# Patient Record
Sex: MALE | Race: WHITE | Employment: OTHER | URBAN - METROPOLITAN AREA
[De-identification: names, ages, dates, MRNs, and addresses within clinical notes are randomized per-mention and may not be internally consistent; named-entity substitution may affect disease eponyms.]

---

## 2017-03-15 LAB
AVERAGE GLUCOSE: NORMAL
CHOLESTEROL, TOTAL: 125 MG/DL
CHOLESTEROL/HDL RATIO: NORMAL
HBA1C MFR BLD: 6.1 %
HDLC SERPL-MCNC: 39 MG/DL (ref 35–70)
LDL CHOLESTEROL CALCULATED: 61 MG/DL (ref 0–160)
TRIGL SERPL-MCNC: 127 MG/DL
VLDLC SERPL CALC-MCNC: NORMAL MG/DL

## 2017-09-12 ENCOUNTER — TELEPHONE (OUTPATIENT)
Dept: PHARMACY | Facility: CLINIC | Age: 77
End: 2017-09-12

## 2018-03-27 ENCOUNTER — OFFICE VISIT (OUTPATIENT)
Dept: PRIMARY CARE CLINIC | Age: 78
End: 2018-03-27
Payer: MEDICARE

## 2018-03-27 VITALS
OXYGEN SATURATION: 97 % | WEIGHT: 228 LBS | TEMPERATURE: 97.7 F | RESPIRATION RATE: 18 BRPM | BODY MASS INDEX: 30.22 KG/M2 | SYSTOLIC BLOOD PRESSURE: 108 MMHG | HEIGHT: 73 IN | HEART RATE: 61 BPM | DIASTOLIC BLOOD PRESSURE: 62 MMHG

## 2018-03-27 DIAGNOSIS — R53.82 CHRONIC FATIGUE: ICD-10-CM

## 2018-03-27 DIAGNOSIS — N32.0 BLADDER OUTLET OBSTRUCTION: ICD-10-CM

## 2018-03-27 DIAGNOSIS — E78.2 MIXED HYPERLIPIDEMIA: ICD-10-CM

## 2018-03-27 DIAGNOSIS — I25.5 ISCHEMIC CARDIOMYOPATHY: Primary | ICD-10-CM

## 2018-03-27 DIAGNOSIS — I25.10 CORONARY ARTERY DISEASE INVOLVING NATIVE CORONARY ARTERY OF NATIVE HEART WITHOUT ANGINA PECTORIS: ICD-10-CM

## 2018-03-27 DIAGNOSIS — R97.20 ELEVATED PSA: ICD-10-CM

## 2018-03-27 DIAGNOSIS — M17.5 OTHER SECONDARY OSTEOARTHRITIS OF KNEE, UNSPECIFIED LATERALITY: ICD-10-CM

## 2018-03-27 DIAGNOSIS — L98.9 SKIN LESIONS: ICD-10-CM

## 2018-03-27 DIAGNOSIS — G47.34 IDIOPATHIC SLEEP RELATED NONOBSTRUCTIVE ALVEOLAR HYPOVENTILATION: ICD-10-CM

## 2018-03-27 DIAGNOSIS — I15.9 SECONDARY HYPERTENSION: ICD-10-CM

## 2018-03-27 DIAGNOSIS — R73.09 ELEVATED HEMOGLOBIN A1C: ICD-10-CM

## 2018-03-27 DIAGNOSIS — N42.89 PROSTATE ASYMMETRY: ICD-10-CM

## 2018-03-27 PROCEDURE — 99214 OFFICE O/P EST MOD 30 MIN: CPT | Performed by: INTERNAL MEDICINE

## 2018-03-27 RX ORDER — LOSARTAN POTASSIUM 25 MG/1
12.5 TABLET ORAL DAILY
Qty: 90 TABLET | Refills: 3 | Status: CANCELLED | OUTPATIENT
Start: 2018-03-27

## 2018-03-27 RX ORDER — METOPROLOL SUCCINATE 25 MG/1
25 TABLET, EXTENDED RELEASE ORAL DAILY
Status: ON HOLD | COMMUNITY
End: 2018-04-12

## 2018-03-27 RX ORDER — LOSARTAN POTASSIUM 25 MG/1
25 TABLET ORAL DAILY
COMMUNITY
End: 2018-03-27 | Stop reason: SDUPTHER

## 2018-03-27 RX ORDER — LOSARTAN POTASSIUM 25 MG/1
25 TABLET ORAL DAILY
Qty: 90 TABLET | Refills: 3 | Status: ON HOLD | OUTPATIENT
Start: 2018-03-27 | End: 2018-04-12

## 2018-03-27 ASSESSMENT — ENCOUNTER SYMPTOMS
DOUBLE VISION: 0
BLURRED VISION: 0
DIARRHEA: 0
BLOOD IN STOOL: 0
NAUSEA: 0
STRIDOR: 0
HEMOPTYSIS: 0
EYE PAIN: 0
SHORTNESS OF BREATH: 0
EYE REDNESS: 0

## 2018-03-27 ASSESSMENT — PATIENT HEALTH QUESTIONNAIRE - PHQ9
SUM OF ALL RESPONSES TO PHQ9 QUESTIONS 1 & 2: 0
2. FEELING DOWN, DEPRESSED OR HOPELESS: 0
1. LITTLE INTEREST OR PLEASURE IN DOING THINGS: 0
SUM OF ALL RESPONSES TO PHQ QUESTIONS 1-9: 0

## 2018-03-27 NOTE — PROGRESS NOTES
Chief Complaint   Patient presents with    Hypertension     6mth check-up    Hyperlipidemia    Health Maintenance     due for flu,tdap,pneumo,       HPI:   Patient last seen 9/26/17    Chief complaint reason for visit patient is complaining of bladder outlet obstruction frequency of urination nocturia    As call located history of known coronary artery disease follows up with cardiologist at [facility]    Patient uses CPAP on a regular basis apparently derives benefit from this. Colonoscopy in 2013 no GI complaints. Patient has had stent placements in the past for coronary disease with ischemic heart disease. He is working out on a regular basis. Allergy and Medications are reviewed and updated. Past Medical History, Surgical History, and Family History has been reviewed and updated. Review of Systems:  Review of Systems   Constitutional: Negative for chills and fever. HENT: Negative for congestion, ear discharge and ear pain. Eyes: Negative for blurred vision, double vision, pain and redness. Respiratory: Negative for hemoptysis, shortness of breath and stridor. Cardiovascular: Negative for chest pain, claudication, leg swelling and PND. Gastrointestinal: Negative for blood in stool, diarrhea and nausea. Genitourinary: Negative for dysuria, hematuria and urgency. Musculoskeletal: Negative for falls and joint pain. Skin: Negative for rash. Neurological: Negative for dizziness, focal weakness, seizures and loss of consciousness. Psychiatric/Behavioral: Negative for depression, hallucinations, memory loss and suicidal ideas. Vitals:    03/27/18 1026   BP: 108/62   Site: Left Arm   Position: Sitting   Cuff Size: Large Adult   Pulse: 61   Resp: 18   Temp: 97.7 °F (36.5 °C)   TempSrc: Tympanic   SpO2: 97%   Weight: 228 lb (103.4 kg)   Height: 6' 1\" (1.854 m)       Physical Exam:  Physical Exam   Constitutional: He is oriented to person, place, and time.  He appears [address], [place] ([facility])      External Referral To Dermatology      CBC with Differential      Comprehensive Metabolic Panel      Hemoglobin A1C      Lipid Panel      TSH with Reflex      UA without Microscopic, Reflex C&S   4. Idiopathic sleep related nonobstructive alveolar hypoventilation G47.34 327.24 [facility], [address], [place] ([facility])      External Referral To Dermatology      CBC with Differential      Comprehensive Metabolic Panel      Hemoglobin A1C      Lipid Panel      TSH with Reflex      UA without Microscopic, Reflex C&S   5. Other secondary osteoarthritis of knee, unspecified laterality M17.5 715.26 [facility], [address], [place] ([facility])      External Referral To Dermatology      CBC with Differential      Comprehensive Metabolic Panel      Hemoglobin A1C      Lipid Panel      TSH with Reflex      UA without Microscopic, Reflex C&S   6. Mixed hyperlipidemia E78.2 272.2 [facility], [address], [place] ([facility])      External Referral To Dermatology      CBC with Differential      Comprehensive Metabolic Panel      Hemoglobin A1C      Lipid Panel      TSH with Reflex      UA without Microscopic, Reflex C&S   7. Elevated hemoglobin A1c R73.09 790.29 [facility], [address], [place] ([facility])      External Referral To Dermatology      CBC with Differential      Comprehensive Metabolic Panel      Hemoglobin A1C      Lipid Panel      TSH with Reflex      UA without Microscopic, Reflex C&S   8. Bladder outlet obstruction N32.0 596.0 [facility], [address], [place] ([facility])      External Referral To Dermatology      CBC with Differential      Comprehensive Metabolic Panel      Hemoglobin A1C      Lipid Panel      TSH with Reflex      UA without Microscopic, Reflex C&S   9.  Prostate asymmetry N42.89 602.8 [facility], [address], [place] ([facility])      External Referral To Dermatology      CBC with Differential      Comprehensive Metabolic Panel      Hemoglobin A1C      Lipid Panel      TSH with Reflex      UA without Microscopic, Reflex

## 2018-03-27 NOTE — PATIENT INSTRUCTIONS
1.  Continue to follow up on a regular basis with all usual consultants that she see  2. Be sure to continue following dietary instructions  3. Review your medications, note any changes that will be made by me or any other physician seeing.   Set up appointment with Dr. Bryan Geronimo, Urology, and Dr. Aldo Nguyen, dermatology  See me in 3 months, lab test befor

## 2018-04-06 ENCOUNTER — HOSPITAL ENCOUNTER (OUTPATIENT)
Age: 78
Discharge: HOME | End: 2018-04-08
Payer: MEDICARE

## 2018-04-06 LAB — PROSTATE SPECIFIC ANTIGEN: 2.14 NG/ML (ref 0–4)

## 2018-04-06 PROCEDURE — G0103 PSA SCREENING: HCPCS

## 2018-04-11 ENCOUNTER — APPOINTMENT (OUTPATIENT)
Dept: GENERAL RADIOLOGY | Age: 78
End: 2018-04-11
Payer: MEDICARE

## 2018-04-11 ENCOUNTER — APPOINTMENT (OUTPATIENT)
Dept: CT IMAGING | Age: 78
End: 2018-04-11
Payer: MEDICARE

## 2018-04-11 ENCOUNTER — HOSPITAL ENCOUNTER (OUTPATIENT)
Age: 78
Setting detail: OBSERVATION
Discharge: HOME | End: 2018-04-12
Attending: EMERGENCY MEDICINE | Admitting: INTERNAL MEDICINE
Payer: MEDICARE

## 2018-04-11 DIAGNOSIS — I50.22 CHF (CONGESTIVE HEART FAILURE), NYHA CLASS I, CHRONIC, SYSTOLIC (HCC): ICD-10-CM

## 2018-04-11 DIAGNOSIS — J32.0 CHRONIC MAXILLARY SINUSITIS: ICD-10-CM

## 2018-04-11 DIAGNOSIS — G45.9 TRANSIENT CEREBRAL ISCHEMIA, UNSPECIFIED TYPE: Primary | ICD-10-CM

## 2018-04-11 DIAGNOSIS — J32.8 CHRONIC FRONTOETHMOIDAL SINUSITIS: ICD-10-CM

## 2018-04-11 DIAGNOSIS — I25.10 CORONARY ARTERY DISEASE INVOLVING NATIVE CORONARY ARTERY OF NATIVE HEART WITHOUT ANGINA PECTORIS: ICD-10-CM

## 2018-04-11 DIAGNOSIS — I25.2 HISTORY OF NON-ST ELEVATION MYOCARDIAL INFARCTION (NSTEMI): ICD-10-CM

## 2018-04-11 PROBLEM — J44.9 COPD (CHRONIC OBSTRUCTIVE PULMONARY DISEASE) (HCC): Status: ACTIVE | Noted: 2018-04-01

## 2018-04-11 LAB
ANION GAP SERPL CALCULATED.3IONS-SCNC: 12 MMOL/L (ref 7–16)
APTT: 32.8 SEC (ref 24.5–35.1)
BILIRUBIN URINE: NEGATIVE
BLOOD, URINE: NEGATIVE
BUN BLDV-MCNC: 16 MG/DL (ref 8–23)
CALCIUM SERPL-MCNC: 8.8 MG/DL (ref 8.6–10.2)
CHLORIDE BLD-SCNC: 105 MMOL/L (ref 98–107)
CLARITY: CLEAR
CO2: 25 MMOL/L (ref 22–29)
COLOR: YELLOW
CREAT SERPL-MCNC: 0.8 MG/DL (ref 0.7–1.2)
EKG ATRIAL RATE: 66 BPM
EKG P AXIS: 39 DEGREES
EKG P-R INTERVAL: 158 MS
EKG Q-T INTERVAL: 414 MS
EKG QRS DURATION: 92 MS
EKG QTC CALCULATION (BAZETT): 434 MS
EKG R AXIS: -2 DEGREES
EKG T AXIS: 9 DEGREES
EKG VENTRICULAR RATE: 66 BPM
GFR AFRICAN AMERICAN: >60
GFR NON-AFRICAN AMERICAN: >60 ML/MIN/1.73
GLUCOSE BLD-MCNC: 110 MG/DL (ref 74–109)
GLUCOSE URINE: NEGATIVE MG/DL
HCT VFR BLD CALC: 41.7 % (ref 37–54)
HEMOGLOBIN: 13.5 G/DL (ref 12.5–16.5)
INR BLD: 1
KETONES, URINE: NEGATIVE MG/DL
LEUKOCYTE ESTERASE, URINE: NEGATIVE
MCH RBC QN AUTO: 28.4 PG (ref 26–35)
MCHC RBC AUTO-ENTMCNC: 32.4 % (ref 32–34.5)
MCV RBC AUTO: 87.6 FL (ref 80–99.9)
NITRITE, URINE: NEGATIVE
PDW BLD-RTO: 15.4 FL (ref 11.5–15)
PH UA: 6 (ref 5–9)
PLATELET # BLD: 211 E9/L (ref 130–450)
PMV BLD AUTO: 11.2 FL (ref 7–12)
POTASSIUM SERPL-SCNC: 3.5 MMOL/L (ref 3.5–5)
PROTEIN UA: NEGATIVE MG/DL
PROTHROMBIN TIME: 11.7 SEC (ref 9.3–12.4)
RBC # BLD: 4.76 E12/L (ref 3.8–5.8)
SODIUM BLD-SCNC: 142 MMOL/L (ref 132–146)
SPECIFIC GRAVITY UA: 1.02 (ref 1–1.03)
TROPONIN: <0.01 NG/ML (ref 0–0.03)
UROBILINOGEN, URINE: 0.2 E.U./DL
WBC # BLD: 9.4 E9/L (ref 4.5–11.5)

## 2018-04-11 PROCEDURE — G0378 HOSPITAL OBSERVATION PER HR: HCPCS

## 2018-04-11 PROCEDURE — 93005 ELECTROCARDIOGRAM TRACING: CPT | Performed by: PHYSICIAN ASSISTANT

## 2018-04-11 PROCEDURE — 80048 BASIC METABOLIC PNL TOTAL CA: CPT

## 2018-04-11 PROCEDURE — 81003 URINALYSIS AUTO W/O SCOPE: CPT

## 2018-04-11 PROCEDURE — 85730 THROMBOPLASTIN TIME PARTIAL: CPT

## 2018-04-11 PROCEDURE — 99285 EMERGENCY DEPT VISIT HI MDM: CPT

## 2018-04-11 PROCEDURE — 71045 X-RAY EXAM CHEST 1 VIEW: CPT

## 2018-04-11 PROCEDURE — 85610 PROTHROMBIN TIME: CPT

## 2018-04-11 PROCEDURE — 70450 CT HEAD/BRAIN W/O DYE: CPT

## 2018-04-11 PROCEDURE — 84484 ASSAY OF TROPONIN QUANT: CPT

## 2018-04-11 PROCEDURE — 85027 COMPLETE CBC AUTOMATED: CPT

## 2018-04-11 RX ORDER — LOSARTAN POTASSIUM 50 MG/1
50 TABLET ORAL DAILY
Status: DISCONTINUED | OUTPATIENT
Start: 2018-04-12 | End: 2018-04-12 | Stop reason: HOSPADM

## 2018-04-11 RX ORDER — ASPIRIN 81 MG/1
81 TABLET ORAL DAILY
Status: DISCONTINUED | OUTPATIENT
Start: 2018-04-12 | End: 2018-04-12 | Stop reason: HOSPADM

## 2018-04-11 RX ORDER — FORMOTEROL FUMARATE 20 UG/2ML
20 SOLUTION RESPIRATORY (INHALATION) 2 TIMES DAILY
Status: DISCONTINUED | OUTPATIENT
Start: 2018-04-12 | End: 2018-04-12

## 2018-04-11 RX ORDER — NITROGLYCERIN 0.4 MG/1
0.4 TABLET SUBLINGUAL EVERY 5 MIN PRN
Status: DISCONTINUED | OUTPATIENT
Start: 2018-04-11 | End: 2018-04-12 | Stop reason: HOSPADM

## 2018-04-11 RX ORDER — CLOPIDOGREL BISULFATE 75 MG/1
75 TABLET ORAL DAILY
Status: DISCONTINUED | OUTPATIENT
Start: 2018-04-12 | End: 2018-04-12 | Stop reason: HOSPADM

## 2018-04-11 RX ORDER — SODIUM CHLORIDE 9 MG/ML
INJECTION, SOLUTION INTRAVENOUS CONTINUOUS
Status: DISCONTINUED | OUTPATIENT
Start: 2018-04-12 | End: 2018-04-12 | Stop reason: HOSPADM

## 2018-04-11 RX ORDER — IPRATROPIUM BROMIDE AND ALBUTEROL SULFATE 2.5; .5 MG/3ML; MG/3ML
1 SOLUTION RESPIRATORY (INHALATION)
Status: DISCONTINUED | OUTPATIENT
Start: 2018-04-12 | End: 2018-04-12

## 2018-04-11 RX ORDER — M-VIT,TX,IRON,MINS/CALC/FOLIC 27MG-0.4MG
1 TABLET ORAL DAILY
Status: DISCONTINUED | OUTPATIENT
Start: 2018-04-12 | End: 2018-04-12 | Stop reason: HOSPADM

## 2018-04-11 RX ORDER — CHOLECALCIFEROL (VITAMIN D3) 50 MCG
2000 TABLET ORAL DAILY
Status: DISCONTINUED | OUTPATIENT
Start: 2018-04-12 | End: 2018-04-12 | Stop reason: HOSPADM

## 2018-04-11 RX ORDER — ATORVASTATIN CALCIUM 40 MG/1
40 TABLET, FILM COATED ORAL DAILY
Status: DISCONTINUED | OUTPATIENT
Start: 2018-04-12 | End: 2018-04-12

## 2018-04-11 RX ORDER — ACETAMINOPHEN 325 MG/1
650 TABLET ORAL EVERY 4 HOURS PRN
Status: DISCONTINUED | OUTPATIENT
Start: 2018-04-11 | End: 2018-04-12 | Stop reason: HOSPADM

## 2018-04-11 RX ORDER — PANTOPRAZOLE SODIUM 40 MG/1
40 TABLET, DELAYED RELEASE ORAL
Status: DISCONTINUED | OUTPATIENT
Start: 2018-04-12 | End: 2018-04-12 | Stop reason: HOSPADM

## 2018-04-11 RX ORDER — METOPROLOL SUCCINATE 50 MG/1
50 TABLET, EXTENDED RELEASE ORAL DAILY
Status: DISCONTINUED | OUTPATIENT
Start: 2018-04-12 | End: 2018-04-12

## 2018-04-11 RX ORDER — ONDANSETRON 2 MG/ML
4 INJECTION INTRAMUSCULAR; INTRAVENOUS EVERY 6 HOURS PRN
Status: DISCONTINUED | OUTPATIENT
Start: 2018-04-11 | End: 2018-04-12 | Stop reason: HOSPADM

## 2018-04-11 RX ORDER — METHYLPREDNISOLONE SODIUM SUCCINATE 40 MG/ML
40 INJECTION, POWDER, LYOPHILIZED, FOR SOLUTION INTRAMUSCULAR; INTRAVENOUS EVERY 8 HOURS
Status: DISCONTINUED | OUTPATIENT
Start: 2018-04-12 | End: 2018-04-12

## 2018-04-11 RX ORDER — BUDESONIDE 0.25 MG/2ML
250 INHALANT ORAL 2 TIMES DAILY
Status: DISCONTINUED | OUTPATIENT
Start: 2018-04-12 | End: 2018-04-12

## 2018-04-11 ASSESSMENT — ENCOUNTER SYMPTOMS
ABDOMINAL PAIN: 0
SHORTNESS OF BREATH: 0
EYE REDNESS: 0
EYE PAIN: 0
SORE THROAT: 0
DIARRHEA: 0
VOMITING: 0
EYE DISCHARGE: 0
BLOOD IN STOOL: 0
NAUSEA: 0
BACK PAIN: 0
SINUS PRESSURE: 0
WHEEZING: 0
COUGH: 0

## 2018-04-11 ASSESSMENT — PAIN SCALES - GENERAL: PAINLEVEL_OUTOF10: 0

## 2018-04-12 ENCOUNTER — APPOINTMENT (OUTPATIENT)
Dept: MRI IMAGING | Age: 78
End: 2018-04-12
Payer: MEDICARE

## 2018-04-12 ENCOUNTER — APPOINTMENT (OUTPATIENT)
Dept: ULTRASOUND IMAGING | Age: 78
End: 2018-04-12
Payer: MEDICARE

## 2018-04-12 VITALS
HEART RATE: 81 BPM | SYSTOLIC BLOOD PRESSURE: 134 MMHG | BODY MASS INDEX: 30.22 KG/M2 | HEIGHT: 73 IN | DIASTOLIC BLOOD PRESSURE: 64 MMHG | OXYGEN SATURATION: 95 % | WEIGHT: 228 LBS | TEMPERATURE: 97.9 F | RESPIRATION RATE: 18 BRPM

## 2018-04-12 LAB
ALBUMIN SERPL-MCNC: 3.4 G/DL (ref 3.5–5.2)
ALP BLD-CCNC: 51 U/L (ref 40–129)
ALT SERPL-CCNC: 11 U/L (ref 0–40)
ANION GAP SERPL CALCULATED.3IONS-SCNC: 11 MMOL/L (ref 7–16)
AST SERPL-CCNC: 12 U/L (ref 0–39)
BASOPHILS ABSOLUTE: 0.08 E9/L (ref 0–0.2)
BASOPHILS RELATIVE PERCENT: 1 % (ref 0–2)
BILIRUB SERPL-MCNC: 0.4 MG/DL (ref 0–1.2)
BUN BLDV-MCNC: 15 MG/DL (ref 8–23)
CALCIUM SERPL-MCNC: 8.7 MG/DL (ref 8.6–10.2)
CHLORIDE BLD-SCNC: 108 MMOL/L (ref 98–107)
CHOLESTEROL, TOTAL: 158 MG/DL (ref 0–199)
CO2: 22 MMOL/L (ref 22–29)
CREAT SERPL-MCNC: 0.7 MG/DL (ref 0.7–1.2)
EOSINOPHILS ABSOLUTE: 0.66 E9/L (ref 0.05–0.5)
EOSINOPHILS RELATIVE PERCENT: 8.4 % (ref 0–6)
FILM ARRAY ADENOVIRUS: NORMAL
FILM ARRAY BORDETELLA PERTUSSIS: NORMAL
FILM ARRAY CHLAMYDOPHILIA PNEUMONIAE: NORMAL
FILM ARRAY CORONAVIRUS 229E: NORMAL
FILM ARRAY CORONAVIRUS HKU1: NORMAL
FILM ARRAY CORONAVIRUS NL63: NORMAL
FILM ARRAY CORONAVIRUS OC43: NORMAL
FILM ARRAY INFLUENZA A VIRUS 09H1: NORMAL
FILM ARRAY INFLUENZA A VIRUS H1: NORMAL
FILM ARRAY INFLUENZA A VIRUS H3: NORMAL
FILM ARRAY INFLUENZA A VIRUS: NORMAL
FILM ARRAY INFLUENZA B: NORMAL
FILM ARRAY METAPNEUMOVIRUS: NORMAL
FILM ARRAY MYCOPLASMA PNEUMONIAE: NORMAL
FILM ARRAY PARAINFLUENZA VIRUS 1: NORMAL
FILM ARRAY PARAINFLUENZA VIRUS 2: NORMAL
FILM ARRAY PARAINFLUENZA VIRUS 3: NORMAL
FILM ARRAY PARAINFLUENZA VIRUS 4: NORMAL
FILM ARRAY RESPIRATORY SYNCITIAL VIRUS: NORMAL
FILM ARRAY RHINOVIRUS/ENTEROVIRUS: NORMAL
FOLATE: >20 NG/ML (ref 4.8–24.2)
GFR AFRICAN AMERICAN: >60
GFR NON-AFRICAN AMERICAN: >60 ML/MIN/1.73
GLUCOSE BLD-MCNC: 105 MG/DL (ref 74–109)
HBA1C MFR BLD: 5.8 % (ref 4.8–5.9)
HCT VFR BLD CALC: 39.5 % (ref 37–54)
HDLC SERPL-MCNC: 59 MG/DL
HEMOGLOBIN: 12.8 G/DL (ref 12.5–16.5)
IMMATURE GRANULOCYTES #: 0.03 E9/L
IMMATURE GRANULOCYTES %: 0.4 % (ref 0–5)
L. PNEUMOPHILA SEROGP 1 UR AG: NORMAL
LDL CHOLESTEROL CALCULATED: 81 MG/DL (ref 0–99)
LV EF: 55 %
LVEF MODALITY: NORMAL
LYMPHOCYTES ABSOLUTE: 3.18 E9/L (ref 1.5–4)
LYMPHOCYTES RELATIVE PERCENT: 40.5 % (ref 20–42)
MAGNESIUM: 2 MG/DL (ref 1.6–2.6)
MCH RBC QN AUTO: 28.3 PG (ref 26–35)
MCHC RBC AUTO-ENTMCNC: 32.4 % (ref 32–34.5)
MCV RBC AUTO: 87.4 FL (ref 80–99.9)
MONOCYTES ABSOLUTE: 0.74 E9/L (ref 0.1–0.95)
MONOCYTES RELATIVE PERCENT: 9.4 % (ref 2–12)
NEUTROPHILS ABSOLUTE: 3.16 E9/L (ref 1.8–7.3)
NEUTROPHILS RELATIVE PERCENT: 40.3 % (ref 43–80)
PDW BLD-RTO: 15.3 FL (ref 11.5–15)
PHOSPHORUS: 4.1 MG/DL (ref 2.5–4.5)
PLATELET # BLD: 192 E9/L (ref 130–450)
PMV BLD AUTO: 11.3 FL (ref 7–12)
POTASSIUM SERPL-SCNC: 3.8 MMOL/L (ref 3.5–5)
PRO-BNP: 439 PG/ML (ref 0–450)
RBC # BLD: 4.52 E12/L (ref 3.8–5.8)
SODIUM BLD-SCNC: 141 MMOL/L (ref 132–146)
STREP PNEUMONIAE ANTIGEN, URINE: NORMAL
TOTAL PROTEIN: 5.8 G/DL (ref 6.4–8.3)
TRIGL SERPL-MCNC: 91 MG/DL (ref 0–149)
TROPONIN: <0.01 NG/ML (ref 0–0.03)
TROPONIN: <0.01 NG/ML (ref 0–0.03)
TSH SERPL DL<=0.05 MIU/L-ACNC: 2.97 UIU/ML (ref 0.27–4.2)
VITAMIN B-12: 552 PG/ML (ref 211–946)
VLDLC SERPL CALC-MCNC: 18 MG/DL
WBC # BLD: 7.9 E9/L (ref 4.5–11.5)

## 2018-04-12 PROCEDURE — 96375 TX/PRO/DX INJ NEW DRUG ADDON: CPT

## 2018-04-12 PROCEDURE — 87501 INFLUENZA DNA AMP PROB 1+: CPT

## 2018-04-12 PROCEDURE — 87581 M.PNEUMON DNA AMP PROBE: CPT

## 2018-04-12 PROCEDURE — 80061 LIPID PANEL: CPT

## 2018-04-12 PROCEDURE — G8988 SELF CARE GOAL STATUS: HCPCS

## 2018-04-12 PROCEDURE — 87088 URINE BACTERIA CULTURE: CPT

## 2018-04-12 PROCEDURE — G8989 SELF CARE D/C STATUS: HCPCS

## 2018-04-12 PROCEDURE — 6370000000 HC RX 637 (ALT 250 FOR IP): Performed by: INTERNAL MEDICINE

## 2018-04-12 PROCEDURE — 83036 HEMOGLOBIN GLYCOSYLATED A1C: CPT

## 2018-04-12 PROCEDURE — 84439 ASSAY OF FREE THYROXINE: CPT

## 2018-04-12 PROCEDURE — 97165 OT EVAL LOW COMPLEX 30 MIN: CPT

## 2018-04-12 PROCEDURE — 87040 BLOOD CULTURE FOR BACTERIA: CPT

## 2018-04-12 PROCEDURE — 96376 TX/PRO/DX INJ SAME DRUG ADON: CPT

## 2018-04-12 PROCEDURE — 93880 EXTRACRANIAL BILAT STUDY: CPT

## 2018-04-12 PROCEDURE — 87798 DETECT AGENT NOS DNA AMP: CPT

## 2018-04-12 PROCEDURE — 2580000003 HC RX 258

## 2018-04-12 PROCEDURE — 87450 HC DIRECT STREP B ANTIGEN: CPT

## 2018-04-12 PROCEDURE — G8978 MOBILITY CURRENT STATUS: HCPCS

## 2018-04-12 PROCEDURE — 87503 INFLUENZA DNA AMP PROB ADDL: CPT

## 2018-04-12 PROCEDURE — 80053 COMPREHEN METABOLIC PANEL: CPT

## 2018-04-12 PROCEDURE — 6360000002 HC RX W HCPCS: Performed by: INTERNAL MEDICINE

## 2018-04-12 PROCEDURE — G0378 HOSPITAL OBSERVATION PER HR: HCPCS

## 2018-04-12 PROCEDURE — 82607 VITAMIN B-12: CPT

## 2018-04-12 PROCEDURE — 85025 COMPLETE CBC W/AUTO DIFF WBC: CPT

## 2018-04-12 PROCEDURE — G8987 SELF CARE CURRENT STATUS: HCPCS

## 2018-04-12 PROCEDURE — 87502 INFLUENZA DNA AMP PROBE: CPT

## 2018-04-12 PROCEDURE — 84100 ASSAY OF PHOSPHORUS: CPT

## 2018-04-12 PROCEDURE — 93306 TTE W/DOPPLER COMPLETE: CPT

## 2018-04-12 PROCEDURE — 96374 THER/PROPH/DIAG INJ IV PUSH: CPT

## 2018-04-12 PROCEDURE — 84443 ASSAY THYROID STIM HORMONE: CPT

## 2018-04-12 PROCEDURE — 36415 COLL VENOUS BLD VENIPUNCTURE: CPT

## 2018-04-12 PROCEDURE — 97161 PT EVAL LOW COMPLEX 20 MIN: CPT

## 2018-04-12 PROCEDURE — 94640 AIRWAY INHALATION TREATMENT: CPT

## 2018-04-12 PROCEDURE — 83735 ASSAY OF MAGNESIUM: CPT

## 2018-04-12 PROCEDURE — 83880 ASSAY OF NATRIURETIC PEPTIDE: CPT

## 2018-04-12 PROCEDURE — 84484 ASSAY OF TROPONIN QUANT: CPT

## 2018-04-12 PROCEDURE — 2580000003 HC RX 258: Performed by: INTERNAL MEDICINE

## 2018-04-12 PROCEDURE — 70551 MRI BRAIN STEM W/O DYE: CPT

## 2018-04-12 PROCEDURE — 87486 CHLMYD PNEUM DNA AMP PROBE: CPT

## 2018-04-12 PROCEDURE — 82746 ASSAY OF FOLIC ACID SERUM: CPT

## 2018-04-12 PROCEDURE — G8980 MOBILITY D/C STATUS: HCPCS

## 2018-04-12 PROCEDURE — 87205 SMEAR GRAM STAIN: CPT

## 2018-04-12 RX ORDER — METOPROLOL SUCCINATE 25 MG/1
50 TABLET, EXTENDED RELEASE ORAL DAILY
Qty: 30 TABLET | Refills: 3 | Status: SHIPPED | OUTPATIENT
Start: 2018-04-12 | End: 2018-07-11 | Stop reason: CLARIF

## 2018-04-12 RX ORDER — METOPROLOL SUCCINATE 50 MG/1
50 TABLET, EXTENDED RELEASE ORAL DAILY
Status: DISCONTINUED | OUTPATIENT
Start: 2018-04-12 | End: 2018-04-12 | Stop reason: HOSPADM

## 2018-04-12 RX ORDER — ATORVASTATIN CALCIUM 40 MG/1
40 TABLET, FILM COATED ORAL DAILY
Status: DISCONTINUED | OUTPATIENT
Start: 2018-04-12 | End: 2018-04-12 | Stop reason: HOSPADM

## 2018-04-12 RX ORDER — LOSARTAN POTASSIUM 25 MG/1
50 TABLET ORAL DAILY
Qty: 90 TABLET | Refills: 3 | Status: SHIPPED | OUTPATIENT
Start: 2018-04-12 | End: 2019-03-21 | Stop reason: SDUPTHER

## 2018-04-12 RX ORDER — ATORVASTATIN CALCIUM 80 MG/1
80 TABLET, FILM COATED ORAL DAILY
Qty: 30 TABLET | Refills: 3 | Status: SHIPPED | OUTPATIENT
Start: 2018-04-12 | End: 2018-06-18 | Stop reason: SDUPTHER

## 2018-04-12 RX ADMIN — SODIUM CHLORIDE: 9 INJECTION, SOLUTION INTRAVENOUS at 00:55

## 2018-04-12 RX ADMIN — ATORVASTATIN CALCIUM 40 MG: 40 TABLET, FILM COATED ORAL at 01:42

## 2018-04-12 RX ADMIN — ENOXAPARIN SODIUM 40 MG: 40 INJECTION, SOLUTION INTRAVENOUS; SUBCUTANEOUS at 08:58

## 2018-04-12 RX ADMIN — LOSARTAN POTASSIUM 50 MG: 50 TABLET, FILM COATED ORAL at 08:58

## 2018-04-12 RX ADMIN — BUDESONIDE 250 MCG: 0.25 SUSPENSION RESPIRATORY (INHALATION) at 09:46

## 2018-04-12 RX ADMIN — CHOLECALCIFEROL TAB 50 MCG (2000 UNIT) 2000 UNITS: 50 TAB at 09:09

## 2018-04-12 RX ADMIN — Medication 1 TABLET: at 08:57

## 2018-04-12 RX ADMIN — FORMOTEROL FUMARATE DIHYDRATE 20 MCG: 20 SOLUTION RESPIRATORY (INHALATION) at 09:45

## 2018-04-12 RX ADMIN — WATER 1 ML: 1 INJECTION INTRAMUSCULAR; INTRAVENOUS; SUBCUTANEOUS at 08:58

## 2018-04-12 RX ADMIN — METOPROLOL SUCCINATE 50 MG: 50 TABLET, EXTENDED RELEASE ORAL at 01:43

## 2018-04-12 RX ADMIN — PANTOPRAZOLE SODIUM 40 MG: 40 TABLET, DELAYED RELEASE ORAL at 09:09

## 2018-04-12 RX ADMIN — WATER 1 ML: 1 INJECTION INTRAMUSCULAR; INTRAVENOUS; SUBCUTANEOUS at 01:43

## 2018-04-12 RX ADMIN — SODIUM CHLORIDE: 9 INJECTION, SOLUTION INTRAVENOUS at 17:49

## 2018-04-12 RX ADMIN — METHYLPREDNISOLONE SODIUM SUCCINATE 40 MG: 40 INJECTION, POWDER, LYOPHILIZED, FOR SOLUTION INTRAMUSCULAR; INTRAVENOUS at 08:58

## 2018-04-12 RX ADMIN — METHYLPREDNISOLONE SODIUM SUCCINATE 40 MG: 40 INJECTION, POWDER, LYOPHILIZED, FOR SOLUTION INTRAMUSCULAR; INTRAVENOUS at 01:43

## 2018-04-12 RX ADMIN — ASPIRIN 81 MG: 81 TABLET, COATED ORAL at 08:57

## 2018-04-12 RX ADMIN — CLOPIDOGREL BISULFATE 75 MG: 75 TABLET ORAL at 08:57

## 2018-04-12 ASSESSMENT — PAIN SCALES - GENERAL
PAINLEVEL_OUTOF10: 0

## 2018-04-13 LAB
GRAM STAIN ORDERABLE: NORMAL
T4 FREE: 1.36 NG/DL (ref 0.93–1.7)

## 2018-04-14 LAB — URINE CULTURE, ROUTINE: NORMAL

## 2018-04-17 LAB
BLOOD CULTURE, ROUTINE: NORMAL
CULTURE, BLOOD 2: NORMAL

## 2018-05-29 ENCOUNTER — OFFICE VISIT (OUTPATIENT)
Dept: CARDIOLOGY CLINIC | Age: 78
End: 2018-05-29
Payer: MEDICARE

## 2018-05-29 VITALS
DIASTOLIC BLOOD PRESSURE: 72 MMHG | BODY MASS INDEX: 29.42 KG/M2 | RESPIRATION RATE: 16 BRPM | SYSTOLIC BLOOD PRESSURE: 112 MMHG | HEART RATE: 60 BPM | HEIGHT: 73 IN | WEIGHT: 222 LBS

## 2018-05-29 DIAGNOSIS — R06.09 DOE (DYSPNEA ON EXERTION): ICD-10-CM

## 2018-05-29 DIAGNOSIS — R07.9 CHEST PAIN, UNSPECIFIED TYPE: Primary | ICD-10-CM

## 2018-05-29 PROCEDURE — 93000 ELECTROCARDIOGRAM COMPLETE: CPT | Performed by: INTERNAL MEDICINE

## 2018-05-29 PROCEDURE — 99214 OFFICE O/P EST MOD 30 MIN: CPT | Performed by: INTERNAL MEDICINE

## 2018-06-18 RX ORDER — ATORVASTATIN CALCIUM 80 MG/1
80 TABLET, FILM COATED ORAL DAILY
Qty: 90 TABLET | Refills: 1 | Status: SHIPPED | OUTPATIENT
Start: 2018-06-18 | End: 2018-07-11 | Stop reason: SDUPTHER

## 2018-06-27 ENCOUNTER — HOSPITAL ENCOUNTER (OUTPATIENT)
Age: 78
Discharge: HOME | End: 2018-06-29
Payer: MEDICARE

## 2018-06-27 DIAGNOSIS — M17.5 OTHER SECONDARY OSTEOARTHRITIS OF KNEE, UNSPECIFIED LATERALITY: ICD-10-CM

## 2018-06-27 DIAGNOSIS — N42.89 PROSTATE ASYMMETRY: ICD-10-CM

## 2018-06-27 DIAGNOSIS — I25.5 ISCHEMIC CARDIOMYOPATHY: ICD-10-CM

## 2018-06-27 DIAGNOSIS — I25.10 CORONARY ARTERY DISEASE INVOLVING NATIVE CORONARY ARTERY OF NATIVE HEART WITHOUT ANGINA PECTORIS: ICD-10-CM

## 2018-06-27 DIAGNOSIS — L98.9 SKIN LESIONS: ICD-10-CM

## 2018-06-27 DIAGNOSIS — R97.20 ELEVATED PSA: ICD-10-CM

## 2018-06-27 DIAGNOSIS — G47.34 IDIOPATHIC SLEEP RELATED NONOBSTRUCTIVE ALVEOLAR HYPOVENTILATION: ICD-10-CM

## 2018-06-27 DIAGNOSIS — N32.0 BLADDER OUTLET OBSTRUCTION: ICD-10-CM

## 2018-06-27 DIAGNOSIS — R73.09 ELEVATED HEMOGLOBIN A1C: ICD-10-CM

## 2018-06-27 DIAGNOSIS — E78.2 MIXED HYPERLIPIDEMIA: ICD-10-CM

## 2018-06-27 DIAGNOSIS — R53.82 CHRONIC FATIGUE: ICD-10-CM

## 2018-06-27 DIAGNOSIS — I15.9 SECONDARY HYPERTENSION: ICD-10-CM

## 2018-06-27 LAB
ALBUMIN SERPL-MCNC: 4 G/DL (ref 3.5–5.2)
ALP BLD-CCNC: 70 U/L (ref 40–129)
ALT SERPL-CCNC: 11 U/L (ref 0–40)
AMYLASE: 74 U/L (ref 20–100)
ANION GAP SERPL CALCULATED.3IONS-SCNC: 12 MMOL/L (ref 7–16)
AST SERPL-CCNC: 13 U/L (ref 0–39)
BASOPHILS ABSOLUTE: 0.07 E9/L (ref 0–0.2)
BASOPHILS RELATIVE PERCENT: 0.8 % (ref 0–2)
BILIRUB SERPL-MCNC: 0.8 MG/DL (ref 0–1.2)
BUN BLDV-MCNC: 19 MG/DL (ref 8–23)
CALCIUM SERPL-MCNC: 8.9 MG/DL (ref 8.6–10.2)
CHLORIDE BLD-SCNC: 108 MMOL/L (ref 98–107)
CHOLESTEROL, TOTAL: 120 MG/DL (ref 0–199)
CO2: 24 MMOL/L (ref 22–29)
CREAT SERPL-MCNC: 0.9 MG/DL (ref 0.7–1.2)
EOSINOPHILS ABSOLUTE: 0.6 E9/L (ref 0.05–0.5)
EOSINOPHILS RELATIVE PERCENT: 7 % (ref 0–6)
GFR AFRICAN AMERICAN: >60
GFR NON-AFRICAN AMERICAN: >60 ML/MIN/1.73
GLUCOSE BLD-MCNC: 103 MG/DL (ref 74–109)
HBA1C MFR BLD: 6.1 % (ref 4–5.6)
HCT VFR BLD CALC: 45.3 % (ref 37–54)
HDLC SERPL-MCNC: 42 MG/DL
HEMOGLOBIN: 14 G/DL (ref 12.5–16.5)
IMMATURE GRANULOCYTES #: 0.02 E9/L
IMMATURE GRANULOCYTES %: 0.2 % (ref 0–5)
LDL CHOLESTEROL CALCULATED: 57 MG/DL (ref 0–99)
LIPASE: 48 U/L (ref 13–60)
LYMPHOCYTES ABSOLUTE: 3.19 E9/L (ref 1.5–4)
LYMPHOCYTES RELATIVE PERCENT: 37.2 % (ref 20–42)
MCH RBC QN AUTO: 27.3 PG (ref 26–35)
MCHC RBC AUTO-ENTMCNC: 30.9 % (ref 32–34.5)
MCV RBC AUTO: 88.3 FL (ref 80–99.9)
MONOCYTES ABSOLUTE: 0.81 E9/L (ref 0.1–0.95)
MONOCYTES RELATIVE PERCENT: 9.5 % (ref 2–12)
NEUTROPHILS ABSOLUTE: 3.88 E9/L (ref 1.8–7.3)
NEUTROPHILS RELATIVE PERCENT: 45.3 % (ref 43–80)
PDW BLD-RTO: 15.2 FL (ref 11.5–15)
PLATELET # BLD: 215 E9/L (ref 130–450)
PMV BLD AUTO: 11.6 FL (ref 7–12)
POTASSIUM SERPL-SCNC: 4.4 MMOL/L (ref 3.5–5)
RBC # BLD: 5.13 E12/L (ref 3.8–5.8)
SODIUM BLD-SCNC: 144 MMOL/L (ref 132–146)
TOTAL PROTEIN: 6.7 G/DL (ref 6.4–8.3)
TRIGL SERPL-MCNC: 107 MG/DL (ref 0–149)
TSH SERPL DL<=0.05 MIU/L-ACNC: 1.93 UIU/ML (ref 0.27–4.2)
VLDLC SERPL CALC-MCNC: 21 MG/DL
WBC # BLD: 8.6 E9/L (ref 4.5–11.5)

## 2018-06-27 PROCEDURE — 83036 HEMOGLOBIN GLYCOSYLATED A1C: CPT

## 2018-06-27 PROCEDURE — 80061 LIPID PANEL: CPT

## 2018-06-27 PROCEDURE — 83690 ASSAY OF LIPASE: CPT

## 2018-06-27 PROCEDURE — 85025 COMPLETE CBC W/AUTO DIFF WBC: CPT

## 2018-06-27 PROCEDURE — 80053 COMPREHEN METABOLIC PANEL: CPT

## 2018-06-27 PROCEDURE — 82150 ASSAY OF AMYLASE: CPT

## 2018-06-27 PROCEDURE — 84443 ASSAY THYROID STIM HORMONE: CPT

## 2018-06-28 ENCOUNTER — HOSPITAL ENCOUNTER (OUTPATIENT)
Dept: PULMONOLOGY | Age: 78
Discharge: HOME | End: 2018-06-28
Payer: MEDICARE

## 2018-06-28 DIAGNOSIS — R06.09 DOE (DYSPNEA ON EXERTION): ICD-10-CM

## 2018-06-28 PROCEDURE — 94726 PLETHYSMOGRAPHY LUNG VOLUMES: CPT

## 2018-06-28 PROCEDURE — 94060 EVALUATION OF WHEEZING: CPT

## 2018-06-28 PROCEDURE — 94729 DIFFUSING CAPACITY: CPT

## 2018-07-02 ENCOUNTER — TELEPHONE (OUTPATIENT)
Dept: CARDIOLOGY CLINIC | Age: 78
End: 2018-07-02

## 2018-07-02 NOTE — TELEPHONE ENCOUNTER
----- Message from Bebe Dawson DO sent at 7/2/2018  3:33 PM EDT -----  Let him know that he has abnormal pulmonary function tests indicating that he has lung disease. I'll defer this to his PCP.  Please forward this to his PCP and ask him to follow-up on this with his PCP.  ----- Message -----  From: Gianni Zavala Incoming Scans From [facility]  Sent: 7/2/2018   2:03 PM  To: Bebe Dawson DO

## 2018-07-02 NOTE — TELEPHONE ENCOUNTER
Patient notified of PFT results and Dr. Morgan Doing recommendation.  Note and PFT results faxed to Dr. Tonia Lemus [phone]

## 2018-07-03 ENCOUNTER — TELEPHONE (OUTPATIENT)
Dept: PRIMARY CARE CLINIC | Age: 78
End: 2018-07-03

## 2018-07-03 DIAGNOSIS — R94.2 ABNORMAL PFTS (PULMONARY FUNCTION TESTS): Primary | ICD-10-CM

## 2018-07-05 NOTE — TELEPHONE ENCOUNTER
Patient has been advised a referral has been sent to Dr. Scott Valerio.   His office will contact patient to schedule

## 2018-07-11 ENCOUNTER — OFFICE VISIT (OUTPATIENT)
Dept: PRIMARY CARE CLINIC | Age: 78
End: 2018-07-11
Payer: MEDICARE

## 2018-07-11 VITALS
WEIGHT: 224 LBS | HEIGHT: 73 IN | TEMPERATURE: 97.2 F | OXYGEN SATURATION: 96 % | BODY MASS INDEX: 29.69 KG/M2 | HEART RATE: 60 BPM | RESPIRATION RATE: 18 BRPM | DIASTOLIC BLOOD PRESSURE: 72 MMHG | SYSTOLIC BLOOD PRESSURE: 118 MMHG

## 2018-07-11 DIAGNOSIS — L81.9 PIGMENTED SKIN LESION: ICD-10-CM

## 2018-07-11 DIAGNOSIS — J32.0 CHRONIC MAXILLARY SINUSITIS: ICD-10-CM

## 2018-07-11 DIAGNOSIS — J44.9 CHRONIC OBSTRUCTIVE PULMONARY DISEASE, UNSPECIFIED COPD TYPE (HCC): ICD-10-CM

## 2018-07-11 DIAGNOSIS — G45.3 AMAUROSIS FUGAX: ICD-10-CM

## 2018-07-11 DIAGNOSIS — R53.83 FATIGUE, UNSPECIFIED TYPE: ICD-10-CM

## 2018-07-11 DIAGNOSIS — I15.9 SECONDARY HYPERTENSION: ICD-10-CM

## 2018-07-11 DIAGNOSIS — M89.9 DISORDER OF BONE: ICD-10-CM

## 2018-07-11 DIAGNOSIS — Z83.3 FHX: DIABETES MELLITUS: ICD-10-CM

## 2018-07-11 DIAGNOSIS — N40.0 PROSTATE ENLARGEMENT: ICD-10-CM

## 2018-07-11 DIAGNOSIS — G47.34 IDIOPATHIC SLEEP RELATED NONOBSTRUCTIVE ALVEOLAR HYPOVENTILATION: ICD-10-CM

## 2018-07-11 DIAGNOSIS — G45.0 VERTEBROBASILAR ARTERY SYNDROME: ICD-10-CM

## 2018-07-11 DIAGNOSIS — E78.2 MIXED HYPERLIPIDEMIA: ICD-10-CM

## 2018-07-11 DIAGNOSIS — I20.9 ANGINA PECTORIS (HCC): Primary | ICD-10-CM

## 2018-07-11 DIAGNOSIS — M17.5 OTHER SECONDARY OSTEOARTHRITIS OF KNEE, UNSPECIFIED LATERALITY: ICD-10-CM

## 2018-07-11 DIAGNOSIS — I25.10 CORONARY ARTERY DISEASE INVOLVING NATIVE CORONARY ARTERY OF NATIVE HEART WITHOUT ANGINA PECTORIS: ICD-10-CM

## 2018-07-11 PROCEDURE — 99214 OFFICE O/P EST MOD 30 MIN: CPT | Performed by: INTERNAL MEDICINE

## 2018-07-11 RX ORDER — CLOPIDOGREL BISULFATE 75 MG/1
75 TABLET ORAL DAILY
Qty: 90 TABLET | Refills: 2 | Status: SHIPPED | OUTPATIENT
Start: 2018-07-11 | End: 2019-03-21 | Stop reason: SDUPTHER

## 2018-07-11 RX ORDER — METOPROLOL SUCCINATE 50 MG/1
50 TABLET, EXTENDED RELEASE ORAL DAILY
COMMUNITY
End: 2018-07-11 | Stop reason: SDUPTHER

## 2018-07-11 RX ORDER — METOPROLOL SUCCINATE 50 MG/1
50 TABLET, EXTENDED RELEASE ORAL DAILY
Qty: 90 TABLET | Refills: 2 | Status: SHIPPED | OUTPATIENT
Start: 2018-07-11 | End: 2019-03-21 | Stop reason: SDUPTHER

## 2018-07-11 RX ORDER — ATORVASTATIN CALCIUM 80 MG/1
80 TABLET, FILM COATED ORAL DAILY
Qty: 90 TABLET | Refills: 2 | Status: SHIPPED | OUTPATIENT
Start: 2018-07-11 | End: 2019-03-21 | Stop reason: SDUPTHER

## 2018-07-11 ASSESSMENT — ENCOUNTER SYMPTOMS
BLURRED VISION: 0
DIARRHEA: 0
SHORTNESS OF BREATH: 1
EYE REDNESS: 0
EYE PAIN: 0
HEMOPTYSIS: 0
DOUBLE VISION: 0
BLOOD IN STOOL: 0
NAUSEA: 0
STRIDOR: 0

## 2018-07-11 NOTE — PROGRESS NOTES
1\" (1.854 m)   Wt 224 lb (101.6 kg)   SpO2 96%   BMI 29.55 kg/m²   Physical Exam   Constitutional: He is oriented to person, place, and time. He appears well-developed and well-nourished. HENT:   Head: Normocephalic and atraumatic. Eyes: Pupils are equal, round, and reactive to light. Neck: Normal range of motion. Cardiovascular: Normal rate and regular rhythm. Pulmonary/Chest: Breath sounds normal.   Abdominal: Soft. Bowel sounds are normal.   Musculoskeletal: Normal range of motion. Neurological: He is alert and oriented to person, place, and time. Skin: Skin is warm and dry. Capillary refill takes less than 2 seconds. Psychiatric: He has a normal mood and affect. Vitals reviewed. Assessment/Plan:  Dewayne Pedro was seen today for hypertension, hyperlipidemia, discuss labs and health maintenance. Coronary artery disease with complications of angina are biggest concern for this gentleman. He saw cardiology locally here over the past 6 or 8 weeks. Eventually had pulmonary function studies which were thought to be abnormal he's now been referred to a pulmonologist for mixed obstructive and restrictive lung changes. The patient follows up with his local cardiologist as directed    Has had colonoscopy in 2013    Laboratory studies reviewed recently done are all at goal.    Continues to use his CPAP device effectively    Had TIA earlier in the year was hospitalized briefly. Patient is on Plavix with good results tolerates the medication well. Immunizations up-to-date on the patient. Plan he'll see me in 6 months, he'll continue to see all the other consulting physicians that he sees. He has not seen urology in recent years as well. The PSA study will be done the next time I see him.     Diagnoses and all orders for this visit:    Angina pectoris (HCC);continue to see cardiology and continue meds    Chronic obstructive pulmonary disease, unspecified COPD type (HCC);we'll continue problems, her history, and the physicians that he engages with.

## 2018-07-16 ENCOUNTER — TELEPHONE (OUTPATIENT)
Dept: CARDIOLOGY CLINIC | Age: 78
End: 2018-07-16

## 2018-07-16 NOTE — TELEPHONE ENCOUNTER
----- Message from Bebe Dawson DO sent at 7/13/2018 10:15 PM EDT -----  Per pulm/pcp    ----- Message -----  From: Gianni Zavala Incoming Scans From [facility]  Sent: 7/2/2018   2:03 PM  To: Bebe Dawson DO

## 2019-02-26 ENCOUNTER — TELEPHONE (OUTPATIENT)
Dept: PRIMARY CARE CLINIC | Age: 79
End: 2019-02-26

## 2019-03-14 ENCOUNTER — HOSPITAL ENCOUNTER (INPATIENT)
Age: 79
LOS: 2 days | Discharge: HOME | DRG: 445 | End: 2019-03-17
Attending: EMERGENCY MEDICINE | Admitting: INTERNAL MEDICINE
Payer: MEDICARE

## 2019-03-14 ENCOUNTER — APPOINTMENT (OUTPATIENT)
Dept: CT IMAGING | Age: 79
DRG: 445 | End: 2019-03-14
Payer: MEDICARE

## 2019-03-14 ENCOUNTER — APPOINTMENT (OUTPATIENT)
Dept: ULTRASOUND IMAGING | Age: 79
DRG: 445 | End: 2019-03-14
Payer: MEDICARE

## 2019-03-14 DIAGNOSIS — K81.0 ACUTE CHOLECYSTITIS: Primary | ICD-10-CM

## 2019-03-14 LAB
ALBUMIN SERPL-MCNC: 4.1 G/DL (ref 3.5–5.2)
ALP BLD-CCNC: 206 U/L (ref 40–129)
ALT SERPL-CCNC: 207 U/L (ref 0–40)
ANION GAP SERPL CALCULATED.3IONS-SCNC: 13 MMOL/L (ref 7–16)
AST SERPL-CCNC: 281 U/L (ref 0–39)
BASOPHILS ABSOLUTE: 0.05 E9/L (ref 0–0.2)
BASOPHILS RELATIVE PERCENT: 0.4 % (ref 0–2)
BILIRUB SERPL-MCNC: 1.6 MG/DL (ref 0–1.2)
BUN BLDV-MCNC: 17 MG/DL (ref 8–23)
CALCIUM SERPL-MCNC: 8.9 MG/DL (ref 8.6–10.2)
CHLORIDE BLD-SCNC: 105 MMOL/L (ref 98–107)
CO2: 23 MMOL/L (ref 22–29)
CREAT SERPL-MCNC: 0.8 MG/DL (ref 0.7–1.2)
EOSINOPHILS ABSOLUTE: 0.09 E9/L (ref 0.05–0.5)
EOSINOPHILS RELATIVE PERCENT: 0.7 % (ref 0–6)
GFR AFRICAN AMERICAN: >60
GFR NON-AFRICAN AMERICAN: >60 ML/MIN/1.73
GLUCOSE BLD-MCNC: 133 MG/DL (ref 74–99)
HCT VFR BLD CALC: 42.9 % (ref 37–54)
HEMOGLOBIN: 14 G/DL (ref 12.5–16.5)
IMMATURE GRANULOCYTES #: 0.02 E9/L
IMMATURE GRANULOCYTES %: 0.2 % (ref 0–5)
LACTIC ACID: 1.8 MMOL/L (ref 0.5–2.2)
LIPASE: 33 U/L (ref 13–60)
LYMPHOCYTES ABSOLUTE: 1.09 E9/L (ref 1.5–4)
LYMPHOCYTES RELATIVE PERCENT: 9 % (ref 20–42)
MCH RBC QN AUTO: 28.6 PG (ref 26–35)
MCHC RBC AUTO-ENTMCNC: 32.6 % (ref 32–34.5)
MCV RBC AUTO: 87.6 FL (ref 80–99.9)
MONOCYTES ABSOLUTE: 0.81 E9/L (ref 0.1–0.95)
MONOCYTES RELATIVE PERCENT: 6.7 % (ref 2–12)
NEUTROPHILS ABSOLUTE: 10.11 E9/L (ref 1.8–7.3)
NEUTROPHILS RELATIVE PERCENT: 83 % (ref 43–80)
PDW BLD-RTO: 14.7 FL (ref 11.5–15)
PLATELET # BLD: 193 E9/L (ref 130–450)
PMV BLD AUTO: 10.5 FL (ref 7–12)
POTASSIUM REFLEX MAGNESIUM: 3.8 MMOL/L (ref 3.5–5)
RBC # BLD: 4.9 E12/L (ref 3.8–5.8)
SODIUM BLD-SCNC: 141 MMOL/L (ref 132–146)
TOTAL PROTEIN: 6.8 G/DL (ref 6.4–8.3)
WBC # BLD: 12.2 E9/L (ref 4.5–11.5)

## 2019-03-14 PROCEDURE — 74176 CT ABD & PELVIS W/O CONTRAST: CPT

## 2019-03-14 PROCEDURE — 99285 EMERGENCY DEPT VISIT HI MDM: CPT

## 2019-03-14 PROCEDURE — 85025 COMPLETE CBC W/AUTO DIFF WBC: CPT

## 2019-03-14 PROCEDURE — 36415 COLL VENOUS BLD VENIPUNCTURE: CPT

## 2019-03-14 PROCEDURE — 83690 ASSAY OF LIPASE: CPT

## 2019-03-14 PROCEDURE — 96375 TX/PRO/DX INJ NEW DRUG ADDON: CPT

## 2019-03-14 PROCEDURE — 2580000003 HC RX 258: Performed by: EMERGENCY MEDICINE

## 2019-03-14 PROCEDURE — 80053 COMPREHEN METABOLIC PANEL: CPT

## 2019-03-14 PROCEDURE — 6360000002 HC RX W HCPCS: Performed by: EMERGENCY MEDICINE

## 2019-03-14 PROCEDURE — 76705 ECHO EXAM OF ABDOMEN: CPT

## 2019-03-14 PROCEDURE — 83605 ASSAY OF LACTIC ACID: CPT

## 2019-03-14 PROCEDURE — 96365 THER/PROPH/DIAG IV INF INIT: CPT

## 2019-03-14 RX ORDER — KETOROLAC TROMETHAMINE 30 MG/ML
15 INJECTION, SOLUTION INTRAMUSCULAR; INTRAVENOUS ONCE
Status: COMPLETED | OUTPATIENT
Start: 2019-03-14 | End: 2019-03-14

## 2019-03-14 RX ORDER — SODIUM CHLORIDE 0.9 % (FLUSH) 0.9 %
10 SYRINGE (ML) INJECTION PRN
Status: DISCONTINUED | OUTPATIENT
Start: 2019-03-14 | End: 2019-03-15 | Stop reason: SDUPTHER

## 2019-03-14 RX ORDER — 0.9 % SODIUM CHLORIDE 0.9 %
1000 INTRAVENOUS SOLUTION INTRAVENOUS ONCE
Status: COMPLETED | OUTPATIENT
Start: 2019-03-14 | End: 2019-03-15

## 2019-03-14 RX ADMIN — KETOROLAC TROMETHAMINE 15 MG: 30 INJECTION, SOLUTION INTRAMUSCULAR; INTRAVENOUS at 23:53

## 2019-03-14 RX ADMIN — SODIUM CHLORIDE 1000 ML: 9 INJECTION, SOLUTION INTRAVENOUS at 23:53

## 2019-03-14 RX ADMIN — CEFTRIAXONE SODIUM 2 G: 2 INJECTION, POWDER, FOR SOLUTION INTRAMUSCULAR; INTRAVENOUS at 23:52

## 2019-03-14 ASSESSMENT — PAIN DESCRIPTION - LOCATION: LOCATION: ABDOMEN

## 2019-03-14 ASSESSMENT — PAIN DESCRIPTION - DESCRIPTORS: DESCRIPTORS: PRESSURE

## 2019-03-14 ASSESSMENT — PAIN - FUNCTIONAL ASSESSMENT: PAIN_FUNCTIONAL_ASSESSMENT: ACTIVITIES ARE NOT PREVENTED

## 2019-03-14 ASSESSMENT — PAIN DESCRIPTION - ORIENTATION: ORIENTATION: LOWER

## 2019-03-14 ASSESSMENT — PAIN DESCRIPTION - ONSET: ONSET: SUDDEN

## 2019-03-14 ASSESSMENT — PAIN SCALES - GENERAL: PAINLEVEL_OUTOF10: 9

## 2019-03-14 ASSESSMENT — PAIN DESCRIPTION - FREQUENCY: FREQUENCY: CONTINUOUS

## 2019-03-14 ASSESSMENT — PAIN DESCRIPTION - PAIN TYPE: TYPE: ACUTE PAIN

## 2019-03-15 ENCOUNTER — APPOINTMENT (OUTPATIENT)
Dept: NUCLEAR MEDICINE | Age: 79
DRG: 445 | End: 2019-03-15
Payer: MEDICARE

## 2019-03-15 PROBLEM — K81.0 CHOLECYSTITIS, ACUTE: Status: ACTIVE | Noted: 2019-03-15

## 2019-03-15 PROBLEM — K81.0 ACUTE CHOLECYSTITIS: Status: ACTIVE | Noted: 2019-03-15

## 2019-03-15 LAB
BILIRUBIN URINE: NEGATIVE
BLOOD, URINE: NEGATIVE
CLARITY: CLEAR
COLOR: YELLOW
EKG ATRIAL RATE: 85 BPM
EKG P AXIS: 32 DEGREES
EKG P-R INTERVAL: 152 MS
EKG Q-T INTERVAL: 380 MS
EKG QRS DURATION: 94 MS
EKG QTC CALCULATION (BAZETT): 452 MS
EKG R AXIS: 12 DEGREES
EKG T AXIS: -6 DEGREES
EKG VENTRICULAR RATE: 85 BPM
GLUCOSE URINE: 100 MG/DL
HBA1C MFR BLD: 5.9 % (ref 4–5.6)
KETONES, URINE: ABNORMAL MG/DL
LACTIC ACID, SEPSIS: 1.4 MMOL/L (ref 0.5–1.9)
LEUKOCYTE ESTERASE, URINE: NEGATIVE
METER GLUCOSE: 108 MG/DL (ref 74–99)
METER GLUCOSE: 120 MG/DL (ref 74–99)
NITRITE, URINE: NEGATIVE
PH UA: 6 (ref 5–9)
PRO-BNP: 754 PG/ML (ref 0–450)
PROCALCITONIN: 1.19 NG/ML (ref 0–0.08)
PROTEIN UA: NEGATIVE MG/DL
SPECIFIC GRAVITY UA: 1.01 (ref 1–1.03)
TROPONIN: <0.01 NG/ML (ref 0–0.03)
TROPONIN: <0.01 NG/ML (ref 0–0.03)
UROBILINOGEN, URINE: 1 E.U./DL

## 2019-03-15 PROCEDURE — 6370000000 HC RX 637 (ALT 250 FOR IP): Performed by: INTERNAL MEDICINE

## 2019-03-15 PROCEDURE — 96375 TX/PRO/DX INJ NEW DRUG ADDON: CPT

## 2019-03-15 PROCEDURE — 99222 1ST HOSP IP/OBS MODERATE 55: CPT | Performed by: INTERNAL MEDICINE

## 2019-03-15 PROCEDURE — 6360000002 HC RX W HCPCS: Performed by: EMERGENCY MEDICINE

## 2019-03-15 PROCEDURE — 83880 ASSAY OF NATRIURETIC PEPTIDE: CPT

## 2019-03-15 PROCEDURE — 2580000003 HC RX 258: Performed by: INTERNAL MEDICINE

## 2019-03-15 PROCEDURE — 6360000002 HC RX W HCPCS: Performed by: INTERNAL MEDICINE

## 2019-03-15 PROCEDURE — 96368 THER/DIAG CONCURRENT INF: CPT

## 2019-03-15 PROCEDURE — 1200000000 HC SEMI PRIVATE

## 2019-03-15 PROCEDURE — 83036 HEMOGLOBIN GLYCOSYLATED A1C: CPT

## 2019-03-15 PROCEDURE — C9113 INJ PANTOPRAZOLE SODIUM, VIA: HCPCS | Performed by: EMERGENCY MEDICINE

## 2019-03-15 PROCEDURE — 3430000000 HC RX DIAGNOSTIC RADIOPHARMACEUTICAL: Performed by: RADIOLOGY

## 2019-03-15 PROCEDURE — 96366 THER/PROPH/DIAG IV INF ADDON: CPT

## 2019-03-15 PROCEDURE — A9537 TC99M MEBROFENIN: HCPCS | Performed by: RADIOLOGY

## 2019-03-15 PROCEDURE — 84484 ASSAY OF TROPONIN QUANT: CPT

## 2019-03-15 PROCEDURE — 96372 THER/PROPH/DIAG INJ SC/IM: CPT

## 2019-03-15 PROCEDURE — 94664 DEMO&/EVAL PT USE INHALER: CPT

## 2019-03-15 PROCEDURE — 2500000003 HC RX 250 WO HCPCS: Performed by: EMERGENCY MEDICINE

## 2019-03-15 PROCEDURE — 2500000003 HC RX 250 WO HCPCS: Performed by: INTERNAL MEDICINE

## 2019-03-15 PROCEDURE — 78226 HEPATOBILIARY SYSTEM IMAGING: CPT

## 2019-03-15 PROCEDURE — 87040 BLOOD CULTURE FOR BACTERIA: CPT

## 2019-03-15 PROCEDURE — C9113 INJ PANTOPRAZOLE SODIUM, VIA: HCPCS | Performed by: INTERNAL MEDICINE

## 2019-03-15 PROCEDURE — 36415 COLL VENOUS BLD VENIPUNCTURE: CPT

## 2019-03-15 PROCEDURE — 97161 PT EVAL LOW COMPLEX 20 MIN: CPT

## 2019-03-15 PROCEDURE — 97165 OT EVAL LOW COMPLEX 30 MIN: CPT

## 2019-03-15 PROCEDURE — 93010 ELECTROCARDIOGRAM REPORT: CPT | Performed by: INTERNAL MEDICINE

## 2019-03-15 PROCEDURE — 96376 TX/PRO/DX INJ SAME DRUG ADON: CPT

## 2019-03-15 PROCEDURE — 97530 THERAPEUTIC ACTIVITIES: CPT

## 2019-03-15 PROCEDURE — 2060000000 HC ICU INTERMEDIATE R&B

## 2019-03-15 PROCEDURE — 81003 URINALYSIS AUTO W/O SCOPE: CPT

## 2019-03-15 PROCEDURE — 83605 ASSAY OF LACTIC ACID: CPT

## 2019-03-15 PROCEDURE — 84145 PROCALCITONIN (PCT): CPT

## 2019-03-15 PROCEDURE — APPSS45 APP SPLIT SHARED TIME 31-45 MINUTES: Performed by: NURSE PRACTITIONER

## 2019-03-15 PROCEDURE — 93005 ELECTROCARDIOGRAM TRACING: CPT | Performed by: NURSE PRACTITIONER

## 2019-03-15 PROCEDURE — 82962 GLUCOSE BLOOD TEST: CPT

## 2019-03-15 PROCEDURE — 94640 AIRWAY INHALATION TREATMENT: CPT

## 2019-03-15 RX ORDER — ACETAMINOPHEN 325 MG/1
650 TABLET ORAL EVERY 4 HOURS PRN
Status: DISCONTINUED | OUTPATIENT
Start: 2019-03-15 | End: 2019-03-17 | Stop reason: HOSPADM

## 2019-03-15 RX ORDER — SODIUM CHLORIDE 9 MG/ML
INJECTION, SOLUTION INTRAVENOUS CONTINUOUS
Status: DISCONTINUED | OUTPATIENT
Start: 2019-03-15 | End: 2019-03-15

## 2019-03-15 RX ORDER — ASPIRIN 81 MG/1
81 TABLET ORAL DAILY
Status: DISCONTINUED | OUTPATIENT
Start: 2019-03-15 | End: 2019-03-17 | Stop reason: HOSPADM

## 2019-03-15 RX ORDER — IPRATROPIUM BROMIDE AND ALBUTEROL SULFATE 2.5; .5 MG/3ML; MG/3ML
1 SOLUTION RESPIRATORY (INHALATION)
Status: DISCONTINUED | OUTPATIENT
Start: 2019-03-15 | End: 2019-03-17 | Stop reason: HOSPADM

## 2019-03-15 RX ORDER — METOPROLOL SUCCINATE 50 MG/1
50 TABLET, EXTENDED RELEASE ORAL DAILY
Status: DISCONTINUED | OUTPATIENT
Start: 2019-03-15 | End: 2019-03-17 | Stop reason: HOSPADM

## 2019-03-15 RX ORDER — NITROGLYCERIN 0.4 MG/1
0.4 TABLET SUBLINGUAL EVERY 5 MIN PRN
Status: DISCONTINUED | OUTPATIENT
Start: 2019-03-15 | End: 2019-03-17 | Stop reason: HOSPADM

## 2019-03-15 RX ORDER — ONDANSETRON 2 MG/ML
4 INJECTION INTRAMUSCULAR; INTRAVENOUS EVERY 8 HOURS PRN
Status: DISCONTINUED | OUTPATIENT
Start: 2019-03-15 | End: 2019-03-17 | Stop reason: HOSPADM

## 2019-03-15 RX ORDER — SODIUM CHLORIDE 9 MG/ML
INJECTION, SOLUTION INTRAVENOUS CONTINUOUS
Status: DISCONTINUED | OUTPATIENT
Start: 2019-03-15 | End: 2019-03-17

## 2019-03-15 RX ORDER — ACETAMINOPHEN 325 MG/1
650 TABLET ORAL EVERY 4 HOURS PRN
Status: DISCONTINUED | OUTPATIENT
Start: 2019-03-15 | End: 2019-03-15 | Stop reason: SDUPTHER

## 2019-03-15 RX ORDER — PANTOPRAZOLE SODIUM 40 MG/10ML
40 INJECTION, POWDER, LYOPHILIZED, FOR SOLUTION INTRAVENOUS DAILY
Status: DISCONTINUED | OUTPATIENT
Start: 2019-03-15 | End: 2019-03-17

## 2019-03-15 RX ORDER — DEXTROSE MONOHYDRATE 25 G/50ML
12.5 INJECTION, SOLUTION INTRAVENOUS PRN
Status: DISCONTINUED | OUTPATIENT
Start: 2019-03-15 | End: 2019-03-15

## 2019-03-15 RX ORDER — MORPHINE SULFATE 2 MG/ML
4 INJECTION, SOLUTION INTRAMUSCULAR; INTRAVENOUS ONCE
Status: COMPLETED | OUTPATIENT
Start: 2019-03-15 | End: 2019-03-15

## 2019-03-15 RX ORDER — SODIUM CHLORIDE 0.9 % (FLUSH) 0.9 %
10 SYRINGE (ML) INJECTION EVERY 12 HOURS SCHEDULED
Status: DISCONTINUED | OUTPATIENT
Start: 2019-03-15 | End: 2019-03-17 | Stop reason: HOSPADM

## 2019-03-15 RX ORDER — SODIUM CHLORIDE 0.9 % (FLUSH) 0.9 %
10 SYRINGE (ML) INJECTION PRN
Status: DISCONTINUED | OUTPATIENT
Start: 2019-03-15 | End: 2019-03-17 | Stop reason: HOSPADM

## 2019-03-15 RX ORDER — FORMOTEROL FUMARATE 20 UG/2ML
20 SOLUTION RESPIRATORY (INHALATION) 2 TIMES DAILY
Status: DISCONTINUED | OUTPATIENT
Start: 2019-03-15 | End: 2019-03-17 | Stop reason: HOSPADM

## 2019-03-15 RX ORDER — CLOPIDOGREL BISULFATE 75 MG/1
75 TABLET ORAL DAILY
Status: DISCONTINUED | OUTPATIENT
Start: 2019-03-15 | End: 2019-03-17 | Stop reason: HOSPADM

## 2019-03-15 RX ORDER — BUDESONIDE 0.25 MG/2ML
250 INHALANT ORAL 2 TIMES DAILY
Status: DISCONTINUED | OUTPATIENT
Start: 2019-03-15 | End: 2019-03-17 | Stop reason: HOSPADM

## 2019-03-15 RX ORDER — DEXTROSE MONOHYDRATE 50 MG/ML
100 INJECTION, SOLUTION INTRAVENOUS PRN
Status: DISCONTINUED | OUTPATIENT
Start: 2019-03-15 | End: 2019-03-15

## 2019-03-15 RX ORDER — MORPHINE SULFATE 2 MG/ML
2 INJECTION, SOLUTION INTRAMUSCULAR; INTRAVENOUS
Status: DISCONTINUED | OUTPATIENT
Start: 2019-03-15 | End: 2019-03-17 | Stop reason: HOSPADM

## 2019-03-15 RX ORDER — PANTOPRAZOLE SODIUM 40 MG/10ML
40 INJECTION, POWDER, LYOPHILIZED, FOR SOLUTION INTRAVENOUS ONCE
Status: COMPLETED | OUTPATIENT
Start: 2019-03-15 | End: 2019-03-15

## 2019-03-15 RX ORDER — 0.9 % SODIUM CHLORIDE 0.9 %
10 VIAL (ML) INJECTION DAILY
Status: DISCONTINUED | OUTPATIENT
Start: 2019-03-15 | End: 2019-03-17

## 2019-03-15 RX ORDER — NICOTINE POLACRILEX 4 MG
15 LOZENGE BUCCAL PRN
Status: DISCONTINUED | OUTPATIENT
Start: 2019-03-15 | End: 2019-03-15

## 2019-03-15 RX ADMIN — IPRATROPIUM BROMIDE AND ALBUTEROL SULFATE 1 AMPULE: .5; 3 SOLUTION RESPIRATORY (INHALATION) at 09:46

## 2019-03-15 RX ADMIN — IPRATROPIUM BROMIDE AND ALBUTEROL SULFATE 1 AMPULE: .5; 3 SOLUTION RESPIRATORY (INHALATION) at 21:49

## 2019-03-15 RX ADMIN — ENOXAPARIN SODIUM 40 MG: 40 INJECTION SUBCUTANEOUS at 10:06

## 2019-03-15 RX ADMIN — ASPIRIN 81 MG: 81 TABLET ORAL at 14:14

## 2019-03-15 RX ADMIN — Medication 10 ML: at 10:06

## 2019-03-15 RX ADMIN — BUDESONIDE 250 MCG: 0.25 SUSPENSION RESPIRATORY (INHALATION) at 09:48

## 2019-03-15 RX ADMIN — FORMOTEROL FUMARATE DIHYDRATE 20 MCG: 20 SOLUTION RESPIRATORY (INHALATION) at 21:48

## 2019-03-15 RX ADMIN — CLOPIDOGREL BISULFATE 75 MG: 75 TABLET ORAL at 14:14

## 2019-03-15 RX ADMIN — MORPHINE SULFATE 2 MG: 2 INJECTION, SOLUTION INTRAMUSCULAR; INTRAVENOUS at 22:23

## 2019-03-15 RX ADMIN — METRONIDAZOLE 500 MG: 500 INJECTION, SOLUTION INTRAVENOUS at 00:27

## 2019-03-15 RX ADMIN — Medication 8 MILLICURIE: at 12:13

## 2019-03-15 RX ADMIN — FORMOTEROL FUMARATE DIHYDRATE 20 MCG: 20 SOLUTION RESPIRATORY (INHALATION) at 09:46

## 2019-03-15 RX ADMIN — MORPHINE SULFATE 2 MG: 2 INJECTION, SOLUTION INTRAMUSCULAR; INTRAVENOUS at 05:19

## 2019-03-15 RX ADMIN — SODIUM CHLORIDE: 9 INJECTION, SOLUTION INTRAVENOUS at 18:51

## 2019-03-15 RX ADMIN — SODIUM CHLORIDE: 9 INJECTION, SOLUTION INTRAVENOUS at 06:59

## 2019-03-15 RX ADMIN — METRONIDAZOLE 500 MG: 500 INJECTION, SOLUTION INTRAVENOUS at 17:30

## 2019-03-15 RX ADMIN — IPRATROPIUM BROMIDE AND ALBUTEROL SULFATE 1 AMPULE: .5; 3 SOLUTION RESPIRATORY (INHALATION) at 17:25

## 2019-03-15 RX ADMIN — IPRATROPIUM BROMIDE AND ALBUTEROL SULFATE 1 AMPULE: .5; 3 SOLUTION RESPIRATORY (INHALATION) at 11:56

## 2019-03-15 RX ADMIN — PANTOPRAZOLE SODIUM 40 MG: 40 INJECTION, POWDER, FOR SOLUTION INTRAVENOUS at 01:38

## 2019-03-15 RX ADMIN — METRONIDAZOLE 500 MG: 500 INJECTION, SOLUTION INTRAVENOUS at 10:06

## 2019-03-15 RX ADMIN — METRONIDAZOLE 500 MG: 500 INJECTION, SOLUTION INTRAVENOUS at 23:25

## 2019-03-15 RX ADMIN — MORPHINE SULFATE 4 MG: 2 INJECTION, SOLUTION INTRAMUSCULAR; INTRAVENOUS at 02:23

## 2019-03-15 RX ADMIN — SODIUM CHLORIDE: 9 INJECTION, SOLUTION INTRAVENOUS at 03:40

## 2019-03-15 RX ADMIN — BUDESONIDE 250 MCG: 0.25 SUSPENSION RESPIRATORY (INHALATION) at 21:48

## 2019-03-15 RX ADMIN — METOPROLOL SUCCINATE 50 MG: 50 TABLET, EXTENDED RELEASE ORAL at 14:14

## 2019-03-15 RX ADMIN — PANTOPRAZOLE SODIUM 40 MG: 40 INJECTION, POWDER, FOR SOLUTION INTRAVENOUS at 10:06

## 2019-03-15 RX ADMIN — CEFTRIAXONE SODIUM 2 G: 2 INJECTION, POWDER, FOR SOLUTION INTRAMUSCULAR; INTRAVENOUS at 22:23

## 2019-03-15 ASSESSMENT — PAIN DESCRIPTION - LOCATION
LOCATION: ABDOMEN

## 2019-03-15 ASSESSMENT — ENCOUNTER SYMPTOMS
DIARRHEA: 0
HEMATOCHEZIA: 0
NAUSEA: 1
WHEEZING: 0
COUGH: 0
BACK PAIN: 0
VOMITING: 1
CONSTIPATION: 1
SHORTNESS OF BREATH: 0
ABDOMINAL PAIN: 1
HEMATEMESIS: 0
BLOOD IN STOOL: 0

## 2019-03-15 ASSESSMENT — PAIN SCALES - GENERAL
PAINLEVEL_OUTOF10: 4
PAINLEVEL_OUTOF10: 0
PAINLEVEL_OUTOF10: 5
PAINLEVEL_OUTOF10: 9
PAINLEVEL_OUTOF10: 0
PAINLEVEL_OUTOF10: 2
PAINLEVEL_OUTOF10: 0
PAINLEVEL_OUTOF10: 0

## 2019-03-15 ASSESSMENT — PAIN DESCRIPTION - PROGRESSION
CLINICAL_PROGRESSION: GRADUALLY WORSENING
CLINICAL_PROGRESSION: NOT CHANGED
CLINICAL_PROGRESSION: GRADUALLY WORSENING

## 2019-03-15 ASSESSMENT — PAIN DESCRIPTION - PAIN TYPE
TYPE: ACUTE PAIN

## 2019-03-15 ASSESSMENT — PAIN DESCRIPTION - FREQUENCY
FREQUENCY: INTERMITTENT
FREQUENCY: CONTINUOUS
FREQUENCY: INTERMITTENT

## 2019-03-15 ASSESSMENT — PAIN DESCRIPTION - ORIENTATION
ORIENTATION: LOWER

## 2019-03-15 ASSESSMENT — PAIN DESCRIPTION - DESCRIPTORS
DESCRIPTORS: DISCOMFORT
DESCRIPTORS: ACHING;DULL;DISCOMFORT
DESCRIPTORS: DULL;DISCOMFORT

## 2019-03-15 ASSESSMENT — PAIN - FUNCTIONAL ASSESSMENT
PAIN_FUNCTIONAL_ASSESSMENT: ACTIVITIES ARE NOT PREVENTED
PAIN_FUNCTIONAL_ASSESSMENT: PREVENTS OR INTERFERES SOME ACTIVE ACTIVITIES AND ADLS

## 2019-03-15 ASSESSMENT — PAIN DESCRIPTION - ONSET
ONSET: GRADUAL
ONSET: ON-GOING
ONSET: ON-GOING

## 2019-03-16 ENCOUNTER — APPOINTMENT (OUTPATIENT)
Dept: MRI IMAGING | Age: 79
DRG: 445 | End: 2019-03-16
Payer: MEDICARE

## 2019-03-16 LAB
ALBUMIN SERPL-MCNC: 3.2 G/DL (ref 3.5–5.2)
ALP BLD-CCNC: 167 U/L (ref 40–129)
ALT SERPL-CCNC: 216 U/L (ref 0–40)
ANION GAP SERPL CALCULATED.3IONS-SCNC: 11 MMOL/L (ref 7–16)
AST SERPL-CCNC: 141 U/L (ref 0–39)
BILIRUB SERPL-MCNC: 3.4 MG/DL (ref 0–1.2)
BILIRUBIN DIRECT: 2.6 MG/DL (ref 0–0.3)
BILIRUBIN, INDIRECT: 0.8 MG/DL (ref 0–1)
BUN BLDV-MCNC: 12 MG/DL (ref 8–23)
CALCIUM SERPL-MCNC: 8.1 MG/DL (ref 8.6–10.2)
CHLORIDE BLD-SCNC: 106 MMOL/L (ref 98–107)
CHOLESTEROL, TOTAL: 87 MG/DL (ref 0–199)
CO2: 21 MMOL/L (ref 22–29)
CREAT SERPL-MCNC: 0.8 MG/DL (ref 0.7–1.2)
GFR AFRICAN AMERICAN: >60
GFR NON-AFRICAN AMERICAN: >60 ML/MIN/1.73
GLUCOSE BLD-MCNC: 115 MG/DL (ref 74–99)
HCT VFR BLD CALC: 36.2 % (ref 37–54)
HDLC SERPL-MCNC: 45 MG/DL
HEMOGLOBIN: 11.4 G/DL (ref 12.5–16.5)
LDL CHOLESTEROL CALCULATED: 33 MG/DL (ref 0–99)
MAGNESIUM: 1.9 MG/DL (ref 1.6–2.6)
MCH RBC QN AUTO: 27.7 PG (ref 26–35)
MCHC RBC AUTO-ENTMCNC: 31.5 % (ref 32–34.5)
MCV RBC AUTO: 88.1 FL (ref 80–99.9)
PDW BLD-RTO: 15.5 FL (ref 11.5–15)
PHOSPHORUS: 2.5 MG/DL (ref 2.5–4.5)
PLATELET # BLD: 165 E9/L (ref 130–450)
PMV BLD AUTO: 11.2 FL (ref 7–12)
POTASSIUM SERPL-SCNC: 3.7 MMOL/L (ref 3.5–5)
RBC # BLD: 4.11 E12/L (ref 3.8–5.8)
REASON FOR REJECTION: NORMAL
REJECTED TEST: NORMAL
SODIUM BLD-SCNC: 138 MMOL/L (ref 132–146)
TOTAL PROTEIN: 5.7 G/DL (ref 6.4–8.3)
TRIGL SERPL-MCNC: 46 MG/DL (ref 0–149)
TSH SERPL DL<=0.05 MIU/L-ACNC: 1.1 UIU/ML (ref 0.27–4.2)
VLDLC SERPL CALC-MCNC: 9 MG/DL
WBC # BLD: 7.9 E9/L (ref 4.5–11.5)

## 2019-03-16 PROCEDURE — 6360000002 HC RX W HCPCS: Performed by: INTERNAL MEDICINE

## 2019-03-16 PROCEDURE — C9113 INJ PANTOPRAZOLE SODIUM, VIA: HCPCS | Performed by: INTERNAL MEDICINE

## 2019-03-16 PROCEDURE — 2500000003 HC RX 250 WO HCPCS: Performed by: INTERNAL MEDICINE

## 2019-03-16 PROCEDURE — 96376 TX/PRO/DX INJ SAME DRUG ADON: CPT

## 2019-03-16 PROCEDURE — 80076 HEPATIC FUNCTION PANEL: CPT

## 2019-03-16 PROCEDURE — 94640 AIRWAY INHALATION TREATMENT: CPT

## 2019-03-16 PROCEDURE — 36415 COLL VENOUS BLD VENIPUNCTURE: CPT

## 2019-03-16 PROCEDURE — 84100 ASSAY OF PHOSPHORUS: CPT

## 2019-03-16 PROCEDURE — 83735 ASSAY OF MAGNESIUM: CPT

## 2019-03-16 PROCEDURE — 74182 MRI ABDOMEN W/CONTRAST: CPT

## 2019-03-16 PROCEDURE — 96366 THER/PROPH/DIAG IV INF ADDON: CPT

## 2019-03-16 PROCEDURE — 80061 LIPID PANEL: CPT

## 2019-03-16 PROCEDURE — 80048 BASIC METABOLIC PNL TOTAL CA: CPT

## 2019-03-16 PROCEDURE — 2580000003 HC RX 258: Performed by: INTERNAL MEDICINE

## 2019-03-16 PROCEDURE — 85027 COMPLETE CBC AUTOMATED: CPT

## 2019-03-16 PROCEDURE — 6370000000 HC RX 637 (ALT 250 FOR IP): Performed by: INTERNAL MEDICINE

## 2019-03-16 PROCEDURE — 84443 ASSAY THYROID STIM HORMONE: CPT

## 2019-03-16 PROCEDURE — 6360000004 HC RX CONTRAST MEDICATION: Performed by: RADIOLOGY

## 2019-03-16 PROCEDURE — 1200000000 HC SEMI PRIVATE

## 2019-03-16 PROCEDURE — A9579 GAD-BASE MR CONTRAST NOS,1ML: HCPCS | Performed by: RADIOLOGY

## 2019-03-16 RX ADMIN — BUDESONIDE 250 MCG: 0.25 SUSPENSION RESPIRATORY (INHALATION) at 09:53

## 2019-03-16 RX ADMIN — FORMOTEROL FUMARATE DIHYDRATE 20 MCG: 20 SOLUTION RESPIRATORY (INHALATION) at 20:58

## 2019-03-16 RX ADMIN — Medication 10 ML: at 08:12

## 2019-03-16 RX ADMIN — FORMOTEROL FUMARATE DIHYDRATE 20 MCG: 20 SOLUTION RESPIRATORY (INHALATION) at 09:53

## 2019-03-16 RX ADMIN — METRONIDAZOLE 500 MG: 500 INJECTION, SOLUTION INTRAVENOUS at 16:06

## 2019-03-16 RX ADMIN — MORPHINE SULFATE 2 MG: 2 INJECTION, SOLUTION INTRAMUSCULAR; INTRAVENOUS at 08:10

## 2019-03-16 RX ADMIN — MORPHINE SULFATE 2 MG: 2 INJECTION, SOLUTION INTRAMUSCULAR; INTRAVENOUS at 22:55

## 2019-03-16 RX ADMIN — CEFTRIAXONE SODIUM 2 G: 2 INJECTION, POWDER, FOR SOLUTION INTRAMUSCULAR; INTRAVENOUS at 22:50

## 2019-03-16 RX ADMIN — SODIUM CHLORIDE: 9 INJECTION, SOLUTION INTRAVENOUS at 07:03

## 2019-03-16 RX ADMIN — METRONIDAZOLE 500 MG: 500 INJECTION, SOLUTION INTRAVENOUS at 10:00

## 2019-03-16 RX ADMIN — METOPROLOL SUCCINATE 50 MG: 50 TABLET, EXTENDED RELEASE ORAL at 09:59

## 2019-03-16 RX ADMIN — Medication 10 ML: at 10:01

## 2019-03-16 RX ADMIN — IPRATROPIUM BROMIDE AND ALBUTEROL SULFATE 1 AMPULE: .5; 3 SOLUTION RESPIRATORY (INHALATION) at 20:58

## 2019-03-16 RX ADMIN — IPRATROPIUM BROMIDE AND ALBUTEROL SULFATE 1 AMPULE: .5; 3 SOLUTION RESPIRATORY (INHALATION) at 11:58

## 2019-03-16 RX ADMIN — ASPIRIN 81 MG: 81 TABLET ORAL at 09:59

## 2019-03-16 RX ADMIN — CLOPIDOGREL BISULFATE 75 MG: 75 TABLET ORAL at 09:59

## 2019-03-16 RX ADMIN — PANTOPRAZOLE SODIUM 40 MG: 40 INJECTION, POWDER, FOR SOLUTION INTRAVENOUS at 10:00

## 2019-03-16 RX ADMIN — GADOTERIDOL 20 ML: 279.3 INJECTION, SOLUTION INTRAVENOUS at 09:29

## 2019-03-16 RX ADMIN — MORPHINE SULFATE 2 MG: 2 INJECTION, SOLUTION INTRAMUSCULAR; INTRAVENOUS at 03:41

## 2019-03-16 RX ADMIN — BUDESONIDE 250 MCG: 0.25 SUSPENSION RESPIRATORY (INHALATION) at 20:58

## 2019-03-16 RX ADMIN — SODIUM CHLORIDE: 9 INJECTION, SOLUTION INTRAVENOUS at 19:48

## 2019-03-16 ASSESSMENT — PAIN SCALES - GENERAL
PAINLEVEL_OUTOF10: 1
PAINLEVEL_OUTOF10: 0
PAINLEVEL_OUTOF10: 5
PAINLEVEL_OUTOF10: 2
PAINLEVEL_OUTOF10: 1
PAINLEVEL_OUTOF10: 4
PAINLEVEL_OUTOF10: 4

## 2019-03-16 ASSESSMENT — PAIN DESCRIPTION - ONSET: ONSET: GRADUAL

## 2019-03-16 ASSESSMENT — PAIN DESCRIPTION - PROGRESSION: CLINICAL_PROGRESSION: GRADUALLY WORSENING

## 2019-03-16 ASSESSMENT — PAIN DESCRIPTION - FREQUENCY: FREQUENCY: INTERMITTENT

## 2019-03-16 ASSESSMENT — PAIN DESCRIPTION - DESCRIPTORS: DESCRIPTORS: ACHING;DISCOMFORT

## 2019-03-16 ASSESSMENT — PAIN - FUNCTIONAL ASSESSMENT: PAIN_FUNCTIONAL_ASSESSMENT: PREVENTS OR INTERFERES SOME ACTIVE ACTIVITIES AND ADLS

## 2019-03-16 ASSESSMENT — PAIN DESCRIPTION - PAIN TYPE: TYPE: ACUTE PAIN

## 2019-03-16 ASSESSMENT — PAIN DESCRIPTION - LOCATION: LOCATION: ABDOMEN

## 2019-03-17 VITALS
TEMPERATURE: 97.6 F | DIASTOLIC BLOOD PRESSURE: 90 MMHG | WEIGHT: 219.8 LBS | HEIGHT: 73 IN | RESPIRATION RATE: 18 BRPM | BODY MASS INDEX: 29.13 KG/M2 | SYSTOLIC BLOOD PRESSURE: 155 MMHG | HEART RATE: 77 BPM | OXYGEN SATURATION: 96 %

## 2019-03-17 LAB
ALBUMIN SERPL-MCNC: 3.2 G/DL (ref 3.5–5.2)
ALP BLD-CCNC: 172 U/L (ref 40–129)
ALT SERPL-CCNC: 148 U/L (ref 0–40)
ANION GAP SERPL CALCULATED.3IONS-SCNC: 10 MMOL/L (ref 7–16)
AST SERPL-CCNC: 65 U/L (ref 0–39)
BILIRUB SERPL-MCNC: 1.7 MG/DL (ref 0–1.2)
BILIRUBIN DIRECT: 1.1 MG/DL (ref 0–0.3)
BILIRUBIN, INDIRECT: 0.6 MG/DL (ref 0–1)
BUN BLDV-MCNC: 8 MG/DL (ref 8–23)
CALCIUM SERPL-MCNC: 8.2 MG/DL (ref 8.6–10.2)
CHLORIDE BLD-SCNC: 105 MMOL/L (ref 98–107)
CO2: 22 MMOL/L (ref 22–29)
CREAT SERPL-MCNC: 0.7 MG/DL (ref 0.7–1.2)
GFR AFRICAN AMERICAN: >60
GFR NON-AFRICAN AMERICAN: >60 ML/MIN/1.73
GLUCOSE BLD-MCNC: 103 MG/DL (ref 74–99)
LIPASE: 22 U/L (ref 13–60)
MAGNESIUM: 1.8 MG/DL (ref 1.6–2.6)
POTASSIUM SERPL-SCNC: 3.4 MMOL/L (ref 3.5–5)
SODIUM BLD-SCNC: 137 MMOL/L (ref 132–146)
TOTAL PROTEIN: 5.6 G/DL (ref 6.4–8.3)

## 2019-03-17 PROCEDURE — 96376 TX/PRO/DX INJ SAME DRUG ADON: CPT

## 2019-03-17 PROCEDURE — 96366 THER/PROPH/DIAG IV INF ADDON: CPT

## 2019-03-17 PROCEDURE — 2500000003 HC RX 250 WO HCPCS: Performed by: INTERNAL MEDICINE

## 2019-03-17 PROCEDURE — 94640 AIRWAY INHALATION TREATMENT: CPT

## 2019-03-17 PROCEDURE — 2580000003 HC RX 258: Performed by: INTERNAL MEDICINE

## 2019-03-17 PROCEDURE — C9113 INJ PANTOPRAZOLE SODIUM, VIA: HCPCS | Performed by: INTERNAL MEDICINE

## 2019-03-17 PROCEDURE — 83735 ASSAY OF MAGNESIUM: CPT

## 2019-03-17 PROCEDURE — 6360000002 HC RX W HCPCS: Performed by: INTERNAL MEDICINE

## 2019-03-17 PROCEDURE — 6370000000 HC RX 637 (ALT 250 FOR IP): Performed by: INTERNAL MEDICINE

## 2019-03-17 PROCEDURE — 36415 COLL VENOUS BLD VENIPUNCTURE: CPT

## 2019-03-17 PROCEDURE — 80076 HEPATIC FUNCTION PANEL: CPT

## 2019-03-17 PROCEDURE — 83690 ASSAY OF LIPASE: CPT

## 2019-03-17 PROCEDURE — 80048 BASIC METABOLIC PNL TOTAL CA: CPT

## 2019-03-17 RX ORDER — PANTOPRAZOLE SODIUM 40 MG/1
40 TABLET, DELAYED RELEASE ORAL
Qty: 30 TABLET | Refills: 3 | Status: SHIPPED | OUTPATIENT
Start: 2019-03-18 | End: 2019-05-08

## 2019-03-17 RX ORDER — PANTOPRAZOLE SODIUM 40 MG/1
40 TABLET, DELAYED RELEASE ORAL
Status: DISCONTINUED | OUTPATIENT
Start: 2019-03-18 | End: 2019-03-17 | Stop reason: HOSPADM

## 2019-03-17 RX ORDER — METRONIDAZOLE 500 MG/1
500 TABLET ORAL EVERY 8 HOURS SCHEDULED
Qty: 15 TABLET | Refills: 0 | Status: SHIPPED | OUTPATIENT
Start: 2019-03-17 | End: 2019-03-22

## 2019-03-17 RX ORDER — CEFDINIR 300 MG/1
300 CAPSULE ORAL EVERY 12 HOURS SCHEDULED
Status: DISCONTINUED | OUTPATIENT
Start: 2019-03-17 | End: 2019-03-17 | Stop reason: HOSPADM

## 2019-03-17 RX ORDER — METRONIDAZOLE 500 MG/1
500 TABLET ORAL EVERY 8 HOURS SCHEDULED
Status: DISCONTINUED | OUTPATIENT
Start: 2019-03-17 | End: 2019-03-17 | Stop reason: HOSPADM

## 2019-03-17 RX ORDER — POTASSIUM CHLORIDE 20 MEQ/1
20 TABLET, EXTENDED RELEASE ORAL ONCE
Status: COMPLETED | OUTPATIENT
Start: 2019-03-17 | End: 2019-03-17

## 2019-03-17 RX ORDER — CEFDINIR 300 MG/1
300 CAPSULE ORAL EVERY 12 HOURS SCHEDULED
Qty: 10 CAPSULE | Refills: 0 | Status: SHIPPED | OUTPATIENT
Start: 2019-03-17 | End: 2019-03-22

## 2019-03-17 RX ADMIN — POTASSIUM CHLORIDE 20 MEQ: 20 TABLET, EXTENDED RELEASE ORAL at 13:56

## 2019-03-17 RX ADMIN — METOPROLOL SUCCINATE 50 MG: 50 TABLET, EXTENDED RELEASE ORAL at 09:51

## 2019-03-17 RX ADMIN — Medication 10 ML: at 09:50

## 2019-03-17 RX ADMIN — METRONIDAZOLE 500 MG: 500 INJECTION, SOLUTION INTRAVENOUS at 01:50

## 2019-03-17 RX ADMIN — CEFDINIR 300 MG: 300 CAPSULE ORAL at 12:03

## 2019-03-17 RX ADMIN — IPRATROPIUM BROMIDE AND ALBUTEROL SULFATE 1 AMPULE: .5; 3 SOLUTION RESPIRATORY (INHALATION) at 07:50

## 2019-03-17 RX ADMIN — METRONIDAZOLE 500 MG: 500 INJECTION, SOLUTION INTRAVENOUS at 09:49

## 2019-03-17 RX ADMIN — IPRATROPIUM BROMIDE AND ALBUTEROL SULFATE 1 AMPULE: .5; 3 SOLUTION RESPIRATORY (INHALATION) at 11:40

## 2019-03-17 RX ADMIN — METRONIDAZOLE 500 MG: 500 TABLET, FILM COATED ORAL at 13:57

## 2019-03-17 RX ADMIN — ASPIRIN 81 MG: 81 TABLET ORAL at 12:03

## 2019-03-17 RX ADMIN — PANTOPRAZOLE SODIUM 40 MG: 40 INJECTION, POWDER, FOR SOLUTION INTRAVENOUS at 09:50

## 2019-03-17 RX ADMIN — BUDESONIDE 250 MCG: 0.25 SUSPENSION RESPIRATORY (INHALATION) at 07:50

## 2019-03-17 RX ADMIN — Medication 10 ML: at 09:52

## 2019-03-17 RX ADMIN — FORMOTEROL FUMARATE DIHYDRATE 20 MCG: 20 SOLUTION RESPIRATORY (INHALATION) at 07:50

## 2019-03-17 RX ADMIN — CLOPIDOGREL BISULFATE 75 MG: 75 TABLET ORAL at 09:50

## 2019-03-17 ASSESSMENT — PAIN SCALES - GENERAL: PAINLEVEL_OUTOF10: 0

## 2019-03-20 LAB
BLOOD CULTURE, ROUTINE: NORMAL
CULTURE, BLOOD 2: NORMAL

## 2019-03-21 ENCOUNTER — OFFICE VISIT (OUTPATIENT)
Dept: PRIMARY CARE CLINIC | Age: 79
End: 2019-03-21
Payer: MEDICARE

## 2019-03-21 ENCOUNTER — TELEPHONE (OUTPATIENT)
Dept: CARDIOLOGY CLINIC | Age: 79
End: 2019-03-21

## 2019-03-21 VITALS
WEIGHT: 215 LBS | BODY MASS INDEX: 28.49 KG/M2 | SYSTOLIC BLOOD PRESSURE: 120 MMHG | RESPIRATION RATE: 18 BRPM | DIASTOLIC BLOOD PRESSURE: 72 MMHG | TEMPERATURE: 98.1 F | OXYGEN SATURATION: 96 % | HEART RATE: 72 BPM | HEIGHT: 73 IN

## 2019-03-21 DIAGNOSIS — I25.10 CORONARY ARTERY DISEASE INVOLVING NATIVE CORONARY ARTERY OF NATIVE HEART WITHOUT ANGINA PECTORIS: ICD-10-CM

## 2019-03-21 DIAGNOSIS — G47.33 OBSTRUCTIVE SLEEP APNEA SYNDROME: ICD-10-CM

## 2019-03-21 DIAGNOSIS — I25.5 ISCHEMIC CARDIOMYOPATHY: ICD-10-CM

## 2019-03-21 DIAGNOSIS — G45.9 TIA (TRANSIENT ISCHEMIC ATTACK): ICD-10-CM

## 2019-03-21 DIAGNOSIS — N40.0 PROSTATE ENLARGEMENT: ICD-10-CM

## 2019-03-21 DIAGNOSIS — J44.9 CHRONIC OBSTRUCTIVE PULMONARY DISEASE, UNSPECIFIED COPD TYPE (HCC): ICD-10-CM

## 2019-03-21 DIAGNOSIS — G45.0 VERTEBROBASILAR ARTERY SYNDROME: ICD-10-CM

## 2019-03-21 DIAGNOSIS — E55.9 VITAMIN D DEFICIENCY: ICD-10-CM

## 2019-03-21 DIAGNOSIS — I10 ESSENTIAL HYPERTENSION: ICD-10-CM

## 2019-03-21 DIAGNOSIS — K81.0 ACUTE CHOLECYSTITIS: Primary | ICD-10-CM

## 2019-03-21 DIAGNOSIS — E78.2 MIXED HYPERLIPIDEMIA: ICD-10-CM

## 2019-03-21 DIAGNOSIS — K59.09 OTHER CONSTIPATION: ICD-10-CM

## 2019-03-21 PROBLEM — G45.3 AMAUROSIS FUGAX: Status: RESOLVED | Noted: 2018-07-11 | Resolved: 2019-03-21

## 2019-03-21 PROBLEM — J32.0 CHRONIC MAXILLARY SINUSITIS: Status: RESOLVED | Noted: 2018-04-01 | Resolved: 2019-03-21

## 2019-03-21 PROBLEM — J32.8 CHRONIC FRONTOETHMOIDAL SINUSITIS: Status: RESOLVED | Noted: 2018-04-01 | Resolved: 2019-03-21

## 2019-03-21 PROCEDURE — 99214 OFFICE O/P EST MOD 30 MIN: CPT | Performed by: INTERNAL MEDICINE

## 2019-03-21 RX ORDER — CLOPIDOGREL BISULFATE 75 MG/1
75 TABLET ORAL DAILY
Qty: 90 TABLET | Refills: 2 | Status: SHIPPED | OUTPATIENT
Start: 2019-03-21 | End: 2019-12-30 | Stop reason: SDUPTHER

## 2019-03-21 RX ORDER — ATORVASTATIN CALCIUM 80 MG/1
80 TABLET, FILM COATED ORAL DAILY
Qty: 90 TABLET | Refills: 2 | Status: SHIPPED
Start: 2019-03-21 | End: 2020-04-02 | Stop reason: SDUPTHER

## 2019-03-21 RX ORDER — METOPROLOL SUCCINATE 50 MG/1
50 TABLET, EXTENDED RELEASE ORAL DAILY
Qty: 90 TABLET | Refills: 2 | Status: SHIPPED | OUTPATIENT
Start: 2019-03-21 | End: 2019-12-30 | Stop reason: SDUPTHER

## 2019-03-21 RX ORDER — LOSARTAN POTASSIUM 25 MG/1
25 TABLET ORAL DAILY
Qty: 90 TABLET | Refills: 2 | Status: SHIPPED | OUTPATIENT
Start: 2019-03-21 | End: 2019-12-30 | Stop reason: SDUPTHER

## 2019-03-21 ASSESSMENT — PATIENT HEALTH QUESTIONNAIRE - PHQ9
1. LITTLE INTEREST OR PLEASURE IN DOING THINGS: 0
SUM OF ALL RESPONSES TO PHQ QUESTIONS 1-9: 0
SUM OF ALL RESPONSES TO PHQ QUESTIONS 1-9: 0
2. FEELING DOWN, DEPRESSED OR HOPELESS: 0
SUM OF ALL RESPONSES TO PHQ9 QUESTIONS 1 & 2: 0

## 2019-03-21 ASSESSMENT — ENCOUNTER SYMPTOMS
CONSTIPATION: 0
CHEST TIGHTNESS: 0
NAUSEA: 0
RHINORRHEA: 0
ABDOMINAL PAIN: 0
SORE THROAT: 0
VOMITING: 0
SHORTNESS OF BREATH: 1
BLOOD IN STOOL: 0
EYE PAIN: 0
COUGH: 0
DIARRHEA: 0

## 2019-03-28 ENCOUNTER — TELEPHONE (OUTPATIENT)
Dept: PRIMARY CARE CLINIC | Age: 79
End: 2019-03-28

## 2019-04-30 ENCOUNTER — OFFICE VISIT (OUTPATIENT)
Dept: CARDIOLOGY CLINIC | Age: 79
End: 2019-04-30
Payer: MEDICARE

## 2019-04-30 VITALS
WEIGHT: 212.3 LBS | HEART RATE: 57 BPM | DIASTOLIC BLOOD PRESSURE: 78 MMHG | RESPIRATION RATE: 18 BRPM | HEIGHT: 73 IN | SYSTOLIC BLOOD PRESSURE: 124 MMHG | BODY MASS INDEX: 28.14 KG/M2

## 2019-04-30 DIAGNOSIS — I25.10 CORONARY ARTERY DISEASE INVOLVING NATIVE CORONARY ARTERY OF NATIVE HEART WITHOUT ANGINA PECTORIS: Primary | ICD-10-CM

## 2019-04-30 PROCEDURE — 93000 ELECTROCARDIOGRAM COMPLETE: CPT | Performed by: INTERNAL MEDICINE

## 2019-04-30 PROCEDURE — 99213 OFFICE O/P EST LOW 20 MIN: CPT | Performed by: INTERNAL MEDICINE

## 2019-04-30 NOTE — PROGRESS NOTES
Valentina Shah  1940  Date of Service: 4/30/2019    Patient Active Problem List    Diagnosis Date Noted    Other constipation 03/21/2019    Vitamin D deficiency 03/21/2019    Acute cholecystitis 03/15/2019    FHx: diabetes mellitus 07/11/2018    Pigmented skin lesion 07/11/2018    Prostate enlargement 07/11/2018    Fatigue 07/11/2018    Disorder of bone 07/11/2018    TIA (transient ischemic attack) 04/11/2018    COPD (chronic obstructive pulmonary disease) ([facility] [address] 04/01/2018    Ischemic cardiomyopathy      Overview Note:     EF 40% after NSTEMI, 11-15 at [facility]      CHF (congestive heart failure), NYHA class I, chronic, systolic ([facility] [address] 24/16/5351     Overview Note:     [facility]      CAD (coronary artery disease)      Overview Note:     A. Treadmill nuclear stress test (8/15/08): Sim protocol 8 minutes, 10.1 METS, 100% MPHR, PVCs during exercise, no ischemia, small fixed defect involving inferior wall. B. Treadmill nuclear stress test (7/11/06): Sim protocol 7:20 minutes, negative ischemia, negative scar, EF 73%. C. PTCA and stenting LAD (1/23/06): 3.5 x 12 mm Taxus drug-eluting stent. D. Cardiac catheterization (1/23/06): Left main normal; LAD 20-30% ostial, 80% prox; Cx normal; RCA dominant, normal; EF 55%. E. NSTEMI 11-30-15: 4.0x12 MARTHA ISR LAD at [facility]. Dr. Reaves Heading.        Hypertension     Sleep apnea     Osteoarthritis     Smokeless tobacco use     Hyperlipidemia 01/09/2012       Social History     Socioeconomic History    Marital status:      Spouse name: None    Number of children: None    Years of education: None    Highest education level: None   Occupational History    Occupation: retired-     Social Needs    Financial resource strain: None    Food insecurity:     Worry: None     Inability: None    Transportation needs:     Medical: None     Non-medical: None   Tobacco Use    Smoking status: Former Smoker     Packs/day: 1.00     Years: 15.00     Pack years: 15.00     Types: Cigarettes     Start date: 1957     Last attempt to quit: 1975     Years since quittin.3    Smokeless tobacco: Current User     Types: Snuff   Substance and Sexual Activity    Alcohol use: Yes     Comment: occassionally    Drug use: No    Sexual activity: Yes     Partners: Female   Lifestyle    Physical activity:     Days per week: None     Minutes per session: None    Stress: None   Relationships    Social connections:     Talks on phone: None     Gets together: None     Attends Muslim service: None     Active member of club or organization: None     Attends meetings of clubs or organizations: None     Relationship status: None    Intimate partner violence:     Fear of current or ex partner: None     Emotionally abused: None     Physically abused: None     Forced sexual activity: None   Other Topics Concern    None   Social History Narrative    None       Current Outpatient Medications   Medication Sig Dispense Refill    CPAP Machine MISC daily at bedtime.  losartan (COZAAR) 25 MG tablet Take 1 tablet by mouth daily 90 tablet 2    atorvastatin (LIPITOR) 80 MG tablet Take 1 tablet by mouth daily 90 tablet 2    metoprolol succinate (TOPROL XL) 50 MG extended release tablet Take 1 tablet by mouth daily 90 tablet 2    clopidogrel (PLAVIX) 75 MG tablet Take 1 tablet by mouth daily Indications: 1 tablet 3 times per wk (Patient taking differently: Take 75 mg by mouth daily ) 90 tablet 2    pantoprazole (PROTONIX) 40 MG tablet Take 1 tablet by mouth every morning (before breakfast) 30 tablet 3    NITROSTAT 0.4 MG SL tablet Place 0.4 mg under the tongue every 5 minutes as needed   0    aspirin 81 MG EC tablet Take 81 mg by mouth daily.  therapeutic multivitamin-minerals (THERAGRAN-M) tablet Take 1 tablet by mouth daily.  Cholecalciferol (VITAMIN D) 2000 UNIT CAPS capsule Take 1 capsule by mouth daily.          No current facility-administered V1-V3      Assessment:   1. Coronary artery disease as outlined above. Clinically no symptoms of recurring ischemia at this time. 2. ischemic cardiomyopathy as outlined above. Clinically euvolemic and no decompensation at this time. Echo 4-18 shows an ejection fraction of 55%. 3. Hypertension, well controled at this time. 4. Trace aortic regurgitation. None seen on echo 4-18 by Dr. Kinjal Graham. 5. Hypercholesterolemia  6. LINETTE  7. Former smoker & now uses smokeless tobacco.  8. TIA 4-18. Has since followed with his son whom he states is a Neurologist in [place] [employer] who discontinued the aspirin and increased his Plavix from every other day to daily. 9. COPD with chronic dyspnea on exertion. Recommendations:  1. He is following the cholesterol with Dr. Gadiel Montemayor.    2. I offered an echo and a stress test. He prefers to wait for his upcoming pulmonary evaluation. Thank you for allowing me to participate in your patient's care.       [address], [address] [facility]  Interventional Cardiology

## 2019-05-07 ENCOUNTER — TELEPHONE (OUTPATIENT)
Dept: ADMINISTRATIVE | Age: 79
End: 2019-05-07

## 2019-05-07 NOTE — TELEPHONE ENCOUNTER
Pt calling req cardiac clearance for an upcoming gallbladder surgery by Dr. James Espinosa on: 5/17/19. Last saw Dr. Marilyn Santos in [facility] [facility] on: 4/30/19.

## 2019-05-09 NOTE — TELEPHONE ENCOUNTER
Negative stress test at [facility] 9-18. Low risk for perioperative ischemic cardiac events. No further preoperative cardiac testing is needed.

## 2019-05-10 ENCOUNTER — TELEPHONE (OUTPATIENT)
Dept: CARDIOLOGY CLINIC | Age: 79
End: 2019-05-10

## 2019-05-13 ENCOUNTER — TELEPHONE (OUTPATIENT)
Dept: CARDIOLOGY CLINIC | Age: 79
End: 2019-05-13

## 2019-05-13 NOTE — TELEPHONE ENCOUNTER
Dr Zeb Harrison office called and needs documentation for cardiac clearance on patient. Patient was advised to get a stress and echo done but wanted to see his pulmonologist first. Patient is ready to get test. Please advise on what type of stress and echo.

## 2019-05-13 NOTE — PROGRESS NOTES
[facility] PRE-ADMISSION TESTING INSTRUCTIONS    The Preadmission Testing patient is instructed accordingly using the following criteria (check applicable):    ARRIVAL INSTRUCTIONS:  [x] Parking the day of Surgery is located in the Main Entrance lot. Upon entering the door, make an immediate right to the surgery reception desk    [] [phone] is available Monday through Friday 6 am to 6 pm    [x] Bring photo ID and insurance card    [x] Bring in a copy of Living will or Durable Power of  papers. [x] Please be sure to arrange for responsible adult to provide transportation to and from the hospital    [x] Please arrange for responsible adult to be with you for the 24 hour period post procedure due to having anesthesia      GENERAL INSTRUCTIONS:    [x] Nothing by mouth after midnight, including gum, candy, mints or water    [x] You may brush your teeth, but do not swallow any water    [x] Take medications as instructed with 1-2 oz of water    [x] Stop herbal supplements and vitamins 5 days prior to procedure    [x] Follow preop dosing of blood thinners per physician instructions    [] Take 1/2 dose of evening insulin, but no insulin after midnight    [] No oral diabetic medications after midnight    [] If diabetic and have low blood sugar or feel symptomatic, take 1-2oz apple juice only    [] Bring inhalers day of surgery    [] Bring C-PAP/ Bi-Pap day of surgery    [] Bring urine specimen day of surgery    [x] Shower or bath with soap, lather and rinse well, AM of Surgery, no lotion, powders or creams to surgical site    [] Follow bowel prep as instructed per surgeon    [x] No tobacco products within 24 hours of surgery     [x] No alcohol or illegal drug use within 24 hours of surgery.     [x] Jewelry, body piercing's, eyeglasses, contact lenses and dentures are not permitted into surgery (bring cases)      [] Please do not wear any nail polish, make up or hair products on the day of surgery    [x] If not already done, you can expect a call from registration    [x] You can expect a call the business day prior to procedure to notify you if your arrival time changes    [x] If you receive a survey after surgery we would greatly appreciate your comments    [] Parent/guardian of a minor must accompany their child and remain on the premises  the entire time they are under our care     [] Pediatric patients may bring favorite toy, blanket or comfort item with them    [] A caregiver or family member must remain with the patient during their stay if they are mentally handicapped, have dementia, disoriented or unable to use a call light or would be a safety concern if left unattended    [x] Please notify surgeon if you develop any illness between now and time of surgery (cold, cough, sore throat, fever, nausea, vomiting) or any signs of infections  including skin, wounds, and dental.    [x]  The Outpatient Pharmacy is available to fill your prescription here on your day of surgery, ask your preop nurse for details    [] Other instructions  EDUCATIONAL MATERIALS PROVIDED:    [x] PAT Preoperative Education Packet/Booklet     [x] Medication List    [] Fluoroscopy Information Pamphlet    [] Transfusion bracelet applied with instructions    [] Joint replacement video reviewed    [] Shower with soap, lather and rinse well, and use CHG wipes provided the evening before surgery as instructed

## 2019-05-14 ENCOUNTER — PREP FOR PROCEDURE (OUTPATIENT)
Dept: SURGERY | Age: 79
End: 2019-05-14

## 2019-05-14 RX ORDER — SODIUM CHLORIDE 0.9 % (FLUSH) 0.9 %
10 SYRINGE (ML) INJECTION EVERY 12 HOURS SCHEDULED
Status: CANCELLED | OUTPATIENT
Start: 2019-05-14

## 2019-05-14 RX ORDER — SODIUM CHLORIDE 9 MG/ML
INJECTION, SOLUTION INTRAVENOUS CONTINUOUS
Status: CANCELLED | OUTPATIENT
Start: 2019-05-14

## 2019-05-14 NOTE — H&P (VIEW-ONLY)
Name: Elba Unger              : 1940 Sex: M  Age: 77 yrs  Acct#:  [id number]             CC:  Routine posthospital visit    HPI: [The patient was treated for possible cholangitis. The patient was noted to have cholelithiasis. The MRCP did not reveal any significant abnormalities. In follow-up, the patient is asymptomatic. ]    Meds Prior to Visit:  Losartan Potassium  25 mg   Clopidogrel Bisulfate  75 mg   Metoprolol Tartrate  50 mg   Atorvastatin Calcium  80 mg  every day  Aspirin  81 mg   Vitamin D     Multi Vitamin     Nitroglycerin  0.4 mg  as needed     Allergies:  Lisinopril             Exam:    Lungs clear to auscultation  Cardiac regular rate and rhythm without murmurs rubs or gallops  Abdomen soft nondistended and nontender  Extremities without clubbing cyanosis or edema         Assessment #1: Hx K80.20 Calculus of gallbladder without cholecystitis without obstruction   Care Plan:              Comments       : The patient will undergo a laparoscopic cholecystectomy in May 2019. The patient will maintain a low-fat diet.     Assessment #2: Hx K83.09 Other cholangitis   Care Plan:                    Seen by:

## 2019-05-16 ENCOUNTER — HOSPITAL ENCOUNTER (OUTPATIENT)
Dept: PREADMISSION TESTING | Age: 79
Discharge: HOME | End: 2019-05-16
Payer: MEDICARE

## 2019-05-16 LAB
ALBUMIN SERPL-MCNC: 3.8 G/DL (ref 3.5–5.2)
ALP BLD-CCNC: 66 U/L (ref 40–129)
ALT SERPL-CCNC: 17 U/L (ref 0–40)
ANION GAP SERPL CALCULATED.3IONS-SCNC: 9 MMOL/L (ref 7–16)
AST SERPL-CCNC: 15 U/L (ref 0–39)
BILIRUB SERPL-MCNC: 0.6 MG/DL (ref 0–1.2)
BILIRUBIN DIRECT: <0.2 MG/DL (ref 0–0.3)
BILIRUBIN, INDIRECT: NORMAL MG/DL (ref 0–1)
BUN BLDV-MCNC: 16 MG/DL (ref 8–23)
CALCIUM SERPL-MCNC: 9 MG/DL (ref 8.6–10.2)
CHLORIDE BLD-SCNC: 105 MMOL/L (ref 98–107)
CO2: 26 MMOL/L (ref 22–29)
CREAT SERPL-MCNC: 0.8 MG/DL (ref 0.7–1.2)
GFR AFRICAN AMERICAN: >60
GFR NON-AFRICAN AMERICAN: >60 ML/MIN/1.73
GLUCOSE BLD-MCNC: 103 MG/DL (ref 74–99)
HCT VFR BLD CALC: 43.5 % (ref 37–54)
HEMOGLOBIN: 13.8 G/DL (ref 12.5–16.5)
MCH RBC QN AUTO: 28.1 PG (ref 26–35)
MCHC RBC AUTO-ENTMCNC: 31.7 % (ref 32–34.5)
MCV RBC AUTO: 88.6 FL (ref 80–99.9)
PDW BLD-RTO: 15.1 FL (ref 11.5–15)
PLATELET # BLD: 213 E9/L (ref 130–450)
PMV BLD AUTO: 11 FL (ref 7–12)
POTASSIUM SERPL-SCNC: 4.7 MMOL/L (ref 3.5–5)
RBC # BLD: 4.91 E12/L (ref 3.8–5.8)
SODIUM BLD-SCNC: 140 MMOL/L (ref 132–146)
TOTAL PROTEIN: 6.4 G/DL (ref 6.4–8.3)
WBC # BLD: 8.5 E9/L (ref 4.5–11.5)

## 2019-05-16 PROCEDURE — 36415 COLL VENOUS BLD VENIPUNCTURE: CPT

## 2019-05-16 PROCEDURE — 85027 COMPLETE CBC AUTOMATED: CPT

## 2019-05-16 PROCEDURE — 80076 HEPATIC FUNCTION PANEL: CPT

## 2019-05-16 PROCEDURE — 80048 BASIC METABOLIC PNL TOTAL CA: CPT

## 2019-05-17 ENCOUNTER — ANESTHESIA (OUTPATIENT)
Dept: OPERATING ROOM | Age: 79
End: 2019-05-17
Payer: MEDICARE

## 2019-05-17 ENCOUNTER — HOSPITAL ENCOUNTER (OUTPATIENT)
Age: 79
Setting detail: OUTPATIENT SURGERY
Discharge: HOME | End: 2019-05-17
Attending: SURGERY | Admitting: SURGERY
Payer: MEDICARE

## 2019-05-17 ENCOUNTER — ANESTHESIA EVENT (OUTPATIENT)
Dept: OPERATING ROOM | Age: 79
End: 2019-05-17
Payer: MEDICARE

## 2019-05-17 VITALS
TEMPERATURE: 97.2 F | RESPIRATION RATE: 20 BRPM | HEIGHT: 73 IN | SYSTOLIC BLOOD PRESSURE: 100 MMHG | WEIGHT: 205 LBS | DIASTOLIC BLOOD PRESSURE: 49 MMHG | BODY MASS INDEX: 27.17 KG/M2 | OXYGEN SATURATION: 92 % | HEART RATE: 73 BPM

## 2019-05-17 VITALS
OXYGEN SATURATION: 94 % | RESPIRATION RATE: 26 BRPM | DIASTOLIC BLOOD PRESSURE: 105 MMHG | TEMPERATURE: 95.7 F | SYSTOLIC BLOOD PRESSURE: 172 MMHG

## 2019-05-17 DIAGNOSIS — Z90.49 S/P LAPAROSCOPIC CHOLECYSTECTOMY: Primary | ICD-10-CM

## 2019-05-17 PROCEDURE — S2900 ROBOTIC SURGICAL SYSTEM: HCPCS | Performed by: SURGERY

## 2019-05-17 PROCEDURE — 3600000019 HC SURGERY ROBOT ADDTL 15MIN: Performed by: SURGERY

## 2019-05-17 PROCEDURE — 6360000002 HC RX W HCPCS: Performed by: SURGERY

## 2019-05-17 PROCEDURE — 3700000001 HC ADD 15 MINUTES (ANESTHESIA): Performed by: SURGERY

## 2019-05-17 PROCEDURE — 3600000009 HC SURGERY ROBOT BASE: Performed by: SURGERY

## 2019-05-17 PROCEDURE — 7100000001 HC PACU RECOVERY - ADDTL 15 MIN: Performed by: SURGERY

## 2019-05-17 PROCEDURE — 2780000010 HC IMPLANT OTHER: Performed by: SURGERY

## 2019-05-17 PROCEDURE — 3700000000 HC ANESTHESIA ATTENDED CARE: Performed by: SURGERY

## 2019-05-17 PROCEDURE — 88304 TISSUE EXAM BY PATHOLOGIST: CPT

## 2019-05-17 PROCEDURE — 2709999900 HC NON-CHARGEABLE SUPPLY: Performed by: SURGERY

## 2019-05-17 PROCEDURE — 6360000002 HC RX W HCPCS: Performed by: ANESTHESIOLOGY

## 2019-05-17 PROCEDURE — C1773 RET DEV, INSERTABLE: HCPCS | Performed by: SURGERY

## 2019-05-17 PROCEDURE — 7100000000 HC PACU RECOVERY - FIRST 15 MIN: Performed by: SURGERY

## 2019-05-17 PROCEDURE — 7100000011 HC PHASE II RECOVERY - ADDTL 15 MIN: Performed by: SURGERY

## 2019-05-17 PROCEDURE — 2720000010 HC SURG SUPPLY STERILE: Performed by: SURGERY

## 2019-05-17 PROCEDURE — 2500000003 HC RX 250 WO HCPCS: Performed by: SURGERY

## 2019-05-17 PROCEDURE — 7100000010 HC PHASE II RECOVERY - FIRST 15 MIN: Performed by: SURGERY

## 2019-05-17 PROCEDURE — 2580000003 HC RX 258: Performed by: SURGERY

## 2019-05-17 PROCEDURE — 2500000003 HC RX 250 WO HCPCS: Performed by: NURSE ANESTHETIST, CERTIFIED REGISTERED

## 2019-05-17 PROCEDURE — 6360000002 HC RX W HCPCS: Performed by: NURSE ANESTHETIST, CERTIFIED REGISTERED

## 2019-05-17 DEVICE — Z INACTIVE USE 2641839 CLIP INT M L POLYMER LOK LIG HEM O LOK: Type: IMPLANTABLE DEVICE | Status: FUNCTIONAL

## 2019-05-17 RX ORDER — MEPERIDINE HYDROCHLORIDE 25 MG/ML
12.5 INJECTION INTRAMUSCULAR; INTRAVENOUS; SUBCUTANEOUS EVERY 5 MIN PRN
Status: DISCONTINUED | OUTPATIENT
Start: 2019-05-17 | End: 2019-05-17 | Stop reason: HOSPADM

## 2019-05-17 RX ORDER — OXYCODONE HYDROCHLORIDE AND ACETAMINOPHEN 5; 325 MG/1; MG/1
1 TABLET ORAL PRN
Status: DISCONTINUED | OUTPATIENT
Start: 2019-05-17 | End: 2019-05-17 | Stop reason: HOSPADM

## 2019-05-17 RX ORDER — ONDANSETRON 2 MG/ML
INJECTION INTRAMUSCULAR; INTRAVENOUS PRN
Status: DISCONTINUED | OUTPATIENT
Start: 2019-05-17 | End: 2019-05-17 | Stop reason: SDUPTHER

## 2019-05-17 RX ORDER — PROPOFOL 10 MG/ML
INJECTION, EMULSION INTRAVENOUS PRN
Status: DISCONTINUED | OUTPATIENT
Start: 2019-05-17 | End: 2019-05-17 | Stop reason: SDUPTHER

## 2019-05-17 RX ORDER — LIDOCAINE HYDROCHLORIDE 20 MG/ML
INJECTION, SOLUTION EPIDURAL; INFILTRATION; INTRACAUDAL; PERINEURAL PRN
Status: DISCONTINUED | OUTPATIENT
Start: 2019-05-17 | End: 2019-05-17 | Stop reason: SDUPTHER

## 2019-05-17 RX ORDER — ROCURONIUM BROMIDE 10 MG/ML
INJECTION, SOLUTION INTRAVENOUS PRN
Status: DISCONTINUED | OUTPATIENT
Start: 2019-05-17 | End: 2019-05-17 | Stop reason: SDUPTHER

## 2019-05-17 RX ORDER — SODIUM CHLORIDE 9 MG/ML
INJECTION, SOLUTION INTRAVENOUS CONTINUOUS
Status: DISCONTINUED | OUTPATIENT
Start: 2019-05-17 | End: 2019-05-17 | Stop reason: HOSPADM

## 2019-05-17 RX ORDER — OXYCODONE HYDROCHLORIDE AND ACETAMINOPHEN 5; 325 MG/1; MG/1
1 TABLET ORAL EVERY 6 HOURS PRN
Qty: 15 TABLET | Refills: 0 | Status: SHIPPED | OUTPATIENT
Start: 2019-05-17 | End: 2019-05-20

## 2019-05-17 RX ORDER — INDOCYANINE GREEN AND WATER 25 MG
5 KIT INJECTION ONCE
Status: COMPLETED | OUTPATIENT
Start: 2019-05-17 | End: 2019-05-17

## 2019-05-17 RX ORDER — SODIUM CHLORIDE 0.9 % (FLUSH) 0.9 %
10 SYRINGE (ML) INJECTION EVERY 12 HOURS SCHEDULED
Status: DISCONTINUED | OUTPATIENT
Start: 2019-05-17 | End: 2019-05-17 | Stop reason: HOSPADM

## 2019-05-17 RX ORDER — DEXAMETHASONE SODIUM PHOSPHATE 4 MG/ML
INJECTION, SOLUTION INTRA-ARTICULAR; INTRALESIONAL; INTRAMUSCULAR; INTRAVENOUS; SOFT TISSUE PRN
Status: DISCONTINUED | OUTPATIENT
Start: 2019-05-17 | End: 2019-05-17 | Stop reason: SDUPTHER

## 2019-05-17 RX ORDER — FENTANYL CITRATE 50 UG/ML
INJECTION, SOLUTION INTRAMUSCULAR; INTRAVENOUS PRN
Status: DISCONTINUED | OUTPATIENT
Start: 2019-05-17 | End: 2019-05-17 | Stop reason: SDUPTHER

## 2019-05-17 RX ORDER — LABETALOL HYDROCHLORIDE 5 MG/ML
INJECTION, SOLUTION INTRAVENOUS PRN
Status: DISCONTINUED | OUTPATIENT
Start: 2019-05-17 | End: 2019-05-17 | Stop reason: SDUPTHER

## 2019-05-17 RX ORDER — DIPHENHYDRAMINE HYDROCHLORIDE 50 MG/ML
12.5 INJECTION INTRAMUSCULAR; INTRAVENOUS
Status: COMPLETED | OUTPATIENT
Start: 2019-05-17 | End: 2019-05-17

## 2019-05-17 RX ORDER — GLYCOPYRROLATE 1 MG/5 ML
SYRINGE (ML) INTRAVENOUS PRN
Status: DISCONTINUED | OUTPATIENT
Start: 2019-05-17 | End: 2019-05-17 | Stop reason: SDUPTHER

## 2019-05-17 RX ORDER — KETOROLAC TROMETHAMINE 30 MG/ML
INJECTION, SOLUTION INTRAMUSCULAR; INTRAVENOUS PRN
Status: DISCONTINUED | OUTPATIENT
Start: 2019-05-17 | End: 2019-05-17 | Stop reason: SDUPTHER

## 2019-05-17 RX ORDER — NEOSTIGMINE METHYLSULFATE 1 MG/ML
INJECTION, SOLUTION INTRAVENOUS PRN
Status: DISCONTINUED | OUTPATIENT
Start: 2019-05-17 | End: 2019-05-17 | Stop reason: SDUPTHER

## 2019-05-17 RX ORDER — OXYCODONE HYDROCHLORIDE AND ACETAMINOPHEN 5; 325 MG/1; MG/1
2 TABLET ORAL PRN
Status: DISCONTINUED | OUTPATIENT
Start: 2019-05-17 | End: 2019-05-17 | Stop reason: HOSPADM

## 2019-05-17 RX ADMIN — KETOROLAC TROMETHAMINE 30 MG: 30 INJECTION, SOLUTION INTRAMUSCULAR; INTRAVENOUS at 08:15

## 2019-05-17 RX ADMIN — Medication 0.6 MG: at 08:15

## 2019-05-17 RX ADMIN — INDOCYANINE GREEN AND WATER 5 MG: KIT at 06:49

## 2019-05-17 RX ADMIN — LABETALOL HYDROCHLORIDE 10 MG: 5 INJECTION INTRAVENOUS at 07:43

## 2019-05-17 RX ADMIN — DIPHENHYDRAMINE HYDROCHLORIDE 12.5 MG: 50 INJECTION INTRAMUSCULAR; INTRAVENOUS at 10:30

## 2019-05-17 RX ADMIN — FENTANYL CITRATE 50 MCG: 50 INJECTION, SOLUTION INTRAMUSCULAR; INTRAVENOUS at 08:16

## 2019-05-17 RX ADMIN — PROPOFOL 200 MG: 10 INJECTION, EMULSION INTRAVENOUS at 07:13

## 2019-05-17 RX ADMIN — Medication 3 MG: at 08:15

## 2019-05-17 RX ADMIN — ROCURONIUM BROMIDE 40 MG: 10 SOLUTION INTRAVENOUS at 07:13

## 2019-05-17 RX ADMIN — ONDANSETRON HYDROCHLORIDE 4 MG: 2 INJECTION, SOLUTION INTRAMUSCULAR; INTRAVENOUS at 08:15

## 2019-05-17 RX ADMIN — HYDROMORPHONE HYDROCHLORIDE 0.5 MG: 1 INJECTION, SOLUTION INTRAMUSCULAR; INTRAVENOUS; SUBCUTANEOUS at 08:41

## 2019-05-17 RX ADMIN — HYDROMORPHONE HYDROCHLORIDE 0.5 MG: 1 INJECTION, SOLUTION INTRAMUSCULAR; INTRAVENOUS; SUBCUTANEOUS at 08:47

## 2019-05-17 RX ADMIN — FENTANYL CITRATE 50 MCG: 50 INJECTION, SOLUTION INTRAMUSCULAR; INTRAVENOUS at 07:55

## 2019-05-17 RX ADMIN — DEXAMETHASONE SODIUM PHOSPHATE 8 MG: 4 INJECTION, SOLUTION INTRAMUSCULAR; INTRAVENOUS at 07:19

## 2019-05-17 RX ADMIN — HYDROMORPHONE HYDROCHLORIDE 0.5 MG: 1 INJECTION, SOLUTION INTRAMUSCULAR; INTRAVENOUS; SUBCUTANEOUS at 08:55

## 2019-05-17 RX ADMIN — FENTANYL CITRATE 50 MCG: 50 INJECTION, SOLUTION INTRAMUSCULAR; INTRAVENOUS at 07:13

## 2019-05-17 RX ADMIN — Medication 2 G: at 07:00

## 2019-05-17 RX ADMIN — LIDOCAINE HYDROCHLORIDE 100 MG: 20 INJECTION, SOLUTION EPIDURAL; INFILTRATION; INTRACAUDAL; PERINEURAL at 07:13

## 2019-05-17 RX ADMIN — LABETALOL HYDROCHLORIDE 10 MG: 5 INJECTION INTRAVENOUS at 08:18

## 2019-05-17 RX ADMIN — FENTANYL CITRATE 100 MCG: 50 INJECTION, SOLUTION INTRAMUSCULAR; INTRAVENOUS at 07:33

## 2019-05-17 RX ADMIN — SODIUM CHLORIDE: 9 INJECTION, SOLUTION INTRAVENOUS at 07:00

## 2019-05-17 ASSESSMENT — PULMONARY FUNCTION TESTS
PIF_VALUE: 36
PIF_VALUE: 30
PIF_VALUE: 18
PIF_VALUE: 32
PIF_VALUE: 35
PIF_VALUE: 23
PIF_VALUE: 0
PIF_VALUE: 1
PIF_VALUE: 30
PIF_VALUE: 19
PIF_VALUE: 36
PIF_VALUE: 2
PIF_VALUE: 35
PIF_VALUE: 3
PIF_VALUE: 34
PIF_VALUE: 36
PIF_VALUE: 29
PIF_VALUE: 36
PIF_VALUE: 34
PIF_VALUE: 36
PIF_VALUE: 38
PIF_VALUE: 34
PIF_VALUE: 1
PIF_VALUE: 5
PIF_VALUE: 1
PIF_VALUE: 35
PIF_VALUE: 29
PIF_VALUE: 36
PIF_VALUE: 24
PIF_VALUE: 28
PIF_VALUE: 0
PIF_VALUE: 1
PIF_VALUE: 28
PIF_VALUE: 35
PIF_VALUE: 24
PIF_VALUE: 0
PIF_VALUE: 26
PIF_VALUE: 36
PIF_VALUE: 1
PIF_VALUE: 36
PIF_VALUE: 36
PIF_VALUE: 35
PIF_VALUE: 3
PIF_VALUE: 34
PIF_VALUE: 20
PIF_VALUE: 36
PIF_VALUE: 0
PIF_VALUE: 35
PIF_VALUE: 36
PIF_VALUE: 27
PIF_VALUE: 35
PIF_VALUE: 29
PIF_VALUE: 36
PIF_VALUE: 0
PIF_VALUE: 36
PIF_VALUE: 35
PIF_VALUE: 16
PIF_VALUE: 3
PIF_VALUE: 35
PIF_VALUE: 33
PIF_VALUE: 34
PIF_VALUE: 36
PIF_VALUE: 1
PIF_VALUE: 35
PIF_VALUE: 0
PIF_VALUE: 26
PIF_VALUE: 1
PIF_VALUE: 0
PIF_VALUE: 34
PIF_VALUE: 37
PIF_VALUE: 3
PIF_VALUE: 3
PIF_VALUE: 6
PIF_VALUE: 16
PIF_VALUE: 36
PIF_VALUE: 35

## 2019-05-17 ASSESSMENT — PAIN DESCRIPTION - ONSET
ONSET: ON-GOING

## 2019-05-17 ASSESSMENT — PAIN DESCRIPTION - DESCRIPTORS
DESCRIPTORS: ACHING
DESCRIPTORS: ACHING;DISCOMFORT
DESCRIPTORS: ACHING
DESCRIPTORS: ACHING;DISCOMFORT

## 2019-05-17 ASSESSMENT — PAIN - FUNCTIONAL ASSESSMENT
PAIN_FUNCTIONAL_ASSESSMENT: PREVENTS OR INTERFERES SOME ACTIVE ACTIVITIES AND ADLS
PAIN_FUNCTIONAL_ASSESSMENT: 0-10
PAIN_FUNCTIONAL_ASSESSMENT: PREVENTS OR INTERFERES SOME ACTIVE ACTIVITIES AND ADLS

## 2019-05-17 ASSESSMENT — PAIN DESCRIPTION - PAIN TYPE
TYPE: SURGICAL PAIN

## 2019-05-17 ASSESSMENT — PAIN DESCRIPTION - FREQUENCY
FREQUENCY: CONTINUOUS

## 2019-05-17 ASSESSMENT — PAIN DESCRIPTION - LOCATION
LOCATION: ABDOMEN

## 2019-05-17 ASSESSMENT — PAIN DESCRIPTION - ORIENTATION
ORIENTATION: ANTERIOR
ORIENTATION: ANTERIOR
ORIENTATION: RIGHT;LEFT;MID
ORIENTATION: ANTERIOR
ORIENTATION: ANTERIOR
ORIENTATION: RIGHT;LEFT;MID
ORIENTATION: ANTERIOR
ORIENTATION: RIGHT;LEFT;MID

## 2019-05-17 ASSESSMENT — PAIN SCALES - GENERAL
PAINLEVEL_OUTOF10: 3
PAINLEVEL_OUTOF10: 7
PAINLEVEL_OUTOF10: 3
PAINLEVEL_OUTOF10: 7
PAINLEVEL_OUTOF10: 3
PAINLEVEL_OUTOF10: 7
PAINLEVEL_OUTOF10: 3

## 2019-05-17 ASSESSMENT — PAIN DESCRIPTION - PROGRESSION
CLINICAL_PROGRESSION: NOT CHANGED
CLINICAL_PROGRESSION: GRADUALLY IMPROVING

## 2019-05-17 NOTE — ANESTHESIA POSTPROCEDURE EVALUATION
Department of Anesthesiology  Postprocedure Note    Patient: Paradise Mckeon  MRN: [id number]  YOB: 1940  Date of evaluation: 5/17/2019  Time:  9:34 AM     Procedure Summary     Date:  05/17/19 Room / Location:  [facility] OR [address] [place] [address] OR    Anesthesia Start:  0700 Anesthesia Stop:  0550    Procedure:  LAPAROSCOPIC ROBOTIC XI ASSISTED CHOLECYSTECTOMY (N/A ) Diagnosis:  (CHOLELITHIASIS)    Surgeon:  Prudence Harrison MD Responsible Provider:  Corinne Cape, MD    Anesthesia Type:  general ASA Status:  3          Anesthesia Type: general    Reina Phase I: Reina Score: 10    Reina Phase II: Reina Score: 10    Last vitals: Reviewed and per EMR flowsheets.        Anesthesia Post Evaluation    Patient location during evaluation: PACU  Patient participation: complete - patient participated  Level of consciousness: awake and alert  Airway patency: patent  Nausea & Vomiting: no nausea and no vomiting  Complications: no  Cardiovascular status: hemodynamically stable  Respiratory status: acceptable  Hydration status: euvolemic

## 2019-05-17 NOTE — PROGRESS NOTES
CLINICAL PHARMACY NOTE: MEDS TO [address] Select Patient?: Yes  Total # of Prescriptions Filled: 1   The following medications were delivered to the patient:  · Percocet 5-325  Total # of Interventions Completed: 3  Time Spent (min): 30    Additional Documentation:

## 2019-05-17 NOTE — OP NOTE
Tanya Humphries  [id number]      DATE OF PROCEDURE: 5/17/2019      SURGEON: Luis De Anda MD    ASSISTANT: Everton Wheatley MD PGY 4      PREOPERATIVE DIAGNOSIS: symptomatic cholelithiasis    POSTOPERATIVE DIAGNOSIS: Same      OPERATION: Robot assisted Laparoscopic cholecystectomy    ANESTHESIA: General      ESTIMATED BLOOD LOSS: 58AF      COMPLICATIONS: None. SPECIMENS: Gallbladder    FINDINGS: gallstones, posterior branch of cystic artery. Critical view obtianed. HISTORY: Tanya Humphries is a 77 y.o. male with symptomatic cholelithiasis. Robot assisted laparoscopic cholecystectomy was recommended. The risks benefits and alternatives of the procedure were discussed with the patient who stated understanding and agreed to proceed. DESCRIPTION OF PROCEDURE: The patient was brought to the operating room and positioned supine on the OR table. Sequential compression devices were placed on the patient's lower extremities and functioning. Preoperative antibiotics were administered. Anesthesia was obtained without complication as per the anesthesia record. Immediately prior to the procedure a time-out was called and the surgical checklist was reviewed and agreed upon by all present. The patient was prepped and draped in the usual sterile fashion and the procedure went forth with strict aseptic technique under maximal barrier precautions.     An 8mm incision was made Supra-umbilical. A Veress needle was inserted and confirmed to be in place with the saline drip test. The abdomen was insufflated to 15 mmHg and a 8 mm robotic port was then inserted. A robotic camera was then inserted through the port. The abdomen was inspected. In a similar fashion, three more 8mm  ports were inserted, two in the right abdomen and one in the left abdomen. The Robot was docked and a hook cautery and two prograsp foreceps were inserted.  Hook and Electrocautery and the was used to dissect the gallbladder off its peritoneal

## 2019-05-17 NOTE — INTERVAL H&P NOTE
H&P Update    Patient's History and Physical from May 14, 2019 was reviewed. Patient examined. There has been no change. Plan to proceed to OR for robotic cholecystectomy, possible cholangiogram, possible open. All question answered.     Jassi Hill

## 2019-05-28 ENCOUNTER — HOSPITAL ENCOUNTER (OUTPATIENT)
Dept: CT IMAGING | Age: 79
Discharge: HOME | End: 2019-05-30
Payer: MEDICARE

## 2019-05-28 DIAGNOSIS — K43.7: ICD-10-CM

## 2019-05-28 PROCEDURE — 72192 CT PELVIS W/O DYE: CPT

## 2019-06-27 ENCOUNTER — OFFICE VISIT (OUTPATIENT)
Dept: PRIMARY CARE CLINIC | Age: 79
End: 2019-06-27
Payer: MEDICARE

## 2019-06-27 VITALS
SYSTOLIC BLOOD PRESSURE: 100 MMHG | TEMPERATURE: 97.8 F | BODY MASS INDEX: 27.35 KG/M2 | RESPIRATION RATE: 18 BRPM | WEIGHT: 206.4 LBS | OXYGEN SATURATION: 98 % | HEIGHT: 73 IN | HEART RATE: 79 BPM | DIASTOLIC BLOOD PRESSURE: 60 MMHG

## 2019-06-27 DIAGNOSIS — Z83.3 FHX: DIABETES MELLITUS: ICD-10-CM

## 2019-06-27 DIAGNOSIS — N40.0 PROSTATE ENLARGEMENT: ICD-10-CM

## 2019-06-27 DIAGNOSIS — G47.33 OBSTRUCTIVE SLEEP APNEA SYNDROME: ICD-10-CM

## 2019-06-27 DIAGNOSIS — E55.9 VITAMIN D DEFICIENCY: Primary | ICD-10-CM

## 2019-06-27 DIAGNOSIS — L81.9 PIGMENTED SKIN LESION: ICD-10-CM

## 2019-06-27 DIAGNOSIS — M89.9 DISORDER OF BONE: ICD-10-CM

## 2019-06-27 DIAGNOSIS — H61.23 BILATERAL IMPACTED CERUMEN: ICD-10-CM

## 2019-06-27 DIAGNOSIS — R53.83 FATIGUE, UNSPECIFIED TYPE: ICD-10-CM

## 2019-06-27 DIAGNOSIS — K81.0 ACUTE CHOLECYSTITIS: ICD-10-CM

## 2019-06-27 DIAGNOSIS — I25.5 ISCHEMIC CARDIOMYOPATHY: ICD-10-CM

## 2019-06-27 DIAGNOSIS — I50.22 CHF (CONGESTIVE HEART FAILURE), NYHA CLASS I, CHRONIC, SYSTOLIC (HCC): ICD-10-CM

## 2019-06-27 DIAGNOSIS — M17.5 OTHER SECONDARY OSTEOARTHRITIS OF KNEE, UNSPECIFIED LATERALITY: ICD-10-CM

## 2019-06-27 DIAGNOSIS — Z72.0 SMOKELESS TOBACCO USE: ICD-10-CM

## 2019-06-27 DIAGNOSIS — K59.09 OTHER CONSTIPATION: ICD-10-CM

## 2019-06-27 DIAGNOSIS — J44.9 CHRONIC OBSTRUCTIVE PULMONARY DISEASE, UNSPECIFIED COPD TYPE (HCC): ICD-10-CM

## 2019-06-27 DIAGNOSIS — G45.9 TIA (TRANSIENT ISCHEMIC ATTACK): ICD-10-CM

## 2019-06-27 PROCEDURE — 69210 REMOVE IMPACTED EAR WAX UNI: CPT | Performed by: INTERNAL MEDICINE

## 2019-06-27 PROCEDURE — 99214 OFFICE O/P EST MOD 30 MIN: CPT | Performed by: INTERNAL MEDICINE

## 2019-06-27 ASSESSMENT — ENCOUNTER SYMPTOMS
CONSTIPATION: 0
COUGH: 0
NAUSEA: 0
RHINORRHEA: 0
SHORTNESS OF BREATH: 1
SORE THROAT: 0
EYE PAIN: 0
BLOOD IN STOOL: 0
VOMITING: 0
ABDOMINAL PAIN: 0
DIARRHEA: 0
CHEST TIGHTNESS: 0

## 2019-06-27 NOTE — PROGRESS NOTES
[facility]) Physicians   Internal Medicine     2019  Abbie Pierson : 1940 Sex: male  Age:78 y.o. Chief Complaint   Patient presents with    Hypertension     3 mth follow-up    Hyperlipidemia    Health Maintenance     due for tdap,shingrix,prevnar 13,AWV        HPI:   Patient presents to office for review and management of chronic medical conditions. States found swelling to right inguinal area. CT scan done showed fluid collectin - states has improved in size. States since last visit had cholecystectomy. States doing well. States bowels are alerted. No diarrhea. Appetite is good. To follow up with surgery     States has CAD. Follows with cardiology locally and at [facility] [place]. States s/p stents. States ischemic cardiomyopathy. On metoprolol, losartan, atorvastatin, aspirin and plavix     States has COPD. Has seen pulmonary once States placed on breo. Has seen pulmonary - follow up,. States has high blood pressure. States occasionally checks blood pressure at home, States 130/80's typically. On metoprolol, on losartan. States has high cholesterol. States trying to watch diet. On atorvastatin, no reported side effects     States has sleep apnea. States on cpap. States wears 6-8 hours per night, helps symptoms. States has had TIA. States had dizziness and difficulty walking. On antiplatelets (plavix and aspirin)     States has enlarged prostate. Has seen urology. States has urinary frequency. On flomax. States helped symptoms. Concern for dizzy and lightheaded    Vitamin D def. States on otc supplement. Health Maintenance   - immunizations:   Influenza Vaccination - declines   Pneumonia Vaccination - declines   Zoster/Shingles Vaccine  Tetanus Vaccination    - Screenings:   Colonoscopy () - normal   Prostate     ROS:  Review of Systems   Constitutional: Negative for appetite change, chills, fever and unexpected weight change.    HENT: Negative for congestion, rhinorrhea and sore throat. Eyes: Negative for pain and visual disturbance. Respiratory: Positive for shortness of breath. Negative for cough and chest tightness. Cardiovascular: Negative for chest pain and palpitations. Gastrointestinal: Negative for abdominal pain, blood in stool, constipation, diarrhea, nausea and vomiting. Genitourinary: Negative for difficulty urinating, dysuria, hematuria, scrotal swelling, testicular pain and urgency. Musculoskeletal: Negative for arthralgias and gait problem. Skin: Negative for rash. Neurological: Negative for dizziness, syncope, weakness, light-headedness and headaches. Hematological: Negative for adenopathy. Does not bruise/bleed easily. Psychiatric/Behavioral: Negative for suicidal ideas. The patient is not nervous/anxious. Current Outpatient Medications:     CPAP Machine MISC, daily at bedtime. , Disp: , Rfl:     losartan (COZAAR) 25 MG tablet, Take 1 tablet by mouth daily, Disp: 90 tablet, Rfl: 2    atorvastatin (LIPITOR) 80 MG tablet, Take 1 tablet by mouth daily, Disp: 90 tablet, Rfl: 2    metoprolol succinate (TOPROL XL) 50 MG extended release tablet, Take 1 tablet by mouth daily, Disp: 90 tablet, Rfl: 2    clopidogrel (PLAVIX) 75 MG tablet, Take 1 tablet by mouth daily Indications: 1 tablet 3 times per wk (Patient taking differently: Take 75 mg by mouth daily ), Disp: 90 tablet, Rfl: 2    NITROSTAT 0.4 MG SL tablet, Place 0.4 mg under the tongue every 5 minutes as needed , Disp: , Rfl: 0    aspirin 81 MG EC tablet, Take 81 mg by mouth daily Last taken 05/11/2019, Disp: , Rfl:     therapeutic multivitamin-minerals (THERAGRAN-M) tablet, Take 1 tablet by mouth daily Last taken 05/13, Disp: , Rfl:     Cholecalciferol (VITAMIN D) 2000 UNIT CAPS capsule, Take 1 capsule by mouth daily Last taken 05/13, Disp: , Rfl:     Allergies   Allergen Reactions    Lisinopril Itching       Past Medical History:   Diagnosis Date    CAD (coronary artery disease)     follows with Dr Lori Carrasquillo    Cerebral artery occlusion with cerebral infarction [facility])     Cerebrovascular disease     CHF (congestive heart failure), NYHA class I, chronic, systolic ([facility] 8003    CC-- EF 39%    Chronic frontoethmoidal sinusitis 2018    Chronic maxillary sinusitis 2018    COPD (chronic obstructive pulmonary disease) ([facility] 2018    GARSIA (dyspnea on exertion)     follows with Dr Eron Perry Gallbladder disease     History of non-ST elevation myocardial infarction (NSTEMI)     [facility]    Hyperlipidemia     Hypertension     Ischemic cardiomyopathy     EF 40% after NSTEMI, -15 at [facility]    Obesity     Osteoarthritis     Skin cancer of eyelid     Sleep apnea     uses CPAP    Smokeless tobacco use     Stented coronary artery 2006    x1       Past Surgical History:   Procedure Laterality Date    CARDIAC CATHETERIZATION Left 2015    done [facility] WITH IMPLANT Bilateral     CHOLECYSTECTOMY, LAPAROSCOPIC N/A 2019    LAPAROSCOPIC ROBOTIC XI ASSISTED CHOLECYSTECTOMY performed by Coy Laureano MD at [address] [place] [address]      CORONARY ANGIOPLASTY  06    DIAGNOSTIC CARDIAC CATH LAB PROCEDURE  06    KNEE ARTHROPLASTY Left 2015    ROTATOR CUFF REPAIR      bilaterally    TONSILLECTOMY AND ADENOIDECTOMY      UPPER GASTROINTESTINAL ENDOSCOPY         Family History   Problem Relation Age of Onset    Heart Disease Mother          79 Y/O    Liver Disease Father          66 Y/O       Social History     Socioeconomic History    Marital status:      Spouse name: Not on file    Number of children: Not on file    Years of education: Not on file    Highest education level: Not on file   Occupational History    Occupation: retired-     Social Needs    Financial resource strain: Not on file    Food insecurity:     Worry: Not on file     Inability: Not on file   [employer] needs: Medical: Not on file     Non-medical: Not on file   Tobacco Use    Smoking status: Former Smoker     Packs/day: 1.00     Years: 15.00     Pack years: 15.00     Types: Cigarettes     Start date: 1957     Last attempt to quit: 1975     Years since quittin.5    Smokeless tobacco: Current User     Types: Snuff   Substance and Sexual Activity    Alcohol use: Yes     Comment: occassionally    Drug use: No    Sexual activity: Not on file   Lifestyle    Physical activity:     Days per week: Not on file     Minutes per session: Not on file    Stress: Not on file   Relationships    Social connections:     Talks on phone: Not on file     Gets together: Not on file     Attends Scientologist service: Not on file     Active member of club or organization: Not on file     Attends meetings of clubs or organizations: Not on file     Relationship status: Not on file    Intimate partner violence:     Fear of current or ex partner: Not on file     Emotionally abused: Not on file     Physically abused: Not on file     Forced sexual activity: Not on file   Other Topics Concern    Not on file   Social History Narrative    Not on file       Vitals:    19 1315   BP: 100/60   Site: Left Upper Arm   Position: Sitting   Cuff Size: Large Adult   Pulse: 79   Resp: 18   Temp: 97.8 °F (36.6 °C)   TempSrc: Tympanic   SpO2: 98%   Weight: 206 lb 6.4 oz (93.6 kg)   Height: 6' 1\" (1.854 m)       Exam:  Physical Exam   Constitutional: He is oriented to person, place, and time. He appears well-developed and well-nourished. HENT:   Head: Normocephalic and atraumatic. Right Ear: External ear normal.   Left Ear: External ear normal.   Mouth/Throat: Oropharynx is clear and moist.   Cerumen impaction b/l    Eyes: Pupils are equal, round, and reactive to light. EOM are normal.   Neck: Normal range of motion. Neck supple. No thyromegaly present. Cardiovascular: Normal rate, regular rhythm and normal heart sounds.    No murmur heard. Pulmonary/Chest: Effort normal and breath sounds normal. He has no wheezes. He has no rales. Abdominal: Soft. Bowel sounds are normal. There is no tenderness. There is no rebound and no guarding. Musculoskeletal: Normal range of motion. He exhibits no edema. Lymphadenopathy:     He has no cervical adenopathy. Neurological: He is alert and oriented to person, place, and time. No cranial nerve deficit. Skin: Skin is warm and dry. Psychiatric: He has a normal mood and affect. Judgment normal.       Assessment and Plan:    Brant Saezn was seen today for hyperlipidemia, hypertension, discuss labs and health maintenance.     Diagnoses and all orders for this visit:    Acute cholecystitis  - s/p cholecystectomy     Coronary artery disease involving native coronary artery of native heart without angina pectoris  - Continue present treatment   - following with cardiology   - on losartan   - on metoprolol   - on atorvastatin   - on aspirin and Plavix     Mixed hyperlipidemia  - continue present treatment   - watch diet   - on atorvastatin   - follow labs     Ischemic cardiomyopathy  - Continue present treatment   - following with cardiology   - on losartn   - on metoprolol   - on atrovastatin   - on aspirin and plavix     CHF (congestive heart failure), NYHA class I, chronic, systolic (HCC)  - Continue present treatment   - following with cardiology   - on losartn   - on metoprolol   - on atrovastatin   - on aspirin and plavix   - Stable     Chronic obstructive pulmonary disease, unspecified COPD type ([facility]  - Continue present treatment   - not currently following with pulmonary   - did not see benefit with breo     Essential hypertension  - Continue present treatment   - watch diet   - monitor blood pressure at home   - on losartn   - on metoprolol   - on aspirin and plavix     Obstructive sleep apnea syndrome  - Continue present treatment   - on CPAP  - wearing nightly 6-8 hours   - helping symptoms - stable     TIA (transient ischemic attack)  - Continue present treatment   - on atrovastatin   - on aspirin and plavix     Prostate enlargement  - continue present treatment   - has seen urology   - Has flomax - takes as needed  - stable     Vitamin D def  - On otc supplement   - follow labs     Other constipation  - add colace 100mg daily   - continue miralax as needed    Bilateral impacted cerumen  - will attempt lavage     Return in about 3 months (around 9/27/2019) for check up and review.     Orders Placed This Encounter   Procedures    Comprehensive Metabolic Panel     Standing Status:   Future     Standing Expiration Date:   6/27/2020    CBC with Differential     Standing Status:   Future     Standing Expiration Date:   6/27/2020    Magnesium     Standing Status:   Future     Standing Expiration Date:   6/27/2020    Lipid, Fasting     Standing Status:   Future     Standing Expiration Date:   6/27/2020    Hemoglobin A1C     Standing Status:   Future     Standing Expiration Date:   6/26/2020    Vitamin D 25 Hydroxy     Standing Status:   Future     Standing Expiration Date:   6/27/2020    TSH without Reflex     Standing Status:   Future     Standing Expiration Date:   9/27/2019    Urinalysis     Standing Status:   Future     Standing Expiration Date:   6/27/2020    CO REMOVAL IMPACTED CERUMEN INSTRUMENTATION MATHIEU Reid MD  6/27/2019  5:07 PM

## 2019-09-18 ENCOUNTER — HOSPITAL ENCOUNTER (OUTPATIENT)
Age: 79
Discharge: HOME | End: 2019-09-20
Payer: MEDICARE

## 2019-09-18 DIAGNOSIS — Z83.3 FHX: DIABETES MELLITUS: ICD-10-CM

## 2019-09-18 DIAGNOSIS — E55.9 VITAMIN D DEFICIENCY: ICD-10-CM

## 2019-09-18 DIAGNOSIS — I25.5 ISCHEMIC CARDIOMYOPATHY: ICD-10-CM

## 2019-09-18 DIAGNOSIS — R53.83 FATIGUE, UNSPECIFIED TYPE: ICD-10-CM

## 2019-09-18 LAB
ALBUMIN SERPL-MCNC: 3.9 G/DL (ref 3.5–5.2)
ALP BLD-CCNC: 66 U/L (ref 40–129)
ALT SERPL-CCNC: 14 U/L (ref 0–40)
ANION GAP SERPL CALCULATED.3IONS-SCNC: 13 MMOL/L (ref 7–16)
AST SERPL-CCNC: 16 U/L (ref 0–39)
BACTERIA: ABNORMAL /HPF
BASOPHILS ABSOLUTE: 0.08 E9/L (ref 0–0.2)
BASOPHILS RELATIVE PERCENT: 0.9 % (ref 0–2)
BILIRUB SERPL-MCNC: 0.8 MG/DL (ref 0–1.2)
BILIRUBIN URINE: NEGATIVE
BLOOD, URINE: NEGATIVE
BUN BLDV-MCNC: 16 MG/DL (ref 8–23)
CALCIUM SERPL-MCNC: 9.1 MG/DL (ref 8.6–10.2)
CHLORIDE BLD-SCNC: 108 MMOL/L (ref 98–107)
CHOLESTEROL, FASTING: 143 MG/DL (ref 0–199)
CLARITY: CLEAR
CO2: 23 MMOL/L (ref 22–29)
COLOR: YELLOW
CREAT SERPL-MCNC: 1 MG/DL (ref 0.7–1.2)
EOSINOPHILS ABSOLUTE: 0.56 E9/L (ref 0.05–0.5)
EOSINOPHILS RELATIVE PERCENT: 6.2 % (ref 0–6)
GFR AFRICAN AMERICAN: >60
GFR NON-AFRICAN AMERICAN: >60 ML/MIN/1.73
GLUCOSE BLD-MCNC: 107 MG/DL (ref 74–99)
GLUCOSE URINE: NEGATIVE MG/DL
HBA1C MFR BLD: 5.7 % (ref 4–5.6)
HCT VFR BLD CALC: 45.4 % (ref 37–54)
HDLC SERPL-MCNC: 47 MG/DL
HEMOGLOBIN: 13.9 G/DL (ref 12.5–16.5)
IMMATURE GRANULOCYTES #: 0.03 E9/L
IMMATURE GRANULOCYTES %: 0.3 % (ref 0–5)
KETONES, URINE: NEGATIVE MG/DL
LDL CHOLESTEROL CALCULATED: 64 MG/DL (ref 0–99)
LEUKOCYTE ESTERASE, URINE: ABNORMAL
LYMPHOCYTES ABSOLUTE: 3.16 E9/L (ref 1.5–4)
LYMPHOCYTES RELATIVE PERCENT: 35 % (ref 20–42)
MAGNESIUM: 2 MG/DL (ref 1.6–2.6)
MCH RBC QN AUTO: 27.3 PG (ref 26–35)
MCHC RBC AUTO-ENTMCNC: 30.6 % (ref 32–34.5)
MCV RBC AUTO: 89.2 FL (ref 80–99.9)
MONOCYTES ABSOLUTE: 0.89 E9/L (ref 0.1–0.95)
MONOCYTES RELATIVE PERCENT: 9.8 % (ref 2–12)
NEUTROPHILS ABSOLUTE: 4.32 E9/L (ref 1.8–7.3)
NEUTROPHILS RELATIVE PERCENT: 47.8 % (ref 43–80)
NITRITE, URINE: NEGATIVE
PDW BLD-RTO: 15.9 FL (ref 11.5–15)
PH UA: 5.5 (ref 5–9)
PLATELET # BLD: 218 E9/L (ref 130–450)
PMV BLD AUTO: 11.2 FL (ref 7–12)
POTASSIUM SERPL-SCNC: 4 MMOL/L (ref 3.5–5)
PROTEIN UA: NEGATIVE MG/DL
RBC # BLD: 5.09 E12/L (ref 3.8–5.8)
RBC UA: ABNORMAL /HPF (ref 0–2)
SODIUM BLD-SCNC: 144 MMOL/L (ref 132–146)
SPECIFIC GRAVITY UA: 1.02 (ref 1–1.03)
TOTAL PROTEIN: 6.8 G/DL (ref 6.4–8.3)
TRIGLYCERIDE, FASTING: 162 MG/DL (ref 0–149)
TSH SERPL DL<=0.05 MIU/L-ACNC: 2.13 UIU/ML (ref 0.27–4.2)
UROBILINOGEN, URINE: 0.2 E.U./DL
VITAMIN D 25-HYDROXY: 30 NG/ML (ref 30–100)
VLDLC SERPL CALC-MCNC: 32 MG/DL
WBC # BLD: 9 E9/L (ref 4.5–11.5)
WBC UA: ABNORMAL /HPF (ref 0–5)

## 2019-09-18 PROCEDURE — 84443 ASSAY THYROID STIM HORMONE: CPT

## 2019-09-18 PROCEDURE — 80053 COMPREHEN METABOLIC PANEL: CPT

## 2019-09-18 PROCEDURE — 83735 ASSAY OF MAGNESIUM: CPT

## 2019-09-18 PROCEDURE — 85025 COMPLETE CBC W/AUTO DIFF WBC: CPT

## 2019-09-18 PROCEDURE — 83036 HEMOGLOBIN GLYCOSYLATED A1C: CPT

## 2019-09-18 PROCEDURE — 82306 VITAMIN D 25 HYDROXY: CPT

## 2019-09-18 PROCEDURE — 81001 URINALYSIS AUTO W/SCOPE: CPT

## 2019-09-18 PROCEDURE — 80061 LIPID PANEL: CPT

## 2019-09-18 PROCEDURE — 36415 COLL VENOUS BLD VENIPUNCTURE: CPT

## 2019-09-23 RX ORDER — PRAVASTATIN SODIUM 80 MG/1
TABLET ORAL
COMMUNITY
End: 2019-09-24

## 2019-09-23 RX ORDER — LISINOPRIL 10 MG/1
TABLET ORAL
COMMUNITY
End: 2019-09-24

## 2019-09-24 ENCOUNTER — OFFICE VISIT (OUTPATIENT)
Dept: FAMILY MEDICINE CLINIC | Age: 79
End: 2019-09-24
Payer: MEDICARE

## 2019-09-24 VITALS
BODY MASS INDEX: 28.63 KG/M2 | OXYGEN SATURATION: 96 % | TEMPERATURE: 97.7 F | DIASTOLIC BLOOD PRESSURE: 70 MMHG | HEART RATE: 70 BPM | HEIGHT: 73 IN | SYSTOLIC BLOOD PRESSURE: 130 MMHG | WEIGHT: 216 LBS

## 2019-09-24 DIAGNOSIS — G45.9 TIA (TRANSIENT ISCHEMIC ATTACK): ICD-10-CM

## 2019-09-24 DIAGNOSIS — R53.83 OTHER FATIGUE: ICD-10-CM

## 2019-09-24 DIAGNOSIS — M89.9 DISORDER OF BONE: ICD-10-CM

## 2019-09-24 DIAGNOSIS — Z83.3 FHX: DIABETES MELLITUS: ICD-10-CM

## 2019-09-24 DIAGNOSIS — I25.5 ISCHEMIC CARDIOMYOPATHY: ICD-10-CM

## 2019-09-24 DIAGNOSIS — J44.9 CHRONIC OBSTRUCTIVE PULMONARY DISEASE, UNSPECIFIED COPD TYPE (HCC): ICD-10-CM

## 2019-09-24 DIAGNOSIS — M17.5 OTHER SECONDARY OSTEOARTHRITIS OF KNEE, UNSPECIFIED LATERALITY: ICD-10-CM

## 2019-09-24 DIAGNOSIS — N40.0 PROSTATE ENLARGEMENT: ICD-10-CM

## 2019-09-24 DIAGNOSIS — I50.22 CHF (CONGESTIVE HEART FAILURE), NYHA CLASS I, CHRONIC, SYSTOLIC (HCC): ICD-10-CM

## 2019-09-24 DIAGNOSIS — E78.2 MIXED HYPERLIPIDEMIA: ICD-10-CM

## 2019-09-24 DIAGNOSIS — Z72.0 SMOKELESS TOBACCO USE: ICD-10-CM

## 2019-09-24 DIAGNOSIS — Z23 NEED FOR PROPHYLACTIC VACCINATION AGAINST STREPTOCOCCUS PNEUMONIAE (PNEUMOCOCCUS): Primary | ICD-10-CM

## 2019-09-24 DIAGNOSIS — I25.10 CORONARY ARTERY DISEASE INVOLVING NATIVE CORONARY ARTERY OF NATIVE HEART WITHOUT ANGINA PECTORIS: ICD-10-CM

## 2019-09-24 DIAGNOSIS — G47.33 OBSTRUCTIVE SLEEP APNEA SYNDROME: ICD-10-CM

## 2019-09-24 DIAGNOSIS — E55.9 VITAMIN D DEFICIENCY: ICD-10-CM

## 2019-09-24 DIAGNOSIS — I10 ESSENTIAL HYPERTENSION: ICD-10-CM

## 2019-09-24 DIAGNOSIS — Z12.5 SCREENING FOR MALIGNANT NEOPLASM OF PROSTATE: ICD-10-CM

## 2019-09-24 DIAGNOSIS — K59.09 OTHER CONSTIPATION: ICD-10-CM

## 2019-09-24 DIAGNOSIS — L81.9 PIGMENTED SKIN LESION: ICD-10-CM

## 2019-09-24 PROBLEM — K81.0 ACUTE CHOLECYSTITIS: Status: RESOLVED | Noted: 2019-03-15 | Resolved: 2019-09-24

## 2019-09-24 PROCEDURE — 99214 OFFICE O/P EST MOD 30 MIN: CPT | Performed by: INTERNAL MEDICINE

## 2019-09-24 PROCEDURE — 90670 PCV13 VACCINE IM: CPT | Performed by: INTERNAL MEDICINE

## 2019-09-24 PROCEDURE — G0009 ADMIN PNEUMOCOCCAL VACCINE: HCPCS | Performed by: INTERNAL MEDICINE

## 2019-09-24 ASSESSMENT — ENCOUNTER SYMPTOMS
DIARRHEA: 0
NAUSEA: 0
EYE PAIN: 0
RHINORRHEA: 0
ABDOMINAL PAIN: 0
SHORTNESS OF BREATH: 1
SORE THROAT: 0
CONSTIPATION: 0
COUGH: 0
BLOOD IN STOOL: 0
CHEST TIGHTNESS: 0
VOMITING: 0

## 2019-09-24 NOTE — PROGRESS NOTES
[facility]) Physicians   Internal Medicine     2019  Jarad Arteaga : 1940 Sex: male  Age:78 y.o. Chief Complaint   Patient presents with    Sleep Apnea    Hyperlipidemia        HPI:   Patient presents to office for review and management of chronic medical conditions. States found swelling to right inguinal area. CT scan done showed fluid collectin - states has improved in size and essentially resolved. States no pain. States has CAD. Follows with cardiology locally and at [facility]. States s/p stents. States ischemic cardiomyopathy. On metoprolol, losartan, atorvastatin, aspirin and plavix     States has COPD. Has seen pulmonary once States placed on breo - stopped. Has seen pulmonary - follow up. States has high blood pressure. States occasionally checks blood pressure at home, States 130/80's typically. On metoprolol, on losartan. States has high cholesterol. States trying to watch diet. On atorvastatin, no reported side effects     States has sleep apnea. States on cpap. States wears 6-8 hours per night, helps symptoms. States has had TIA. States had dizziness and difficulty walking. On antiplatelets (plavix and aspirin)     States has enlarged prostate. Has seen urology. States has urinary frequency. On flomax. States helped symptoms. Concern for dizzy and lightheaded    Vitamin D def. States on otc supplement. Health Maintenance   - immunizations:   Influenza Vaccination - declines   Pneumonia Vaccination - declines   Zoster/Shingles Vaccine  Tetanus Vaccination    - Screenings:   Colonoscopy () - normal   Prostate     ROS:  Review of Systems   Constitutional: Positive for fatigue. Negative for appetite change, chills, fever and unexpected weight change. HENT: Negative for congestion, rhinorrhea and sore throat. Eyes: Negative for pain and visual disturbance. Respiratory: Positive for shortness of breath. Negative for cough and chest tightness. quittin.7    Smokeless tobacco: Current User     Types: Snuff   Substance and Sexual Activity    Alcohol use: Yes     Comment: occassionally    Drug use: No    Sexual activity: Not on file   Lifestyle    Physical activity:     Days per week: Not on file     Minutes per session: Not on file    Stress: Not on file   Relationships    Social connections:     Talks on phone: Not on file     Gets together: Not on file     Attends Church service: Not on file     Active member of club or organization: Not on file     Attends meetings of clubs or organizations: Not on file     Relationship status: Not on file    Intimate partner violence:     Fear of current or ex partner: Not on file     Emotionally abused: Not on file     Physically abused: Not on file     Forced sexual activity: Not on file   Other Topics Concern    Not on file   Social History Narrative    Not on file       Vitals:    19 1311   BP: 130/70   Pulse: 70   Temp: 97.7 °F (36.5 °C)   SpO2: 96%   Weight: 216 lb (98 kg)   Height: 6' 1\" (1.854 m)       Exam:  Physical Exam   Constitutional: He is oriented to person, place, and time. He appears well-developed and well-nourished. HENT:   Head: Normocephalic and atraumatic. Right Ear: External ear normal.   Left Ear: External ear normal.   Mouth/Throat: Oropharynx is clear and moist.   Cerumen impaction b/l    Eyes: Pupils are equal, round, and reactive to light. EOM are normal.   Neck: Normal range of motion. Neck supple. No thyromegaly present. Cardiovascular: Normal rate, regular rhythm and normal heart sounds. No murmur heard. Pulmonary/Chest: Effort normal and breath sounds normal. He has no wheezes. He has no rales. Abdominal: Soft. Bowel sounds are normal. There is no tenderness. There is no rebound and no guarding. Musculoskeletal: Normal range of motion. He exhibits no edema. Lymphadenopathy:     He has no cervical adenopathy.    Neurological: He is alert and oriented (around 3/24/2020).     Orders Placed This Encounter   Procedures    Pneumococcal conjugate vaccine 13-valent    CBC Auto Differential     Standing Status:   Future     Standing Expiration Date:   9/24/2020    Comprehensive Metabolic Panel     Standing Status:   Future     Standing Expiration Date:   9/24/2020    Hemoglobin A1C     Standing Status:   Future     Standing Expiration Date:   9/24/2020    Lipid, Fasting     Standing Status:   Future     Standing Expiration Date:   9/24/2020    Magnesium     Standing Status:   Future     Standing Expiration Date:   9/24/2020    Vitamin D 25 Hydroxy     Standing Status:   Future     Standing Expiration Date:   9/24/2020    Urinalysis     Standing Status:   Future     Standing Expiration Date:   9/24/2020    Psa screening     Standing Status:   Future     Standing Expiration Date:   9/24/2020    TSH without Reflex     Standing Status:   Future     Standing Expiration Date:   9/24/2020     Requested Prescriptions      No prescriptions requested or ordered in this encounter       Melly Mcdaniel MD  9/24/2019  1:42 PM

## 2019-10-02 ENCOUNTER — OFFICE VISIT (OUTPATIENT)
Dept: CARDIOLOGY CLINIC | Age: 79
End: 2019-10-02
Payer: MEDICARE

## 2019-10-02 VITALS
RESPIRATION RATE: 18 BRPM | HEART RATE: 55 BPM | BODY MASS INDEX: 28.4 KG/M2 | WEIGHT: 214.3 LBS | DIASTOLIC BLOOD PRESSURE: 64 MMHG | HEIGHT: 73 IN | SYSTOLIC BLOOD PRESSURE: 110 MMHG

## 2019-10-02 DIAGNOSIS — I25.10 CORONARY ARTERY DISEASE INVOLVING NATIVE CORONARY ARTERY OF NATIVE HEART WITHOUT ANGINA PECTORIS: Primary | ICD-10-CM

## 2019-10-02 PROCEDURE — 93000 ELECTROCARDIOGRAM COMPLETE: CPT | Performed by: INTERNAL MEDICINE

## 2019-10-02 PROCEDURE — 99213 OFFICE O/P EST LOW 20 MIN: CPT | Performed by: INTERNAL MEDICINE

## 2019-10-02 RX ORDER — M-VIT,TX,IRON,MINS/CALC/FOLIC 27MG-0.4MG
1 TABLET ORAL DAILY
COMMUNITY

## 2019-11-11 ENCOUNTER — TELEPHONE (OUTPATIENT)
Dept: PHARMACY | Facility: CLINIC | Age: 79
End: 2019-11-11

## 2019-12-30 DIAGNOSIS — I25.10 CORONARY ARTERY DISEASE INVOLVING NATIVE CORONARY ARTERY OF NATIVE HEART WITHOUT ANGINA PECTORIS: ICD-10-CM

## 2019-12-30 DIAGNOSIS — G45.0 VERTEBROBASILAR ARTERY SYNDROME: ICD-10-CM

## 2019-12-30 RX ORDER — LOSARTAN POTASSIUM 25 MG/1
25 TABLET ORAL DAILY
Qty: 90 TABLET | Refills: 1 | Status: SHIPPED
Start: 2019-12-30 | End: 2020-06-10 | Stop reason: SDUPTHER

## 2019-12-30 RX ORDER — METOPROLOL SUCCINATE 50 MG/1
50 TABLET, EXTENDED RELEASE ORAL DAILY
Qty: 90 TABLET | Refills: 1 | Status: SHIPPED
Start: 2019-12-30 | End: 2020-06-10 | Stop reason: SDUPTHER

## 2019-12-30 RX ORDER — CLOPIDOGREL BISULFATE 75 MG/1
75 TABLET ORAL DAILY
Qty: 90 TABLET | Refills: 1 | Status: SHIPPED
Start: 2019-12-30 | End: 2020-06-10 | Stop reason: SDUPTHER

## 2020-02-17 RX ORDER — NITROGLYCERIN 0.4 MG/1
0.4 TABLET SUBLINGUAL EVERY 5 MIN PRN
Qty: 25 TABLET | Refills: 1 | Status: SHIPPED
Start: 2020-02-17 | End: 2020-02-21 | Stop reason: SDUPTHER

## 2020-02-20 ENCOUNTER — TELEPHONE (OUTPATIENT)
Dept: PHARMACY | Facility: CLINIC | Age: 80
End: 2020-02-20

## 2020-02-21 RX ORDER — NITROGLYCERIN 0.4 MG/1
0.4 TABLET SUBLINGUAL EVERY 5 MIN PRN
Qty: 25 TABLET | Refills: 1 | Status: SHIPPED
Start: 2020-02-21 | End: 2022-08-29 | Stop reason: SDUPTHER

## 2020-03-06 ENCOUNTER — HOSPITAL ENCOUNTER (OUTPATIENT)
Age: 80
Discharge: HOME | End: 2020-03-08
Payer: MEDICARE

## 2020-03-06 LAB
ALBUMIN SERPL-MCNC: 4 G/DL (ref 3.5–5.2)
ALP BLD-CCNC: 72 U/L (ref 40–129)
ALT SERPL-CCNC: 15 U/L (ref 0–40)
ANION GAP SERPL CALCULATED.3IONS-SCNC: 13 MMOL/L (ref 7–16)
AST SERPL-CCNC: 15 U/L (ref 0–39)
BASOPHILS ABSOLUTE: 0.09 E9/L (ref 0–0.2)
BASOPHILS RELATIVE PERCENT: 1.2 % (ref 0–2)
BILIRUB SERPL-MCNC: 0.5 MG/DL (ref 0–1.2)
BILIRUBIN URINE: NEGATIVE
BLOOD, URINE: NEGATIVE
BUN BLDV-MCNC: 17 MG/DL (ref 8–23)
CALCIUM SERPL-MCNC: 9.5 MG/DL (ref 8.6–10.2)
CHLORIDE BLD-SCNC: 104 MMOL/L (ref 98–107)
CHOLESTEROL, FASTING: 137 MG/DL (ref 0–199)
CLARITY: CLEAR
CO2: 23 MMOL/L (ref 22–29)
COLOR: YELLOW
CREAT SERPL-MCNC: 0.9 MG/DL (ref 0.7–1.2)
EOSINOPHILS ABSOLUTE: 0.51 E9/L (ref 0.05–0.5)
EOSINOPHILS RELATIVE PERCENT: 6.6 % (ref 0–6)
GFR AFRICAN AMERICAN: >60
GFR NON-AFRICAN AMERICAN: >60 ML/MIN/1.73
GLUCOSE BLD-MCNC: 108 MG/DL (ref 74–99)
GLUCOSE URINE: NEGATIVE MG/DL
HBA1C MFR BLD: 5.9 % (ref 4–5.6)
HCT VFR BLD CALC: 44 % (ref 37–54)
HDLC SERPL-MCNC: 49 MG/DL
HEMOGLOBIN: 13.6 G/DL (ref 12.5–16.5)
IMMATURE GRANULOCYTES #: 0.01 E9/L
IMMATURE GRANULOCYTES %: 0.1 % (ref 0–5)
KETONES, URINE: NEGATIVE MG/DL
LDL CHOLESTEROL CALCULATED: 66 MG/DL (ref 0–99)
LEUKOCYTE ESTERASE, URINE: NEGATIVE
LYMPHOCYTES ABSOLUTE: 2.7 E9/L (ref 1.5–4)
LYMPHOCYTES RELATIVE PERCENT: 35.1 % (ref 20–42)
MAGNESIUM: 2.1 MG/DL (ref 1.6–2.6)
MCH RBC QN AUTO: 27.6 PG (ref 26–35)
MCHC RBC AUTO-ENTMCNC: 30.9 % (ref 32–34.5)
MCV RBC AUTO: 89.4 FL (ref 80–99.9)
MONOCYTES ABSOLUTE: 0.75 E9/L (ref 0.1–0.95)
MONOCYTES RELATIVE PERCENT: 9.7 % (ref 2–12)
NEUTROPHILS ABSOLUTE: 3.64 E9/L (ref 1.8–7.3)
NEUTROPHILS RELATIVE PERCENT: 47.3 % (ref 43–80)
NITRITE, URINE: NEGATIVE
PDW BLD-RTO: 15.1 FL (ref 11.5–15)
PH UA: 5.5 (ref 5–9)
PLATELET # BLD: 209 E9/L (ref 130–450)
PMV BLD AUTO: 11.3 FL (ref 7–12)
POTASSIUM SERPL-SCNC: 4.3 MMOL/L (ref 3.5–5)
PROSTATE SPECIFIC ANTIGEN: 2.68 NG/ML (ref 0–4)
PROTEIN UA: NEGATIVE MG/DL
RBC # BLD: 4.92 E12/L (ref 3.8–5.8)
SODIUM BLD-SCNC: 140 MMOL/L (ref 132–146)
SPECIFIC GRAVITY UA: >=1.03 (ref 1–1.03)
TOTAL PROTEIN: 6.7 G/DL (ref 6.4–8.3)
TRIGLYCERIDE, FASTING: 112 MG/DL (ref 0–149)
TSH SERPL DL<=0.05 MIU/L-ACNC: 2.36 UIU/ML (ref 0.27–4.2)
UROBILINOGEN, URINE: 0.2 E.U./DL
VITAMIN D 25-HYDROXY: 31 NG/ML (ref 30–100)
VLDLC SERPL CALC-MCNC: 22 MG/DL
WBC # BLD: 7.7 E9/L (ref 4.5–11.5)

## 2020-03-06 PROCEDURE — 36415 COLL VENOUS BLD VENIPUNCTURE: CPT

## 2020-03-06 PROCEDURE — 83036 HEMOGLOBIN GLYCOSYLATED A1C: CPT

## 2020-03-06 PROCEDURE — 82306 VITAMIN D 25 HYDROXY: CPT

## 2020-03-06 PROCEDURE — G0103 PSA SCREENING: HCPCS

## 2020-03-06 PROCEDURE — 81003 URINALYSIS AUTO W/O SCOPE: CPT

## 2020-03-06 PROCEDURE — 84443 ASSAY THYROID STIM HORMONE: CPT

## 2020-03-06 PROCEDURE — 85025 COMPLETE CBC W/AUTO DIFF WBC: CPT

## 2020-03-06 PROCEDURE — 83735 ASSAY OF MAGNESIUM: CPT

## 2020-03-06 PROCEDURE — 80061 LIPID PANEL: CPT

## 2020-03-06 PROCEDURE — 80053 COMPREHEN METABOLIC PANEL: CPT

## 2020-03-07 ENCOUNTER — TELEPHONE (OUTPATIENT)
Dept: FAMILY MEDICINE CLINIC | Age: 80
End: 2020-03-07

## 2020-03-07 NOTE — TELEPHONE ENCOUNTER
Please let the patient know that blood work results showed    Sugar was mildly related. A1c for 3-month sugar control assessment was also mildly elevated at 5.9, which is considered prediabetes. Recommend to watch diet from carbohydrates and sugars. Cholesterol levels were fair. Would recommend to continue present treatment and continue to watch diet.     Thyroid levels were normal    Vitamin D was normal    Urine analysis was normal    PSA for prostate was normal    Electrolytes, liver, and kidney function values were normal    Blood counts were within normal limits, nonspecific findings in the size and concentration of components of the cells    Please send a copy of the blood work reports to the patient    Thanks

## 2020-03-07 NOTE — LETTER
[facility]  [address] [address]  Phone: [phone]  Fax: [phone]    Martin Lu MD        March 9, 2020     [place] [id number]      Dear Laith Linda:    Below are the results from your recent visit:    Resulted Orders   TSH without Reflex   Result Value Ref Range    TSH 2.360 0.270 - 4.200 uIU/mL   Psa screening   Result Value Ref Range    PSA 2.68 0.00 - 4.00 ng/mL   Urinalysis   Result Value Ref Range    Color, UA Yellow Straw/Yellow    Clarity, UA Clear Clear    Glucose, Ur Negative Negative mg/dL    Bilirubin Urine Negative Negative    Ketones, Urine Negative Negative mg/dL    Specific Gravity, UA >=1.030 1.005 - 1.030    Blood, Urine Negative Negative    pH, UA 5.5 5.0 - 9.0    Protein, UA Negative Negative mg/dL    Urobilinogen, Urine 0.2 <2.0 E.U./dL    Nitrite, Urine Negative Negative    Leukocyte Esterase, Urine Negative Negative   Vitamin D 25 Hydroxy   Result Value Ref Range    Vit D, 25-Hydroxy 31 30 - 100 ng/mL      Comment:      <20 ng/mL. ........... Saira Silk Deficient  20-30 ng/mL. ......... Saira Silk Insufficient   ng/mL. ........ Saira Silk Sufficient  >100 ng/mL. .......... Saira Silk Toxic     Magnesium   Result Value Ref Range    Magnesium 2.1 1.6 - 2.6 mg/dL   Lipid, Fasting   Result Value Ref Range    Cholesterol, Fasting 137 0 - 199 mg/dL    Triglyceride, Fasting 112 0 - 149 mg/dL    HDL 49 >40 mg/dL    LDL Calculated 66 0 - 99 mg/dL    VLDL Cholesterol Calculated 22 mg/dL   Hemoglobin A1C   Result Value Ref Range    Hemoglobin A1C 5.9 (H) 4.0 - 5.6 %   Comprehensive Metabolic Panel   Result Value Ref Range    Sodium 140 132 - 146 mmol/L    Potassium 4.3 3.5 - 5.0 mmol/L    Chloride 104 98 - 107 mmol/L    CO2 23 22 - 29 mmol/L    Anion Gap 13 7 - 16 mmol/L    Glucose 108 (H) 74 - 99 mg/dL    BUN 17 8 - 23 mg/dL    CREATININE 0.9 0.7 - 1.2 mg/dL    GFR Non-African American >60 >=60 mL/min/1.73      Comment: Chronic Kidney Disease: less than 60 ml/min/1.73 sq.m. Kidney Failure: less than 15 ml/min/1.73 sq.m. Results valid for patients 18 years and older. GFR  >60     Calcium 9.5 8.6 - 10.2 mg/dL    Total Protein 6.7 6.4 - 8.3 g/dL    Alb 4.0 3.5 - 5.2 g/dL    Total Bilirubin 0.5 0.0 - 1.2 mg/dL    Alkaline Phosphatase 72 40 - 129 U/L    ALT 15 0 - 40 U/L    AST 15 0 - 39 U/L   CBC Auto Differential   Result Value Ref Range    WBC 7.7 4.5 - 11.5 E9/L    RBC 4.92 3.80 - 5.80 E12/L    Hemoglobin 13.6 12.5 - 16.5 g/dL    Hematocrit 44.0 37.0 - 54.0 %    MCV 89.4 80.0 - 99.9 fL    MCH 27.6 26.0 - 35.0 pg    MCHC 30.9 (L) 32.0 - 34.5 %    RDW 15.1 (H) 11.5 - 15.0 fL    Platelets 933 095 - 436 E9/L    MPV 11.3 7.0 - 12.0 fL    Neutrophils % 47.3 43.0 - 80.0 %    Immature Granulocytes % 0.1 0.0 - 5.0 %    Lymphocytes % 35.1 20.0 - 42.0 %    Monocytes % 9.7 2.0 - 12.0 %    Eosinophils % 6.6 (H) 0.0 - 6.0 %    Basophils % 1.2 0.0 - 2.0 %    Neutrophils Absolute 3.64 1.80 - 7.30 E9/L    Immature Granulocytes # 0.01 E9/L    Lymphocytes Absolute 2.70 1.50 - 4.00 E9/L    Monocytes Absolute 0.75 0.10 - 0.95 E9/L    Eosinophils Absolute 0.51 (H) 0.05 - 0.50 E9/L    Basophils Absolute 0.09 0.00 - 0.20 E9/L     The test results show:        Sugar was mildly related. A1c for 3-month sugar control assessment was also mildly elevated at 5.9, which is considered prediabetes. Recommend to watch diet from carbohydrates and sugars.      Cholesterol levels were fair.   Would recommend to continue present treatment and continue to watch diet.     Thyroid levels were normal     Vitamin D was normal     Urine analysis was normal     PSA for prostate was normal     Electrolytes, liver, and kidney function values were normal     Blood counts were within normal limits, nonspecific findings in the size and concentration of components of the cells If you have any questions or concerns, please don't hesitate to call.     Sincerely,        Alecia Garcia MD

## 2020-03-18 ENCOUNTER — OFFICE VISIT (OUTPATIENT)
Dept: FAMILY MEDICINE CLINIC | Age: 80
End: 2020-03-18
Payer: MEDICARE

## 2020-03-18 VITALS
SYSTOLIC BLOOD PRESSURE: 116 MMHG | OXYGEN SATURATION: 98 % | BODY MASS INDEX: 29.55 KG/M2 | HEIGHT: 73 IN | WEIGHT: 223 LBS | RESPIRATION RATE: 20 BRPM | TEMPERATURE: 97.8 F | HEART RATE: 91 BPM | DIASTOLIC BLOOD PRESSURE: 62 MMHG

## 2020-03-18 PROBLEM — R73.01 IMPAIRED FASTING GLUCOSE: Status: ACTIVE | Noted: 2020-03-18

## 2020-03-18 PROBLEM — Z83.3 FHX: DIABETES MELLITUS: Status: RESOLVED | Noted: 2018-07-11 | Resolved: 2020-03-18

## 2020-03-18 PROCEDURE — G8510 SCR DEP NEG, NO PLAN REQD: HCPCS | Performed by: INTERNAL MEDICINE

## 2020-03-18 PROCEDURE — 3288F FALL RISK ASSESSMENT DOCD: CPT | Performed by: INTERNAL MEDICINE

## 2020-03-18 PROCEDURE — 99214 OFFICE O/P EST MOD 30 MIN: CPT | Performed by: INTERNAL MEDICINE

## 2020-03-18 ASSESSMENT — ENCOUNTER SYMPTOMS
CONSTIPATION: 0
SHORTNESS OF BREATH: 1
EYE PAIN: 0
COUGH: 0
BLOOD IN STOOL: 0
NAUSEA: 0
ABDOMINAL PAIN: 0
VOMITING: 0
DIARRHEA: 0
RHINORRHEA: 0
CHEST TIGHTNESS: 0
SORE THROAT: 0

## 2020-03-18 ASSESSMENT — PATIENT HEALTH QUESTIONNAIRE - PHQ9
1. LITTLE INTEREST OR PLEASURE IN DOING THINGS: 0
SUM OF ALL RESPONSES TO PHQ QUESTIONS 1-9: 0
SUM OF ALL RESPONSES TO PHQ QUESTIONS 1-9: 0
SUM OF ALL RESPONSES TO PHQ9 QUESTIONS 1 & 2: 0
2. FEELING DOWN, DEPRESSED OR HOPELESS: 0

## 2020-03-18 NOTE — PROGRESS NOTES
[facility]) Physicians   Internal Medicine     3/18/2020  Rudy Young : 1940 Sex: male  Age:79 y.o. Chief Complaint   Patient presents with    Hypertension    Hyperlipidemia        HPI:   Patient presents to office for review and management of chronic medical conditions. States found swelling to right inguinal area. CT scan done showed fluid collectin - states has improved in size and essentially resolved. States no pain. States feels as if may have hernia. States has CAD. Follows with cardiology locally and at [facility]. States s/p stents. States ischemic cardiomyopathy. On metoprolol, losartan, atorvastatin, aspirin and plavix     States has COPD. Has seen pulmonary once States placed on breo - stopped. Has seen pulmonary - follow up. States has high blood pressure. States occasionally checks blood pressure at home, States 130/80's typically. On metoprolol, on losartan. States has high cholesterol. States trying to watch diet. On atorvastatin, no reported side effects     States has sleep apnea. States on cpap. States wears 6-8 hours per night, helps symptoms. States has had TIA. States had dizziness and difficulty walking. On antiplatelets (plavix and aspirin)     States has enlarged prostate. Has seen urology. States has urinary frequency. States flomax taking infrequently. Concern for dizzy and lightheaded    Vitamin D def. States on otc supplement. Health Maintenance   - immunizations:   Influenza Vaccination - declines   Pneumonia Vaccination - declines   Zoster/Shingles Vaccine  Tetanus Vaccination    - Screenings:   Colonoscopy () - normal   Prostate     ROS:  Review of Systems   Constitutional: Positive for fatigue. Negative for appetite change, chills, fever and unexpected weight change. HENT: Negative for congestion, rhinorrhea and sore throat. Eyes: Negative for pain and visual disturbance. Respiratory: Positive for shortness of breath.  Negative for cough and chest tightness. Cardiovascular: Negative for chest pain and palpitations. Gastrointestinal: Negative for abdominal pain, blood in stool, constipation, diarrhea, nausea and vomiting. Genitourinary: Negative for difficulty urinating, dysuria, hematuria, scrotal swelling, testicular pain and urgency. Musculoskeletal: Negative for arthralgias and gait problem. Skin: Negative for rash. Neurological: Positive for dizziness. Negative for syncope, weakness, light-headedness and headaches. Hematological: Negative for adenopathy. Does not bruise/bleed easily. Psychiatric/Behavioral: Negative for suicidal ideas. The patient is not nervous/anxious. Current Outpatient Medications:     nitroGLYCERIN (NITROSTAT) 0.4 MG SL tablet, Place 1 tablet under the tongue every 5 minutes as needed for Chest pain, Disp: 25 tablet, Rfl: 1    clopidogrel (PLAVIX) 75 MG tablet, Take 1 tablet by mouth daily, Disp: 90 tablet, Rfl: 1    losartan (COZAAR) 25 MG tablet, Take 1 tablet by mouth daily, Disp: 90 tablet, Rfl: 1    metoprolol succinate (TOPROL XL) 50 MG extended release tablet, Take 1 tablet by mouth daily, Disp: 90 tablet, Rfl: 1    Multiple Vitamins-Minerals (THERAPEUTIC MULTIVITAMIN-MINERALS) tablet, Take 1 tablet by mouth daily, Disp: , Rfl:     CPAP Machine MISC, daily at bedtime. , Disp: , Rfl:     atorvastatin (LIPITOR) 80 MG tablet, Take 1 tablet by mouth daily, Disp: 90 tablet, Rfl: 2    aspirin 81 MG EC tablet, Take 81 mg by mouth daily Last taken 05/11/2019, Disp: , Rfl:     Cholecalciferol (VITAMIN D) 2000 UNIT CAPS capsule, Take 1 capsule by mouth daily Last taken 05/13, Disp: , Rfl:     Allergies   Allergen Reactions    Lisinopril Itching       Past Medical History:   Diagnosis Date    CAD (coronary artery disease) 2006    follows with Dr Nora Garland    Cerebral artery occlusion with cerebral infarction [facility])     Cerebrovascular disease     CHF (congestive heart failure), NYHA class I, chronic, systolic ([facility] 0837    [facility]-- EF 39%    Chronic frontoethmoidal sinusitis 2018    Chronic maxillary sinusitis 2018    [facility]    GARSIA (dyspnea on exertion)     follows with Dr Nighat Pedro Gallbladder disease     History of non-ST elevation myocardial infarction (NSTEMI)     [facility]    Hyperlipidemia     Hypertension     Ischemic cardiomyopathy     EF 40% after NSTEMI, 11-15 at [facility]    Obesity     Osteoarthritis     Skin cancer of eyelid     Sleep apnea     uses CPAP    Smokeless tobacco use     Stented coronary artery 2006    x1       Past Surgical History:   Procedure Laterality Date    CARDIAC CATHETERIZATION Left 2015    done [facility] WITH IMPLANT Bilateral     CHOLECYSTECTOMY, LAPAROSCOPIC N/A 2019    LAPAROSCOPIC ROBOTIC XI ASSISTED CHOLECYSTECTOMY performed by Carlos Galvan MD at [address] [place] [address]      CORONARY ANGIOPLASTY  06    DIAGNOSTIC CARDIAC CATH LAB PROCEDURE  06    KNEE ARTHROPLASTY Left 2015    ROTATOR CUFF REPAIR      bilaterally    TONSILLECTOMY AND ADENOIDECTOMY      UPPER GASTROINTESTINAL ENDOSCOPY         Family History   Problem Relation Age of Onset    Heart Disease Mother          81 Y/O    Liver Disease Father          66 Y/O       Social History     Socioeconomic History    Marital status:      Spouse name: Not on file    Number of children: Not on file    Years of education: Not on file    Highest education level: Not on file   Occupational History    Occupation: retired-     Social Needs    Financial resource strain: Not on file    Food insecurity     Worry: Not on file     Inability: Not on file   [employer] needs     Medical: Not on file     Non-medical: Not on file   Tobacco Use    Smoking status: Former Smoker     Packs/day: 1.00     Years: 15.00     Pack years: 15.00     Types: Cigarettes     Start date: 1957     Last attempt to quit: 1975     Years since quittin.2    Smokeless tobacco: Current User     Types: Snuff   Substance and Sexual Activity    Alcohol use: Yes     Comment: occassionally    Drug use: No    Sexual activity: Not on file   Lifestyle    Physical activity     Days per week: Not on file     Minutes per session: Not on file    Stress: Not on file   Relationships    Social connections     Talks on phone: Not on file     Gets together: Not on file     Attends Yarsanism service: Not on file     Active member of club or organization: Not on file     Attends meetings of clubs or organizations: Not on file     Relationship status: Not on file    Intimate partner violence     Fear of current or ex partner: Not on file     Emotionally abused: Not on file     Physically abused: Not on file     Forced sexual activity: Not on file   Other Topics Concern    Not on file   Social History Narrative    Not on file       Vitals:    20 1504   BP: 116/62   Site: Right Upper Arm   Position: Sitting   Cuff Size: Large Adult   Pulse: 91   Resp: 20   Temp: 97.8 °F (36.6 °C)   TempSrc: Temporal   SpO2: 98%   Weight: 223 lb (101.2 kg)   Height: 6' 1\" (1.854 m)       Exam:  Physical Exam  Constitutional:       Appearance: He is well-developed. HENT:      Head: Normocephalic and atraumatic. Right Ear: External ear normal.      Left Ear: External ear normal.   Eyes:      Pupils: Pupils are equal, round, and reactive to light. Neck:      Musculoskeletal: Normal range of motion and neck supple. Thyroid: No thyromegaly. Cardiovascular:      Rate and Rhythm: Normal rate and regular rhythm. Heart sounds: Normal heart sounds. No murmur. Pulmonary:      Effort: Pulmonary effort is normal.      Breath sounds: Normal breath sounds. No wheezing or rales. Abdominal:      General: Bowel sounds are normal.      Palpations: Abdomen is soft.       Tenderness: (transient ischemic attack)  - Continue present treatment   - on atrovastatin   - on aspirin and plavix     Prostate enlargement  - continue present treatment   - has seen urology   - Has flomax - takes as needed  - stable     Vitamin D def  - On otc supplement   - follow labs     Other constipation  - discussed colace 100mg daily and miralax as needed  - taking mutamucil     Return in about 6 months (around 9/18/2020) for check up and review and labs.     Orders Placed This Encounter   Procedures    CBC Auto Differential     Standing Status:   Future     Standing Expiration Date:   3/18/2021    Comprehensive Metabolic Panel     Standing Status:   Future     Standing Expiration Date:   3/18/2021    Hemoglobin A1C     Standing Status:   Future     Standing Expiration Date:   3/18/2021    Lipid, Fasting     Standing Status:   Future     Standing Expiration Date:   3/18/2021    Magnesium     Standing Status:   Future     Standing Expiration Date:   3/18/2021    TSH without Reflex     Standing Status:   Future     Standing Expiration Date:   3/18/2021     Requested Prescriptions      No prescriptions requested or ordered in this encounter     Jazz Sales MD  3/18/2020  4:55 PM

## 2020-04-02 RX ORDER — ATORVASTATIN CALCIUM 80 MG/1
80 TABLET, FILM COATED ORAL DAILY
Qty: 90 TABLET | Refills: 2 | Status: SHIPPED
Start: 2020-04-02 | End: 2020-08-24 | Stop reason: SDUPTHER

## 2020-06-10 RX ORDER — CLOPIDOGREL BISULFATE 75 MG/1
75 TABLET ORAL DAILY
Qty: 90 TABLET | Refills: 0 | Status: SHIPPED
Start: 2020-06-10 | End: 2020-08-24 | Stop reason: SDUPTHER

## 2020-06-10 RX ORDER — LOSARTAN POTASSIUM 25 MG/1
25 TABLET ORAL DAILY
Qty: 90 TABLET | Refills: 0 | Status: SHIPPED
Start: 2020-06-10 | End: 2020-08-24 | Stop reason: SDUPTHER

## 2020-06-10 RX ORDER — METOPROLOL SUCCINATE 50 MG/1
50 TABLET, EXTENDED RELEASE ORAL DAILY
Qty: 90 TABLET | Refills: 0 | Status: SHIPPED
Start: 2020-06-10 | End: 2020-08-24 | Stop reason: SDUPTHER

## 2020-06-25 ENCOUNTER — APPOINTMENT (OUTPATIENT)
Dept: GENERAL RADIOLOGY | Age: 80
DRG: 445 | End: 2020-06-25
Payer: MEDICARE

## 2020-06-25 ENCOUNTER — APPOINTMENT (OUTPATIENT)
Dept: CT IMAGING | Age: 80
DRG: 445 | End: 2020-06-25
Payer: MEDICARE

## 2020-06-25 ENCOUNTER — HOSPITAL ENCOUNTER (INPATIENT)
Age: 80
LOS: 3 days | Discharge: HOME | DRG: 445 | End: 2020-06-28
Attending: EMERGENCY MEDICINE | Admitting: INTERNAL MEDICINE
Payer: MEDICARE

## 2020-06-25 PROBLEM — K83.09 ACUTE CHOLANGITIS: Status: ACTIVE | Noted: 2020-06-25

## 2020-06-25 PROBLEM — E87.20 LACTIC ACIDOSIS: Status: ACTIVE | Noted: 2020-06-25

## 2020-06-25 PROBLEM — R79.89 ELEVATED LFTS: Status: ACTIVE | Noted: 2020-06-25

## 2020-06-25 PROBLEM — D72.829 LEUKOCYTOSIS: Status: ACTIVE | Noted: 2020-06-25

## 2020-06-25 LAB
ALBUMIN SERPL-MCNC: 3.8 G/DL (ref 3.5–5.2)
ALP BLD-CCNC: 205 U/L (ref 40–129)
ALT SERPL-CCNC: 570 U/L (ref 0–40)
ANION GAP SERPL CALCULATED.3IONS-SCNC: 15 MMOL/L (ref 7–16)
ANISOCYTOSIS: ABNORMAL
AST SERPL-CCNC: 504 U/L (ref 0–39)
BACTERIA: ABNORMAL /HPF
BASOPHILS ABSOLUTE: 0.05 E9/L (ref 0–0.2)
BASOPHILS RELATIVE PERCENT: 0.2 % (ref 0–2)
BILIRUB SERPL-MCNC: 3.3 MG/DL (ref 0–1.2)
BILIRUBIN URINE: ABNORMAL
BLOOD, URINE: NEGATIVE
BUN BLDV-MCNC: 22 MG/DL (ref 8–23)
BURR CELLS: ABNORMAL
CALCIUM SERPL-MCNC: 9.3 MG/DL (ref 8.6–10.2)
CHLORIDE BLD-SCNC: 103 MMOL/L (ref 98–107)
CLARITY: CLEAR
CO2: 22 MMOL/L (ref 22–29)
COLOR: ABNORMAL
CREAT SERPL-MCNC: 1.1 MG/DL (ref 0.7–1.2)
EKG ATRIAL RATE: 86 BPM
EKG P AXIS: 60 DEGREES
EKG P-R INTERVAL: 170 MS
EKG Q-T INTERVAL: 388 MS
EKG QRS DURATION: 92 MS
EKG QTC CALCULATION (BAZETT): 464 MS
EKG R AXIS: 35 DEGREES
EKG T AXIS: 29 DEGREES
EKG VENTRICULAR RATE: 86 BPM
EOSINOPHILS ABSOLUTE: 0 E9/L (ref 0.05–0.5)
EOSINOPHILS RELATIVE PERCENT: 0 % (ref 0–6)
GFR AFRICAN AMERICAN: >60
GFR NON-AFRICAN AMERICAN: >60 ML/MIN/1.73
GLUCOSE BLD-MCNC: 148 MG/DL (ref 74–99)
GLUCOSE URINE: NEGATIVE MG/DL
HCT VFR BLD CALC: 42.3 % (ref 37–54)
HEMOGLOBIN: 13.6 G/DL (ref 12.5–16.5)
HYALINE CASTS: ABNORMAL /LPF (ref 0–2)
IMMATURE GRANULOCYTES #: 0.14 E9/L
IMMATURE GRANULOCYTES %: 0.6 % (ref 0–5)
KETONES, URINE: ABNORMAL MG/DL
LACTIC ACID, SEPSIS: 4.3 MMOL/L (ref 0.5–1.9)
LACTIC ACID: 2.4 MMOL/L (ref 0.5–2.2)
LEUKOCYTE ESTERASE, URINE: NEGATIVE
LIPASE: 18 U/L (ref 13–60)
LYMPHOCYTES ABSOLUTE: 0.91 E9/L (ref 1.5–4)
LYMPHOCYTES RELATIVE PERCENT: 4 % (ref 20–42)
MCH RBC QN AUTO: 28 PG (ref 26–35)
MCHC RBC AUTO-ENTMCNC: 32.2 % (ref 32–34.5)
MCV RBC AUTO: 87.2 FL (ref 80–99.9)
MONOCYTES ABSOLUTE: 1.74 E9/L (ref 0.1–0.95)
MONOCYTES RELATIVE PERCENT: 7.7 % (ref 2–12)
NEUTROPHILS ABSOLUTE: 19.77 E9/L (ref 1.8–7.3)
NEUTROPHILS RELATIVE PERCENT: 87.5 % (ref 43–80)
NITRITE, URINE: NEGATIVE
OVALOCYTES: ABNORMAL
PDW BLD-RTO: 14.6 FL (ref 11.5–15)
PH UA: 5.5 (ref 5–9)
PLATELET # BLD: 177 E9/L (ref 130–450)
PMV BLD AUTO: 10.9 FL (ref 7–12)
POIKILOCYTES: ABNORMAL
POTASSIUM SERPL-SCNC: 4.3 MMOL/L (ref 3.5–5)
PROTEIN UA: ABNORMAL MG/DL
RBC # BLD: 4.85 E12/L (ref 3.8–5.8)
RBC UA: ABNORMAL /HPF (ref 0–2)
SODIUM BLD-SCNC: 140 MMOL/L (ref 132–146)
SPECIFIC GRAVITY UA: 1.02 (ref 1–1.03)
TEAR DROP CELLS: ABNORMAL
TOTAL PROTEIN: 6.6 G/DL (ref 6.4–8.3)
TROPONIN: <0.01 NG/ML (ref 0–0.03)
UROBILINOGEN, URINE: 1 E.U./DL
WBC # BLD: 22.6 E9/L (ref 4.5–11.5)
WBC UA: ABNORMAL /HPF (ref 0–5)

## 2020-06-25 PROCEDURE — 6370000000 HC RX 637 (ALT 250 FOR IP): Performed by: INTERNAL MEDICINE

## 2020-06-25 PROCEDURE — 85025 COMPLETE CBC W/AUTO DIFF WBC: CPT

## 2020-06-25 PROCEDURE — 99285 EMERGENCY DEPT VISIT HI MDM: CPT

## 2020-06-25 PROCEDURE — 6360000002 HC RX W HCPCS: Performed by: INTERNAL MEDICINE

## 2020-06-25 PROCEDURE — 83690 ASSAY OF LIPASE: CPT

## 2020-06-25 PROCEDURE — 6360000004 HC RX CONTRAST MEDICATION: Performed by: RADIOLOGY

## 2020-06-25 PROCEDURE — 1200000000 HC SEMI PRIVATE

## 2020-06-25 PROCEDURE — 96374 THER/PROPH/DIAG INJ IV PUSH: CPT

## 2020-06-25 PROCEDURE — 80053 COMPREHEN METABOLIC PANEL: CPT

## 2020-06-25 PROCEDURE — 2580000003 HC RX 258: Performed by: RADIOLOGY

## 2020-06-25 PROCEDURE — 71045 X-RAY EXAM CHEST 1 VIEW: CPT

## 2020-06-25 PROCEDURE — 87040 BLOOD CULTURE FOR BACTERIA: CPT

## 2020-06-25 PROCEDURE — 74177 CT ABD & PELVIS W/CONTRAST: CPT

## 2020-06-25 PROCEDURE — 80074 ACUTE HEPATITIS PANEL: CPT

## 2020-06-25 PROCEDURE — 83605 ASSAY OF LACTIC ACID: CPT

## 2020-06-25 PROCEDURE — 93005 ELECTROCARDIOGRAM TRACING: CPT | Performed by: EMERGENCY MEDICINE

## 2020-06-25 PROCEDURE — 2580000003 HC RX 258: Performed by: INTERNAL MEDICINE

## 2020-06-25 PROCEDURE — 2580000003 HC RX 258: Performed by: EMERGENCY MEDICINE

## 2020-06-25 PROCEDURE — 81001 URINALYSIS AUTO W/SCOPE: CPT

## 2020-06-25 PROCEDURE — 93010 ELECTROCARDIOGRAM REPORT: CPT | Performed by: INTERNAL MEDICINE

## 2020-06-25 PROCEDURE — 6360000002 HC RX W HCPCS: Performed by: EMERGENCY MEDICINE

## 2020-06-25 PROCEDURE — 36415 COLL VENOUS BLD VENIPUNCTURE: CPT

## 2020-06-25 PROCEDURE — 84484 ASSAY OF TROPONIN QUANT: CPT

## 2020-06-25 RX ORDER — 0.9 % SODIUM CHLORIDE 0.9 %
1000 INTRAVENOUS SOLUTION INTRAVENOUS ONCE
Status: COMPLETED | OUTPATIENT
Start: 2020-06-25 | End: 2020-06-25

## 2020-06-25 RX ORDER — ACETAMINOPHEN 650 MG/1
650 SUPPOSITORY RECTAL EVERY 6 HOURS PRN
Status: DISCONTINUED | OUTPATIENT
Start: 2020-06-25 | End: 2020-06-28 | Stop reason: HOSPADM

## 2020-06-25 RX ORDER — LOSARTAN POTASSIUM 25 MG/1
25 TABLET ORAL DAILY
Status: DISCONTINUED | OUTPATIENT
Start: 2020-06-25 | End: 2020-06-28 | Stop reason: HOSPADM

## 2020-06-25 RX ORDER — CLOPIDOGREL BISULFATE 75 MG/1
75 TABLET ORAL DAILY
Status: DISCONTINUED | OUTPATIENT
Start: 2020-06-25 | End: 2020-06-28 | Stop reason: HOSPADM

## 2020-06-25 RX ORDER — SODIUM CHLORIDE, SODIUM LACTATE, POTASSIUM CHLORIDE, CALCIUM CHLORIDE 600; 310; 30; 20 MG/100ML; MG/100ML; MG/100ML; MG/100ML
INJECTION, SOLUTION INTRAVENOUS CONTINUOUS
Status: DISCONTINUED | OUTPATIENT
Start: 2020-06-25 | End: 2020-06-25 | Stop reason: CLARIF

## 2020-06-25 RX ORDER — SODIUM CHLORIDE 0.9 % (FLUSH) 0.9 %
10 SYRINGE (ML) INJECTION EVERY 12 HOURS SCHEDULED
Status: DISCONTINUED | OUTPATIENT
Start: 2020-06-25 | End: 2020-06-28 | Stop reason: HOSPADM

## 2020-06-25 RX ORDER — CHOLECALCIFEROL (VITAMIN D3) 50 MCG
2000 TABLET ORAL DAILY
Status: DISCONTINUED | OUTPATIENT
Start: 2020-06-25 | End: 2020-06-28 | Stop reason: HOSPADM

## 2020-06-25 RX ORDER — ASPIRIN 81 MG/1
81 TABLET ORAL DAILY
Status: DISCONTINUED | OUTPATIENT
Start: 2020-06-25 | End: 2020-06-28 | Stop reason: HOSPADM

## 2020-06-25 RX ORDER — M-VIT,TX,IRON,MINS/CALC/FOLIC 27MG-0.4MG
1 TABLET ORAL DAILY
Status: DISCONTINUED | OUTPATIENT
Start: 2020-06-25 | End: 2020-06-28 | Stop reason: HOSPADM

## 2020-06-25 RX ORDER — 0.9 % SODIUM CHLORIDE 0.9 %
500 INTRAVENOUS SOLUTION INTRAVENOUS ONCE
Status: COMPLETED | OUTPATIENT
Start: 2020-06-25 | End: 2020-06-25

## 2020-06-25 RX ORDER — ONDANSETRON 2 MG/ML
4 INJECTION INTRAMUSCULAR; INTRAVENOUS EVERY 6 HOURS PRN
Status: DISCONTINUED | OUTPATIENT
Start: 2020-06-25 | End: 2020-06-28 | Stop reason: HOSPADM

## 2020-06-25 RX ORDER — SODIUM CHLORIDE 9 MG/ML
INJECTION, SOLUTION INTRAVENOUS EVERY 8 HOURS
Status: DISCONTINUED | OUTPATIENT
Start: 2020-06-25 | End: 2020-06-28 | Stop reason: HOSPADM

## 2020-06-25 RX ORDER — SODIUM CHLORIDE 0.9 % (FLUSH) 0.9 %
10 SYRINGE (ML) INJECTION PRN
Status: DISCONTINUED | OUTPATIENT
Start: 2020-06-25 | End: 2020-06-28 | Stop reason: HOSPADM

## 2020-06-25 RX ORDER — PROMETHAZINE HYDROCHLORIDE 25 MG/1
12.5 TABLET ORAL EVERY 6 HOURS PRN
Status: DISCONTINUED | OUTPATIENT
Start: 2020-06-25 | End: 2020-06-28 | Stop reason: HOSPADM

## 2020-06-25 RX ORDER — POLYETHYLENE GLYCOL 3350 17 G/17G
17 POWDER, FOR SOLUTION ORAL DAILY PRN
Status: DISCONTINUED | OUTPATIENT
Start: 2020-06-25 | End: 2020-06-28 | Stop reason: HOSPADM

## 2020-06-25 RX ORDER — METOPROLOL SUCCINATE 50 MG/1
50 TABLET, EXTENDED RELEASE ORAL DAILY
Status: DISCONTINUED | OUTPATIENT
Start: 2020-06-25 | End: 2020-06-28 | Stop reason: HOSPADM

## 2020-06-25 RX ORDER — ACETAMINOPHEN 325 MG/1
650 TABLET ORAL EVERY 6 HOURS PRN
Status: DISCONTINUED | OUTPATIENT
Start: 2020-06-25 | End: 2020-06-28 | Stop reason: HOSPADM

## 2020-06-25 RX ORDER — SODIUM CHLORIDE, SODIUM LACTATE, POTASSIUM CHLORIDE, CALCIUM CHLORIDE 600; 310; 30; 20 MG/100ML; MG/100ML; MG/100ML; MG/100ML
INJECTION, SOLUTION INTRAVENOUS CONTINUOUS
Status: ACTIVE | OUTPATIENT
Start: 2020-06-25 | End: 2020-06-26

## 2020-06-25 RX ORDER — ONDANSETRON 2 MG/ML
4 INJECTION INTRAMUSCULAR; INTRAVENOUS ONCE
Status: COMPLETED | OUTPATIENT
Start: 2020-06-25 | End: 2020-06-25

## 2020-06-25 RX ADMIN — ENOXAPARIN SODIUM 40 MG: 40 INJECTION SUBCUTANEOUS at 15:09

## 2020-06-25 RX ADMIN — SODIUM CHLORIDE 1000 ML: 9 INJECTION, SOLUTION INTRAVENOUS at 12:32

## 2020-06-25 RX ADMIN — SODIUM CHLORIDE 500 ML: 9 INJECTION, SOLUTION INTRAVENOUS at 10:58

## 2020-06-25 RX ADMIN — PIPERACILLIN AND TAZOBACTAM 3.38 G: 3; .375 INJECTION, POWDER, FOR SOLUTION INTRAVENOUS at 14:21

## 2020-06-25 RX ADMIN — SODIUM CHLORIDE: 9 INJECTION, SOLUTION INTRAVENOUS at 22:20

## 2020-06-25 RX ADMIN — CLOPIDOGREL BISULFATE 75 MG: 75 TABLET ORAL at 15:10

## 2020-06-25 RX ADMIN — LOSARTAN POTASSIUM 25 MG: 25 TABLET ORAL at 15:10

## 2020-06-25 RX ADMIN — ONDANSETRON 4 MG: 2 INJECTION INTRAMUSCULAR; INTRAVENOUS at 10:59

## 2020-06-25 RX ADMIN — METOPROLOL SUCCINATE 50 MG: 50 TABLET, EXTENDED RELEASE ORAL at 15:10

## 2020-06-25 RX ADMIN — Medication 10 ML: at 12:01

## 2020-06-25 RX ADMIN — Medication 1 TABLET: at 15:09

## 2020-06-25 RX ADMIN — SODIUM CHLORIDE, POTASSIUM CHLORIDE, SODIUM LACTATE AND CALCIUM CHLORIDE: 600; 310; 30; 20 INJECTION, SOLUTION INTRAVENOUS at 20:57

## 2020-06-25 RX ADMIN — PIPERACILLIN AND TAZOBACTAM 3.38 G: 3; .375 INJECTION, POWDER, FOR SOLUTION INTRAVENOUS at 22:10

## 2020-06-25 RX ADMIN — ASPIRIN 81 MG: 81 TABLET, COATED ORAL at 15:10

## 2020-06-25 RX ADMIN — CHOLECALCIFEROL TAB 50 MCG (2000 UNIT) 2000 UNITS: 50 TAB at 15:09

## 2020-06-25 RX ADMIN — IOPAMIDOL 110 ML: 755 INJECTION, SOLUTION INTRAVENOUS at 12:01

## 2020-06-25 RX ADMIN — Medication 10 ML: at 20:55

## 2020-06-25 ASSESSMENT — PAIN SCALES - GENERAL
PAINLEVEL_OUTOF10: 0
PAINLEVEL_OUTOF10: 0

## 2020-06-25 NOTE — PROGRESS NOTES
Called Dr. Benny Awan regarding elevated lactic. Patient got 1.5L ns in ED. Await 5pm lactic draw and let her know result.

## 2020-06-25 NOTE — ED PROVIDER NOTES
VITALS REVIEWED ---------------------------   The nursing notes within the ED encounter and vital signs as below have been reviewed by myself. /60   Pulse 77   Temp 97.4 °F (36.3 °C) (Oral)   Resp 18   Ht 6' 1\" (1.854 m)   Wt 217 lb (98.4 kg)   SpO2 95%   BMI 28.63 kg/m²   Oxygen Saturation Interpretation: Normal    The patients available past medical records and past encounters were reviewed.         ------------------------------ ED COURSE/MEDICAL DECISION MAKING----------------------  Medications   sodium chloride flush 0.9 % injection 10 mL (10 mLs Intravenous Given 6/25/20 1201)   aspirin EC tablet 81 mg (81 mg Oral Given 6/25/20 1510)   vitamin D tablet 2,000 Units (2,000 Units Oral Given 6/25/20 1509)   clopidogrel (PLAVIX) tablet 75 mg (75 mg Oral Given 6/25/20 1510)   losartan (COZAAR) tablet 25 mg (25 mg Oral Given 6/25/20 1510)   metoprolol succinate (TOPROL XL) extended release tablet 50 mg (50 mg Oral Given 6/25/20 1510)   therapeutic multivitamin-minerals 1 tablet (1 tablet Oral Given 6/25/20 1509)   sodium chloride flush 0.9 % injection 10 mL (has no administration in time range)   sodium chloride flush 0.9 % injection 10 mL (has no administration in time range)   acetaminophen (TYLENOL) tablet 650 mg (has no administration in time range)     Or   acetaminophen (TYLENOL) suppository 650 mg (has no administration in time range)   polyethylene glycol (GLYCOLAX) packet 17 g (has no administration in time range)   promethazine (PHENERGAN) tablet 12.5 mg (has no administration in time range)     Or   ondansetron (ZOFRAN) injection 4 mg (has no administration in time range)   enoxaparin (LOVENOX) injection 40 mg (40 mg Subcutaneous Given 6/25/20 1509)   piperacillin-tazobactam (ZOSYN) 3.375 g in dextrose 5 % 50 mL IVPB extended infusion (mini-bag) (has no administration in time range)     And   0.9 % sodium chloride infusion (has no administration in time range)   0.9 % sodium chloride bolus using voice recognition software.  Every effort was made to ensure accuracy; however, inadvertent computerized transcription errors may be present        Mike Long MD  06/25/20 4138

## 2020-06-25 NOTE — H&P
[facility] History and Physical      CHIEF COMPLAINT:  Nausea and vomiting 12 hours ago. History of Present Illness:  Sarah Villalobos is a [de-identified] yo male with PMHx significant for CAD status post stent 2006, LINETTE, hypertension, hyperlipidemia, ischemic cardiomyopathy, HFrEF (EF 40%), COPD, Chx if vertebrobasilar TIA, hx of gallstone s/p cholecystectomy (5/2019) presented from home with chief complaint of nausea vomiting started last night. He and his wife went out for dinner last night around 6 PM at a [employer]. He ate some sausages. He went home and felt nauseated and vomited small amounts of food 3 times. She had with generalized fatigue. He cameto the ED to check it out even though the nausea vomiting has been gone. In the ED, he was afebrile with T 97.6, RR 14, tachycardic 108, blood pressure 109/62, SPO2 94 on room air. Labs significant for lactic acid 4.3, weighted alk phos 205, , , lipase 18 within normal.  Leukocytosis with WBC 22.6  CT abdomen showed   1. No acute abnormality seen in the abdomen or the pelvis. 2. Moderate diverticulosis without diverticulitis. 3. Enlarged prostate (81.2 g; normal less than 30 g). 4. Mild cardiomegaly. 5. Small hiatal hernia. During interview, he only complains generalized fatigue and malaise. Denies nausea or vomiting, denies abdominal pain, denies pain or shortness of breath. Denies fever or chill. Denies headache. Denies rashes. Denies urinary symptoms. Denies diarrhea or constipation. Informant(s) for H&P:patient, wife and chart review.     REVIEW OF SYSTEMS:  A comprehensive review of systems was negative except for: what is in the HPI    PMH:  Past Medical History:   Diagnosis Date    CAD (coronary artery disease) 2006    follows with Dr Jey Hernandez Cerebral artery occlusion with cerebral infarction [facility])     Cerebrovascular disease     CHF (congestive heart failure), NYHA class I, chronic, systolic ([facility] [address] 2015    [facility]-- EF 39%    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/2018    Chronic maxillary sinusitis 04/2018    COPD (chronic obstructive pulmonary disease) ([address] 04/2018    GARSIA (dyspnea on exertion)     follows with Dr Liss Dutta Gallbladder disease     History of non-ST elevation myocardial infarction (NSTEMI) 2015    [facility]    Hyperlipidemia     Hypertension     Ischemic cardiomyopathy     EF 40[facility]    Obesity     Osteoarthritis     Skin cancer of eyelid     Sleep apnea     uses CPAP    Smokeless tobacco use     Stented coronary artery 2006    x1       Surgical History:  Past Surgical History:   Procedure Laterality Date    CARDIAC CATHETERIZATION Left 11/30/2015    done [address] Bilateral     CHOLECYSTECTOMY, LAPAROSCOPIC N/A 5/17/2019    LAPAROSCOPIC ROBOTIC XI ASSISTED CHOLECYSTECTOMY performed by Gurvinder Duval MD at [address] [place] [address]  2014    CORONARY ANGIOPLASTY  1/23/06    DIAGNOSTIC CARDIAC CATH LAB PROCEDURE  1/23/06    KNEE ARTHROPLASTY Left 02/2015    ROTATOR CUFF REPAIR      bilaterally    TONSILLECTOMY AND ADENOIDECTOMY  1951    UPPER GASTROINTESTINAL ENDOSCOPY  2015       Medications Prior to Admission:    Prior to Admission medications    Medication Sig Start Date End Date Taking?  Authorizing Provider   losartan (COZAAR) 25 MG tablet Take 1 tablet by mouth daily 6/10/20  Yes Inocente King MD   metoprolol succinate (TOPROL XL) 50 MG extended release tablet Take 1 tablet by mouth daily 6/10/20  Yes Inocente King MD   clopidogrel (PLAVIX) 75 MG tablet Take 1 tablet by mouth daily 6/10/20  Yes Inocente King MD   atorvastatin (LIPITOR) 80 MG tablet Take 1 tablet by mouth daily 4/2/20  Yes Inocente King MD   Multiple Vitamins-Minerals (THERAPEUTIC MULTIVITAMIN-MINERALS) tablet Take 1 tablet by mouth daily   Yes Historical Provider, MD   aspirin 81 MG EC tablet Take 81 mg by mouth daily Last taken 4.85   HGB 13.6   HCT 42.3   MCV 87.2   MCH 28.0   MCHC 32.2   RDW 14.6      MPV 10.9       No results for input(s): POCGLU in the last 72 hours. CBC with Differential:    Lab Results   Component Value Date    WBC 22.6 06/25/2020    RBC 4.85 06/25/2020    HGB 13.6 06/25/2020    HCT 42.3 06/25/2020     06/25/2020    MCV 87.2 06/25/2020    MCH 28.0 06/25/2020    MCHC 32.2 06/25/2020    RDW 14.6 06/25/2020    SEGSPCT 45 01/10/2012    LYMPHOPCT 4.0 06/25/2020    MONOPCT 7.7 06/25/2020    BASOPCT 0.2 06/25/2020    MONOSABS 1.74 06/25/2020    LYMPHSABS 0.91 06/25/2020    EOSABS 0.00 06/25/2020    BASOSABS 0.05 06/25/2020     CMP:    Lab Results   Component Value Date     06/25/2020    K 4.3 06/25/2020    K 3.8 03/14/2019     06/25/2020    CO2 22 06/25/2020    BUN 22 06/25/2020    CREATININE 1.1 06/25/2020    GFRAA >60 06/25/2020    LABGLOM >60 06/25/2020    GLUCOSE 148 06/25/2020    GLUCOSE 103 01/10/2012    PROT 6.6 06/25/2020    LABALBU 3.8 06/25/2020    LABALBU 3.9 01/10/2012    CALCIUM 9.3 06/25/2020    BILITOT 3.3 06/25/2020    ALKPHOS 205 06/25/2020     06/25/2020     06/25/2020       Radiology:   CT ABDOMEN PELVIS W IV CONTRAST Additional Contrast? None   Final Result         1. No acute abnormality seen in the abdomen or the pelvis. 2. Moderate diverticulosis without diverticulitis. 3. Enlarged prostate (81.2 g; normal less than 30 g). 4. Mild cardiomegaly. 5. Small hiatal hernia. XR CHEST PORTABLE   Final Result      Mild cardiomegaly with left ventricular contour.              EKG: Normal sinus rhythm  Possible Anterior infarct (cited on or before 25-JUN-2020)  Abnormal ECG  When compared with ECG of 15-MAR-2019 11:04,  No significant change was found  Confirmed by Jamie Ely ([id number]) on 6/25/2020 6:16:48 PM    ASSESSMENT:      Active Problems:    CAD (coronary artery disease)    Sleep apnea    Acute cholangitis    Elevated LFTs    Leukocytosis

## 2020-06-26 ENCOUNTER — APPOINTMENT (OUTPATIENT)
Dept: GENERAL RADIOLOGY | Age: 80
DRG: 445 | End: 2020-06-26
Payer: MEDICARE

## 2020-06-26 LAB
ALBUMIN SERPL-MCNC: 3.2 G/DL (ref 3.5–5.2)
ALP BLD-CCNC: 151 U/L (ref 40–129)
ALT SERPL-CCNC: 315 U/L (ref 0–40)
ANION GAP SERPL CALCULATED.3IONS-SCNC: 7 MMOL/L (ref 7–16)
AST SERPL-CCNC: 188 U/L (ref 0–39)
BASOPHILS ABSOLUTE: 0.05 E9/L (ref 0–0.2)
BASOPHILS RELATIVE PERCENT: 0.4 % (ref 0–2)
BILIRUB SERPL-MCNC: 4.7 MG/DL (ref 0–1.2)
BILIRUBIN DIRECT: 3.7 MG/DL (ref 0–0.3)
BILIRUBIN, INDIRECT: 1 MG/DL (ref 0–1)
BUN BLDV-MCNC: 20 MG/DL (ref 8–23)
CALCIUM SERPL-MCNC: 8.3 MG/DL (ref 8.6–10.2)
CEA: 0.9 NG/ML (ref 0–5.2)
CEA: 1 NG/ML (ref 0–5.2)
CHLORIDE BLD-SCNC: 108 MMOL/L (ref 98–107)
CO2: 23 MMOL/L (ref 22–29)
CREAT SERPL-MCNC: 1 MG/DL (ref 0.7–1.2)
EOSINOPHILS ABSOLUTE: 0.34 E9/L (ref 0.05–0.5)
EOSINOPHILS RELATIVE PERCENT: 2.5 % (ref 0–6)
FERRITIN: 1754 NG/ML
GFR AFRICAN AMERICAN: >60
GFR NON-AFRICAN AMERICAN: >60 ML/MIN/1.73
GLUCOSE BLD-MCNC: 103 MG/DL (ref 74–99)
HAV IGM SER IA-ACNC: NORMAL
HCT VFR BLD CALC: 36.5 % (ref 37–54)
HEMOGLOBIN: 11.8 G/DL (ref 12.5–16.5)
HEPATITIS B CORE IGM ANTIBODY: NORMAL
HEPATITIS B SURFACE ANTIGEN INTERPRETATION: NORMAL
HEPATITIS C ANTIBODY INTERPRETATION: NORMAL
IGG: 810 MG/DL (ref 700–1600)
IGM: 25 MG/DL (ref 40–230)
IMMATURE GRANULOCYTES #: 0.07 E9/L
IMMATURE GRANULOCYTES %: 0.5 % (ref 0–5)
INR BLD: 1.4
INR BLD: 1.5
IRON SATURATION: 41 % (ref 20–55)
IRON: 80 MCG/DL (ref 59–158)
LACTIC ACID: 1.2 MMOL/L (ref 0.5–2.2)
LACTIC ACID: 1.7 MMOL/L (ref 0.5–2.2)
LYMPHOCYTES ABSOLUTE: 1.56 E9/L (ref 1.5–4)
LYMPHOCYTES RELATIVE PERCENT: 11.6 % (ref 20–42)
MCH RBC QN AUTO: 28.4 PG (ref 26–35)
MCHC RBC AUTO-ENTMCNC: 32.3 % (ref 32–34.5)
MCV RBC AUTO: 87.7 FL (ref 80–99.9)
MONOCYTES ABSOLUTE: 0.99 E9/L (ref 0.1–0.95)
MONOCYTES RELATIVE PERCENT: 7.4 % (ref 2–12)
NEUTROPHILS ABSOLUTE: 10.45 E9/L (ref 1.8–7.3)
NEUTROPHILS RELATIVE PERCENT: 77.6 % (ref 43–80)
PDW BLD-RTO: 15.3 FL (ref 11.5–15)
PLATELET # BLD: 154 E9/L (ref 130–450)
PMV BLD AUTO: 11.4 FL (ref 7–12)
POTASSIUM REFLEX MAGNESIUM: 3.6 MMOL/L (ref 3.5–5)
PROTHROMBIN TIME: 16 SEC (ref 9.3–12.4)
PROTHROMBIN TIME: 17.5 SEC (ref 9.3–12.4)
RBC # BLD: 4.16 E12/L (ref 3.8–5.8)
SODIUM BLD-SCNC: 138 MMOL/L (ref 132–146)
T4 TOTAL: 5.2 MCG/DL (ref 4.5–11.7)
TOTAL IRON BINDING CAPACITY: 195 MCG/DL (ref 250–450)
TOTAL PROTEIN: 5.7 G/DL (ref 6.4–8.3)
TSH SERPL DL<=0.05 MIU/L-ACNC: 0.89 UIU/ML (ref 0.27–4.2)
WBC # BLD: 13.5 E9/L (ref 4.5–11.5)

## 2020-06-26 PROCEDURE — 2580000003 HC RX 258: Performed by: NURSE PRACTITIONER

## 2020-06-26 PROCEDURE — 85025 COMPLETE CBC W/AUTO DIFF WBC: CPT

## 2020-06-26 PROCEDURE — 84436 ASSAY OF TOTAL THYROXINE: CPT

## 2020-06-26 PROCEDURE — 6370000000 HC RX 637 (ALT 250 FOR IP): Performed by: NURSE PRACTITIONER

## 2020-06-26 PROCEDURE — 6370000000 HC RX 637 (ALT 250 FOR IP): Performed by: INTERNAL MEDICINE

## 2020-06-26 PROCEDURE — 71045 X-RAY EXAM CHEST 1 VIEW: CPT

## 2020-06-26 PROCEDURE — 82378 CARCINOEMBRYONIC ANTIGEN: CPT

## 2020-06-26 PROCEDURE — 83540 ASSAY OF IRON: CPT

## 2020-06-26 PROCEDURE — 80074 ACUTE HEPATITIS PANEL: CPT

## 2020-06-26 PROCEDURE — 99233 SBSQ HOSP IP/OBS HIGH 50: CPT | Performed by: INTERNAL MEDICINE

## 2020-06-26 PROCEDURE — 2580000003 HC RX 258: Performed by: INTERNAL MEDICINE

## 2020-06-26 PROCEDURE — 83605 ASSAY OF LACTIC ACID: CPT

## 2020-06-26 PROCEDURE — 6360000002 HC RX W HCPCS: Performed by: INTERNAL MEDICINE

## 2020-06-26 PROCEDURE — 82728 ASSAY OF FERRITIN: CPT

## 2020-06-26 PROCEDURE — 36415 COLL VENOUS BLD VENIPUNCTURE: CPT

## 2020-06-26 PROCEDURE — 82784 ASSAY IGA/IGD/IGG/IGM EACH: CPT

## 2020-06-26 PROCEDURE — 83550 IRON BINDING TEST: CPT

## 2020-06-26 PROCEDURE — 80076 HEPATIC FUNCTION PANEL: CPT

## 2020-06-26 PROCEDURE — 86255 FLUORESCENT ANTIBODY SCREEN: CPT

## 2020-06-26 PROCEDURE — 94660 CPAP INITIATION&MGMT: CPT

## 2020-06-26 PROCEDURE — 82103 ALPHA-1-ANTITRYPSIN TOTAL: CPT

## 2020-06-26 PROCEDURE — 86256 FLUORESCENT ANTIBODY TITER: CPT

## 2020-06-26 PROCEDURE — 1200000000 HC SEMI PRIVATE

## 2020-06-26 PROCEDURE — 80048 BASIC METABOLIC PNL TOTAL CA: CPT

## 2020-06-26 PROCEDURE — 86038 ANTINUCLEAR ANTIBODIES: CPT

## 2020-06-26 PROCEDURE — 84443 ASSAY THYROID STIM HORMONE: CPT

## 2020-06-26 PROCEDURE — 85610 PROTHROMBIN TIME: CPT

## 2020-06-26 PROCEDURE — 82105 ALPHA-FETOPROTEIN SERUM: CPT

## 2020-06-26 PROCEDURE — 82787 IGG 1 2 3 OR 4 EACH: CPT

## 2020-06-26 RX ORDER — SODIUM CHLORIDE 9 MG/ML
INJECTION, SOLUTION INTRAVENOUS CONTINUOUS
Status: DISCONTINUED | OUTPATIENT
Start: 2020-06-26 | End: 2020-06-26

## 2020-06-26 RX ORDER — DOCUSATE SODIUM 100 MG/1
100 CAPSULE, LIQUID FILLED ORAL 2 TIMES DAILY
Status: DISCONTINUED | OUTPATIENT
Start: 2020-06-26 | End: 2020-06-28 | Stop reason: HOSPADM

## 2020-06-26 RX ADMIN — DOCUSATE SODIUM 100 MG: 100 CAPSULE, LIQUID FILLED ORAL at 22:42

## 2020-06-26 RX ADMIN — LOSARTAN POTASSIUM 25 MG: 25 TABLET ORAL at 08:11

## 2020-06-26 RX ADMIN — PIPERACILLIN AND TAZOBACTAM 3.38 G: 3; .375 INJECTION, POWDER, FOR SOLUTION INTRAVENOUS at 22:42

## 2020-06-26 RX ADMIN — CHOLECALCIFEROL TAB 50 MCG (2000 UNIT) 2000 UNITS: 50 TAB at 08:11

## 2020-06-26 RX ADMIN — DOCUSATE SODIUM 100 MG: 100 CAPSULE, LIQUID FILLED ORAL at 14:23

## 2020-06-26 RX ADMIN — Medication 1 TABLET: at 08:11

## 2020-06-26 RX ADMIN — SODIUM CHLORIDE: 9 INJECTION, SOLUTION INTRAVENOUS at 06:08

## 2020-06-26 RX ADMIN — PIPERACILLIN AND TAZOBACTAM 3.38 G: 3; .375 INJECTION, POWDER, FOR SOLUTION INTRAVENOUS at 05:54

## 2020-06-26 RX ADMIN — Medication 10 ML: at 21:19

## 2020-06-26 RX ADMIN — SODIUM CHLORIDE, POTASSIUM CHLORIDE, SODIUM LACTATE AND CALCIUM CHLORIDE: 600; 310; 30; 20 INJECTION, SOLUTION INTRAVENOUS at 06:09

## 2020-06-26 RX ADMIN — PIPERACILLIN AND TAZOBACTAM 3.38 G: 3; .375 INJECTION, POWDER, FOR SOLUTION INTRAVENOUS at 14:19

## 2020-06-26 RX ADMIN — CLOPIDOGREL BISULFATE 75 MG: 75 TABLET ORAL at 08:11

## 2020-06-26 RX ADMIN — SODIUM CHLORIDE: 9 INJECTION, SOLUTION INTRAVENOUS at 11:15

## 2020-06-26 RX ADMIN — METOPROLOL SUCCINATE 50 MG: 50 TABLET, EXTENDED RELEASE ORAL at 08:11

## 2020-06-26 RX ADMIN — ASPIRIN 81 MG: 81 TABLET, COATED ORAL at 08:11

## 2020-06-26 RX ADMIN — ENOXAPARIN SODIUM 40 MG: 40 INJECTION SUBCUTANEOUS at 08:11

## 2020-06-26 RX ADMIN — SODIUM CHLORIDE: 9 INJECTION, SOLUTION INTRAVENOUS at 22:42

## 2020-06-26 RX ADMIN — SODIUM CHLORIDE: 9 INJECTION, SOLUTION INTRAVENOUS at 18:03

## 2020-06-26 ASSESSMENT — PAIN SCALES - GENERAL
PAINLEVEL_OUTOF10: 0
PAINLEVEL_OUTOF10: 0

## 2020-06-26 NOTE — PLAN OF CARE
Problem: Fluid Volume:  Goal: Ability to achieve a balanced intake and output will improve  Description: Ability to achieve a balanced intake and output will improve  6/26/2020 1049 by Lul Miller, RN  Outcome: Met This Shift

## 2020-06-26 NOTE — PROGRESS NOTES
[facility]) Hospitalist Progress Note    Admission Date  6/25/2020  9:11 AM  Chief Complaint Fatigue  Admit Dx   Acute cholangitis [K83.09]    Subjective  History of Present Illness  6/26/2020 Melissa Oconnell is an 79-year-old male with past medical history pertinent for COPD, chronic systolic CHF, CAD status-post MI and stenting, sleep apnea, hyperlipidemia, hypertension, past CVA who presented to the ER and was admitted yesterday afternoon with nausea, emesis, and fatigue. LFTs found to be acutely elevated though patient is status post cholecystectomy last year. Acute cholangitis was suspected, patient was started on Zosyn, given IV fluids, and had CT which demonstrated some mild diverticulosis but no acute abnormalities otherwise. Patient seen by GI, was down for MRCP today, which is pending at this time. Otherwise, plan to continue antibiotics and monitor labs.     Review of Systems - 12-point review of systems has been reviewed and is otherwise negative except as listed in the HPI    Objective  Physical Exam  Vitals: /68   Pulse 82   Temp 98.5 °F (36.9 °C) (Oral)   Resp 16   Ht 6' 1\" (1.854 m)   Wt 217 lb (98.4 kg)   SpO2 95%   BMI 28.63 kg/m²   General: well-developed, well-nourished, no acute distress, cooperative  Skin: warm, dry, intact, normal color without cyanosis  HEENT: normocephalic, atraumatic, mucous membranes normal  Respiratory: clear to auscultation bilaterally without respiratory distress  Cardiovascular: regular rate and rhythm without murmur / rub / gallop  Abdominal: soft, nontender, nondistended, normoactive bowel sounds  Extremities: no mottling, pulses intact, no edema  Neurologic: awake, alert, no focal deficits  Psychiatric: normal affect, cooperative    Labs  Recent Labs     06/25/20  1054 06/26/20  0605    138   K 4.3 3.6   CO2 22 23   BUN 22 20   CREATININE 1.1 1.0   GLUCOSE 148* 103*   CALCIUM 9.3 8.3*   WBC 22.6* 13.5*   RBC 4.85 4.16   HGB 13.6 11.8*   HCT

## 2020-06-27 ENCOUNTER — APPOINTMENT (OUTPATIENT)
Dept: MRI IMAGING | Age: 80
DRG: 445 | End: 2020-06-27
Payer: MEDICARE

## 2020-06-27 LAB
AFP-TUMOR MARKER: 1 NG/ML (ref 0–9)
ALBUMIN SERPL-MCNC: 3.2 G/DL (ref 3.5–5.2)
ALP BLD-CCNC: 180 U/L (ref 40–129)
ALPHA-1 ANTITRYPSIN: 139 MG/DL (ref 90–200)
ALT SERPL-CCNC: 217 U/L (ref 0–40)
AMMONIA: 26.5 UMOL/L (ref 16–60)
AMYLASE: 38 U/L (ref 20–100)
ANION GAP SERPL CALCULATED.3IONS-SCNC: 9 MMOL/L (ref 7–16)
AST SERPL-CCNC: 97 U/L (ref 0–39)
BASOPHILS ABSOLUTE: 0.05 E9/L (ref 0–0.2)
BASOPHILS RELATIVE PERCENT: 0.6 % (ref 0–2)
BILIRUB SERPL-MCNC: 4 MG/DL (ref 0–1.2)
BILIRUBIN DIRECT: 3.1 MG/DL (ref 0–0.3)
BILIRUBIN, INDIRECT: 0.9 MG/DL (ref 0–1)
BUN BLDV-MCNC: 13 MG/DL (ref 8–23)
CALCIUM SERPL-MCNC: 8.7 MG/DL (ref 8.6–10.2)
CHLORIDE BLD-SCNC: 106 MMOL/L (ref 98–107)
CO2: 22 MMOL/L (ref 22–29)
CREAT SERPL-MCNC: 0.9 MG/DL (ref 0.7–1.2)
EOSINOPHILS ABSOLUTE: 0.42 E9/L (ref 0.05–0.5)
EOSINOPHILS RELATIVE PERCENT: 4.7 % (ref 0–6)
GFR AFRICAN AMERICAN: >60
GFR NON-AFRICAN AMERICAN: >60 ML/MIN/1.73
GLUCOSE BLD-MCNC: 98 MG/DL (ref 74–99)
HCT VFR BLD CALC: 38.5 % (ref 37–54)
HEMOGLOBIN: 12.4 G/DL (ref 12.5–16.5)
IMMATURE GRANULOCYTES #: 0.02 E9/L
IMMATURE GRANULOCYTES %: 0.2 % (ref 0–5)
INR BLD: 1.1
LIPASE: 23 U/L (ref 13–60)
LYMPHOCYTES ABSOLUTE: 1.88 E9/L (ref 1.5–4)
LYMPHOCYTES RELATIVE PERCENT: 20.8 % (ref 20–42)
MCH RBC QN AUTO: 28.1 PG (ref 26–35)
MCHC RBC AUTO-ENTMCNC: 32.2 % (ref 32–34.5)
MCV RBC AUTO: 87.1 FL (ref 80–99.9)
MONOCYTES ABSOLUTE: 0.87 E9/L (ref 0.1–0.95)
MONOCYTES RELATIVE PERCENT: 9.6 % (ref 2–12)
NEUTROPHILS ABSOLUTE: 5.79 E9/L (ref 1.8–7.3)
NEUTROPHILS RELATIVE PERCENT: 64.1 % (ref 43–80)
PDW BLD-RTO: 14.9 FL (ref 11.5–15)
PLATELET # BLD: 155 E9/L (ref 130–450)
PMV BLD AUTO: 11.1 FL (ref 7–12)
POTASSIUM REFLEX MAGNESIUM: 3.7 MMOL/L (ref 3.5–5)
PROTHROMBIN TIME: 13.5 SEC (ref 9.3–12.4)
RBC # BLD: 4.42 E12/L (ref 3.8–5.8)
SODIUM BLD-SCNC: 137 MMOL/L (ref 132–146)
TOTAL PROTEIN: 6.1 G/DL (ref 6.4–8.3)
WBC # BLD: 9 E9/L (ref 4.5–11.5)

## 2020-06-27 PROCEDURE — 83690 ASSAY OF LIPASE: CPT

## 2020-06-27 PROCEDURE — 94660 CPAP INITIATION&MGMT: CPT

## 2020-06-27 PROCEDURE — 1200000000 HC SEMI PRIVATE

## 2020-06-27 PROCEDURE — 6370000000 HC RX 637 (ALT 250 FOR IP): Performed by: INTERNAL MEDICINE

## 2020-06-27 PROCEDURE — 6360000002 HC RX W HCPCS: Performed by: INTERNAL MEDICINE

## 2020-06-27 PROCEDURE — 74183 MRI ABD W/O CNTR FLWD CNTR: CPT

## 2020-06-27 PROCEDURE — 6360000004 HC RX CONTRAST MEDICATION: Performed by: RADIOLOGY

## 2020-06-27 PROCEDURE — 82150 ASSAY OF AMYLASE: CPT

## 2020-06-27 PROCEDURE — 82140 ASSAY OF AMMONIA: CPT

## 2020-06-27 PROCEDURE — 99233 SBSQ HOSP IP/OBS HIGH 50: CPT | Performed by: INTERNAL MEDICINE

## 2020-06-27 PROCEDURE — 6370000000 HC RX 637 (ALT 250 FOR IP): Performed by: NURSE PRACTITIONER

## 2020-06-27 PROCEDURE — 85610 PROTHROMBIN TIME: CPT

## 2020-06-27 PROCEDURE — 85025 COMPLETE CBC W/AUTO DIFF WBC: CPT

## 2020-06-27 PROCEDURE — 2580000003 HC RX 258: Performed by: INTERNAL MEDICINE

## 2020-06-27 PROCEDURE — 36415 COLL VENOUS BLD VENIPUNCTURE: CPT

## 2020-06-27 PROCEDURE — 80076 HEPATIC FUNCTION PANEL: CPT

## 2020-06-27 PROCEDURE — 80048 BASIC METABOLIC PNL TOTAL CA: CPT

## 2020-06-27 PROCEDURE — A9579 GAD-BASE MR CONTRAST NOS,1ML: HCPCS | Performed by: RADIOLOGY

## 2020-06-27 RX ADMIN — SODIUM CHLORIDE: 9 INJECTION, SOLUTION INTRAVENOUS at 11:08

## 2020-06-27 RX ADMIN — Medication 10 ML: at 09:26

## 2020-06-27 RX ADMIN — Medication 1 TABLET: at 09:26

## 2020-06-27 RX ADMIN — CLOPIDOGREL BISULFATE 75 MG: 75 TABLET ORAL at 09:26

## 2020-06-27 RX ADMIN — Medication 10 ML: at 21:00

## 2020-06-27 RX ADMIN — DOCUSATE SODIUM 100 MG: 100 CAPSULE, LIQUID FILLED ORAL at 09:26

## 2020-06-27 RX ADMIN — SODIUM CHLORIDE: 9 INJECTION, SOLUTION INTRAVENOUS at 19:15

## 2020-06-27 RX ADMIN — PIPERACILLIN AND TAZOBACTAM 3.38 G: 3; .375 INJECTION, POWDER, FOR SOLUTION INTRAVENOUS at 06:42

## 2020-06-27 RX ADMIN — CHOLECALCIFEROL TAB 50 MCG (2000 UNIT) 2000 UNITS: 50 TAB at 09:26

## 2020-06-27 RX ADMIN — PIPERACILLIN AND TAZOBACTAM 3.38 G: 3; .375 INJECTION, POWDER, FOR SOLUTION INTRAVENOUS at 15:04

## 2020-06-27 RX ADMIN — LOSARTAN POTASSIUM 25 MG: 25 TABLET ORAL at 09:26

## 2020-06-27 RX ADMIN — ASPIRIN 81 MG: 81 TABLET, COATED ORAL at 09:26

## 2020-06-27 RX ADMIN — ENOXAPARIN SODIUM 40 MG: 40 INJECTION SUBCUTANEOUS at 09:26

## 2020-06-27 RX ADMIN — PIPERACILLIN AND TAZOBACTAM 3.38 G: 3; .375 INJECTION, POWDER, FOR SOLUTION INTRAVENOUS at 21:13

## 2020-06-27 RX ADMIN — GADOTERIDOL 19 ML: 279.3 INJECTION, SOLUTION INTRAVENOUS at 14:54

## 2020-06-27 RX ADMIN — DOCUSATE SODIUM 100 MG: 100 CAPSULE, LIQUID FILLED ORAL at 21:13

## 2020-06-27 RX ADMIN — METOPROLOL SUCCINATE 50 MG: 50 TABLET, EXTENDED RELEASE ORAL at 09:27

## 2020-06-27 ASSESSMENT — PAIN SCALES - GENERAL
PAINLEVEL_OUTOF10: 0
PAINLEVEL_OUTOF10: 0

## 2020-06-27 NOTE — PROGRESS NOTES
Notified Dr. Mikel Yeboah of patients high BP, and crackles in lungs. No SOB, chest pain, SpO2% remains in high 90's on RA. Orders received to stop continuous fluids and obtain STAT chest xray.

## 2020-06-27 NOTE — PROGRESS NOTES
PROGRESS NOTE    Patient Presents with/Seen in Consultation For      *Reason for Consult: elev LFTs, possible choledolithiasis     CHIEF COMPLAINT:  Stomach pain, nausea, and vomiting     Subjective:     Patient seen Patrick Sweet in bed. States he took broth this AM. Denies any N/V, or abdominal pain. Awaiting MRI, yet to be completed despite order being placed yesterday afternoon. Spoke with charge RN to follow through with the MRI order. POC reviewed with the patient, all questions answered. Review of Systems  Aside from what was mentioned in the PMH and HPI, essentially unremarkable, all others negative. Objective:     Patient Vitals for the past 8 hrs:   BP Temp Temp src Pulse Resp SpO2   06/27/20 0819 (!) 154/74 97.9 °F (36.6 °C) Oral 70 16 96 %       General appearance: alert, awake, laying in bed, and cooperative  Eyes: conjunctivae/corneas clear. PERRL.   Lungs: clear to auscultation bilaterally  Heart: regular rate and rhythm, no murmur, 2+ pulses;  without edema  Abdomen: softly distended, non-tender; bowel sounds normal; no masses  Extremities: extremities without edema  Pulses: 2+ and symmetric  Skin: Skin color, texture, turgor normal.   Neurologic: Grossly normal    docusate sodium (COLACE) capsule 100 mg, BID  sodium chloride flush 0.9 % injection 10 mL, PRN  aspirin EC tablet 81 mg, Daily  vitamin D tablet 2,000 Units, Daily  clopidogrel (PLAVIX) tablet 75 mg, Daily  losartan (COZAAR) tablet 25 mg, Daily  metoprolol succinate (TOPROL XL) extended release tablet 50 mg, Daily  therapeutic multivitamin-minerals 1 tablet, Daily  sodium chloride flush 0.9 % injection 10 mL, 2 times per day  sodium chloride flush 0.9 % injection 10 mL, PRN  acetaminophen (TYLENOL) tablet 650 mg, Q6H PRN    Or  acetaminophen (TYLENOL) suppository 650 mg, Q6H PRN  polyethylene glycol (GLYCOLAX) packet 17 g, Daily PRN  promethazine (PHENERGAN) tablet 12.5 mg, Q6H PRN    Or  ondansetron (ZOFRAN) injection 4 mg, Q6H

## 2020-06-27 NOTE — PLAN OF CARE
Problem: Physical Regulation:  Goal: Ability to maintain clinical measurements within normal limits will improve  Description: Ability to maintain clinical measurements within normal limits will improve  Outcome: Met This Shift  Goal: Will show no signs and symptoms of electrolyte imbalance  Description: Will show no signs and symptoms of electrolyte imbalance  Outcome: Met This Shift     Problem: OXYGENATION/RESPIRATORY FUNCTION  Goal: Patient will maintain patent airway  Outcome: Met This Shift  Goal: Patient will achieve/maintain normal respiratory rate/effort  Description: Respiratory rate and effort will be within normal limits for the patient  Outcome: Met This Shift     Problem: ACTIVITY INTOLERANCE/IMPAIRED MOBILITY  Goal: Mobility/activity is maintained at optimum level for patient  Outcome: Met This Shift

## 2020-06-27 NOTE — PROGRESS NOTES
[facility]) Hospitalist Progress Note    Admission Date  6/25/2020  9:11 AM  Chief Complaint Fatigue  Admit Dx   Acute cholangitis [K83.09]    Subjective  History of Present Illness  6/26/2020 Humberto Adams is an 53-year-old male with past medical history pertinent for COPD, chronic systolic CHF, CAD status-post MI and stenting, sleep apnea, hyperlipidemia, hypertension, past CVA who presented to the ER and was admitted yesterday afternoon with nausea, emesis, and fatigue. LFTs found to be acutely elevated though patient is status post cholecystectomy last year. Acute cholangitis was suspected, patient was started on Zosyn, given IV fluids, and had CT which demonstrated some mild diverticulosis but no acute abnormalities otherwise. Patient seen by GI, was down for MRCP today, which is pending at this time. Otherwise, plan to continue antibiotics and monitor labs. 6/27 No acute overnight events. MRCP appears to have inadvertently been not ordered yesterday and patient is for this evaluation today. Otherwise, patient claims he is feeling notably tired but in no pain.   No other new issues and no family was present during my exam.    Review of Systems - 12-point review of systems has been reviewed and is otherwise negative except as listed in the HPI    Objective  Physical Exam  Vitals: BP (!) 154/74   Pulse 70   Temp 97.9 °F (36.6 °C) (Oral)   Resp 16   Ht 6' 1\" (1.854 m)   Wt 217 lb (98.4 kg)   SpO2 96%   BMI 28.63 kg/m²   General: well-developed, well-nourished, no acute distress, cooperative  Skin: warm, dry, intact, normal color without cyanosis  HEENT: normocephalic, atraumatic, mucous membranes normal  Respiratory: clear to auscultation bilaterally without respiratory distress  Cardiovascular: regular rate and rhythm without murmur / rub / gallop  Abdominal: soft, nontender, nondistended, normoactive bowel sounds  Extremities: no mottling, pulses intact, no edema  Neurologic: awake, alert, no focal deficits  Psychiatric: normal affect, cooperative    Labs  Recent Labs     06/25/20  1054 06/26/20  0605 06/27/20  0525    138 137   K 4.3 3.6 3.7   CO2 22 23 22   BUN 22 20 13   CREATININE 1.1 1.0 0.9   GLUCOSE 148* 103* 98   CALCIUM 9.3 8.3* 8.7   WBC 22.6* 13.5* 9.0   RBC 4.85 4.16 4.42   HGB 13.6 11.8* 12.4*   HCT 42.3 36.5* 38.5    154 155     Radiology  Ct Abdomen Pelvis W Iv Contrast Additional Contrast? None  Result Date: 6/25/2020  1. No acute abnormality seen in the abdomen or the pelvis. 2. Moderate diverticulosis without diverticulitis. 3. Enlarged prostate (81.2 g; normal less than 30 g). 4. Mild cardiomegaly. 5. Small hiatal hernia. Xr Chest Portable  Result Date: 6/25/2020  Mild cardiomegaly with left ventricular contour. Assessment / Plan  #1 epigastric pain questionably due to cholangitis with hyperbilirubinemia and elevated transaminases: Currently on Zosyn, ID following. CT evaluation reveals no acute intra-abdominal pathology. MRCP is pending. Antiemetics as needed.   Okay for clears after MRCP    #2 lactic acidosis resolved    #3 COPD: Stable, chest x-ray unremarkable and saturating well on room air    #4 chronic systolic CHF: Stable, monitor volume status, does not appear to be on any chronic diuretics on an outpatient basis    #5 CAD status post MI and stenting: Stable, continue aspirin and Plavix    #6 sleep apnea: Okay to use home CPAP    #7 hyperlipidemia: Continue Lipitor    #8 hypertension: Continue losartan, metoprolol    #9 past CVA: Continue aspirin and Plavix    Code status  full  DVT prophylaxis Lovenox  Disposition  Home once medically stable    Electronically signed by Minoo Hernandez DO on 6/27/2020 at 2:20 PM

## 2020-06-28 VITALS
WEIGHT: 217 LBS | BODY MASS INDEX: 28.76 KG/M2 | DIASTOLIC BLOOD PRESSURE: 67 MMHG | OXYGEN SATURATION: 94 % | HEART RATE: 80 BPM | TEMPERATURE: 98.2 F | SYSTOLIC BLOOD PRESSURE: 140 MMHG | RESPIRATION RATE: 18 BRPM | HEIGHT: 73 IN

## 2020-06-28 LAB
ALBUMIN SERPL-MCNC: 3.2 G/DL (ref 3.5–5.2)
ALP BLD-CCNC: 231 U/L (ref 40–129)
ALT SERPL-CCNC: 168 U/L (ref 0–40)
AMMONIA: 27.1 UMOL/L (ref 16–60)
ANION GAP SERPL CALCULATED.3IONS-SCNC: 8 MMOL/L (ref 7–16)
AST SERPL-CCNC: 72 U/L (ref 0–39)
BASOPHILS ABSOLUTE: 0.06 E9/L (ref 0–0.2)
BASOPHILS RELATIVE PERCENT: 0.8 % (ref 0–2)
BILIRUB SERPL-MCNC: 2.4 MG/DL (ref 0–1.2)
BILIRUBIN DIRECT: 1.5 MG/DL (ref 0–0.3)
BILIRUBIN, INDIRECT: 0.9 MG/DL (ref 0–1)
BUN BLDV-MCNC: 11 MG/DL (ref 8–23)
CALCIUM SERPL-MCNC: 8.8 MG/DL (ref 8.6–10.2)
CHLORIDE BLD-SCNC: 106 MMOL/L (ref 98–107)
CO2: 22 MMOL/L (ref 22–29)
CREAT SERPL-MCNC: 0.8 MG/DL (ref 0.7–1.2)
EOSINOPHILS ABSOLUTE: 0.31 E9/L (ref 0.05–0.5)
EOSINOPHILS RELATIVE PERCENT: 4 % (ref 0–6)
GFR AFRICAN AMERICAN: >60
GFR NON-AFRICAN AMERICAN: >60 ML/MIN/1.73
GLUCOSE BLD-MCNC: 99 MG/DL (ref 74–99)
HCT VFR BLD CALC: 37.3 % (ref 37–54)
HEMOGLOBIN: 12.2 G/DL (ref 12.5–16.5)
IGG 1: 250 MG/DL (ref 240–1118)
IGG 2: 410 MG/DL (ref 124–549)
IGG 3: 54 MG/DL (ref 21–134)
IGG 4: 28 MG/DL (ref 1–123)
IMMATURE GRANULOCYTES #: 0.03 E9/L
IMMATURE GRANULOCYTES %: 0.4 % (ref 0–5)
INR BLD: 1.1
LYMPHOCYTES ABSOLUTE: 2.2 E9/L (ref 1.5–4)
LYMPHOCYTES RELATIVE PERCENT: 28.4 % (ref 20–42)
MCH RBC QN AUTO: 27.9 PG (ref 26–35)
MCHC RBC AUTO-ENTMCNC: 32.7 % (ref 32–34.5)
MCV RBC AUTO: 85.4 FL (ref 80–99.9)
MONOCYTES ABSOLUTE: 0.82 E9/L (ref 0.1–0.95)
MONOCYTES RELATIVE PERCENT: 10.6 % (ref 2–12)
NEUTROPHILS ABSOLUTE: 4.33 E9/L (ref 1.8–7.3)
NEUTROPHILS RELATIVE PERCENT: 55.8 % (ref 43–80)
PDW BLD-RTO: 14.9 FL (ref 11.5–15)
PLATELET # BLD: 180 E9/L (ref 130–450)
PMV BLD AUTO: 10.7 FL (ref 7–12)
POTASSIUM REFLEX MAGNESIUM: 3.6 MMOL/L (ref 3.5–5)
PROTHROMBIN TIME: 12.9 SEC (ref 9.3–12.4)
RBC # BLD: 4.37 E12/L (ref 3.8–5.8)
SODIUM BLD-SCNC: 136 MMOL/L (ref 132–146)
TOTAL PROTEIN: 6 G/DL (ref 6.4–8.3)
TROPONIN: <0.01 NG/ML (ref 0–0.03)
WBC # BLD: 7.8 E9/L (ref 4.5–11.5)

## 2020-06-28 PROCEDURE — 80048 BASIC METABOLIC PNL TOTAL CA: CPT

## 2020-06-28 PROCEDURE — 6370000000 HC RX 637 (ALT 250 FOR IP): Performed by: NURSE PRACTITIONER

## 2020-06-28 PROCEDURE — 85025 COMPLETE CBC W/AUTO DIFF WBC: CPT

## 2020-06-28 PROCEDURE — 80076 HEPATIC FUNCTION PANEL: CPT

## 2020-06-28 PROCEDURE — 36415 COLL VENOUS BLD VENIPUNCTURE: CPT

## 2020-06-28 PROCEDURE — 6360000002 HC RX W HCPCS: Performed by: INTERNAL MEDICINE

## 2020-06-28 PROCEDURE — 94660 CPAP INITIATION&MGMT: CPT

## 2020-06-28 PROCEDURE — 2580000003 HC RX 258: Performed by: INTERNAL MEDICINE

## 2020-06-28 PROCEDURE — 93005 ELECTROCARDIOGRAM TRACING: CPT | Performed by: INTERNAL MEDICINE

## 2020-06-28 PROCEDURE — 82140 ASSAY OF AMMONIA: CPT

## 2020-06-28 PROCEDURE — 84484 ASSAY OF TROPONIN QUANT: CPT

## 2020-06-28 PROCEDURE — 6370000000 HC RX 637 (ALT 250 FOR IP): Performed by: INTERNAL MEDICINE

## 2020-06-28 PROCEDURE — 85610 PROTHROMBIN TIME: CPT

## 2020-06-28 PROCEDURE — 99239 HOSP IP/OBS DSCHRG MGMT >30: CPT | Performed by: INTERNAL MEDICINE

## 2020-06-28 RX ORDER — NITROGLYCERIN 0.4 MG/1
0.4 TABLET SUBLINGUAL EVERY 5 MIN PRN
Status: DISCONTINUED | OUTPATIENT
Start: 2020-06-28 | End: 2020-06-28 | Stop reason: HOSPADM

## 2020-06-28 RX ORDER — AMOXICILLIN AND CLAVULANATE POTASSIUM 875; 125 MG/1; MG/1
1 TABLET, FILM COATED ORAL 2 TIMES DAILY
Qty: 14 TABLET | Refills: 0 | Status: SHIPPED | OUTPATIENT
Start: 2020-06-28 | End: 2020-07-05

## 2020-06-28 RX ADMIN — SODIUM CHLORIDE: 9 INJECTION, SOLUTION INTRAVENOUS at 01:15

## 2020-06-28 RX ADMIN — Medication 1 TABLET: at 09:20

## 2020-06-28 RX ADMIN — DOCUSATE SODIUM 100 MG: 100 CAPSULE, LIQUID FILLED ORAL at 09:21

## 2020-06-28 RX ADMIN — ASPIRIN 81 MG: 81 TABLET, COATED ORAL at 09:21

## 2020-06-28 RX ADMIN — METOPROLOL SUCCINATE 50 MG: 50 TABLET, EXTENDED RELEASE ORAL at 09:21

## 2020-06-28 RX ADMIN — ONDANSETRON 4 MG: 2 INJECTION INTRAMUSCULAR; INTRAVENOUS at 09:54

## 2020-06-28 RX ADMIN — SODIUM CHLORIDE: 9 INJECTION, SOLUTION INTRAVENOUS at 10:00

## 2020-06-28 RX ADMIN — CHOLECALCIFEROL TAB 50 MCG (2000 UNIT) 2000 UNITS: 50 TAB at 09:20

## 2020-06-28 RX ADMIN — LOSARTAN POTASSIUM 25 MG: 25 TABLET ORAL at 09:21

## 2020-06-28 RX ADMIN — CLOPIDOGREL BISULFATE 75 MG: 75 TABLET ORAL at 09:21

## 2020-06-28 RX ADMIN — Medication 10 ML: at 09:21

## 2020-06-28 RX ADMIN — PIPERACILLIN AND TAZOBACTAM 3.38 G: 3; .375 INJECTION, POWDER, FOR SOLUTION INTRAVENOUS at 05:59

## 2020-06-28 ASSESSMENT — PAIN SCALES - GENERAL: PAINLEVEL_OUTOF10: 0

## 2020-06-28 NOTE — DISCHARGE SUMMARY
[facility] Physician Discharge Summary     Chica Eid MD  [address] [address]  [phone]    Schedule an appointment as soon as possible for a visit  follow up    Bernardo Alvarez MD  [address]  [phone]    Schedule an appointment as soon as possible for a visit  follow up    Activity level   As tolerated    Disposition   Home      Condition on discharge Stable    Patient ID   Akira Barba, [de-identified] y.o.male /  1940  / MRN [id number]    Admit date   2020    Discharge date  2020  4:24 PM    Admission diagnoses Active Problems:    CAD (coronary artery disease)    Sleep apnea    Acute cholangitis    Elevated LFTs    Leukocytosis    Lactic acidosis  Resolved Problems:    * No resolved hospital problems. *    Discharge diagnoses Same    Consults   IP CONSULT TO GI    Procedures   See hospital course    Vinay Garcia is an 56-year-old male with past medical history pertinent for COPD, chronic systolic CHF, CAD status-post MI and stenting, sleep apnea, hyperlipidemia, hypertension, past CVA who presented to the ER and was admitted yesterday afternoon with nausea, emesis, and fatigue. LFTs found to be acutely elevated though patient is status post cholecystectomy last year. Acute cholangitis was suspected, patient was started on Zosyn, given IV fluids, and had CT which demonstrated some mild diverticulosis but no acute abnormalities otherwise. #1 epigastric pain questionably due to cholangitis with hyperbilirubinemia and elevated transaminases: Currently on Zosyn, switch to PO Augmentin. CT evaluation reveals no acute intra-abdominal pathology. MRCP unremarkable.   Antiemetics as needed.      #2 lactic acidosis resolved     #3 COPD: Stable, chest x-ray unremarkable and saturating well on room air     #4 chronic systolic CHF: Stable, monitor volume status, does not appear to be on any chronic diuretics on an outpatient basis     #5 CAD status post MI and stenting: Stable, continue aspirin and Plavix     #6 sleep apnea: Okay to use home CPAP     #7 hyperlipidemia: Continue Lipitor     #8 hypertension: Continue losartan, metoprolol     #9 past CVA: Continue aspirin and Plavix    Discharge Exam  BP (!) 140/67   Pulse 80   Temp 98.2 °F (36.8 °C) (Oral)   Resp 18   Ht 6' 1\" (1.854 m)   Wt 217 lb (98.4 kg)   SpO2 94%   BMI 28.63 kg/m²   General Appearance: alert and oriented to person, place and time and in no acute distress  Skin: warm and dry  Head: normocephalic and atraumatic  Eyes: pupils equal, round, and reactive to light, extraocular eye movements intact, conjunctivae normal  Neck: neck supple and non tender without mass   Pulmonary/Chest: clear to auscultation bilaterally- no wheezes, rales or rhonchi, normal air movement, no respiratory distress  Cardiovascular: normal rate, normal S1 and S2 and no carotid bruits  Abdomen: soft, non-tender, non-distended, normal bowel sounds, no masses or organomegaly  Extremities: no cyanosis, no clubbing and no edema  Neurologic: no cranial nerve deficit and speech normal    No intake/output data recorded. No intake/output data recorded. Labs  Recent Labs     06/26/20  0605 06/27/20  0525 06/28/20  0320    137 136   K 3.6 3.7 3.6   * 106 106   CO2 23 22 22   BUN 20 13 11   CREATININE 1.0 0.9 0.8   GLUCOSE 103* 98 99   CALCIUM 8.3* 8.7 8.8   WBC 13.5* 9.0 7.8   RBC 4.16 4.42 4.37   HGB 11.8* 12.4* 12.2*   HCT 36.5* 38.5 37.3   MCV 87.7 87.1 85.4   MCH 28.4 28.1 27.9   MCHC 32.3 32.2 32.7   RDW 15.3* 14.9 14.9    155 180   MPV 11.4 11.1 10.7       Imaging  Ct Abdomen Pelvis W Iv Contrast Additional Contrast? None  Result Date: 6/25/2020  1. No acute abnormality seen in the abdomen or the pelvis. 2. Moderate diverticulosis without diverticulitis. 3. Enlarged prostate (81.2 g; normal less than 30 g). 4. Mild cardiomegaly. 5. Small hiatal hernia.

## 2020-06-28 NOTE — PLAN OF CARE
Problem: Fluid Volume:  Goal: Ability to achieve a balanced intake and output will improve  Description: Ability to achieve a balanced intake and output will improve  Outcome: Met This Shift     Problem: Physical Regulation:  Goal: Ability to maintain clinical measurements within normal limits will improve  Description: Ability to maintain clinical measurements within normal limits will improve  Outcome: Met This Shift  Goal: Will show no signs and symptoms of electrolyte imbalance  Description: Will show no signs and symptoms of electrolyte imbalance  Outcome: Met This Shift     Problem: OXYGENATION/RESPIRATORY FUNCTION  Goal: Patient will maintain patent airway  6/27/2020 2350 by Harpal Owens RN  Outcome: Met This Shift  6/27/2020 1634 by Antoinette Diaz RN  Outcome: Met This Shift  Goal: Patient will achieve/maintain normal respiratory rate/effort  Description: Respiratory rate and effort will be within normal limits for the patient  Outcome: Met This Shift     Problem: HEMODYNAMIC STATUS  Goal: Patient has stable vital signs and fluid balance  Outcome: Met This Shift     Problem: FLUID AND ELECTROLYTE IMBALANCE  Goal: Fluid and electrolyte balance are achieved/maintained  Outcome: Met This Shift     Problem: ACTIVITY INTOLERANCE/IMPAIRED MOBILITY  Goal: Mobility/activity is maintained at optimum level for patient  Outcome: Met This Shift

## 2020-06-29 LAB
AFP-TUMOR MARKER: 1 NG/ML (ref 0–9)
ALPHA-1 ANTITRYPSIN: 156 MG/DL (ref 90–200)
ANTI-MITOCHONDRIAL AB, IFA: NEGATIVE
ANTI-NUCLEAR ANTIBODY (ANA): NEGATIVE
ANTISMOOTH MUSCLE AB, QUANT: NORMAL
EKG ATRIAL RATE: 63 BPM
EKG P AXIS: 57 DEGREES
EKG P-R INTERVAL: 150 MS
EKG Q-T INTERVAL: 434 MS
EKG QRS DURATION: 102 MS
EKG QTC CALCULATION (BAZETT): 444 MS
EKG R AXIS: 24 DEGREES
EKG T AXIS: 24 DEGREES
EKG VENTRICULAR RATE: 63 BPM
HAV IGM SER IA-ACNC: NORMAL
HEPATITIS B CORE IGM ANTIBODY: NORMAL
HEPATITIS B SURFACE ANTIGEN INTERPRETATION: NORMAL
HEPATITIS C ANTIBODY INTERPRETATION: NORMAL
SMOOTH MUSCLE ANTIBODY: POSITIVE

## 2020-06-29 PROCEDURE — 93010 ELECTROCARDIOGRAM REPORT: CPT | Performed by: INTERNAL MEDICINE

## 2020-06-30 LAB
ANCA IFA: NORMAL
BLOOD CULTURE, ROUTINE: NORMAL
CULTURE, BLOOD 2: NORMAL
IGG 1: 266 MG/DL (ref 240–1118)
IGG 2: 434 MG/DL (ref 124–549)
IGG 3: 59 MG/DL (ref 21–134)
IGG 4: 27 MG/DL (ref 1–123)
IGG: 833 MG/DL (ref 700–1600)
IGM: 25 MG/DL (ref 40–230)

## 2020-07-01 LAB
HAV IGM SER IA-ACNC: NORMAL
HEPATITIS B CORE IGM ANTIBODY: NORMAL
HEPATITIS B SURFACE ANTIGEN INTERPRETATION: NORMAL
HEPATITIS C ANTIBODY INTERPRETATION: NORMAL

## 2020-07-02 ENCOUNTER — TELEPHONE (OUTPATIENT)
Dept: FAMILY MEDICINE CLINIC | Age: 80
End: 2020-07-02

## 2020-07-02 NOTE — TELEPHONE ENCOUNTER
Pt was recently discharged from the hospital. I called and scheduled a hospital f/u for [facility] 7/6/20.

## 2020-07-06 ENCOUNTER — OFFICE VISIT (OUTPATIENT)
Dept: FAMILY MEDICINE CLINIC | Age: 80
End: 2020-07-06
Payer: MEDICARE

## 2020-07-06 ENCOUNTER — HOSPITAL ENCOUNTER (OUTPATIENT)
Age: 80
Discharge: HOME | End: 2020-07-08
Payer: MEDICARE

## 2020-07-06 VITALS
BODY MASS INDEX: 28.23 KG/M2 | WEIGHT: 213 LBS | HEART RATE: 74 BPM | DIASTOLIC BLOOD PRESSURE: 64 MMHG | HEIGHT: 73 IN | SYSTOLIC BLOOD PRESSURE: 110 MMHG | TEMPERATURE: 97.9 F | OXYGEN SATURATION: 97 %

## 2020-07-06 LAB
ALBUMIN SERPL-MCNC: 4 G/DL (ref 3.5–5.2)
ALP BLD-CCNC: 158 U/L (ref 40–129)
ALT SERPL-CCNC: 35 U/L (ref 0–40)
ANION GAP SERPL CALCULATED.3IONS-SCNC: 15 MMOL/L (ref 7–16)
AST SERPL-CCNC: 23 U/L (ref 0–39)
BASOPHILS ABSOLUTE: 0.1 E9/L (ref 0–0.2)
BASOPHILS RELATIVE PERCENT: 0.9 % (ref 0–2)
BILIRUB SERPL-MCNC: 0.6 MG/DL (ref 0–1.2)
BILIRUBIN DIRECT: 0.2 MG/DL (ref 0–0.3)
BILIRUBIN, INDIRECT: 0.4 MG/DL (ref 0–1)
BUN BLDV-MCNC: 16 MG/DL (ref 8–23)
CALCIUM SERPL-MCNC: 9.1 MG/DL (ref 8.6–10.2)
CHLORIDE BLD-SCNC: 104 MMOL/L (ref 98–107)
CO2: 23 MMOL/L (ref 22–29)
CREAT SERPL-MCNC: 0.9 MG/DL (ref 0.7–1.2)
EOSINOPHILS ABSOLUTE: 0.59 E9/L (ref 0.05–0.5)
EOSINOPHILS RELATIVE PERCENT: 5.2 % (ref 0–6)
GFR AFRICAN AMERICAN: >60
GFR NON-AFRICAN AMERICAN: >60 ML/MIN/1.73
GLUCOSE BLD-MCNC: 103 MG/DL (ref 74–99)
HCT VFR BLD CALC: 42.9 % (ref 37–54)
HEMOGLOBIN: 13.3 G/DL (ref 12.5–16.5)
IMMATURE GRANULOCYTES #: 0.04 E9/L
IMMATURE GRANULOCYTES %: 0.4 % (ref 0–5)
LYMPHOCYTES ABSOLUTE: 3.78 E9/L (ref 1.5–4)
LYMPHOCYTES RELATIVE PERCENT: 33.5 % (ref 20–42)
MCH RBC QN AUTO: 27.9 PG (ref 26–35)
MCHC RBC AUTO-ENTMCNC: 31 % (ref 32–34.5)
MCV RBC AUTO: 90.1 FL (ref 80–99.9)
MONOCYTES ABSOLUTE: 0.9 E9/L (ref 0.1–0.95)
MONOCYTES RELATIVE PERCENT: 8 % (ref 2–12)
NEUTROPHILS ABSOLUTE: 5.89 E9/L (ref 1.8–7.3)
NEUTROPHILS RELATIVE PERCENT: 52 % (ref 43–80)
PDW BLD-RTO: 15.8 FL (ref 11.5–15)
PLATELET # BLD: 321 E9/L (ref 130–450)
PMV BLD AUTO: 11.4 FL (ref 7–12)
POTASSIUM SERPL-SCNC: 4.2 MMOL/L (ref 3.5–5)
RBC # BLD: 4.76 E12/L (ref 3.8–5.8)
SODIUM BLD-SCNC: 142 MMOL/L (ref 132–146)
TOTAL PROTEIN: 6.7 G/DL (ref 6.4–8.3)
WBC # BLD: 11.3 E9/L (ref 4.5–11.5)

## 2020-07-06 PROCEDURE — 80076 HEPATIC FUNCTION PANEL: CPT

## 2020-07-06 PROCEDURE — 99214 OFFICE O/P EST MOD 30 MIN: CPT | Performed by: INTERNAL MEDICINE

## 2020-07-06 PROCEDURE — 80048 BASIC METABOLIC PNL TOTAL CA: CPT

## 2020-07-06 PROCEDURE — 1111F DSCHRG MED/CURRENT MED MERGE: CPT | Performed by: INTERNAL MEDICINE

## 2020-07-06 PROCEDURE — 85025 COMPLETE CBC W/AUTO DIFF WBC: CPT

## 2020-07-06 PROCEDURE — 36415 COLL VENOUS BLD VENIPUNCTURE: CPT

## 2020-07-06 RX ORDER — KETOCONAZOLE 20 MG/ML
SHAMPOO TOPICAL
Qty: 100 ML | Refills: 1 | Status: SHIPPED
Start: 2020-07-06 | End: 2022-02-01 | Stop reason: ALTCHOICE

## 2020-07-06 RX ORDER — TAMSULOSIN HYDROCHLORIDE 0.4 MG/1
0.4 CAPSULE ORAL DAILY
Qty: 90 CAPSULE | Refills: 1 | Status: SHIPPED
Start: 2020-07-06 | End: 2020-08-24 | Stop reason: SDUPTHER

## 2020-07-06 ASSESSMENT — ENCOUNTER SYMPTOMS
CONSTIPATION: 0
EYE PAIN: 0
ABDOMINAL PAIN: 0
CHEST TIGHTNESS: 0
SHORTNESS OF BREATH: 1
DIARRHEA: 0
RHINORRHEA: 0
SORE THROAT: 0
NAUSEA: 0
VOMITING: 0
COUGH: 0
BLOOD IN STOOL: 0

## 2020-07-06 NOTE — PROGRESS NOTES
Post-Discharge Transitional Care Management Services or Hospital Follow Up      Jose Kenney   YOB: 1940    Date of Office Visit:  7/6/2020  Date of Hospital Admission: 6/25/20  Date of Hospital Discharge: 6/28/20  Readmission Risk Score(high >=14%.  Medium >=10%):Readmission Risk Score: 13      Care management risk score Rising risk (score 2-5) and Complex Care (Scores >=6): 4     Non face to face  following discharge, date last encounter closed (first attempt may have been earlier): *No documented post hospital discharge outreach found in the last 14 days *No documented post hospital discharge outreach found in the last 14 days    Call initiated 2 business days of discharge: *No response recorded in the last 14 days     Patient Active Problem List   Diagnosis    Hyperlipidemia    CAD (coronary artery disease)    Hypertension    Sleep apnea    Osteoarthritis    Smokeless tobacco use    Ischemic cardiomyopathy    TIA (transient ischemic attack)    CHF (congestive heart failure), NYHA class I, chronic, systolic (HCC)    COPD (chronic obstructive pulmonary disease) ([address]    Pigmented skin lesion    Prostate enlargement    Fatigue    Disorder of bone    Other constipation    Vitamin D deficiency    Bilateral impacted cerumen    Impaired fasting glucose    Acute cholangitis    Elevated LFTs    Leukocytosis    Lactic acidosis       Allergies   Allergen Reactions    Lisinopril Itching       Medications listed as ordered at the time of discharge from [facility] Medication Instructions TRUMAN:    Printed on:07/06/20 0972   Medication Information                      aspirin 81 MG EC tablet  Take 81 mg by mouth daily Last taken 05/11/2019             atorvastatin (LIPITOR) 80 MG tablet  Take 1 tablet by mouth daily             Cholecalciferol (VITAMIN D) 2000 UNIT CAPS capsule  Take 1 capsule by mouth daily Last taken 05/13             clopidogrel (PLAVIX) 75 MG tablet  Take 1 tablet by mouth daily             CPAP Machine MISC  daily at bedtime. ketoconazole (NIZORAL) 2 % shampoo  Apply topically daily as needed. losartan (COZAAR) 25 MG tablet  Take 1 tablet by mouth daily             metoprolol succinate (TOPROL XL) 50 MG extended release tablet  Take 1 tablet by mouth daily             Multiple Vitamins-Minerals (THERAPEUTIC MULTIVITAMIN-MINERALS) tablet  Take 1 tablet by mouth daily             nitroGLYCERIN (NITROSTAT) 0.4 MG SL tablet  Place 1 tablet under the tongue every 5 minutes as needed for Chest pain             tamsulosin (FLOMAX) 0.4 MG capsule  Take 1 capsule by mouth daily                   Medications marked \"taking\" at this time  Outpatient Medications Marked as Taking for the 7/6/20 encounter (Office Visit) with Jordan Law MD   Medication Sig Dispense Refill    tamsulosin (FLOMAX) 0.4 MG capsule Take 1 capsule by mouth daily 90 capsule 1    ketoconazole (NIZORAL) 2 % shampoo Apply topically daily as needed. 100 mL 1    losartan (COZAAR) 25 MG tablet Take 1 tablet by mouth daily 90 tablet 0    metoprolol succinate (TOPROL XL) 50 MG extended release tablet Take 1 tablet by mouth daily 90 tablet 0    clopidogrel (PLAVIX) 75 MG tablet Take 1 tablet by mouth daily 90 tablet 0    atorvastatin (LIPITOR) 80 MG tablet Take 1 tablet by mouth daily 90 tablet 2    nitroGLYCERIN (NITROSTAT) 0.4 MG SL tablet Place 1 tablet under the tongue every 5 minutes as needed for Chest pain 25 tablet 1    Multiple Vitamins-Minerals (THERAPEUTIC MULTIVITAMIN-MINERALS) tablet Take 1 tablet by mouth daily      CPAP Machine MISC daily at bedtime.       aspirin 81 MG EC tablet Take 81 mg by mouth daily Last taken 05/11/2019      Cholecalciferol (VITAMIN D) 2000 UNIT CAPS capsule Take 1 capsule by mouth daily Last taken 05/13          Medications patient taking as of now reconciled against medications ordered at time of hospital discharge: Yes    Chief Complaint   Patient presents with    Follow-Up from Hospital     N&V and fatigue        HPI    Inpatient course: Discharge summary reviewed- see chart. Interval history/Current status:     -  was recently discharged from hospital.  was having abdominal pain and nausea with vomiting.  went to ER.  in ER was found to have very elevated liver function tests. Had leukocytosis. Had CT abdo  Showed diverticulosis, no diverticulitis. initial thought was cholangitis. Has h/o cholecystectomy.  was started in IV antibiotics. IV zosyn. 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 MRCP was also unremarkable.  was discharged on Augmentin.     -  since discharge, no abdo pain. No further nausea or vomiting. States appetite is back to normal. States some constipation. No fever or chills. No chest pain. States chronic SOB. To complete ATB today. To see GI (8/18/2020)     -  has been having frequent urination.  was seen by urology in the past.  was placed on flomax in the past.     Review of Systems   Constitutional: Negative for appetite change, chills, fever and unexpected weight change. HENT: Negative for congestion, rhinorrhea and sore throat. Eyes: Negative for pain and visual disturbance. Respiratory: Positive for shortness of breath. Negative for cough and chest tightness. Cardiovascular: Negative for chest pain and palpitations. Gastrointestinal: Negative for abdominal pain, blood in stool, constipation, diarrhea, nausea and vomiting. Genitourinary: Positive for frequency. Negative for difficulty urinating, dysuria, hematuria, scrotal swelling, testicular pain and urgency. Musculoskeletal: Negative for arthralgias and gait problem. Skin: Negative for rash. Neurological: Negative for dizziness, syncope, weakness, light-headedness and headaches. Hematological: Negative for adenopathy. Does not bruise/bleed easily. Psychiatric/Behavioral: Negative for suicidal ideas. The patient is not nervous/anxious. Vitals:    07/06/20 1314   BP: 110/64   Site: Left Upper Arm   Position: Sitting   Cuff Size: Large Adult   Pulse: 74   Temp: 97.9 °F (36.6 °C)   SpO2: 97%   Weight: 213 lb (96.6 kg)   Height: 6' 1\" (1.854 m)     Body mass index is 28.1 kg/m². Wt Readings from Last 3 Encounters:   07/06/20 213 lb (96.6 kg)   06/25/20 217 lb (98.4 kg)   03/18/20 223 lb (101.2 kg)     BP Readings from Last 3 Encounters:   07/06/20 110/64   06/28/20 (!) 140/67   03/18/20 116/62       Physical Exam  Vitals signs reviewed. Constitutional:       Appearance: He is well-developed. HENT:      Head: Normocephalic and atraumatic. Right Ear: External ear normal.      Left Ear: External ear normal.   Eyes:      Pupils: Pupils are equal, round, and reactive to light. Neck:      Musculoskeletal: Normal range of motion and neck supple. Thyroid: No thyromegaly. Cardiovascular:      Rate and Rhythm: Normal rate and regular rhythm. Heart sounds: Normal heart sounds. No murmur. Pulmonary:      Effort: Pulmonary effort is normal.      Breath sounds: Normal breath sounds. No wheezing or rales. Abdominal:      General: Bowel sounds are normal.      Palpations: Abdomen is soft. Tenderness: There is no abdominal tenderness. There is no guarding or rebound. Musculoskeletal: Normal range of motion. Lymphadenopathy:      Cervical: No cervical adenopathy. Skin:     General: Skin is warm and dry. Neurological:      Mental Status: He is alert and oriented to person, place, and time. Cranial Nerves: No cranial nerve deficit. Psychiatric:         Judgment: Judgment normal.         Assessment/Plan:  1.  Cholangitis  - uncertain as to cause  - CT abdo and MRCP - no acute changes   - placed on ATB - to completed today   - recheck labs   - to follow up with GI     2. Other constipation  - discussed colace 100mg daily and miralax as needed  - taking mutamucil     3. Prostate enlargement  - continue present treatment   - has seen urology   - Has flomax - takes as needed, refill today, advised to take regularly, side effects including orthostatic precautions disucssed   - stable     4. Elevated LFTs  - uncertain as to cause, possible due to above   - CT abdo and MRCP - no acute changes   - placed on ATB - to completed today   - recheck labs   - to follow up with GI       Rash and nonspecific skin eruption  - poss seborrheic dermatitis   - trial of Nizoral     Medical Decision Making: moderate complexity     Return for check up and review, as scheduled. Orders Placed This Encounter   Procedures    HEPATIC FUNCTION PANEL     Standing Status:   Future     Number of Occurrences:   1     Standing Expiration Date:   7/6/2021    Basic Metabolic Panel     Standing Status:   Future     Number of Occurrences:   1     Standing Expiration Date:   7/6/2021    CBC Auto Differential     Standing Status:   Future     Number of Occurrences:   1     Standing Expiration Date:   7/6/2021    NE DISCHARGE MEDS RECONCILED W/ CURRENT OUTPATIENT MED LIST     Requested Prescriptions     Signed Prescriptions Disp Refills    tamsulosin (FLOMAX) 0.4 MG capsule 90 capsule 1     Sig: Take 1 capsule by mouth daily    ketoconazole (NIZORAL) 2 % shampoo 100 mL 1     Sig: Apply topically daily as needed.      Electronically signed by Beba Alvarado MD on 7/6/2020 at 4:44 PM

## 2020-07-07 ENCOUNTER — TELEPHONE (OUTPATIENT)
Dept: FAMILY MEDICINE CLINIC | Age: 80
End: 2020-07-07

## 2020-07-07 NOTE — TELEPHONE ENCOUNTER
Please let the patient know that blood work results showed    Alkaline phosphatase level, which is a liver enzyme as well as bone enzyme, was slightly elevated but much improved when compared to hospital labs    Other liver function tests including bilirubin were back in the normal range    Electrolytes and kidney functions were normal    Sugar level was slightly elevated    Blood counts were normal    Overall blood work was much improved when compared to hospital blood work    Please send a copy of the reports to the patient    Thanks

## 2020-07-07 NOTE — LETTER
[address] [place] [address] Drive  Tiana Peralta  Phone: [phone]  Fax: [phone]    Yoseph Portillo MD        July 7, 2020     [address] [facility] [address]      Dear Natahomar Maki:    Below are the results from your recent visit:    Resulted Orders   HEPATIC FUNCTION PANEL   Result Value Ref Range    Total Protein 6.7 6.4 - 8.3 g/dL    Alb 4.0 3.5 - 5.2 g/dL    Alkaline Phosphatase 158 (H) 40 - 129 U/L    ALT 35 0 - 40 U/L    AST 23 0 - 39 U/L    Total Bilirubin 0.6 0.0 - 1.2 mg/dL    Bilirubin, Direct 0.2 0.0 - 0.3 mg/dL    Bilirubin, Indirect 0.4 0.0 - 1.0 mg/dL   Basic Metabolic Panel   Result Value Ref Range    Sodium 142 132 - 146 mmol/L    Potassium 4.2 3.5 - 5.0 mmol/L    Chloride 104 98 - 107 mmol/L    CO2 23 22 - 29 mmol/L    Anion Gap 15 7 - 16 mmol/L    Glucose 103 (H) 74 - 99 mg/dL    BUN 16 8 - 23 mg/dL    CREATININE 0.9 0.7 - 1.2 mg/dL    GFR Non-African American >60 >=60 mL/min/1.73      Comment:      Chronic Kidney Disease: less than 60 ml/min/1.73 sq.m. Kidney Failure: less than 15 ml/min/1.73 sq.m. Results valid for patients 18 years and older.       GFR African American >60     Calcium 9.1 8.6 - 10.2 mg/dL   CBC Auto Differential   Result Value Ref Range    WBC 11.3 4.5 - 11.5 E9/L    RBC 4.76 3.80 - 5.80 E12/L    Hemoglobin 13.3 12.5 - 16.5 g/dL    Hematocrit 42.9 37.0 - 54.0 %    MCV 90.1 80.0 - 99.9 fL    MCH 27.9 26.0 - 35.0 pg    MCHC 31.0 (L) 32.0 - 34.5 %    RDW 15.8 (H) 11.5 - 15.0 fL    Platelets 744 743 - 324 E9/L    MPV 11.4 7.0 - 12.0 fL    Neutrophils % 52.0 43.0 - 80.0 %    Immature Granulocytes % 0.4 0.0 - 5.0 %    Lymphocytes % 33.5 20.0 - 42.0 %    Monocytes % 8.0 2.0 - 12.0 %    Eosinophils % 5.2 0.0 - 6.0 %    Basophils % 0.9 0.0 - 2.0 %    Neutrophils Absolute 5.89 1.80 - 7.30 E9/L    Immature Granulocytes # 0.04 E9/L    Lymphocytes Absolute 3.78 1.50 - 4.00 E9/L Monocytes Absolute 0.90 0.10 - 0.95 E9/L    Eosinophils Absolute 0.59 (H) 0.05 - 0.50 E9/L    Basophils Absolute 0.10 0.00 - 0.20 E9/L     The test results show:    Alkaline phosphatase level, which is a liver enzyme as well as bone enzyme, was slightly elevated but much improved when compared to hospital labs     Other liver function tests including bilirubin were back in the normal range     Electrolytes and kidney functions were normal     Sugar level was slightly elevated     Blood counts were normal     Overall blood work was much improved when compared to hospital blood work    If you have any questions or concerns, please don't hesitate to call.     Sincerely,        Ramon German MD

## 2020-08-24 ENCOUNTER — OFFICE VISIT (OUTPATIENT)
Dept: FAMILY MEDICINE CLINIC | Age: 80
End: 2020-08-24
Payer: MEDICARE

## 2020-08-24 VITALS
SYSTOLIC BLOOD PRESSURE: 118 MMHG | DIASTOLIC BLOOD PRESSURE: 70 MMHG | WEIGHT: 218 LBS | HEART RATE: 87 BPM | HEIGHT: 73 IN | OXYGEN SATURATION: 97 % | BODY MASS INDEX: 28.89 KG/M2 | TEMPERATURE: 97.3 F

## 2020-08-24 PROCEDURE — 99214 OFFICE O/P EST MOD 30 MIN: CPT | Performed by: INTERNAL MEDICINE

## 2020-08-24 RX ORDER — ATORVASTATIN CALCIUM 80 MG/1
80 TABLET, FILM COATED ORAL DAILY
Qty: 90 TABLET | Refills: 1 | Status: SHIPPED
Start: 2020-08-24 | End: 2021-01-27 | Stop reason: SDUPTHER

## 2020-08-24 RX ORDER — CLOPIDOGREL BISULFATE 75 MG/1
75 TABLET ORAL DAILY
Qty: 90 TABLET | Refills: 1 | Status: SHIPPED
Start: 2020-08-24 | End: 2021-01-27 | Stop reason: SDUPTHER

## 2020-08-24 RX ORDER — TAMSULOSIN HYDROCHLORIDE 0.4 MG/1
0.4 CAPSULE ORAL DAILY
Qty: 90 CAPSULE | Refills: 1 | Status: SHIPPED
Start: 2020-08-24 | End: 2022-05-24

## 2020-08-24 RX ORDER — LOSARTAN POTASSIUM 25 MG/1
25 TABLET ORAL DAILY
Qty: 90 TABLET | Refills: 1 | Status: SHIPPED
Start: 2020-08-24 | End: 2021-07-27 | Stop reason: SDUPTHER

## 2020-08-24 RX ORDER — METOPROLOL SUCCINATE 50 MG/1
50 TABLET, EXTENDED RELEASE ORAL DAILY
Qty: 90 TABLET | Refills: 1 | Status: SHIPPED
Start: 2020-08-24 | End: 2021-01-27 | Stop reason: SDUPTHER

## 2020-08-24 ASSESSMENT — ENCOUNTER SYMPTOMS
SHORTNESS OF BREATH: 1
CHEST TIGHTNESS: 0
RHINORRHEA: 0
VOMITING: 0
SORE THROAT: 0
COUGH: 0
BLOOD IN STOOL: 0
EYE PAIN: 0
NAUSEA: 0
DIARRHEA: 0
ABDOMINAL PAIN: 0
CONSTIPATION: 0

## 2020-08-24 NOTE — PROGRESS NOTES
[facility]) Physicians   Internal Medicine     2020  Mo Govea : 1940 Sex: male  Age:79 y.o. Chief Complaint   Patient presents with    6 Month Follow-Up    Hypertension        HPI:   Patient presents to office for review and management of chronic medical conditions. Was in hospital (2020) for elevated liver function tests. Had leukocytosis. Had CT abdo  Showed diverticulosis, no diverticulitis. initial thought was cholangitis. Has h/o cholecystectomy. States was started in IV antibiotics. IV zosyn. 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 MRCP was also unremarkable. States was discharged on Augmentin. States having constipation. Taking fiber supplements. States found swelling to right inguinal area. CT scan done showed fluid collectin - states has improved in size and essentially resolved. States no pain. States feels as if may have hernia. States has CAD. Follows with cardiology locally and at [facility]. States s/p stents. States ischemic cardiomyopathy. On metoprolol, losartan, atorvastatin, aspirin and plavix     States has COPD. States breathing is fair. Has seen pulmonary once. States placed on breo - stopped. States has high blood pressure. States occasionally checks blood pressure at home. On metoprolol, on losartan. States has high cholesterol. States trying to watch diet. On atorvastatin, no reported side effects     States has sleep apnea. States on cpap. States wears 6-8 hours per night, helps symptoms. States has had TIA. States had dizziness and difficulty walking. On antiplatelets (plavix and aspirin)     States has enlarged prostate. Has seen urology. States has urinary frequency. States flomax taking infrequently 1-2 times per week. Concern for dizzy and lightheaded    Vitamin D def. States on otc supplement.      Health Maintenance   - immunizations:   Influenza Vaccination - declines   Pneumonia Vaccination - declines   Zoster/Shingles Vaccine  Tetanus Vaccination    - Screenings:   Colonoscopy (2013) - normal   Prostate     ROS:  Review of Systems   Constitutional: Positive for fatigue. Negative for appetite change, chills, fever and unexpected weight change. HENT: Negative for congestion, rhinorrhea and sore throat. Eyes: Negative for pain and visual disturbance. Respiratory: Positive for shortness of breath. Negative for cough and chest tightness. Cardiovascular: Negative for chest pain and palpitations. Gastrointestinal: Negative for abdominal pain, blood in stool, constipation, diarrhea, nausea and vomiting. Genitourinary: Negative for difficulty urinating, dysuria, hematuria, scrotal swelling, testicular pain and urgency. Musculoskeletal: Negative for arthralgias and gait problem. Skin: Negative for rash. Neurological: Positive for dizziness. Negative for syncope, weakness, light-headedness and headaches. Hematological: Negative for adenopathy. Does not bruise/bleed easily. Psychiatric/Behavioral: Negative for suicidal ideas. The patient is not nervous/anxious. Current Outpatient Medications:     metoprolol succinate (TOPROL XL) 50 MG extended release tablet, Take 1 tablet by mouth daily, Disp: 90 tablet, Rfl: 1    losartan (COZAAR) 25 MG tablet, Take 1 tablet by mouth daily, Disp: 90 tablet, Rfl: 1    clopidogrel (PLAVIX) 75 MG tablet, Take 1 tablet by mouth daily, Disp: 90 tablet, Rfl: 1    tamsulosin (FLOMAX) 0.4 MG capsule, Take 1 capsule by mouth daily, Disp: 90 capsule, Rfl: 1    atorvastatin (LIPITOR) 80 MG tablet, Take 1 tablet by mouth daily, Disp: 90 tablet, Rfl: 1    ketoconazole (NIZORAL) 2 % shampoo, Apply topically daily as needed. , Disp: 100 mL, Rfl: 1    nitroGLYCERIN (NITROSTAT) 0.4 MG SL tablet, Place 1 tablet under the tongue every 5 minutes as needed for Chest pain, Disp: 25 tablet, Rfl: 1    Multiple Mother          79 Y/O    Diabetes Mother     Liver Disease Father          66 Y/O       Social History     Socioeconomic History    Marital status:      Spouse name: Not on file    Number of children: Not on file    Years of education: Not on file    Highest education level: Not on file   Occupational History    Occupation: retired-     Social Needs    Financial resource strain: Not on file    Food insecurity     Worry: Not on file     Inability: Not on file   [employer] needs     Medical: Not on file     Non-medical: Not on file   Tobacco Use    Smoking status: Former Smoker     Packs/day: 1.00     Years: 15.00     Pack years: 15.00     Types: Cigarettes     Start date: 1957     Last attempt to quit: 1975     Years since quittin.6    Smokeless tobacco: Current User     Types: Snuff   Substance and Sexual Activity    Alcohol use: Yes     Comment: occassionally    Drug use: No    Sexual activity: Not on file   Lifestyle    Physical activity     Days per week: Not on file     Minutes per session: Not on file    Stress: Not on file   Relationships    Social connections     Talks on phone: Not on file     Gets together: Not on file     Attends Synagogue service: Not on file     Active member of club or organization: Not on file     Attends meetings of clubs or organizations: Not on file     Relationship status: Not on file    Intimate partner violence     Fear of current or ex partner: Not on file     Emotionally abused: Not on file     Physically abused: Not on file     Forced sexual activity: Not on file   Other Topics Concern    Not on file   Social History Narrative    Not on file       Vitals:    20 1048   BP: 118/70   Pulse: 87   Temp: 97.3 °F (36.3 °C)   SpO2: 97%   Weight: 218 lb (98.9 kg)   Height: 6' 1\" (1.854 m)       Exam:  Physical Exam  Constitutional:       Appearance: He is well-developed.    HENT:      Head: Normocephalic and Ischemic cardiomyopathy  - Continue present treatment   - following with cardiology   - on losartn   - on metoprolol   - on atrovastatin   - on aspirin and plavix     CHF (congestive heart failure), NYHA class I, chronic, systolic (HCC)  - Continue present treatment   - following with cardiology   - on losartn   - on metoprolol   - on atrovastatin   - on aspirin and plavix   - Stable     Chronic obstructive pulmonary disease, unspecified COPD type ([facility] [address]  - Continue present treatment   - not currently following with pulmonary   - did not see benefit with breo     Essential hypertension  - Continue present treatment   - watch diet   - monitor blood pressure at home   - on losartn   - on metoprolol   - on aspirin and plavix     Impaired fasting glucose  - continue present treatment  - watch diet   - follow A1c - last A1c (3/2020) 5.9     Obstructive sleep apnea syndrome  - Continue present treatment   - on CPAP  - wearing nightly 6-8 hours   - helping symptoms   - stable     TIA (transient ischemic attack)  - Continue present treatment   - on atrovastatin   - on aspirin and plavix     Prostate enlargement  - continue present treatment   - has seen urology   - Has flomax - takes as needed  - stable     Vitamin D def  - On otc supplement   - follow labs     Other constipation  - discussed colace 100mg daily and miralax as needed  - taking mutamucil     Return in about 6 months (around 2/24/2021) for check up and review and labs.     Orders Placed This Encounter   Procedures    CBC Auto Differential     Standing Status:   Future     Standing Expiration Date:   8/24/2021    Comprehensive Metabolic Panel     Standing Status:   Future     Standing Expiration Date:   8/24/2021    Hemoglobin A1C     Standing Status:   Future     Standing Expiration Date:   8/24/2021    Lipid, Fasting     Standing Status:   Future     Standing Expiration Date:   8/24/2021    Magnesium     Standing Status:   Future     Standing Expiration Date:   8/24/2021    Vitamin D 25 Hydroxy     Standing Status:   Future     Standing Expiration Date:   8/24/2021    Vitamin B12 & Folate     Standing Status:   Future     Standing Expiration Date:   8/24/2021    TSH without Reflex     Standing Status:   Future     Standing Expiration Date:   8/24/2021    Urinalysis     Standing Status:   Future     Standing Expiration Date:   8/24/2021     Requested Prescriptions     Signed Prescriptions Disp Refills    metoprolol succinate (TOPROL XL) 50 MG extended release tablet 90 tablet 1     Sig: Take 1 tablet by mouth daily    losartan (COZAAR) 25 MG tablet 90 tablet 1     Sig: Take 1 tablet by mouth daily    clopidogrel (PLAVIX) 75 MG tablet 90 tablet 1     Sig: Take 1 tablet by mouth daily    tamsulosin (FLOMAX) 0.4 MG capsule 90 capsule 1     Sig: Take 1 capsule by mouth daily    atorvastatin (LIPITOR) 80 MG tablet 90 tablet 1     Sig: Take 1 tablet by mouth daily     Song Johnson MD  8/24/2020  11:44 AM

## 2020-11-16 ENCOUNTER — OFFICE VISIT (OUTPATIENT)
Dept: PRIMARY CARE CLINIC | Age: 80
End: 2020-11-16
Payer: MEDICARE

## 2020-11-16 ENCOUNTER — TELEPHONE (OUTPATIENT)
Dept: FAMILY MEDICINE CLINIC | Age: 80
End: 2020-11-16

## 2020-11-16 VITALS
TEMPERATURE: 97.7 F | HEART RATE: 83 BPM | OXYGEN SATURATION: 98 % | BODY MASS INDEX: 28.89 KG/M2 | HEIGHT: 73 IN | RESPIRATION RATE: 18 BRPM | WEIGHT: 218 LBS

## 2020-11-16 DIAGNOSIS — Z20.822 ENCOUNTER FOR LABORATORY TESTING FOR COVID-19 VIRUS: ICD-10-CM

## 2020-11-16 PROCEDURE — 99213 OFFICE O/P EST LOW 20 MIN: CPT | Performed by: NURSE PRACTITIONER

## 2020-11-16 RX ORDER — RASAGILINE 0.5 MG/1
TABLET ORAL
COMMUNITY
Start: 2020-10-07 | End: 2021-01-27 | Stop reason: DRUGHIGH

## 2020-11-16 NOTE — PROGRESS NOTES
Chief Complaint   Fatigue; Covid Testing; and Shortness of Breath      History of Present Illness   Source of history provided by:  patient and spouse/SO. Dede Peña is a [de-identified] y.o. old male who presents to the flu clinic with complaints of Shortness of breath, Loss of Appetite, Fatigue and dizziness x 1 days. States symptoms have stayed the same since onset. Has been taking no OTC medications. Denies any Fever, Cough, Nausea, Vomiting, Chest Pain, LE Edema, Abdominal Pain or Rash. Denies any hx of asthma, frequent episodes of bronchitis, COPD, emphysema or pneumonia. ROS   Pertinent positives and negatives are stated within HPI, all other systems reviewed and are negative. Past Medical History:  has a past medical history of CAD (coronary artery disease), Cerebral artery occlusion with cerebral infarction [facility]), Cerebrovascular disease, CHF (congestive heart failure), NYHA class I, chronic, systolic ([facility]), Chronic frontoethmoidal sinusitis, Chronic maxillary sinusitis, COPD (chronic obstructive pulmonary disease) ([facility]), GARSIA (dyspnea on exertion), Gallbladder disease, History of non-ST elevation myocardial infarction (NSTEMI), Hyperlipidemia, Hypertension, Ischemic cardiomyopathy, Obesity, Osteoarthritis, Skin cancer of eyelid, Sleep apnea, Smokeless tobacco use, and Stented coronary artery. Past Surgical History:  has a past surgical history that includes Rotator cuff repair; Diagnostic Cardiac Cath Lab Procedure (1/23/06); Coronary angioplasty (1/23/06); Knee Arthroplasty (Left, 02/2015); Cardiac catheterization (Left, 11/30/2015); Tonsillectomy and adenoidectomy (1951); Cataract removal with implant (Bilateral); Upper gastrointestinal endoscopy (2015); Colonoscopy (2014); and Cholecystectomy, laparoscopic (N/A, 5/17/2019). Social History:  reports that he quit smoking about 45 years ago. His smoking use included cigarettes. He started smoking about 63 years ago.  He has a 15.00 pack-year smoking history. His smokeless tobacco use includes snuff. He reports current alcohol use. He reports that he does not use drugs. Family History: family history includes Diabetes in his mother; Heart Disease in his mother; Liver Disease in his father. Allergies: Lisinopril    Physical Exam   Vital Signs:  Pulse 83   Temp 97.7 °F (36.5 °C) (Temporal)   Resp 18   Ht 6' 1\" (1.854 m)   Wt 218 lb (98.9 kg)   SpO2 98%   BMI 28.76 kg/m²    Oxygen Saturation Interpretation: Normal.    Constitutional:  Alert, development consistent with age. NAD. Head:  NC/NT. Airway patent. Ears: TMs blocked bilaterally with cerumen. Canals without exudate or swelling bilaterally. Mouth: Posterior pharynx with mild erythema, with no postnasal drip. No tonsillar hypertrophy or exudate. Neck:  Normal ROM. Supple. No anterior cervical adenopathy noted. Lungs: CTAB without wheezes, rales, or rhonchi. CV:  Regular rate and rhythm, normal heart sounds, without pathological murmurs, ectopy, gallops, or rubs. Skin:  Normal turgor. Warm, dry, without visible rash. Lymphatic: No lymphangitis or adenopathy noted. Neurological:  Oriented. Motor functions intact, but legs are weak. Using a cane for ambulatory assistance. Lab / Imaging Results   (All laboratory and radiology results have been personally reviewed by myself)  Labs:  No results found for this visit on 11/16/20. Imaging: All Radiology results interpreted by Radiologist unless otherwise noted. No results found. Medical Decision Making   Pt non-toxic, in no apparent distress and stable at time of discharge. Assessment/Plan   Patrick Mendoza was seen today for fatigue, covid testing and shortness of breath. Diagnoses and all orders for this visit:    GARSIA (dyspnea on exertion)    Fatigue, unspecified type    Weakness    Dizziness    Encounter for laboratory testing for COVID-19 virus  -     COVID-19 Ambulatory;  Future        COVID-19 swab obtained and pending, will call with results once available. Advised self-quarantine at home in the interim. Increase fluids and rest. Symptomatic relief discussed including Tylenol prn pain/fever. Schedule f/u with PCP in 7-10 days if symptoms persist. ED sooner if symptoms worsen or change. ED immediately with high or refractory fever, progressive SOB, dyspnea, CP, calf pain/swelling, shaking chills, vomiting, abdominal pain, lethargy, flank pain, or decreased urinary output. Pt verbalizes understanding and is in agreement with plan of care. All questions answered. CARMENZA Hicks NP    This visit was provided as a focused evaluation during the [place] -19 pandemic/national emergency. A comprehensive review of all previous patient history and testing was not conducted. Pertinent findings were elicited during the visit. *NOTE: This report was transcribed using voice recognition software. Every effort was made to ensure accuracy; however, inadvertent computerized transcription errors may be present.

## 2020-11-19 ENCOUNTER — TELEPHONE (OUTPATIENT)
Dept: FAMILY MEDICINE CLINIC | Age: 80
End: 2020-11-19

## 2020-11-19 ENCOUNTER — HOSPITAL ENCOUNTER (OUTPATIENT)
Dept: GENERAL RADIOLOGY | Age: 80
Discharge: HOME | End: 2020-11-21
Payer: MEDICARE

## 2020-11-19 ENCOUNTER — OFFICE VISIT (OUTPATIENT)
Dept: PRIMARY CARE CLINIC | Age: 80
End: 2020-11-19
Payer: MEDICARE

## 2020-11-19 ENCOUNTER — HOSPITAL ENCOUNTER (OUTPATIENT)
Age: 80
Discharge: HOME | End: 2020-11-21
Payer: MEDICARE

## 2020-11-19 VITALS
BODY MASS INDEX: 28.89 KG/M2 | RESPIRATION RATE: 18 BRPM | SYSTOLIC BLOOD PRESSURE: 120 MMHG | HEART RATE: 84 BPM | OXYGEN SATURATION: 95 % | DIASTOLIC BLOOD PRESSURE: 72 MMHG | TEMPERATURE: 98.2 F | HEIGHT: 73 IN | WEIGHT: 218 LBS

## 2020-11-19 LAB
SARS-COV-2: ABNORMAL
SOURCE: ABNORMAL

## 2020-11-19 PROCEDURE — 71046 X-RAY EXAM CHEST 2 VIEWS: CPT

## 2020-11-19 PROCEDURE — 99213 OFFICE O/P EST LOW 20 MIN: CPT | Performed by: PHYSICIAN ASSISTANT

## 2020-11-19 NOTE — TELEPHONE ENCOUNTER
Wife calling he is covid positive. Their son is a physician and would like pcp to order chest xrays. Will you order? They can get at [facility].  Order pended for review

## 2020-11-19 NOTE — TELEPHONE ENCOUNTER
Is he having symptoms    If he is having difficulty shortness of breath worsening cough fever I would recommend evaluation in emergency room given his medical problems    Orders Placed This Encounter   Procedures    XR CHEST STANDARD (2 VW)     Standing Status:   Future     Standing Expiration Date:   11/19/2021     Order Specific Question:   Reason for exam:     Answer:   covid 19     X-ray order signed

## 2020-11-19 NOTE — PROGRESS NOTES
20  Paola Duncan : 1940 Sex: male  Age [de-identified] y.o. Subjective:  Chief Complaint   Patient presents with    Shortness of Breath     pt found out yesterday he is Covid positive, pt states here to make sure he is ok. HPI:   Paola Duncan , [de-identified] y.o. male presents to [facility] care for evaluation of COVID-19. The patient had a test on Monday. Just found out that he had a indeterminant swab but his wife is positive. The patient has had some mild shortness of breath but really not a new finding for the patient. The patient was recently sent by PCP for chest x-ray. He has not had a cough. He is not had a fever. The patient states that otherwise he is feeling very well. Does note a little bit of shortness of breath with ambulation. The patient is not having any diarrhea. Eating and drinking normally. ROS:   Unless otherwise stated in this report the patient's positive and negative responses for review of systems for constitutional, eyes, ENT, cardiovascular, respiratory, gastrointestinal, neurological, , musculoskeletal, and integument systems and related systems to the presenting problem are either stated in the history of present illness or were not pertinent or were negative for the symptoms and/or complaints related to the presenting medical problem. Positives and pertinent negatives as per HPI. All others reviewed and are negative.       PMH:     Past Medical History:   Diagnosis Date    CAD (coronary artery disease)     follows with Dr Benitez Ng    Cerebral artery occlusion with cerebral infarction [facility])     Cerebrovascular disease     CHF (congestive heart failure), NYHA class I, chronic, systolic ([facility] [address] 3908    [facility]-- EF 39%    Chronic frontoethmoidal sinusitis 2018    Chronic maxillary sinusitis 2018    COPD (chronic obstructive pulmonary disease) ([facility] [address] 2018    GARSIA (dyspnea on exertion)     follows with Dr Rolf Maza Rfl: 1    Multiple Vitamins-Minerals (THERAPEUTIC MULTIVITAMIN-MINERALS) tablet, Take 1 tablet by mouth daily, Disp: , Rfl:     CPAP Machine MISC, daily at bedtime. , Disp: , Rfl:     aspirin 81 MG EC tablet, Take 81 mg by mouth daily Last taken 2019, Disp: , Rfl:     Cholecalciferol (VITAMIN D) 2000 UNIT CAPS capsule, Take 1 capsule by mouth daily Last taken , Disp: , Rfl:     Allergies: Allergies   Allergen Reactions    Lisinopril Itching       Social History:     Social History     Tobacco Use    Smoking status: Former Smoker     Packs/day: 1.00     Years: 15.00     Pack years: 15.00     Types: Cigarettes     Start date: 1957     Last attempt to quit: 1975     Years since quittin.9    Smokeless tobacco: Current User     Types: Snuff   Substance Use Topics    Alcohol use: Yes     Comment: occassionally    Drug use: No       Patient lives at home. Physical Exam:     Vitals:    20 1426   BP: 120/72   Pulse: 84   Resp: 18   Temp: 98.2 °F (36.8 °C)   TempSrc: Oral   SpO2: 95%   Weight: 218 lb (98.9 kg)   Height: 6' 1\" (1.854 m)       Exam:  Physical Exam  Nurse's notes and vital signs reviewed. The patient is not hypoxic. ? General: Alert, no acute distress, patient resting comfortably Patient is not toxic or lethargic. Skin: Warm, intact, no pallor noted. There is no evidence of rash at this time. Head: Normocephalic, atraumatic  Eye: Normal conjunctiva  Ears, Nose, Throat: Right tympanic membrane clear, left tympanic membrane clear. No drainage or discharge noted. No pre- or post-auricular tenderness, erythema, or swelling noted. No rhinorrhea or congestion noted. Posterior oropharynx shows no erythema, tonsillar hypertrophy, or exudate. the uvula is midline. No trismus or drooling is noted. Moist mucous membranes. Neck: No anterior/posterior lymphadenopathy noted. No erythema, no masses, no fluctuance or induration noted. No meningeal signs.   Cardiovascular: Regular Rate and Rhythm  Respiratory: No acute distress, no rhonchi, wheezing or crackles noted. No stridor or retractions are noted. Neurological: A&O x4, normal speech  Psychiatric: Cooperative         Testing:     Xr Chest (2 Vw)    Result Date: 11/19/2020  EXAMINATION: TWO XRAY VIEWS OF THE CHEST 11/19/2020 1:07 pm COMPARISON: 06/26/2020 HISTORY: ORDERING SYSTEM PROVIDED HISTORY: Cough TECHNOLOGIST PROVIDED HISTORY: Reason for exam:->covid 19 FINDINGS: There is hyperinflation of the lungs and flattening of diaphragms consistent with COPD. There is a vague infiltrate seen within the right lung base. Ground-glass infiltrates are not appreciated with and within the upper lobes. 1. Vague infiltrate within the right lung base. Ground-glass infiltrates are not seen within the right upper lobe, left upper lobe or left lower lobe. 2. Findings of COPD. SARS-CoV-2  CommentAbnormal    Not Detected  Final  11/16/2020  2:26 PM  352    Indeterminate   We are UNABLE to reliably determine a   result for the specimen due to the   inconsistent amplification of all of the   required SARS-CoV-2 components from the   specimen submitted. If clinically indicated,   please recollect an additional specimen   for testing. Medical Decision Making:     Vital signs reviewed    Past medical history reviewed. Allergies reviewed. Medications reviewed. Patient on arrival does not appear to be in any apparent distress or discomfort. Previous chest x-ray reviewed    Previous COVID-19 swab reviewed and was noted to be indeterminate. The patient signs and symptoms seem to be consistent with Lenda Gammons. The patient does appear to be doing well. Vital signs are all stable. I discussed this with the patient. The patient was also discussed with his son who is a physician via phone. He was comfortable with the plan. We will have him follow-up with PCP.   Go directly to the ED if any of the signs or symptoms worsen. Clinical Impression:   Olga Miller was seen today for shortness of breath. Diagnoses and all orders for this visit:    COVID-19        The patient is to call for any concerns or return if any of the signs or symptoms worsen. The patient is to follow-up with PCP in the next 2-3 days for repeat evaluation repeat assessment or go directly to the emergency department.      SIGNATURE: Ab Mcdaniel III, PA-C

## 2020-11-22 ENCOUNTER — APPOINTMENT (OUTPATIENT)
Dept: GENERAL RADIOLOGY | Age: 80
End: 2020-11-22
Payer: MEDICARE

## 2020-11-22 ENCOUNTER — HOSPITAL ENCOUNTER (OUTPATIENT)
Age: 80
Setting detail: OBSERVATION
Discharge: HOME | End: 2020-11-25
Attending: EMERGENCY MEDICINE | Admitting: INTERNAL MEDICINE
Payer: MEDICARE

## 2020-11-22 PROBLEM — U07.1 COVID-19 VIRUS INFECTION: Status: ACTIVE | Noted: 2020-11-22

## 2020-11-22 LAB
ALBUMIN SERPL-MCNC: 3.8 G/DL (ref 3.5–5.2)
ALP BLD-CCNC: 90 U/L (ref 40–129)
ALT SERPL-CCNC: 13 U/L (ref 0–40)
ANION GAP SERPL CALCULATED.3IONS-SCNC: 10 MMOL/L (ref 7–16)
AST SERPL-CCNC: 19 U/L (ref 0–39)
BASOPHILS ABSOLUTE: 0.01 E9/L (ref 0–0.2)
BASOPHILS RELATIVE PERCENT: 0.2 % (ref 0–2)
BILIRUB SERPL-MCNC: 0.7 MG/DL (ref 0–1.2)
BUN BLDV-MCNC: 11 MG/DL (ref 8–23)
CALCIUM SERPL-MCNC: 8.9 MG/DL (ref 8.6–10.2)
CHLORIDE BLD-SCNC: 100 MMOL/L (ref 98–107)
CO2: 25 MMOL/L (ref 22–29)
CREAT SERPL-MCNC: 0.9 MG/DL (ref 0.7–1.2)
D DIMER: <200 NG/ML DDU
EOSINOPHILS ABSOLUTE: 0.02 E9/L (ref 0.05–0.5)
EOSINOPHILS RELATIVE PERCENT: 0.4 % (ref 0–6)
FIBRINOGEN: 538 MG/DL (ref 225–540)
GFR AFRICAN AMERICAN: >60
GFR NON-AFRICAN AMERICAN: >60 ML/MIN/1.73
GLUCOSE BLD-MCNC: 105 MG/DL (ref 74–99)
HCT VFR BLD CALC: 46.4 % (ref 37–54)
HEMOGLOBIN: 14.4 G/DL (ref 12.5–16.5)
IMMATURE GRANULOCYTES #: 0.01 E9/L
IMMATURE GRANULOCYTES %: 0.2 % (ref 0–5)
LACTIC ACID: 1.3 MMOL/L (ref 0.5–2.2)
LYMPHOCYTES ABSOLUTE: 1.52 E9/L (ref 1.5–4)
LYMPHOCYTES RELATIVE PERCENT: 31.7 % (ref 20–42)
MCH RBC QN AUTO: 27 PG (ref 26–35)
MCHC RBC AUTO-ENTMCNC: 31 % (ref 32–34.5)
MCV RBC AUTO: 86.9 FL (ref 80–99.9)
MONOCYTES ABSOLUTE: 0.76 E9/L (ref 0.1–0.95)
MONOCYTES RELATIVE PERCENT: 15.9 % (ref 2–12)
NEUTROPHILS ABSOLUTE: 2.47 E9/L (ref 1.8–7.3)
NEUTROPHILS RELATIVE PERCENT: 51.6 % (ref 43–80)
PDW BLD-RTO: 14.6 FL (ref 11.5–15)
PLATELET # BLD: 159 E9/L (ref 130–450)
PMV BLD AUTO: 10.8 FL (ref 7–12)
POTASSIUM REFLEX MAGNESIUM: 4 MMOL/L (ref 3.5–5)
PRO-BNP: 170 PG/ML (ref 0–450)
RBC # BLD: 5.34 E12/L (ref 3.8–5.8)
SODIUM BLD-SCNC: 135 MMOL/L (ref 132–146)
TOTAL PROTEIN: 6.8 G/DL (ref 6.4–8.3)
TROPONIN: <0.01 NG/ML (ref 0–0.03)
WBC # BLD: 4.8 E9/L (ref 4.5–11.5)

## 2020-11-22 PROCEDURE — 6370000000 HC RX 637 (ALT 250 FOR IP): Performed by: INTERNAL MEDICINE

## 2020-11-22 PROCEDURE — 2580000003 HC RX 258: Performed by: STUDENT IN AN ORGANIZED HEALTH CARE EDUCATION/TRAINING PROGRAM

## 2020-11-22 PROCEDURE — 86140 C-REACTIVE PROTEIN: CPT

## 2020-11-22 PROCEDURE — G0378 HOSPITAL OBSERVATION PER HR: HCPCS

## 2020-11-22 PROCEDURE — 83880 ASSAY OF NATRIURETIC PEPTIDE: CPT

## 2020-11-22 PROCEDURE — 96374 THER/PROPH/DIAG INJ IV PUSH: CPT

## 2020-11-22 PROCEDURE — 99284 EMERGENCY DEPT VISIT MOD MDM: CPT

## 2020-11-22 PROCEDURE — 36415 COLL VENOUS BLD VENIPUNCTURE: CPT

## 2020-11-22 PROCEDURE — 96361 HYDRATE IV INFUSION ADD-ON: CPT

## 2020-11-22 PROCEDURE — 96372 THER/PROPH/DIAG INJ SC/IM: CPT

## 2020-11-22 PROCEDURE — 83605 ASSAY OF LACTIC ACID: CPT

## 2020-11-22 PROCEDURE — 85384 FIBRINOGEN ACTIVITY: CPT

## 2020-11-22 PROCEDURE — 93005 ELECTROCARDIOGRAM TRACING: CPT | Performed by: STUDENT IN AN ORGANIZED HEALTH CARE EDUCATION/TRAINING PROGRAM

## 2020-11-22 PROCEDURE — 84145 PROCALCITONIN (PCT): CPT

## 2020-11-22 PROCEDURE — 99220 PR INITIAL OBSERVATION CARE/DAY 70 MINUTES: CPT | Performed by: INTERNAL MEDICINE

## 2020-11-22 PROCEDURE — 80053 COMPREHEN METABOLIC PANEL: CPT

## 2020-11-22 PROCEDURE — 6360000002 HC RX W HCPCS: Performed by: INTERNAL MEDICINE

## 2020-11-22 PROCEDURE — 84484 ASSAY OF TROPONIN QUANT: CPT

## 2020-11-22 PROCEDURE — 2580000003 HC RX 258: Performed by: INTERNAL MEDICINE

## 2020-11-22 PROCEDURE — 0202U NFCT DS 22 TRGT SARS-COV-2: CPT

## 2020-11-22 PROCEDURE — 85025 COMPLETE CBC W/AUTO DIFF WBC: CPT

## 2020-11-22 PROCEDURE — 82728 ASSAY OF FERRITIN: CPT

## 2020-11-22 PROCEDURE — 85378 FIBRIN DEGRADE SEMIQUANT: CPT

## 2020-11-22 PROCEDURE — 71045 X-RAY EXAM CHEST 1 VIEW: CPT

## 2020-11-22 PROCEDURE — 6360000002 HC RX W HCPCS: Performed by: STUDENT IN AN ORGANIZED HEALTH CARE EDUCATION/TRAINING PROGRAM

## 2020-11-22 RX ORDER — PREDNISONE 20 MG/1
40 TABLET ORAL DAILY
Status: DISCONTINUED | OUTPATIENT
Start: 2020-11-23 | End: 2020-11-25 | Stop reason: HOSPADM

## 2020-11-22 RX ORDER — ASPIRIN 81 MG/1
81 TABLET ORAL DAILY
Status: DISCONTINUED | OUTPATIENT
Start: 2020-11-22 | End: 2020-11-25 | Stop reason: HOSPADM

## 2020-11-22 RX ORDER — ASCORBIC ACID 500 MG
1000 TABLET ORAL 2 TIMES DAILY
Status: DISCONTINUED | OUTPATIENT
Start: 2020-11-22 | End: 2020-11-25 | Stop reason: HOSPADM

## 2020-11-22 RX ORDER — METOPROLOL SUCCINATE 25 MG/1
50 TABLET, EXTENDED RELEASE ORAL DAILY
Status: DISCONTINUED | OUTPATIENT
Start: 2020-11-23 | End: 2020-11-25 | Stop reason: HOSPADM

## 2020-11-22 RX ORDER — METHYLPREDNISOLONE SODIUM SUCCINATE 125 MG/2ML
60 INJECTION, POWDER, LYOPHILIZED, FOR SOLUTION INTRAMUSCULAR; INTRAVENOUS ONCE
Status: DISCONTINUED | OUTPATIENT
Start: 2020-11-22 | End: 2020-11-22

## 2020-11-22 RX ORDER — DEXAMETHASONE SODIUM PHOSPHATE 4 MG/ML
4 INJECTION, SOLUTION INTRA-ARTICULAR; INTRALESIONAL; INTRAMUSCULAR; INTRAVENOUS; SOFT TISSUE ONCE
Status: COMPLETED | OUTPATIENT
Start: 2020-11-22 | End: 2020-11-22

## 2020-11-22 RX ORDER — ACETAMINOPHEN 650 MG/1
650 SUPPOSITORY RECTAL EVERY 6 HOURS PRN
Status: DISCONTINUED | OUTPATIENT
Start: 2020-11-22 | End: 2020-11-25 | Stop reason: HOSPADM

## 2020-11-22 RX ORDER — POLYETHYLENE GLYCOL 3350 17 G/17G
17 POWDER, FOR SOLUTION ORAL DAILY PRN
Status: DISCONTINUED | OUTPATIENT
Start: 2020-11-22 | End: 2020-11-25 | Stop reason: HOSPADM

## 2020-11-22 RX ORDER — TAMSULOSIN HYDROCHLORIDE 0.4 MG/1
0.4 CAPSULE ORAL DAILY
Status: DISCONTINUED | OUTPATIENT
Start: 2020-11-22 | End: 2020-11-25 | Stop reason: HOSPADM

## 2020-11-22 RX ORDER — 0.9 % SODIUM CHLORIDE 0.9 %
500 INTRAVENOUS SOLUTION INTRAVENOUS ONCE
Status: COMPLETED | OUTPATIENT
Start: 2020-11-22 | End: 2020-11-22

## 2020-11-22 RX ORDER — M-VIT,TX,IRON,MINS/CALC/FOLIC 27MG-0.4MG
1 TABLET ORAL DAILY
Status: DISCONTINUED | OUTPATIENT
Start: 2020-11-23 | End: 2020-11-25 | Stop reason: HOSPADM

## 2020-11-22 RX ORDER — ZINC SULFATE 50(220)MG
50 CAPSULE ORAL 2 TIMES DAILY
Status: DISCONTINUED | OUTPATIENT
Start: 2020-11-22 | End: 2020-11-25 | Stop reason: HOSPADM

## 2020-11-22 RX ORDER — SODIUM CHLORIDE 0.9 % (FLUSH) 0.9 %
10 SYRINGE (ML) INJECTION EVERY 12 HOURS SCHEDULED
Status: DISCONTINUED | OUTPATIENT
Start: 2020-11-22 | End: 2020-11-25 | Stop reason: HOSPADM

## 2020-11-22 RX ORDER — ATORVASTATIN CALCIUM 40 MG/1
80 TABLET, FILM COATED ORAL DAILY
Status: DISCONTINUED | OUTPATIENT
Start: 2020-11-22 | End: 2020-11-25 | Stop reason: HOSPADM

## 2020-11-22 RX ORDER — 0.9 % SODIUM CHLORIDE 0.9 %
500 INTRAVENOUS SOLUTION INTRAVENOUS ONCE
Status: DISCONTINUED | OUTPATIENT
Start: 2020-11-22 | End: 2020-11-22

## 2020-11-22 RX ORDER — 0.9 % SODIUM CHLORIDE 0.9 %
1000 INTRAVENOUS SOLUTION INTRAVENOUS ONCE
Status: COMPLETED | OUTPATIENT
Start: 2020-11-22 | End: 2020-11-22

## 2020-11-22 RX ORDER — VITAMIN B COMPLEX
1000 TABLET ORAL DAILY
Status: DISCONTINUED | OUTPATIENT
Start: 2020-11-23 | End: 2020-11-25 | Stop reason: HOSPADM

## 2020-11-22 RX ORDER — ACETAMINOPHEN 325 MG/1
650 TABLET ORAL EVERY 6 HOURS PRN
Status: DISCONTINUED | OUTPATIENT
Start: 2020-11-22 | End: 2020-11-25 | Stop reason: HOSPADM

## 2020-11-22 RX ORDER — LOSARTAN POTASSIUM 25 MG/1
25 TABLET ORAL DAILY
Status: DISCONTINUED | OUTPATIENT
Start: 2020-11-22 | End: 2020-11-25 | Stop reason: HOSPADM

## 2020-11-22 RX ORDER — CLOPIDOGREL BISULFATE 75 MG/1
75 TABLET ORAL DAILY
Status: DISCONTINUED | OUTPATIENT
Start: 2020-11-22 | End: 2020-11-25 | Stop reason: HOSPADM

## 2020-11-22 RX ORDER — MULTIVIT-MIN/IRON/FOLIC ACID/K 18-600-40
1 CAPSULE ORAL DAILY
Status: DISCONTINUED | OUTPATIENT
Start: 2020-11-22 | End: 2020-11-22 | Stop reason: SDUPTHER

## 2020-11-22 RX ORDER — SODIUM CHLORIDE 9 MG/ML
INJECTION, SOLUTION INTRAVENOUS CONTINUOUS
Status: DISCONTINUED | OUTPATIENT
Start: 2020-11-22 | End: 2020-11-25

## 2020-11-22 RX ORDER — SODIUM CHLORIDE 0.9 % (FLUSH) 0.9 %
10 SYRINGE (ML) INJECTION PRN
Status: DISCONTINUED | OUTPATIENT
Start: 2020-11-22 | End: 2020-11-25 | Stop reason: HOSPADM

## 2020-11-22 RX ORDER — NITROGLYCERIN 0.4 MG/1
0.4 TABLET SUBLINGUAL EVERY 5 MIN PRN
Status: DISCONTINUED | OUTPATIENT
Start: 2020-11-22 | End: 2020-11-25 | Stop reason: HOSPADM

## 2020-11-22 RX ADMIN — Medication 1000 MG: at 22:32

## 2020-11-22 RX ADMIN — SODIUM CHLORIDE 500 ML: 9 INJECTION, SOLUTION INTRAVENOUS at 13:29

## 2020-11-22 RX ADMIN — ATORVASTATIN CALCIUM 80 MG: 40 TABLET, FILM COATED ORAL at 22:32

## 2020-11-22 RX ADMIN — SODIUM CHLORIDE 1000 ML: 9 INJECTION, SOLUTION INTRAVENOUS at 15:22

## 2020-11-22 RX ADMIN — SODIUM CHLORIDE: 9 INJECTION, SOLUTION INTRAVENOUS at 22:41

## 2020-11-22 RX ADMIN — CLOPIDOGREL BISULFATE 75 MG: 75 TABLET ORAL at 22:31

## 2020-11-22 RX ADMIN — ASPIRIN 81 MG: 81 TABLET, COATED ORAL at 22:31

## 2020-11-22 RX ADMIN — ENOXAPARIN SODIUM 40 MG: 40 INJECTION SUBCUTANEOUS at 22:32

## 2020-11-22 RX ADMIN — Medication 10 ML: at 22:21

## 2020-11-22 RX ADMIN — TAMSULOSIN HYDROCHLORIDE 0.4 MG: 0.4 CAPSULE ORAL at 22:31

## 2020-11-22 RX ADMIN — LOSARTAN POTASSIUM 25 MG: 25 TABLET, FILM COATED ORAL at 22:30

## 2020-11-22 RX ADMIN — ZINC SULFATE 220 MG (50 MG) CAPSULE 50 MG: CAPSULE at 22:31

## 2020-11-22 RX ADMIN — DEXAMETHASONE SODIUM PHOSPHATE 4 MG: 4 INJECTION, SOLUTION INTRAMUSCULAR; INTRAVENOUS at 13:29

## 2020-11-22 ASSESSMENT — ENCOUNTER SYMPTOMS
RHINORRHEA: 0
DIARRHEA: 0
VOMITING: 0
COUGH: 0
NAUSEA: 0
CONSTIPATION: 0
ABDOMINAL PAIN: 0
SHORTNESS OF BREATH: 0

## 2020-11-22 NOTE — ED PROVIDER NOTES
Patient is an 24-year-old male with past medical history of hypertension, hyper lipid, MI, COPD who presents to the emergency department with GARSIA above baseline and recent diagnosis of COVID-19. Patient states he went on Monday with his wife for testing for COVID-19. Patient reports that his result was indeterminate however his wife was positive. Patient has had symptoms of fatigue, decreased energy, decreased p.o. food and fluids for the past week. Patient denies any chest pain, shortness of breath, blurred vision, double vision, GI or urinary symptoms. Patient denies any trauma. Patient denies any fever or chills. She has not been on antibiotics or steroids. Review of Systems   Constitutional: Positive for appetite change and fatigue. Negative for chills and fever. HENT: Negative for congestion, rhinorrhea, trouble swallowing and voice change. Eyes: Negative for visual disturbance. Respiratory: Negative for cough and shortness of breath. Cardiovascular: Negative for chest pain and palpitations. Gastrointestinal: Negative for abdominal pain, constipation, diarrhea, nausea and vomiting. Endocrine: Negative for polyuria. Genitourinary: Negative for dysuria, frequency, hematuria and urgency. Musculoskeletal: Negative for arthralgias and myalgias. Skin: Negative for rash and wound. Allergic/Immunologic: Negative for immunocompromised state. Neurological: Positive for weakness. Negative for dizziness, syncope, light-headedness and numbness. Psychiatric/Behavioral: Negative for agitation. The patient is not nervous/anxious. Physical Exam  Vitals signs and nursing note reviewed. Constitutional:       General: He is awake. He is not in acute distress. Appearance: He is ill-appearing. He is not toxic-appearing. HENT:      Head: Normocephalic and atraumatic. Nose: Nose normal.      Mouth/Throat:      Mouth: Mucous membranes are dry.       Pharynx: Oropharynx is Work-up including labs shows no leukocytosis or anemia, no significant electrolyte abnormalities, troponin and lactic acid are negative. EKG shows sinus mechanism without ectopy or signs of ischemia. Chest x-ray shows signs consistent with COPD without any other acute process. Orthostatics were performed and are grossly positive. Patient was treated with IV fluid and orthostatics on repeat were again positive and patient was treated with an additional liter of fluid. Patient was given steroid with presumed diagnosis of Covid due to close family contact and positive test, and symptoms. Patient was monitored in the emergency department without further change. Patient was ambulated without hypoxia or tachycardia noted. Patient reevaluation shows patient week and orthostatic positive despite 2 L of IV fluids. Spoke with son, Dr. Adamaris Abrue, who stated patient's most recent echo shows EF of 38%. With patient's low EF gentle hydration for orthostatics is a prudent course and treatment. Patient also with GARSIA and weakness in the face of likely Covid 19 diagnosis. Decision was made to admit the patient for weakness, and hydration and plan was discussed with patient. He is agreeable to plan of admission. Hospitalist was consulted and patient was discussed, he was accepted to the service. Patient was monitored in the emergency department without further change. Was admitted in stable condition. ED Course as of Nov 22 2228   Sun Nov 22, 2020   9413 Spoke with patient's son, Dr. Adamaris Abreu, who states patient has been dry recently. Patient's last EF of 35% that is not documented in our charts.     [QC]      ED Course User Index  [QC] Tee Lucas MD       --------------------------------------------- PAST HISTORY ---------------------------------------------  Past Medical History:  has a past medical history of CAD (coronary artery disease), Cerebral artery occlusion with cerebral infarction ([facility] [address] Cerebrovascular disease, CHF (congestive heart failure), NYHA class I, chronic, systolic (HCC), Chronic frontoethmoidal sinusitis, Chronic maxillary sinusitis, COPD (chronic obstructive pulmonary disease) (HCC), GARSIA (dyspnea on exertion), Gallbladder disease, History of non-ST elevation myocardial infarction (NSTEMI), Hyperlipidemia, Hypertension, Ischemic cardiomyopathy, Obesity, Osteoarthritis, Skin cancer of eyelid, Sleep apnea, Smokeless tobacco use, and Stented coronary artery. Past Surgical History:  has a past surgical history that includes Rotator cuff repair; Diagnostic Cardiac Cath Lab Procedure (1/23/06); Coronary angioplasty (1/23/06); Knee Arthroplasty (Left, 02/2015); Cardiac catheterization (Left, 11/30/2015); Tonsillectomy and adenoidectomy (1951); Cataract removal with implant (Bilateral); Upper gastrointestinal endoscopy (2015); Colonoscopy (2014); and Cholecystectomy, laparoscopic (N/A, 5/17/2019). Social History:  reports that he quit smoking about 45 years ago. His smoking use included cigarettes. He started smoking about 63 years ago. He has a 15.00 pack-year smoking history. His smokeless tobacco use includes snuff. He reports current alcohol use. He reports that he does not use drugs. Family History: family history includes Diabetes in his mother; Heart Disease in his mother; Liver Disease in his father. The patients home medications have been reviewed.     Allergies: Lisinopril    -------------------------------------------------- RESULTS -------------------------------------------------    LABS:  Results for orders placed or performed during the hospital encounter of 11/22/20   CBC Auto Differential   Result Value Ref Range    WBC 4.8 4.5 - 11.5 E9/L    RBC 5.34 3.80 - 5.80 E12/L    Hemoglobin 14.4 12.5 - 16.5 g/dL    Hematocrit 46.4 37.0 - 54.0 %    MCV 86.9 80.0 - 99.9 fL    MCH 27.0 26.0 - 35.0 pg    MCHC 31.0 (L) 32.0 - 34.5 %    RDW 14.6 11.5 - 15.0 fL    Platelets 832 041 - 450 E9/L    MPV 10.8 7.0 - 12.0 fL    Neutrophils % 51.6 43.0 - 80.0 %    Immature Granulocytes % 0.2 0.0 - 5.0 %    Lymphocytes % 31.7 20.0 - 42.0 %    Monocytes % 15.9 (H) 2.0 - 12.0 %    Eosinophils % 0.4 0.0 - 6.0 %    Basophils % 0.2 0.0 - 2.0 %    Neutrophils Absolute 2.47 1.80 - 7.30 E9/L    Immature Granulocytes # 0.01 E9/L    Lymphocytes Absolute 1.52 1.50 - 4.00 E9/L    Monocytes Absolute 0.76 0.10 - 0.95 E9/L    Eosinophils Absolute 0.02 (L) 0.05 - 0.50 E9/L    Basophils Absolute 0.01 0.00 - 0.20 E9/L   Comprehensive Metabolic Panel w/ Reflex to MG   Result Value Ref Range    Sodium 135 132 - 146 mmol/L    Potassium reflex Magnesium 4.0 3.5 - 5.0 mmol/L    Chloride 100 98 - 107 mmol/L    CO2 25 22 - 29 mmol/L    Anion Gap 10 7 - 16 mmol/L    Glucose 105 (H) 74 - 99 mg/dL    BUN 11 8 - 23 mg/dL    CREATININE 0.9 0.7 - 1.2 mg/dL    GFR Non-African American >60 >=60 mL/min/1.73    GFR African American >60     Calcium 8.9 8.6 - 10.2 mg/dL    Total Protein 6.8 6.4 - 8.3 g/dL    Alb 3.8 3.5 - 5.2 g/dL    Total Bilirubin 0.7 0.0 - 1.2 mg/dL    Alkaline Phosphatase 90 40 - 129 U/L    ALT 13 0 - 40 U/L    AST 19 0 - 39 U/L   Troponin   Result Value Ref Range    Troponin <0.01 0.00 - 0.03 ng/mL   Lactic Acid, Plasma   Result Value Ref Range    Lactic Acid 1.3 0.5 - 2.2 mmol/L   EKG 12 Lead   Result Value Ref Range    Ventricular Rate 71 BPM    Atrial Rate 71 BPM    P-R Interval 158 ms    QRS Duration 92 ms    Q-T Interval 392 ms    QTc Calculation (Bazett) 425 ms    P Axis 78 degrees    R Axis 89 degrees    T Axis 84 degrees       RADIOLOGY:  XR CHEST PORTABLE   Final Result   Non-specific large lung volumes may be secondary to pulmonary emphysema   and/or asthma. No radiographic evidence of definite acute cardiopulmonary disease when   accounting for slight interstitial scar in right lung base, unchanged from   2018          EKG: This EKG is signed and interpreted by me.     Rate: 71  Rhythm: Sinus  Interpretation: no acute changes  Comparison: stable as compared to patient's most recent EKG      ------------------------- NURSING NOTES AND VITALS REVIEWED ---------------------------  Date / Time Roomed:  11/22/2020 11:40 AM  ED Bed Assignment:  [facility]/[id number]    The nursing notes within the ED encounter and vital signs as below have been reviewed. Patient Vitals for the past 24 hrs:   BP Temp Temp src Pulse Resp SpO2 Height Weight   11/22/20 2136 (!) 145/82 -- -- 70 -- 96 % -- --   11/22/20 2004 132/62 98.9 °F (37.2 °C) Oral 60 18 94 % -- --   11/22/20 1902 132/74 -- -- 80 -- 95 % -- --   11/22/20 1803 131/73 -- -- 66 -- 96 % -- --   11/22/20 1730 135/75 -- -- 84 -- 96 % -- --   11/22/20 1700 (!) 143/77 -- -- 79 -- 96 % -- --   11/22/20 1559 127/72 -- -- 70 -- 97 % -- --   11/22/20 1530 108/67 -- -- 83 -- 96 % -- --   11/22/20 1458 136/68 -- -- 66 -- 95 % -- --   11/22/20 1430 121/65 -- -- 64 -- 96 % -- --   11/22/20 1359 (!) 140/69 -- -- 72 -- 98 % -- --   11/22/20 1156 (!) 140/87 98.1 °F (36.7 °C) Oral 81 18 96 % 6' 1\" (1.854 m) 200 lb (90.7 kg)       Oxygen Saturation Interpretation: Normal    ------------------------------------------ PROGRESS NOTES ------------------------------------------  Re-evaluation(s):  Patients symptoms are improving  Repeat physical examination is improved    Counseling:  I have spoken with the patient and discussed todays results, in addition to providing specific details for the plan of care and counseling regarding the diagnosis and prognosis. Their questions are answered at this time and they are agreeable with the plan of admission.    --------------------------------- ADDITIONAL PROVIDER NOTES ---------------------------------  Consultations:  Spoke with Dr. Mraita Johnson. Discussed case. They will admit the patient.   This patient's ED course included: a personal history and physicial examination, re-evaluation prior to disposition, multiple bedside re-evaluations, IV medications, cardiac monitoring and continuous pulse oximetry    This patient has remained hemodynamically stable during their ED course. Diagnosis:  1. Dehydration    2. Orthostatic hypotension    3. COVID-19 virus infection    4. Fatigue, unspecified type        Disposition:  Patient's disposition: Admit to telemetry  Patient's condition is stable. 11/22/20, 8:37 PM EST.     This note is prepared by Brianda Boudreaux MD -PGY-1           Brianda Boudreaux MD  Resident  11/22/20 [address], [place]  Resident  11/30/20 0275

## 2020-11-23 LAB
ADENOVIRUS BY PCR: NOT DETECTED
ANION GAP SERPL CALCULATED.3IONS-SCNC: 11 MMOL/L (ref 7–16)
BASOPHILS ABSOLUTE: 0.01 E9/L (ref 0–0.2)
BASOPHILS RELATIVE PERCENT: 0.2 % (ref 0–2)
BORDETELLA PARAPERTUSSIS BY PCR: NOT DETECTED
BORDETELLA PERTUSSIS BY PCR: NOT DETECTED
BUN BLDV-MCNC: 10 MG/DL (ref 8–23)
C-REACTIVE PROTEIN: 1.2 MG/DL (ref 0–0.4)
CALCIUM SERPL-MCNC: 8.1 MG/DL (ref 8.6–10.2)
CHLAMYDOPHILIA PNEUMONIAE BY PCR: NOT DETECTED
CHLORIDE BLD-SCNC: 104 MMOL/L (ref 98–107)
CO2: 20 MMOL/L (ref 22–29)
CORONAVIRUS 229E BY PCR: NOT DETECTED
CORONAVIRUS HKU1 BY PCR: NOT DETECTED
CORONAVIRUS NL63 BY PCR: NOT DETECTED
CORONAVIRUS OC43 BY PCR: NOT DETECTED
CREAT SERPL-MCNC: 0.7 MG/DL (ref 0.7–1.2)
EKG ATRIAL RATE: 71 BPM
EKG P AXIS: 78 DEGREES
EKG P-R INTERVAL: 158 MS
EKG Q-T INTERVAL: 392 MS
EKG QRS DURATION: 92 MS
EKG QTC CALCULATION (BAZETT): 425 MS
EKG R AXIS: 89 DEGREES
EKG T AXIS: 84 DEGREES
EKG VENTRICULAR RATE: 71 BPM
EOSINOPHILS ABSOLUTE: 0 E9/L (ref 0.05–0.5)
EOSINOPHILS RELATIVE PERCENT: 0 % (ref 0–6)
FERRITIN: 649 NG/ML
GFR AFRICAN AMERICAN: >60
GFR NON-AFRICAN AMERICAN: >60 ML/MIN/1.73
GLUCOSE BLD-MCNC: 133 MG/DL (ref 74–99)
HCT VFR BLD CALC: 40.9 % (ref 37–54)
HEMOGLOBIN: 12.9 G/DL (ref 12.5–16.5)
HUMAN METAPNEUMOVIRUS BY PCR: NOT DETECTED
HUMAN RHINOVIRUS/ENTEROVIRUS BY PCR: NOT DETECTED
IMMATURE GRANULOCYTES #: 0.01 E9/L
IMMATURE GRANULOCYTES %: 0.2 % (ref 0–5)
INFLUENZA A BY PCR: NOT DETECTED
INFLUENZA B BY PCR: NOT DETECTED
LYMPHOCYTES ABSOLUTE: 1.17 E9/L (ref 1.5–4)
LYMPHOCYTES RELATIVE PERCENT: 27.4 % (ref 20–42)
MCH RBC QN AUTO: 27.1 PG (ref 26–35)
MCHC RBC AUTO-ENTMCNC: 31.5 % (ref 32–34.5)
MCV RBC AUTO: 85.9 FL (ref 80–99.9)
MONOCYTES ABSOLUTE: 0.6 E9/L (ref 0.1–0.95)
MONOCYTES RELATIVE PERCENT: 14.1 % (ref 2–12)
MYCOPLASMA PNEUMONIAE BY PCR: NOT DETECTED
NEUTROPHILS ABSOLUTE: 2.48 E9/L (ref 1.8–7.3)
NEUTROPHILS RELATIVE PERCENT: 58.1 % (ref 43–80)
PARAINFLUENZA VIRUS 1 BY PCR: NOT DETECTED
PARAINFLUENZA VIRUS 2 BY PCR: NOT DETECTED
PARAINFLUENZA VIRUS 3 BY PCR: NOT DETECTED
PARAINFLUENZA VIRUS 4 BY PCR: NOT DETECTED
PDW BLD-RTO: 14.6 FL (ref 11.5–15)
PLATELET # BLD: 143 E9/L (ref 130–450)
PMV BLD AUTO: 10.6 FL (ref 7–12)
POTASSIUM REFLEX MAGNESIUM: 3.9 MMOL/L (ref 3.5–5)
RBC # BLD: 4.76 E12/L (ref 3.8–5.8)
RESPIRATORY SYNCYTIAL VIRUS BY PCR: NOT DETECTED
SARS-COV-2, PCR: DETECTED
SODIUM BLD-SCNC: 135 MMOL/L (ref 132–146)
TROPONIN: <0.01 NG/ML (ref 0–0.03)
TROPONIN: <0.01 NG/ML (ref 0–0.03)
WBC # BLD: 4.3 E9/L (ref 4.5–11.5)

## 2020-11-23 PROCEDURE — G0378 HOSPITAL OBSERVATION PER HR: HCPCS

## 2020-11-23 PROCEDURE — 84484 ASSAY OF TROPONIN QUANT: CPT

## 2020-11-23 PROCEDURE — 6360000002 HC RX W HCPCS: Performed by: INTERNAL MEDICINE

## 2020-11-23 PROCEDURE — 2580000003 HC RX 258: Performed by: INTERNAL MEDICINE

## 2020-11-23 PROCEDURE — 85025 COMPLETE CBC W/AUTO DIFF WBC: CPT

## 2020-11-23 PROCEDURE — 99225 PR SBSQ OBSERVATION CARE/DAY 25 MINUTES: CPT | Performed by: INTERNAL MEDICINE

## 2020-11-23 PROCEDURE — 6370000000 HC RX 637 (ALT 250 FOR IP): Performed by: INTERNAL MEDICINE

## 2020-11-23 PROCEDURE — 96372 THER/PROPH/DIAG INJ SC/IM: CPT

## 2020-11-23 PROCEDURE — 80048 BASIC METABOLIC PNL TOTAL CA: CPT

## 2020-11-23 PROCEDURE — 36415 COLL VENOUS BLD VENIPUNCTURE: CPT

## 2020-11-23 RX ADMIN — MULTIPLE VITAMINS W/ MINERALS TAB 1 TABLET: TAB at 10:53

## 2020-11-23 RX ADMIN — ZINC SULFATE 220 MG (50 MG) CAPSULE 50 MG: CAPSULE at 10:52

## 2020-11-23 RX ADMIN — CLOPIDOGREL BISULFATE 75 MG: 75 TABLET ORAL at 10:53

## 2020-11-23 RX ADMIN — ENOXAPARIN SODIUM 40 MG: 40 INJECTION SUBCUTANEOUS at 10:55

## 2020-11-23 RX ADMIN — ZINC SULFATE 220 MG (50 MG) CAPSULE 50 MG: CAPSULE at 21:44

## 2020-11-23 RX ADMIN — ATORVASTATIN CALCIUM 80 MG: 40 TABLET, FILM COATED ORAL at 21:44

## 2020-11-23 RX ADMIN — Medication 1000 MG: at 10:52

## 2020-11-23 RX ADMIN — PREDNISONE 40 MG: 20 TABLET ORAL at 10:52

## 2020-11-23 RX ADMIN — TAMSULOSIN HYDROCHLORIDE 0.4 MG: 0.4 CAPSULE ORAL at 10:53

## 2020-11-23 RX ADMIN — METOPROLOL SUCCINATE 50 MG: 25 TABLET, EXTENDED RELEASE ORAL at 10:55

## 2020-11-23 RX ADMIN — ASPIRIN 81 MG: 81 TABLET, COATED ORAL at 10:53

## 2020-11-23 RX ADMIN — Medication 1000 MG: at 21:44

## 2020-11-23 RX ADMIN — Medication 10 ML: at 10:55

## 2020-11-23 RX ADMIN — LOSARTAN POTASSIUM 25 MG: 25 TABLET, FILM COATED ORAL at 10:54

## 2020-11-23 ASSESSMENT — PAIN SCALES - GENERAL
PAINLEVEL_OUTOF10: 0
PAINLEVEL_OUTOF10: 0

## 2020-11-23 NOTE — CARE COORDINATION
CM NOTE: Per QFR with pt's nurse---covid testing indeterminant ---pt in droplet plus isolation. Wife positive at home. +OH, IVF for dehydration. Currently receiving IV decadron. AM labs pending. CM attempted to reach pt by phone in room but number remains busy.

## 2020-11-23 NOTE — ED NOTES
Pts son, Dmitri Rossy Seymour (), called and updated per pt request. Per son, pts EF is 38% and he is in the beginning stages of Parkinson's. Provided him with phone number for ED and 6W.      Glendy Santana RN  11/22/20 2053

## 2020-11-23 NOTE — H&P
[facility] History and Physical      CHIEF COMPLAINT:  GARSIA    History of Present Illness:  [de-identified]year old male with a hx of ashd, lvef 38%, prior tobacco abuse, htn, hlp, copd and early parkinsons disease. For the past week he has had generalized weakness and poor appetite. Chronically has poor sense of taste and smell but didn't notice a difference recently. No sore throat, no congestion, no diarrhea. His wife also has similar sx. On Monday they were both checked, she was positive but his result was indeterminate. Since then he has noticed intermittent GARSIA that is progressive, today he had shortness of breath just with getting up out of bed and changing clothes. Therefore presented to the ED for further evaluation. At rest his pulse ox is above 95%, on exertion was 94%. Had orthostatic hypotension despite ivf resuscitation therefore referred for admission. Informant(s) for H&P:pt.      REVIEW OF SYSTEMS:  A comprehensive 14 point review of systems was negative except for: what is in the HPI    PMH:  Past Medical History:   Diagnosis Date    CAD (coronary artery disease) 2006    follows with Dr Genesis Toney    Cerebral artery occlusion with cerebral infarction [facility])     Cerebrovascular disease     CHF (congestive heart failure), NYHA class I, chronic[facility] systolic [facility] [id number]    [facility]-- EF 39%    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/2018    Chronic maxillary sinusitis 04/2018    COPD [facility] 04/[facility]    GARSIA (dyspnea on exertion)     follows with Dr Veena Borden Gallbladder disease     History of non-ST elevation myocardial [facility] ([facility]STEMI) [facility]    [facility]    Hyperlipidemia     Hypertension     Ischemic cardiomyopathy     EF 40% after NSTEMI, 11-15 at [facility]    Obesity     Osteoarthritis     Skin cancer of eyelid     Sleep apnea     uses CPAP    Smokeless tobacco use     Stented coronary artery 2006    x1       Surgical History:  Past Surgical History: Procedure Laterality Date    CARDIAC CATHETERIZATION Left 11/30/2015    done [facility] WITH IMPLANT Bilateral     CHOLECYSTECTOMY, LAPAROSCOPIC N/A 5/17/2019    LAPAROSCOPIC ROBOTIC XI ASSISTED CHOLECYSTECTOMY performed by Alaina Iraheta MD at [address] [place] [address]  2014    CORONARY ANGIOPLASTY  1/23/06    DIAGNOSTIC CARDIAC CATH LAB PROCEDURE  1/23/06    KNEE ARTHROPLASTY Left 02/2015    ROTATOR CUFF REPAIR      bilaterally    TONSILLECTOMY AND ADENOIDECTOMY  1951    UPPER GASTROINTESTINAL ENDOSCOPY  2015       Medications Prior to Admission:    Prior to Admission medications    Medication Sig Start Date End Date Taking? Authorizing Provider   rasagiline (AZILECT) 0.5 MG TABS take 1 tablet by mouth once daily 10/7/20   Historical Provider, MD   metoprolol succinate (TOPROL XL) 50 MG extended release tablet Take 1 tablet by mouth daily 8/24/20   Marina Sotelo MD   losartan (COZAAR) 25 MG tablet Take 1 tablet by mouth daily 8/24/20   Marina Sotelo MD   clopidogrel (PLAVIX) 75 MG tablet Take 1 tablet by mouth daily 8/24/20   Marina Sotelo MD   tamsulosin [facility]) 0.4 MG capsule Take 1 capsule by mouth daily 8/24/20   Marina Sotelo MD   atorvastatin (LIPITOR) 80 MG tablet Take 1 tablet by mouth daily 8/24/20   Marina Sotelo MD   ketoconazole (NIZORAL) 2 % shampoo Apply topically daily as needed. 7/6/20   Marina Sotelo MD   nitroGLYCERIN (NITROSTAT) 0.4 MG SL tablet Place 1 tablet under the tongue every 5 minutes as needed for Chest pain 2/21/20   Isrrael Brito,    Multiple Vitamins-Minerals (THERAPEUTIC MULTIVITAMIN-MINERALS) tablet Take 1 tablet by mouth daily    Historical Provider, MD   CPAP Machine MISC daily at bedtime.     Historical Provider, MD   aspirin 81 MG EC tablet Take 81 mg by mouth daily Last taken 05/11/2019    Historical Provider, MD   Cholecalciferol (VITAMIN D) 2000 UNIT CAPS capsule Take 1 capsule by mouth daily Last taken 05/13 Historical Provider, MD       Allergies:    Lisinopril    Social History:    reports that he quit smoking about 45 years ago. His smoking use included cigarettes. He started smoking about 63 years ago. He has a 15.00 pack-year smoking history. His smokeless tobacco use includes snuff. He reports current alcohol use. He reports that he does not use drugs. Family History:   family history includes Diabetes in his mother; Heart Disease in his mother; Liver Disease in his father. PHYSICAL EXAM:  Vitals:  BP (!) 145/82   Pulse 70   Temp 98.9 °F (37.2 °C) (Oral)   Resp 18   Ht 6' 1\" (1.854 m)   Wt 200 lb (90.7 kg)   SpO2 96%   BMI 26.39 kg/m²   General Appearance: alert and oriented to person, place and time and in no acute distress  Skin: warm and dry, turgor not diminished  Head: normocephalic and atraumatic  Eyes: pupils equal, round, and reactive to light, extraocular eye movements intact, conjunctivae normal  Neck: neck supple and non tender without mass   Pulmonary/Chest: clear to auscultation bilaterally- no wheezes, rales or rhonchi, normal air movement, no respiratory distress  Cardiovascular: normal rate, normal S1 and S2 and no M/R/R  Abdomen: soft, non-tender, non-distended, normal bowel sounds, no masses or organomegaly  Extremities: no cyanosis, no clubbing and no edema  Neurologic: no cranial nerve deficit and speech normal        LABS:  Recent Labs     11/22/20  1311      K 4.0      CO2 25   BUN 11   CREATININE 0.9   GLUCOSE 105*   CALCIUM 8.9       Recent Labs     11/22/20  1311   WBC 4.8   RBC 5.34   HGB 14.4   HCT 46.4   MCV 86.9   MCH 27.0   MCHC 31.0*   RDW 14.6      MPV 10.8       No results for input(s): POCGLU in the last 72 hours. Radiology:   XR CHEST PORTABLE   Final Result   Non-specific large lung volumes may be secondary to pulmonary emphysema   and/or asthma.    No radiographic evidence of definite acute cardiopulmonary disease when   accounting for slight interstitial scar in right lung base, unchanged from   2018          EKG: no acute abnl    ASSESSMENT:    Active Problems:    COVID-19 virus infection  Orthostatic hypotension  COPD with possible exacerbation  ASHD s/p stent  Chronic hfref 38%   BPH  hlp  LINETTE on cpap    PLAN:  covid 19:  -vitamin supplements. Check inflammatory markers  -no hypoxia however due to GARSIA and underlying copd will start prednisone for possible copd exacerbation  -repeat home oxygen assessment prior to dc    Orthostatic hypotension:  -continue ivf tonight. Repeat orthostatic vitals in am  -has hx of heart failure, avoid fluid overload. COPD with exacerbation:  -prednisone 40 mg daily, bronchodilators. Check procalcitonin, hold abx pending results. Ashd: resume home regimen. LINETTE: continue cpap    Code Status: full  DVT prophylaxis: lovenox      NOTE: This report was transcribed using voice recognition software. Every effort was made to ensure accuracy; however, inadvertent computerized transcription errors may be present.   Electronically signed by Mary Ann Johnson MD on 11/22/2020 at 9:59 PM

## 2020-11-24 LAB
ANION GAP SERPL CALCULATED.3IONS-SCNC: 9 MMOL/L (ref 7–16)
BASOPHILS ABSOLUTE: 0 E9/L (ref 0–0.2)
BASOPHILS RELATIVE PERCENT: 0 % (ref 0–2)
BUN BLDV-MCNC: 12 MG/DL (ref 8–23)
CALCIUM SERPL-MCNC: 8.2 MG/DL (ref 8.6–10.2)
CHLORIDE BLD-SCNC: 104 MMOL/L (ref 98–107)
CO2: 22 MMOL/L (ref 22–29)
CREAT SERPL-MCNC: 0.7 MG/DL (ref 0.7–1.2)
EOSINOPHILS ABSOLUTE: 0 E9/L (ref 0.05–0.5)
EOSINOPHILS RELATIVE PERCENT: 0 % (ref 0–6)
GFR AFRICAN AMERICAN: >60
GFR NON-AFRICAN AMERICAN: >60 ML/MIN/1.73
GLUCOSE BLD-MCNC: 121 MG/DL (ref 74–99)
HCT VFR BLD CALC: 38.5 % (ref 37–54)
HEMOGLOBIN: 12.6 G/DL (ref 12.5–16.5)
IMMATURE GRANULOCYTES #: 0.01 E9/L
IMMATURE GRANULOCYTES %: 0.2 % (ref 0–5)
LYMPHOCYTES ABSOLUTE: 1.25 E9/L (ref 1.5–4)
LYMPHOCYTES RELATIVE PERCENT: 20.8 % (ref 20–42)
MCH RBC QN AUTO: 27.5 PG (ref 26–35)
MCHC RBC AUTO-ENTMCNC: 32.7 % (ref 32–34.5)
MCV RBC AUTO: 84.1 FL (ref 80–99.9)
MONOCYTES ABSOLUTE: 0.62 E9/L (ref 0.1–0.95)
MONOCYTES RELATIVE PERCENT: 10.3 % (ref 2–12)
NEUTROPHILS ABSOLUTE: 4.14 E9/L (ref 1.8–7.3)
NEUTROPHILS RELATIVE PERCENT: 68.7 % (ref 43–80)
PDW BLD-RTO: 14.8 FL (ref 11.5–15)
PLATELET # BLD: 162 E9/L (ref 130–450)
PMV BLD AUTO: 10.7 FL (ref 7–12)
POTASSIUM REFLEX MAGNESIUM: 3.7 MMOL/L (ref 3.5–5)
PROCALCITONIN: 0.04 NG/ML (ref 0–0.08)
RBC # BLD: 4.58 E12/L (ref 3.8–5.8)
SODIUM BLD-SCNC: 135 MMOL/L (ref 132–146)
WBC # BLD: 6 E9/L (ref 4.5–11.5)

## 2020-11-24 PROCEDURE — 80048 BASIC METABOLIC PNL TOTAL CA: CPT

## 2020-11-24 PROCEDURE — 99225 PR SBSQ OBSERVATION CARE/DAY 25 MINUTES: CPT | Performed by: INTERNAL MEDICINE

## 2020-11-24 PROCEDURE — 6360000002 HC RX W HCPCS: Performed by: INTERNAL MEDICINE

## 2020-11-24 PROCEDURE — 2580000003 HC RX 258: Performed by: INTERNAL MEDICINE

## 2020-11-24 PROCEDURE — 6370000000 HC RX 637 (ALT 250 FOR IP): Performed by: INTERNAL MEDICINE

## 2020-11-24 PROCEDURE — 36415 COLL VENOUS BLD VENIPUNCTURE: CPT

## 2020-11-24 PROCEDURE — 96372 THER/PROPH/DIAG INJ SC/IM: CPT

## 2020-11-24 PROCEDURE — 85025 COMPLETE CBC W/AUTO DIFF WBC: CPT

## 2020-11-24 PROCEDURE — G0378 HOSPITAL OBSERVATION PER HR: HCPCS

## 2020-11-24 RX ADMIN — Medication 10 ML: at 09:54

## 2020-11-24 RX ADMIN — ZINC SULFATE 220 MG (50 MG) CAPSULE 50 MG: CAPSULE at 09:56

## 2020-11-24 RX ADMIN — Medication 1000 MG: at 09:55

## 2020-11-24 RX ADMIN — SODIUM CHLORIDE: 9 INJECTION, SOLUTION INTRAVENOUS at 02:48

## 2020-11-24 RX ADMIN — LOSARTAN POTASSIUM 25 MG: 25 TABLET, FILM COATED ORAL at 09:55

## 2020-11-24 RX ADMIN — TAMSULOSIN HYDROCHLORIDE 0.4 MG: 0.4 CAPSULE ORAL at 09:55

## 2020-11-24 RX ADMIN — ATORVASTATIN CALCIUM 80 MG: 40 TABLET, FILM COATED ORAL at 09:56

## 2020-11-24 RX ADMIN — ASPIRIN 81 MG: 81 TABLET, COATED ORAL at 09:55

## 2020-11-24 RX ADMIN — Medication 1000 MG: at 21:00

## 2020-11-24 RX ADMIN — METOPROLOL SUCCINATE 50 MG: 25 TABLET, EXTENDED RELEASE ORAL at 09:56

## 2020-11-24 RX ADMIN — ZINC SULFATE 220 MG (50 MG) CAPSULE 50 MG: CAPSULE at 21:00

## 2020-11-24 RX ADMIN — CLOPIDOGREL BISULFATE 75 MG: 75 TABLET ORAL at 09:55

## 2020-11-24 RX ADMIN — POLYETHYLENE GLYCOL 3350 17 G: 17 POWDER, FOR SOLUTION ORAL at 09:52

## 2020-11-24 RX ADMIN — ENOXAPARIN SODIUM 40 MG: 40 INJECTION SUBCUTANEOUS at 09:54

## 2020-11-24 RX ADMIN — PREDNISONE 40 MG: 20 TABLET ORAL at 14:55

## 2020-11-24 RX ADMIN — Medication 10 ML: at 21:00

## 2020-11-24 RX ADMIN — MULTIPLE VITAMINS W/ MINERALS TAB 1 TABLET: TAB at 09:55

## 2020-11-24 ASSESSMENT — PAIN SCALES - GENERAL
PAINLEVEL_OUTOF10: 0

## 2020-11-24 NOTE — CARE COORDINATION
Social work / Discharge Planning:       [address] Social work noted that when pulse ox testing completed, patient did not qualify for home oxygen. Social work will continue to follow to assist with discharge planning.   Electronically signed by BERTHA Chappell on 11/24/2020 at 10:21 AM

## 2020-11-24 NOTE — PROGRESS NOTES
Pulse ox was _____95_% on room air at rest.  Ambulated patient on room air. Oxygen saturation was ____95__% on room air while ambulating.  Electronically signed by Kavin Galan RN on 11/24/2020 at 10:18 AM    .

## 2020-11-24 NOTE — PROGRESS NOTES
[facility] Hospitalist   Progress Note    Admitting Date and Time: 11/22/2020 11:40 AM  Admit Dx: NLTSH-97 virus infection [U07.1]  COVID-19 virus infection [U07.1]    Subjective:    Patient was admitted with COVID-19 virus infection [U07.1]  COVID-19 virus infection [U07.1].  Patient denies fever, chills, cp, sob, n/v.     aspirin  81 mg Oral Daily    therapeutic multivitamin-minerals  1 tablet Oral Daily    metoprolol succinate  50 mg Oral Daily    losartan  25 mg Oral Daily    clopidogrel  75 mg Oral Daily    tamsulosin  0.4 mg Oral Daily    atorvastatin  80 mg Oral Daily    sodium chloride flush  10 mL Intravenous 2 times per day    enoxaparin  40 mg Subcutaneous Daily    Vitamin D  1,000 Units Oral Daily    vitamin C  1,000 mg Oral BID    zinc sulfate  50 mg Oral BID    predniSONE  40 mg Oral Daily     nitroGLYCERIN, 0.4 mg, Q5 Min PRN  sodium chloride flush, 10 mL, PRN  acetaminophen, 650 mg, Q6H PRN    Or  acetaminophen, 650 mg, Q6H PRN  polyethylene glycol, 17 g, Daily PRN         Objective:    /66   Pulse 61   Temp 98.2 °F (36.8 °C) (Oral)   Resp 18   Ht 6' 1\" (1.854 m)   Wt 200 lb (90.7 kg)   SpO2 96%   BMI 26.39 kg/m²   Skin: warm and dry, no rash or erythema  Pulmonary/Chest: clear to auscultation bilaterally- no wheezes, rales or rhonchi, normal air movement, no respiratory distress  Cardiovascular: rhythm reg at rate of 64  Abdomen: soft, non-tender, non-distended, normal bowel sounds, no masses or organomegaly  Extremities: no cyanosis, no clubbing and no edema      Recent Labs     11/22/20  1311 11/23/20  0330    135   K 4.0 3.9    104   CO2 25 20*   BUN 11 10   CREATININE 0.9 0.7   GLUCOSE 105* 133*   CALCIUM 8.9 8.1*       Recent Labs     11/22/20  1311 11/23/20  0330   WBC 4.8 4.3*   RBC 5.34 4.76   HGB 14.4 12.9   HCT 46.4 40.9   MCV 86.9 85.9   MCH 27.0 27.1   MCHC 31.0* 31.5*   RDW 14.6 14.6    143   MPV 10.8 10.6       CBC with Differential:    Lab Results   Component Value Date    WBC 4.3 11/23/2020    RBC 4.76 11/23/2020    HGB 12.9 11/23/2020    HCT 40.9 11/23/2020     11/23/2020    MCV 85.9 11/23/2020    MCH 27.1 11/23/2020    MCHC 31.5 11/23/2020    RDW 14.6 11/23/2020    SEGSPCT 45 01/10/2012    LYMPHOPCT 27.4 11/23/2020    MONOPCT 14.1 11/23/2020    BASOPCT 0.2 11/23/2020    MONOSABS 0.60 11/23/2020    LYMPHSABS 1.17 11/23/2020    EOSABS 0.00 11/23/2020    BASOSABS 0.01 11/23/2020     BMP:    Lab Results   Component Value Date     11/23/2020    K 3.9 11/23/2020     11/23/2020    CO2 20 11/23/2020    BUN 10 11/23/2020    LABALBU 3.8 11/22/2020    LABALBU 3.9 01/10/2012    CREATININE 0.7 11/23/2020    CALCIUM 8.1 11/23/2020    GFRAA >60 11/23/2020    LABGLOM >60 11/23/2020    GLUCOSE 133 11/23/2020    GLUCOSE 103 01/10/2012     Troponin:    Lab Results   Component Value Date    TROPONINI <0.01 11/23/2020        Radiology:   XR CHEST PORTABLE   Final Result   Non-specific large lung volumes may be secondary to pulmonary emphysema   and/or asthma. No radiographic evidence of definite acute cardiopulmonary disease when   accounting for slight interstitial scar in right lung base, unchanged from   2018          Assessment:    Active Problems:    COVID-19 virus infection  Resolved Problems:    * No resolved hospital problems. *      Plan:  1. covid 19 infection monitor for worsening symptoms  Pt on prednisone for copd exacerbation  2. Orthostatic hypotension continue iv fluids  3. Dehydration continue iv fluids  4. Copd exacerbation continue steroids  5. Acidosis monitor  6. htn continue meds  7.  Hyperlipidemia continue med        Electronically signed by Golden Saldaña DO on 11/23/2020 at 11:10 PM

## 2020-11-25 VITALS
OXYGEN SATURATION: 93 % | SYSTOLIC BLOOD PRESSURE: 106 MMHG | HEART RATE: 86 BPM | TEMPERATURE: 98.9 F | WEIGHT: 200 LBS | HEIGHT: 73 IN | BODY MASS INDEX: 26.51 KG/M2 | DIASTOLIC BLOOD PRESSURE: 62 MMHG | RESPIRATION RATE: 16 BRPM

## 2020-11-25 LAB
ANION GAP SERPL CALCULATED.3IONS-SCNC: 10 MMOL/L (ref 7–16)
BASOPHILS ABSOLUTE: 0 E9/L (ref 0–0.2)
BASOPHILS RELATIVE PERCENT: 0 % (ref 0–2)
BUN BLDV-MCNC: 11 MG/DL (ref 8–23)
CALCIUM SERPL-MCNC: 8 MG/DL (ref 8.6–10.2)
CHLORIDE BLD-SCNC: 103 MMOL/L (ref 98–107)
CO2: 21 MMOL/L (ref 22–29)
CREAT SERPL-MCNC: 0.6 MG/DL (ref 0.7–1.2)
EOSINOPHILS ABSOLUTE: 0 E9/L (ref 0.05–0.5)
EOSINOPHILS RELATIVE PERCENT: 0 % (ref 0–6)
GFR AFRICAN AMERICAN: >60
GFR NON-AFRICAN AMERICAN: >60 ML/MIN/1.73
GLUCOSE BLD-MCNC: 135 MG/DL (ref 74–99)
HCT VFR BLD CALC: 37.1 % (ref 37–54)
HEMOGLOBIN: 12.4 G/DL (ref 12.5–16.5)
IMMATURE GRANULOCYTES #: 0.02 E9/L
IMMATURE GRANULOCYTES %: 0.5 % (ref 0–5)
LYMPHOCYTES ABSOLUTE: 0.98 E9/L (ref 1.5–4)
LYMPHOCYTES RELATIVE PERCENT: 23.2 % (ref 20–42)
MCH RBC QN AUTO: 28.1 PG (ref 26–35)
MCHC RBC AUTO-ENTMCNC: 33.4 % (ref 32–34.5)
MCV RBC AUTO: 83.9 FL (ref 80–99.9)
MONOCYTES ABSOLUTE: 0.52 E9/L (ref 0.1–0.95)
MONOCYTES RELATIVE PERCENT: 12.3 % (ref 2–12)
NEUTROPHILS ABSOLUTE: 2.7 E9/L (ref 1.8–7.3)
NEUTROPHILS RELATIVE PERCENT: 64 % (ref 43–80)
PDW BLD-RTO: 15 FL (ref 11.5–15)
PLATELET # BLD: 159 E9/L (ref 130–450)
PMV BLD AUTO: 10.7 FL (ref 7–12)
POTASSIUM REFLEX MAGNESIUM: 3.7 MMOL/L (ref 3.5–5)
RBC # BLD: 4.42 E12/L (ref 3.8–5.8)
SODIUM BLD-SCNC: 134 MMOL/L (ref 132–146)
WBC # BLD: 4.2 E9/L (ref 4.5–11.5)

## 2020-11-25 PROCEDURE — G0378 HOSPITAL OBSERVATION PER HR: HCPCS

## 2020-11-25 PROCEDURE — 2580000003 HC RX 258: Performed by: INTERNAL MEDICINE

## 2020-11-25 PROCEDURE — 6370000000 HC RX 637 (ALT 250 FOR IP): Performed by: INTERNAL MEDICINE

## 2020-11-25 PROCEDURE — 80048 BASIC METABOLIC PNL TOTAL CA: CPT

## 2020-11-25 PROCEDURE — 99217 PR OBSERVATION CARE DISCHARGE MANAGEMENT: CPT | Performed by: INTERNAL MEDICINE

## 2020-11-25 PROCEDURE — 6360000002 HC RX W HCPCS: Performed by: INTERNAL MEDICINE

## 2020-11-25 PROCEDURE — 85025 COMPLETE CBC W/AUTO DIFF WBC: CPT

## 2020-11-25 PROCEDURE — 96372 THER/PROPH/DIAG INJ SC/IM: CPT

## 2020-11-25 PROCEDURE — 36415 COLL VENOUS BLD VENIPUNCTURE: CPT

## 2020-11-25 RX ORDER — PREDNISONE 10 MG/1
TABLET ORAL
Qty: 10 TABLET | Refills: 0 | Status: SHIPPED | OUTPATIENT
Start: 2020-11-25 | End: 2020-12-07

## 2020-11-25 RX ADMIN — Medication 10 ML: at 09:53

## 2020-11-25 RX ADMIN — CLOPIDOGREL BISULFATE 75 MG: 75 TABLET ORAL at 09:52

## 2020-11-25 RX ADMIN — Medication 1000 MG: at 09:52

## 2020-11-25 RX ADMIN — LOSARTAN POTASSIUM 25 MG: 25 TABLET, FILM COATED ORAL at 09:53

## 2020-11-25 RX ADMIN — METOPROLOL SUCCINATE 50 MG: 25 TABLET, EXTENDED RELEASE ORAL at 09:53

## 2020-11-25 RX ADMIN — ATORVASTATIN CALCIUM 80 MG: 40 TABLET, FILM COATED ORAL at 09:53

## 2020-11-25 RX ADMIN — PREDNISONE 40 MG: 20 TABLET ORAL at 09:53

## 2020-11-25 RX ADMIN — ASPIRIN 81 MG: 81 TABLET, COATED ORAL at 09:52

## 2020-11-25 RX ADMIN — MULTIPLE VITAMINS W/ MINERALS TAB 1 TABLET: TAB at 09:52

## 2020-11-25 RX ADMIN — ENOXAPARIN SODIUM 40 MG: 40 INJECTION SUBCUTANEOUS at 09:53

## 2020-11-25 RX ADMIN — ZINC SULFATE 220 MG (50 MG) CAPSULE 50 MG: CAPSULE at 09:53

## 2020-11-25 RX ADMIN — TAMSULOSIN HYDROCHLORIDE 0.4 MG: 0.4 CAPSULE ORAL at 09:53

## 2020-11-25 ASSESSMENT — PAIN SCALES - GENERAL: PAINLEVEL_OUTOF10: 0

## 2020-11-25 NOTE — PROGRESS NOTES
[facility] CARE AT [facility]   Progress Note    Admitting Date and Time: 11/22/2020 11:40 AM  Admit Dx: ROFUN-20 virus infection [U07.1]  COVID-19 virus infection [U07.1]    Subjective:    Patient was admitted with COVID-19 virus infection [U07.1]  COVID-19 virus infection [U07.1].  Patient denies fever, chills, cp, sob, n/v.     aspirin  81 mg Oral Daily    therapeutic multivitamin-minerals  1 tablet Oral Daily    metoprolol succinate  50 mg Oral Daily    losartan  25 mg Oral Daily    clopidogrel  75 mg Oral Daily    tamsulosin  0.4 mg Oral Daily    atorvastatin  80 mg Oral Daily    sodium chloride flush  10 mL Intravenous 2 times per day    enoxaparin  40 mg Subcutaneous Daily    Vitamin D  1,000 Units Oral Daily    vitamin C  1,000 mg Oral BID    zinc sulfate  50 mg Oral BID    predniSONE  40 mg Oral Daily     nitroGLYCERIN, 0.4 mg, Q5 Min PRN  sodium chloride flush, 10 mL, PRN  acetaminophen, 650 mg, Q6H PRN    Or  acetaminophen, 650 mg, Q6H PRN  polyethylene glycol, 17 g, Daily PRN         Objective:    /69   Pulse 74   Temp 98.3 °F (36.8 °C) (Oral)   Resp 18   Ht 6' 1\" (1.854 m)   Wt 200 lb (90.7 kg)   SpO2 94%   BMI 26.39 kg/m²   Skin: warm and dry, no rash or erythema  Pulmonary/Chest: clear to auscultation bilaterally- no wheezes, rales or rhonchi, normal air movement, no respiratory distress  Cardiovascular: rhythm reg at rate of 76  Abdomen: soft, non-tender, non-distended, normal bowel sounds, no masses or organomegaly  Extremities: no cyanosis, no clubbing and no edema      Recent Labs     11/22/20  1311 11/23/20  0330 11/24/20  0230    135 135   K 4.0 3.9 3.7    104 104   CO2 25 20* 22   BUN 11 10 12   CREATININE 0.9 0.7 0.7   GLUCOSE 105* 133* 121*   CALCIUM 8.9 8.1* 8.2*       Recent Labs     11/22/20  1311 11/23/20  0330 11/24/20  0230   WBC 4.8 4.3* 6.0   RBC 5.34 4.76 4.58   HGB 14.4 12.9 12.6   HCT 46.4 40.9 38.5   MCV 86.9 85.9 84.1   MCH 27.0 27.1 27.5   MCHC 31.0* 31.5* 32.7   RDW 14.6 14.6 14.8    143 162   MPV 10.8 10.6 10.7       CBC with Differential:    Lab Results   Component Value Date    WBC 6.0 11/24/2020    RBC 4.58 11/24/2020    HGB 12.6 11/24/2020    HCT 38.5 11/24/2020     11/24/2020    MCV 84.1 11/24/2020    MCH 27.5 11/24/2020    MCHC 32.7 11/24/2020    RDW 14.8 11/24/2020    SEGSPCT 45 01/10/2012    LYMPHOPCT 20.8 11/24/2020    MONOPCT 10.3 11/24/2020    BASOPCT 0.0 11/24/2020    MONOSABS 0.62 11/24/2020    LYMPHSABS 1.25 11/24/2020    EOSABS 0.00 11/24/2020    BASOSABS 0.00 11/24/2020     BMP:    Lab Results   Component Value Date     11/24/2020    K 3.7 11/24/2020     11/24/2020    CO2 22 11/24/2020    BUN 12 11/24/2020    LABALBU 3.8 11/22/2020    LABALBU 3.9 01/10/2012    CREATININE 0.7 11/24/2020    CALCIUM 8.2 11/24/2020    GFRAA >60 11/24/2020    LABGLOM >60 11/24/2020    GLUCOSE 121 11/24/2020    GLUCOSE 103 01/10/2012        Radiology:   XR CHEST PORTABLE   Final Result   Non-specific large lung volumes may be secondary to pulmonary emphysema   and/or asthma. No radiographic evidence of definite acute cardiopulmonary disease when   accounting for slight interstitial scar in right lung base, unchanged from   2018          Assessment:    Active Problems:    COVID-19 virus infection  Resolved Problems:    * No resolved hospital problems. *      Plan:  1. covid 19 infection monitor for worsening symptoms  Pt on prednisone for copd exacerbation  2. Orthostatic hypotension continue iv fluids. Continue to monitor. Pt is on tamulosin and has parkinsons disease although reported as early parkinsons. Pt still mildly symptomatic. Hopeful discharge tomorrow if continues to be less symptomatic(lightheaded and generalized weakness)  3. Dehydration continue iv fluids  4. Copd exacerbation continue steroids  5. Acidosis monitor  6. htn continue meds  7.  Hyperlipidemia continue med          Electronically signed by Song Grimm, DO on 11/24/2020 at 11:44 PM

## 2020-11-25 NOTE — CARE COORDINATION
CM NOTE: Per QFR---covid positive in droplet plus isolation. Up in chair. Currently on room air. Checking ambulating O2 to determine if pt qualifies for home oxygen. Tested yesterday & did not qualify. Plan home today.

## 2020-11-25 NOTE — DISCHARGE SUMMARY
[facility] [place] Physician Discharge Summary       No follow-up provider specified. Activity level: as staci    Diet: DIET CARDIAC; Dispo:home    Condition at discharge: fair          Patient ID:  Felipe Camarillo  [id number]  67 y.o.  1940    Admit date: 11/22/2020    Discharge date and time:  11/25/2020  3:39 PM    Admission Diagnoses: Active Problems:    COVID-19 virus infection  Resolved Problems:    * No resolved hospital problems. *      Discharge Diagnoses: Active Problems:    COVID-19 virus infection  Resolved Problems:    * No resolved hospital problems. *    covid 19 infection  Orthostatic hypotension  Dehydration  Copd exacerbation  Acidosis  htn  hyperlipidemia      Consults:  None    Procedures: none    Hospital Course: Patient was admitted with COVID-19 virus infection [U07.1]  COVID-19 virus infection [U07.1]. Patient is an [de-identified]year old male with a hx of ashd, lvef 38%, prior tobacco abuse, htn, hlp, copd and early parkinsons disease. For the past week he has had generalized weakness and poor appetite. Chronically has poor sense of taste and smell but didn't notice a difference recently. No sore throat, no congestion, no diarrhea. His wife also has similar sx. On Monday they were both checked, she was positive but his result was indeterminate. Since then he has noticed intermittent GARSIA that is progressive, today he had shortness of breath just with getting up out of bed and changing clothes. Therefore presented to the ED for further evaluation. At rest his pulse ox is above 95%, on exertion was 94%. Had orthostatic hypotension despite ivf resuscitation therefore referred for admission. Pt seen and examined. Pt given steroids. Pt also monitored on iv fluids. Pt did improve. Pt initially with symptoms of lightheadedness with orthos but improved.  Pt encouraged to continue to take po fluids and may need to discuss with outpt doctor if needs to change tamulosin. On day of discharge, pt denied fevers, chills, n/v. Discharge planning d/w pt. Time given for questions and all questions answered. Discharge Exam:  Vitals:    11/24/20 1445 11/24/20 2026 11/25/20 0235 11/25/20 0930   BP: 109/62 125/69  (!) 165/91   Pulse: 86 74  80   Resp: 20 18  16   Temp: 98.5 °F (36.9 °C) 98.3 °F (36.8 °C)  99.6 °F (37.6 °C)   TempSrc: Oral Oral  Oral   SpO2: 94%  93% 95%   Weight:       Height:           Skin: warm and dry, no rash or erythema  Pulmonary/Chest: clear to auscultation bilaterally- no wheezes, rales or rhonchi, normal air movement, no respiratory distress  Cardiovascular: rhythm reg at rate of 76  Abdomen: soft, non-tender, non-distended, normal bowel sounds, no masses or organomegaly  Extremities: no cyanosis, no clubbing and no edema  I/O last 3 completed shifts: In: 120 [P.O.:120]  Out: 1300 [Urine:1300]  No intake/output data recorded. LABS:  Recent Labs     11/23/20 0330 11/24/20 0230 11/25/20  0205    135 134   K 3.9 3.7 3.7    104 103   CO2 20* 22 21*   BUN 10 12 11   CREATININE 0.7 0.7 0.6*   GLUCOSE 133* 121* 135*   CALCIUM 8.1* 8.2* 8.0*       Recent Labs     11/23/20 0330 11/24/20 0230 11/25/20  0205   WBC 4.3* 6.0 4.2*   RBC 4.76 4.58 4.42   HGB 12.9 12.6 12.4*   HCT 40.9 38.5 37.1   MCV 85.9 84.1 83.9   MCH 27.1 27.5 28.1   MCHC 31.5* 32.7 33.4   RDW 14.6 14.8 15.0    162 159   MPV 10.6 10.7 10.7       No results for input(s): POCGLU in the last 72 hours.     CBC with Differential:    Lab Results   Component Value Date    WBC 4.2 11/25/2020    RBC 4.42 11/25/2020    HGB 12.4 11/25/2020    HCT 37.1 11/25/2020     11/25/2020    MCV 83.9 11/25/2020    MCH 28.1 11/25/2020    MCHC 33.4 11/25/2020    RDW 15.0 11/25/2020    SEGSPCT 45 01/10/2012    LYMPHOPCT 23.2 11/25/2020    MONOPCT 12.3 11/25/2020    BASOPCT 0.0 11/25/2020    MONOSABS 0.52 11/25/2020    LYMPHSABS 0.98 11/25/2020    EOSABS 0.00 11/25/2020    BASOSABS 0.00 11/25/2020     BMP:    Lab Results   Component Value Date     11/25/2020    K 3.7 11/25/2020     11/25/2020    CO2 21 11/25/2020    BUN 11 11/25/2020    LABALBU 3.8 11/22/2020    LABALBU 3.9 01/10/2012    CREATININE 0.6 11/25/2020    CALCIUM 8.0 11/25/2020    GFRAA >60 11/25/2020    LABGLOM >60 11/25/2020    GLUCOSE 135 11/25/2020    GLUCOSE 103 01/10/2012       Imaging:   XR CHEST PORTABLE   Final Result   Non-specific large lung volumes may be secondary to pulmonary emphysema   and/or asthma. No radiographic evidence of definite acute cardiopulmonary disease when   accounting for slight interstitial scar in right lung base, unchanged from   2018          Patient Instructions:      Medication List      START taking these medications    predniSONE 10 MG tablet  Commonly known as:  DELTASONE  Take orally 4 tabs on 11/26 then 3 tabs on 11/27 then 2 tabs on 11/28 then 1 tab on 11/29        CONTINUE taking these medications    aspirin 81 MG EC tablet     atorvastatin 80 MG tablet  Commonly known as:  LIPITOR  Take 1 tablet by mouth daily     clopidogrel 75 MG tablet  Commonly known as:  PLAVIX  Take 1 tablet by mouth daily     CPAP Machine Misc     ketoconazole 2 % shampoo  Commonly known as:  Nizoral  Apply topically daily as needed.      losartan 25 MG tablet  Commonly known as:  COZAAR  Take 1 tablet by mouth daily     metoprolol succinate 50 MG extended release tablet  Commonly known as:  TOPROL XL  Take 1 tablet by mouth daily     nitroGLYCERIN 0.4 MG SL tablet  Commonly known as:  Nitrostat  Place 1 tablet under the tongue every 5 minutes as needed for Chest pain     rasagiline 0.5 MG Tabs  Commonly known as:  AZILECT     tamsulosin 0.4 MG capsule  Commonly known as:  FLOMAX  Take 1 capsule by mouth daily     therapeutic multivitamin-minerals tablet     vitamin D 50 MCG (2000 UT) Caps capsule           Where to Get Your Medications      These medications were sent to [facility] [address] [place] [address] Thierry Gaytan, [address] [address]    Phone:  [phone]   · predniSONE 10 MG tablet           Total time for discharge is 37 min    Signed:  Electronically signed by Drew Laboy DO on 11/25/2020 at 3:39 PM

## 2020-11-26 ENCOUNTER — TELEPHONE (OUTPATIENT)
Dept: FAMILY MEDICINE CLINIC | Age: 80
End: 2020-11-26

## 2020-11-27 ENCOUNTER — CARE COORDINATION (OUTPATIENT)
Dept: CASE MANAGEMENT | Age: 80
End: 2020-11-27

## 2020-11-27 NOTE — PROGRESS NOTES
CLINICAL PHARMACY NOTE: MEDS TO [address] Select Patient?: Yes  Total # of Prescriptions Filled: 1   The following medications were delivered to the patient:  · PREDNISONE 10 MG    Total # of Interventions Completed: 2  Time Spent (min): 30    Additional Documentation:

## 2020-11-27 NOTE — CARE COORDINATION
[facility] Transitions Initial Follow Up Call    Call within 2 business days of discharge: Yes    Patient: Shanon Hernandez Patient : 1940   MRN: <[id number]>  Reason for Admission: [address]  Discharge Date: 20 RARS: Readmission Risk Score: 13      Last Discharge [address] [address]       Complaint Diagnosis Description Type Department Provider    20 Shortness of Breath; Cough; Fatigue Dehydration . .. ED to Hosp-Admission (Discharged) (ADMITTED) TERESA 6W Song Grimm DO; BARB Ornelas. Attempted to reach pt for follow up call. Left message requesting call back.       Care Transitions 24 Hour Call    Care Transitions Interventions         Follow Up  Future Appointments   Date Time Provider Peter Martines   2021 11:00 AM Aurelio Lemus MD [place]

## 2020-11-28 ENCOUNTER — CARE COORDINATION (OUTPATIENT)
Dept: CASE MANAGEMENT | Age: 80
End: 2020-11-28

## 2020-11-30 ASSESSMENT — ENCOUNTER SYMPTOMS
TROUBLE SWALLOWING: 0
VOICE CHANGE: 0

## 2020-11-30 NOTE — TELEPHONE ENCOUNTER
Pt is till having symptoms. Hospital said to f/u w/in 1 week. Pt is not able to do a virtual appt. Pt feels stable. What is the suggested time frame for when he would be allowed in our office for a follow up?

## 2020-12-01 NOTE — TELEPHONE ENCOUNTER
Per CDC guidelines    At least 10 days have passed since symptoms first appeared and At least 24 hours have passed since last fever without the use of fever-reducing medications and Symptoms (e.g., cough, shortness of breath) have improved.  I would probably apply this from his discharge date since still with symptoms

## 2020-12-02 ENCOUNTER — TELEPHONE (OUTPATIENT)
Dept: ADMINISTRATIVE | Age: 80
End: 2020-12-02

## 2020-12-02 NOTE — TELEPHONE ENCOUNTER
Spoke w/ wife and let her know that a repeat test was not done. We are working on scheduling an appt for next week. When we have an opening we will call.

## 2020-12-02 NOTE — TELEPHONE ENCOUNTER
Pts wife called in and would like Dr Makayla Acosta nurse to return a call to her. She said pt was just discharged from Hospital.  She stated that pt was covid + in hospital and was tested again prior to leaving. She does not know those results and she is unsure as to when pt. Should schedule follow up.     thanks

## 2020-12-07 ENCOUNTER — OFFICE VISIT (OUTPATIENT)
Dept: FAMILY MEDICINE CLINIC | Age: 80
End: 2020-12-07
Payer: MEDICARE

## 2020-12-07 VITALS
TEMPERATURE: 96.9 F | BODY MASS INDEX: 26.24 KG/M2 | WEIGHT: 198 LBS | HEIGHT: 73 IN | OXYGEN SATURATION: 97 % | HEART RATE: 77 BPM | SYSTOLIC BLOOD PRESSURE: 110 MMHG | DIASTOLIC BLOOD PRESSURE: 66 MMHG

## 2020-12-07 PROCEDURE — 1111F DSCHRG MED/CURRENT MED MERGE: CPT | Performed by: INTERNAL MEDICINE

## 2020-12-07 PROCEDURE — 99214 OFFICE O/P EST MOD 30 MIN: CPT | Performed by: INTERNAL MEDICINE

## 2020-12-07 RX ORDER — KETOCONAZOLE 20 MG/G
CREAM TOPICAL
Qty: 30 G | Refills: 1 | Status: SHIPPED | OUTPATIENT
Start: 2020-12-07

## 2020-12-07 RX ORDER — MIRTAZAPINE 15 MG/1
15 TABLET, FILM COATED ORAL NIGHTLY
Qty: 30 TABLET | Refills: 5 | Status: SHIPPED
Start: 2020-12-07 | End: 2021-05-04 | Stop reason: SDUPTHER

## 2020-12-07 ASSESSMENT — ENCOUNTER SYMPTOMS
COUGH: 0
VOMITING: 0
SORE THROAT: 0
EYE PAIN: 0
BLOOD IN STOOL: 0
CONSTIPATION: 0
CHEST TIGHTNESS: 0
ABDOMINAL PAIN: 0
SHORTNESS OF BREATH: 0
RHINORRHEA: 0
DIARRHEA: 0
NAUSEA: 0

## 2020-12-07 NOTE — PROGRESS NOTES
Post-Discharge Transitional Care Management Services or Hospital Follow Up      Aleta Flores   YOB: 1940    Date of Office Visit:  12/7/2020  Date of Hospital Admission: 11/22/20  Date of Hospital Discharge: 11/25/20  Readmission Risk Score(high >=14%.  Medium >=10%):Readmission Risk Score: 13      Care management risk score Rising risk (score 2-5) and Complex Care (Scores >=6): 3     Non face to face  following discharge, date last encounter closed (first attempt may have been earlier): 11/28/2020  3:53 PM 11/28/2020  3:53 PM    Call initiated 2 business days of discharge: Yes     Patient Active Problem List   Diagnosis    Hyperlipidemia    CAD (coronary artery disease)    Hypertension    Sleep apnea    Osteoarthritis    Smokeless tobacco use    Ischemic cardiomyopathy    TIA (transient ischemic attack)    CHF (congestive heart failure), NYHA class I, chronic, systolic (HCC)    COPD (chronic obstructive pulmonary disease) ([facility] [address]    Pigmented skin lesion    Prostate enlargement    Fatigue    Disorder of bone    Other constipation    Vitamin D deficiency    Bilateral impacted cerumen    Impaired fasting glucose    Acute cholangitis    Elevated LFTs    Leukocytosis    Lactic acidosis    COVID-19 virus infection    Dehydration    Orthostatic hypotension    COPD exacerbation (HCC)       Allergies   Allergen Reactions    Lisinopril Itching       Medications listed as ordered at the time of discharge from hospital   [facility] Medication Instructions TRUMAN:    Printed on:12/07/20 8851   Medication Information                      aspirin 81 MG EC tablet  Take 81 mg by mouth daily Last taken 05/11/2019             atorvastatin (LIPITOR) 80 MG tablet  Take 1 tablet by mouth daily             Cholecalciferol (VITAMIN D) 2000 UNIT CAPS capsule  Take 1 capsule by mouth daily Last taken 05/13             clopidogrel (PLAVIX) 75 MG tablet  Take 1 tablet by mouth daily             CPAP Machine MISC  daily at bedtime. ketoconazole (NIZORAL) 2 % cream  Apply topically daily. ketoconazole (NIZORAL) 2 % shampoo  Apply topically daily as needed. losartan (COZAAR) 25 MG tablet  Take 1 tablet by mouth daily             metoprolol succinate (TOPROL XL) 50 MG extended release tablet  Take 1 tablet by mouth daily             mirtazapine (REMERON) 15 MG tablet  Take 1 tablet by mouth nightly             Multiple Vitamins-Minerals (THERAPEUTIC MULTIVITAMIN-MINERALS) tablet  Take 1 tablet by mouth daily             nitroGLYCERIN (NITROSTAT) 0.4 MG SL tablet  Place 1 tablet under the tongue every 5 minutes as needed for Chest pain             rasagiline (AZILECT) 0.5 MG TABS  take 1 tablet by mouth once daily             tamsulosin (FLOMAX) 0.4 MG capsule  Take 1 capsule by mouth daily                   Medications marked \"taking\" at this time  Outpatient Medications Marked as Taking for the 12/7/20 encounter (Office Visit) with Cem Villa MD   Medication Sig Dispense Refill    mirtazapine (REMERON) 15 MG tablet Take 1 tablet by mouth nightly 30 tablet 5    ketoconazole (NIZORAL) 2 % cream Apply topically daily. 30 g 1    rasagiline (AZILECT) 0.5 MG TABS take 1 tablet by mouth once daily      metoprolol succinate (TOPROL XL) 50 MG extended release tablet Take 1 tablet by mouth daily 90 tablet 1    losartan (COZAAR) 25 MG tablet Take 1 tablet by mouth daily 90 tablet 1    clopidogrel (PLAVIX) 75 MG tablet Take 1 tablet by mouth daily 90 tablet 1    tamsulosin (FLOMAX) 0.4 MG capsule Take 1 capsule by mouth daily 90 capsule 1    atorvastatin (LIPITOR) 80 MG tablet Take 1 tablet by mouth daily 90 tablet 1    ketoconazole (NIZORAL) 2 % shampoo Apply topically daily as needed.  100 mL 1    nitroGLYCERIN (NITROSTAT) 0.4 MG SL tablet Place 1 tablet under the tongue every 5 minutes as needed for Chest pain 25 tablet 1    Multiple Vitamins-Minerals (THERAPEUTIC MULTIVITAMIN-MINERALS) tablet Take 1 tablet by mouth daily      CPAP Machine MISC daily at bedtime.  aspirin 81 MG EC tablet Take 81 mg by mouth daily Last taken 05/11/2019      Cholecalciferol (VITAMIN D) 2000 UNIT CAPS capsule Take 1 capsule by mouth daily Last taken 05/13          Medications patient taking as of now reconciled against medications ordered at time of hospital discharge: Yes    Chief Complaint   Patient presents with    Follow-Up from [address] [place] [address]    Inpatient course: Discharge summary reviewed- see chart. Interval history/Current status:     was admitted to hospital. States had fatigue. States decreased appetite. States was dehydration. States fever. States became weak. States possible loss of taste and smell. No cough. While in hospital was given IVF. States was given steroids. States was some orthostatic and improved. Was discharged     States since discharge, patient remains weak. Still with no appetite. No nausea or vomiting. No abdo pain. No dysuria. States urinary frequency. On Flomax normal. NO headaches. No vision changes. No new sore throat, rhinorrhea, cough. No chest pain or discomfort. No SOB. States having anxiety while in hospital. States family concerned about depression. States not moving around a lot. States restless. States was diagnosed with parkinson. States was started on medication. Rasagiline. States started a few months. Review of Systems   Constitutional: Negative for appetite change, chills, fever and unexpected weight change. HENT: Negative for congestion, rhinorrhea and sore throat. Eyes: Negative for pain and visual disturbance. Respiratory: Negative for cough, chest tightness and shortness of breath. Cardiovascular: Negative for chest pain and palpitations. Gastrointestinal: Negative for abdominal pain, blood in stool, constipation, diarrhea, nausea and vomiting. Genitourinary: Negative for difficulty urinating, dysuria, hematuria, scrotal swelling, testicular pain and urgency. Musculoskeletal: Negative for arthralgias and gait problem. Skin: Negative for rash. Neurological: Negative for dizziness, syncope, weakness, light-headedness and headaches. Hematological: Negative for adenopathy. Does not bruise/bleed easily. Psychiatric/Behavioral: Negative for suicidal ideas. The patient is not nervous/anxious. Vitals:    12/07/20 1502   BP: 110/66   Pulse: 77   Temp: 96.9 °F (36.1 °C)   SpO2: 97%   Weight: 198 lb (89.8 kg)   Height: 6' 1\" (1.854 m)     Body mass index is 26.12 kg/m². Wt Readings from Last 3 Encounters:   12/07/20 198 lb (89.8 kg)   11/22/20 200 lb (90.7 kg)   11/19/20 218 lb (98.9 kg)     BP Readings from Last 3 Encounters:   12/07/20 110/66   11/25/20 106/62   11/19/20 120/72       Physical Exam  Vitals signs reviewed. Constitutional:       Appearance: He is well-developed. HENT:      Head: Normocephalic and atraumatic. Right Ear: External ear normal.      Left Ear: External ear normal.   Eyes:      Pupils: Pupils are equal, round, and reactive to light. Neck:      Musculoskeletal: Normal range of motion and neck supple. Thyroid: No thyromegaly. Cardiovascular:      Rate and Rhythm: Normal rate and regular rhythm. Heart sounds: Normal heart sounds. No murmur. Pulmonary:      Effort: Pulmonary effort is normal.      Breath sounds: Normal breath sounds. No wheezing or rales. Abdominal:      General: Bowel sounds are normal.      Palpations: Abdomen is soft. Tenderness: There is no abdominal tenderness. There is no guarding or rebound. Musculoskeletal: Normal range of motion. Lymphadenopathy:      Cervical: No cervical adenopathy. Skin:     General: Skin is warm and dry. Neurological:      Mental Status: He is alert and oriented to person, place, and time. Cranial Nerves: No cranial nerve deficit.

## 2020-12-11 ENCOUNTER — EVALUATION (OUTPATIENT)
Dept: PHYSICAL THERAPY | Age: 80
End: 2020-12-11
Payer: MEDICARE

## 2020-12-11 PROCEDURE — 97162 PT EVAL MOD COMPLEX 30 MIN: CPT | Performed by: PHYSICAL THERAPIST

## 2020-12-11 NOTE — PROGRESS NOTES
[address] [facility]   Phone: [phone]   Fax: [phone]      Physical Therapy Daily Treatment Note    Date: 2020  Patient Name: Gina Frank  : 1940   MRN: [id number]  [facility]: 1 year  DOSx: NA  Referring Provider: Philipp Low MD  [address] Diagnosis:  G20 (ICD-10-CM) - Parkinson disease ([facility] [address]   Outcome Measure:  PSFS walking 4. Balance 4, sitting and standing 3   Elias/56    S: See eval  O:  Time 1100- 1145     Visit  Repeat outcome measure at mid point and end. Pain 0/10     ROM      Modalities            Exercise      Bike            Squats      Calf Raises             Marching      Alt. Sidekicks            Sit/Stands            Gait training       NMR Balance activities to aid in safe community and home ambulation    Marching Gait      Sidestepping      Retrowalk      Heel to toe      March on BOSU                  A:  Tolerated well. Above added to written HEP.   P: Continue with rehab plan  [facility], PT  DPT, PT [id number]    Treatment Charges: Mins Units   Initial Evaluation 45 1   Re-Evaluation     Ther Exercise         TE     Manual Therapy     MT     Ther Activities        TA     Gait Training          GT     Neuro Re-education NR     Modalities     Non-Billable Service Time     Other     Total Time/Units 45 1

## 2020-12-11 NOTE — PROGRESS NOTES
uses CPAP    Smokeless tobacco use     Stented coronary artery 2006    x1     Past Surgical History:   Procedure Laterality Date    CARDIAC CATHETERIZATION Left 11/30/2015    done [address] Bilateral     CHOLECYSTECTOMY, LAPAROSCOPIC N/A 5/17/2019    LAPAROSCOPIC ROBOTIC XI ASSISTED CHOLECYSTECTOMY performed by Jolene Jones MD at [address] [place] [address]  2014    CORONARY ANGIOPLASTY  1/23/06    DIAGNOSTIC CARDIAC CATH LAB PROCEDURE  1/23/06    KNEE ARTHROPLASTY Left 02/2015    ROTATOR CUFF REPAIR      bilaterally    TONSILLECTOMY AND ADENOIDECTOMY  1951    UPPER GASTROINTESTINAL ENDOSCOPY  2015       Medications:   Current Outpatient Medications   Medication Sig Dispense Refill    mirtazapine (REMERON) 15 MG tablet Take 1 tablet by mouth nightly 30 tablet 5    ketoconazole (NIZORAL) 2 % cream Apply topically daily. 30 g 1    rasagiline (AZILECT) 0.5 MG TABS take 1 tablet by mouth once daily      metoprolol succinate (TOPROL XL) 50 MG extended release tablet Take 1 tablet by mouth daily 90 tablet 1    losartan (COZAAR) 25 MG tablet Take 1 tablet by mouth daily 90 tablet 1    clopidogrel (PLAVIX) 75 MG tablet Take 1 tablet by mouth daily 90 tablet 1    tamsulosin (FLOMAX) 0.4 MG capsule Take 1 capsule by mouth daily 90 capsule 1    atorvastatin (LIPITOR) 80 MG tablet Take 1 tablet by mouth daily 90 tablet 1    ketoconazole (NIZORAL) 2 % shampoo Apply topically daily as needed. 100 mL 1    nitroGLYCERIN (NITROSTAT) 0.4 MG SL tablet Place 1 tablet under the tongue every 5 minutes as needed for Chest pain 25 tablet 1    Multiple Vitamins-Minerals (THERAPEUTIC MULTIVITAMIN-MINERALS) tablet Take 1 tablet by mouth daily      CPAP Machine MISC daily at bedtime.       aspirin 81 MG EC tablet Take 81 mg by mouth daily Last taken 05/11/2019      Cholecalciferol (VITAMIN D) 2000 UNIT CAPS capsule Take 1 capsule by mouth daily Last taken 05/13 No current facility-administered medications for this visit. Social history: Patient lives with spouse in a 1 story house with 3 steps to enter with railing. Equipment owned: cane    Reported limitations: difficulty with walking     Occupation: does not work. Exercise regimen: none    Hobbies: none currently - used to play golf     Patient Goals: return to prior activity, get back to normal     Contraindications/Precautions: falls risk    OBJECTIVE:     Observations: well nourished male       Gait: pt ambulates with shuffling gait and slight flexed forward trunk     Functional Strength: not able to toe walk, heel walk; able to squat. Range of Motion:    Neck:    Flexion:  [x] Normal   [] Limited    Extension:  [] Normal   [x] Limited 20%    Right Rotation: [] Normal   [x] Limited 20%   Left Rotation:  [] Normal   [x] Limited 20%   Right Side Bending: [] Normal   [x] Limited 30%   Left Side Bending: [] Normal   [x] Limited 30%    Upper Extremity:   Right:   [x] Normal   [] Limited    Left:   [x] Normal   [] Limited     Trunk:   Flexion:  [x] Normal   [] Limited    Extension:  [] Normal   [x] Limited 30%    Right Rotation: [] Normal   [x] Limited 30%   Left Rotation:  [] Normal   [x] Limited 30%   Right Side Bending: [] Normal   [x] Limited 20%   Left Side Bending: [] Normal   [x] Limited 20%    Lower Extremity:   Right:   [x] Normal   [] Limited    Left:   [x] Normal   [] Limited       Strength:     Neck: mildly decreased   Trunk: mildly decreased   R UE: 4/5   L UE: 4/5   R LE: 4/5   L LE: 4- /5    Sensation:   Pt reports no c/o numbness/tingling in hands or feet. Coordination: heel to shin WNL  Alternating toe taps slight decreased coordination     Functional Mobility/Tests:  DGI: NA  DUNN/56    ASSESSMENT     Outcome Measure:   PSFS walking 4. Balance 4, sitting and standing 3     Problems:   ? Strength decreased  ?  Decreased functional ability with walking, stairs, standing, ADLs [x] There are no barriers affecting plan of care or recovery    [] Barriers to this patient's plan of care or recovery include. Domestic Concerns:  [x] No  [] Yes:    Short Term goals (2 weeks)  ? Increase Strength to 4+/5   ? Able to perform/complete the following functions/tasks: pt able to walk 150+ ft with least restrictive device with good - balance. Pt able to go up/down 5 steps with good - balance. Pt able to perform 5 sit to stands with 2 UE support with good - balance. Long Term goals (4-6 weeks)  ? Increase Strength to 5-/5   ? Able to perform/complete the following functions/tasks: pt able to walk 300 ft with least restrictive device with good balance. Pt able to go up/down flight of steps with good balance. Pt able to perform 10 sit to stands with 2 UE support with good balance. ? Independent with Home Exercise Programs     Rehab Potential: [x] Good  [] Fair  [] Poor    PLAN       Treatment Plan:   [x] Therapeutic Exercise  [x] Therapeutic Activity  [x] Neuromuscular Re-education   [x] Gait Training  [x] Balance Training  [x] Aerobic conditioning  [x] Manual Therapy  [x] Massage/Fascial release   [] Work/Sport specific activities    [] Pain Neuroscience [x] Cold/hotpack  [] Vasocompression  [x] Electrical Stimulation  [] Lumbar/Cervical Traction  [] Ultrasound   [] Iontophoresis: 4 mg/mL Dexamethasone Sodium Phosphate 40-80 mAmin        [x] Instruction in HEP      []  Medication allergies reviewed for use of Dexamethasone Sodium Phosphate 4mg/ml  with iontophoresis treatments. Patient is not allergic.       The following CPT codes are likely to be used in the care of this patient: 33278 PT Evaluation: Moderate Complexity , 40985 PT Re-Evaluation , 28582 Therapeutic Exercise , 42212 Neuromuscular Re-Education , 79480 Therapeutic Activities , 74886 Manual Therapy , 78966 Gait Training ,  Electrical Stimulation      Suggested Professional Referral: [x] No  [] Yes: Patient Education:  [x] Plans/Goals, Risks/Benefits discussed  [x] Home exercise program  Method of Education: [x] Verbal  [x] Demo  [x] Written  Comprehension of Education:  [x] Verbalizes understanding. [x] Demonstrates understanding. [] Needs Review. [] Demonstrates/verbalizes understanding of HEP/Ed previously given. Frequency:  2 days per week for 4-6 weeks     Patient understands diagnosis/prognosis and consents to treatment, plan and goals: [x] Yes    [] No     Thank you for the opportunity to work with your patient. If you have questions or comments, please contact me at numbers listed above. Electronically signed by: Nehal Blankenship, PT DPT [id number]         Please sign Physician's Certification and return to: [facility] PT  [address] [address]  Dept: [phone]  Dept Fax: [phone] PT , DPT PT [id number]    Physician's Certification / Comments     Frequency/Duration 2 days per week for 4-6 weeks  Certification period from 12/11/2020  to 1/22/2021. I have reviewed the Plan of Care established for skilled therapy services and certify that the services are required and that they will be provided while the patient is under my care.     Physician's Comments/Revisions:               Physician's Printed Name:                                           [de-identified] Signature:                                                               Date:

## 2020-12-16 ENCOUNTER — TREATMENT (OUTPATIENT)
Dept: PHYSICAL THERAPY | Age: 80
End: 2020-12-16
Payer: MEDICARE

## 2020-12-16 PROCEDURE — 97530 THERAPEUTIC ACTIVITIES: CPT | Performed by: PHYSICAL THERAPIST

## 2020-12-16 PROCEDURE — 97110 THERAPEUTIC EXERCISES: CPT | Performed by: PHYSICAL THERAPIST

## 2020-12-16 NOTE — PROGRESS NOTES
[address] [facility]   Phone: [phone]   Fax: [phone]      Physical Therapy Daily Treatment Note    Date: 2020  Patient Name: Shahida Razo  : 1940   MRN: [id number]  DOInjury: 1 year  DOSx: NA  Referring Provider: No referring provider defined for this encounter. Medical Diagnosis:  G20 (ICD-10-CM) - Parkinson disease ([facility] [address]   Outcome Measure:  PSFS walking 4. Balance 4, sitting and standing 3   Elias/56    S:  Pt reports no pain today. O:  Time 1150 - 1229     Visit 2/8 Repeat outcome measure at mid point and end. Pain 0/10     ROM      Modalities            Exercise      Bike 10 min   TE         Squats      Calf Raises 2 x 10   TE          Marching 2 x 10   TE   Alt. Sidekicks 2 x 10   TE   LAQ 2 x 10  HEP TE   Sit/Stands 2 x 10   TA   Hip adduction with ball seated  2 x 10  HEP TE   Hip abduction  2 x 10  OTB , HEP TE    NMR Balance activities to aid in safe community and home ambulation    Marching Gait      Sidestepping      Retrowalk      Heel to toe      March on BOSU                  A:  Tolerated well. Pt given handout for HEP- LAQ, seated marching, hip adduction and hip abduction.    P: Continue with rehab plan  Julee Umanzor PT  DPT, PT [id number]    Treatment Charges: Mins Units   Initial Evaluation     Re-Evaluation     Ther Exercise         TE 29 2   Manual Therapy     MT     Ther Activities        TA 10 1   Gait Training          GT     Neuro Re-education NR     Modalities     Non-Billable Service Time     Other     Total Time/Units 39 3

## 2020-12-23 ENCOUNTER — TREATMENT (OUTPATIENT)
Dept: PHYSICAL THERAPY | Age: 80
End: 2020-12-23
Payer: MEDICARE

## 2020-12-23 PROCEDURE — 97530 THERAPEUTIC ACTIVITIES: CPT | Performed by: PHYSICAL THERAPIST

## 2020-12-23 PROCEDURE — 97110 THERAPEUTIC EXERCISES: CPT | Performed by: PHYSICAL THERAPIST

## 2020-12-23 PROCEDURE — 97112 NEUROMUSCULAR REEDUCATION: CPT | Performed by: PHYSICAL THERAPIST

## 2020-12-23 NOTE — PROGRESS NOTES
[address] [facility]   Phone: [phone]   Fax: [phone]      Physical Therapy Daily Treatment Note    Date: 2020  Patient Name: Felipe Camarillo  : 1940   MRN: [id number]  DOInjury: 1 year  DOSx: NA  Referring Provider: No referring provider defined for this encounter. Medical Diagnosis:  G20 (ICD-10-CM) - Parkinson disease ([facility] [address]   Outcome Measure:  PSFS walking 4. Balance 4, sitting and standing 3   Elias/56    S:  Pt reports no pain today. O:  Time 1421- 6119      Visit 3/8 Repeat outcome measure at mid point and end. Pain 0/10     ROM      Modalities            Exercise      Bike 10 min   TE         Squats      Calf Raises 2 x 10   TE          Marching   TE   Alt. Sidekicks 2 x 10   TE   LAQ 2 x 10  HEP TE   Sit/Stands 2 x 10   TA   Hip adduction with ball seated  3 x 10  HEP TE   Hip abduction  3 x 10  YTB , HEP TE    NMR Balance activities to aid in safe community and home ambulation    Marching Gait 2 x 25 ft  With cane NMR   Sidestepping 1 x 25 ft  With cane to R and L  NMR    Fwd/ Retrowalk 1 x 25 ft  With cane NMR   Heel to toe      March on BOSU      Resisted gait             A:  Tolerated well.     P: Continue with rehab plan  Boom Aldana, PT  DPT, PT [id number]    Treatment Charges: Mins Units   Initial Evaluation     Re-Evaluation     Ther Exercise         TE 18 1   Manual Therapy     MT     Ther Activities        TA 10 1   Gait Training          GT     Neuro Re-education NR 10 1   Modalities     Non-Billable Service Time     Other     Total Time/Units 38 3

## 2020-12-29 ENCOUNTER — TREATMENT (OUTPATIENT)
Dept: PHYSICAL THERAPY | Age: 80
End: 2020-12-29
Payer: MEDICARE

## 2020-12-29 PROCEDURE — 97110 THERAPEUTIC EXERCISES: CPT | Performed by: PHYSICAL THERAPIST

## 2020-12-29 PROCEDURE — 97112 NEUROMUSCULAR REEDUCATION: CPT | Performed by: PHYSICAL THERAPIST

## 2020-12-29 NOTE — PROGRESS NOTES
[address] [facility]   Phone: [phone]   Fax: [phone]      Physical Therapy Daily Treatment Note    Date: 2020  Patient Name: Barrera Romero  : 1940   MRN: [id number]  [facility]: 1 [facility]  DOSx: NA  Referring Provider: Azra Srivastava MD  [address] Diagnosis:  G20 (ICD-10-CM) - Parkinson disease ([facility] [address]   Outcome Measure:  PSFS walking 4. Balance 4, sitting and standing 3   Elias/56    S:  Pt reports no pain today. O:  Time 1101- 1139     Visit 8 Repeat outcome measure at mid point and end. Pain 0/10     ROM      Modalities            Exercise      Bike 10 min   TE         Squats      Calf Raises 2 x 10   TE          Marching   TE   Alt. Sidekicks   TE   LAQ 3 x 10  HEP TE   Sit/Stands 2 x 10  With UE support     Hip adduction with ball seated  3 x 10  HEP TE   Hip abduction  3 x 10  BTB , HEP TE    NMR Balance activities to aid in safe community and home ambulation    Marching Gait 2 x 25 ft With cane NMR   Sidestepping 1 x 25 ft   With cane to R and L  NMR    Fwd/ Retrowalk 1 x 25 ft  With cane NMR   Heel to toe      March on BOSU      Resisted gait       Walking without cane working on clearing feet from floor/ not scuffing/ not shuffling  2 x 25ft  NMR   A:  Tolerated well. Requires cues during gait not to scuff shoes on floor. P: Continue with rehab plan.    Shmuel Nixon, PT  DPT, [address] [facility] [id number]    Treatment Charges: Mins Units   Initial Evaluation     Re-Evaluation     Ther Exercise         TE 25 2   Manual Therapy     MT     Ther Activities        TA     Gait Training          GT     Neuro Re-education NR 13 1   Modalities     Non-Billable Service Time     Other     Total Time/Units 38   3

## 2020-12-31 ENCOUNTER — TREATMENT (OUTPATIENT)
Dept: PHYSICAL THERAPY | Age: 80
End: 2020-12-31
Payer: MEDICARE

## 2020-12-31 PROCEDURE — 97110 THERAPEUTIC EXERCISES: CPT | Performed by: PHYSICAL THERAPIST

## 2020-12-31 PROCEDURE — 97112 NEUROMUSCULAR REEDUCATION: CPT | Performed by: PHYSICAL THERAPIST

## 2020-12-31 NOTE — PROGRESS NOTES
[address] [facility]   Phone: [phone]   Fax: [phone]      Physical Therapy Daily Treatment Note    Date: 2020  Patient Name: Kevin Nesbitt  : 1940   MRN: [id number]  DOInjury: 1 year  DOSx: NA  Referring Provider: No referring provider defined for this encounter. Medical Diagnosis:  G20 (ICD-10-CM) - Parkinson disease ([facility] [address]   Outcome Measure:  PSFS walking 4. Balance 4, sitting and standing 3   Elias/56    S:  Pt reports no pain today. O:  Time 1102- 1142     Visit  Repeat outcome measure at mid point and end. Pain 0/10     ROM      Modalities            Exercise      Bike 10 min   TE         Squats      Calf Raises 2 x 10   TE          Marching   TE   Alt. Sidekicks   TE   LAQ 3 x 10  HEP TE   Sit/Stands 2 x 10  With UE support     Hip adduction with ball seated  3 x 10  HEP TE   Hip abduction  3 x 10  BTB , HEP TE    NMR Balance activities to aid in safe community and home ambulation    Marching Gait 2 x 25 ft With cane NMR   Sidestepping 1 x 25 ft   With cane to R and L  NMR    Fwd/ Retrowalk 1 x 25 ft  With cane NMR   Heel to toe      March on BOSU      Resisted gait  1 x 5 each  Red X band , fwd, backward, lateral L and R  NMR    Walking without cane working on clearing feet from floor/ not scuffing/ not shuffling    NMR   A:  Tolerated well. Again requires cues during gait not to scuff shoes on floor. With resisted gait, pt had 2 small LOB able to self correct by reaching for environmental support. P: Continue with rehab plan.    Daly Becerril, PT  DPT, [place] [id number]    Treatment Charges: Mins Units   Initial Evaluation     Re-Evaluation     Ther Exercise         TE 23 2   Manual Therapy     MT     Ther Activities        TA     Gait Training          GT     Neuro Re-education NR 17 1   Modalities     Non-Billable Service Time     Other     Total Time/Units 40   3

## 2021-01-05 ENCOUNTER — TREATMENT (OUTPATIENT)
Dept: PHYSICAL THERAPY | Age: 81
End: 2021-01-05
Payer: MEDICARE

## 2021-01-05 DIAGNOSIS — G20 PARKINSON DISEASE (HCC): Primary | ICD-10-CM

## 2021-01-05 PROCEDURE — 97530 THERAPEUTIC ACTIVITIES: CPT | Performed by: PHYSICAL THERAPIST

## 2021-01-05 PROCEDURE — 97110 THERAPEUTIC EXERCISES: CPT | Performed by: PHYSICAL THERAPIST

## 2021-01-05 NOTE — PROGRESS NOTES
[address] [facility]   Phone: [phone]   Fax: [phone]      Physical Therapy Daily Treatment Note    Date: 2021  Patient Name: Lebron Romero  : 1940   MRN: [id number]  [facility]  DOSx: NA  Referring Provider: Denise Sandhoff, MD  [facility],  [address] [place] [address] Diagnosis:  G20 (ICD-10-CM) - Parkinson disease ([facility] [address]   Outcome Measure:  PSFS walking 4. Balance 4, sitting and standing 3   Elias/56    S:  Pt reports no pain today. Pt reports tired today, walked around house 30 minutes looking for something before coming in.   O:  Time 1101- 1140     Visit  Repeat outcome measure at mid point and end. Pain 0/10     ROM      Modalities            Exercise      Bike 10 min   TE         Squats      Calf Raises   TE    step ups  1 x 10 B  6 inch  TA   Marching 2 x 10   TE   Alt. Sidekicks   TE   LAQ 3 x 10  HEP TE   Sit/Stands 2 x 10   1 x 10  With UE support from chair; On low mat table to simulate couch at home. TA   Hip adduction with ball seated  3 x 10  HEP TE   Hip abduction  3 x 10  BTB , HEP TE    NMR Balance activities to aid in safe community and home ambulation    Marching Gait With cane NMR   Sidestepping With cane to R and L  NMR    Fwd/ Retrowalk With cane NMR   Heel to toe      Standing on foam  1 min   NMR   Resisted gait   Red X band , fwd, backward, lateral L and R  NMR    Walking without cane working on clearing feet from floor/ not scuffing/ not shuffling    NMR   A:  Tolerated fairly well. Pt reports some fatigue and mild SOB during session. Pt reports feels his mask is bothering him and tired from walking around house before coming to therapy. O2 sat checked during session and  97- 99%. Pt given rest breaks as needed. P: Continue with rehab plan.    Maci Finley, PT  DPT, [place] [id number]    Treatment Charges: Mins Units   Initial Evaluation     Re-Evaluation     Ther Exercise         TE 23 2 Manual Therapy     MT     Ther Activities        TA 15 1   Gait Training          GT     Neuro Re-education NR 1    Modalities     Non-Billable Service Time     Other     Total Time/Units 39   3

## 2021-01-12 ENCOUNTER — TREATMENT (OUTPATIENT)
Dept: PHYSICAL THERAPY | Age: 81
End: 2021-01-12
Payer: MEDICARE

## 2021-01-12 DIAGNOSIS — G20 PARKINSON DISEASE (HCC): Primary | ICD-10-CM

## 2021-01-12 PROCEDURE — 97110 THERAPEUTIC EXERCISES: CPT | Performed by: PHYSICAL THERAPIST

## 2021-01-12 PROCEDURE — 97530 THERAPEUTIC ACTIVITIES: CPT | Performed by: PHYSICAL THERAPIST

## 2021-01-12 NOTE — PROGRESS NOTES
[address] [facility]   Phone: [phone]   Fax: [phone]      Physical Therapy Daily Treatment Note    Date: 2021  Patient Name: Odilia Henderson  : 1940   MRN: [id number]  DOInjury: 1 year  DOSx: NA  Referring Provider: No referring provider defined for this encounter. Medical Diagnosis:  G20 (ICD-10-CM) - Parkinson disease ([facility] [address]   Outcome Measure:  PSFS walking 4. Balance 4, sitting and standing 3   Elias/56    S:  Pt reports no pain today. O:  Time 1110- 1149      Visit 7/8 Repeat outcome measure at mid point and end. Pain 0/10     ROM      Modalities            Exercise      Bike 10 min   TE         Squats      Calf Raises   TE    step ups   6 inch  TA   Marching 2 x 10  seated TE   Alt. Sidekicks   TE   LAQ 3 x 10  HEP TE   Sit/Stands 2 x 10    With UE support from chair;   TA   Hip adduction with ball seated  3 x 10  HEP TE   Hip abduction  3 x 10  BTB , HEP TE    NMR Balance activities to aid in safe community and home ambulation    Marching Gait With cane NMR   Sidestepping With cane to R and L  NMR    Fwd/ Retrowalk With cane NMR   Heel to toe      Standing on foam  1 min   NMR   Resisted gait   Red X band , fwd, backward, lateral L and R  NMR    Walking without cane working on clearing feet from floor/ not scuffing/ not shuffling    NMR   A:  Tolerated fairly well. P: Continue with rehab plan. Plan for reassessment next session.    Maryann Vera, PT  [facility], [place] [id number]    Treatment Charges: Mins Units   Initial Evaluation     Re-Evaluation     Ther Exercise         TE 18  1   Manual Therapy     MT     Ther Activities        TA 10 1   Gait Training          GT     Neuro Re-education NR 1 0   Modalities     Non-Billable Service Time 10     Other     Total Time/Units 39   2

## 2021-01-14 ENCOUNTER — TREATMENT (OUTPATIENT)
Dept: PHYSICAL THERAPY | Age: 81
End: 2021-01-14
Payer: MEDICARE

## 2021-01-14 DIAGNOSIS — G20 PARKINSON DISEASE (HCC): Primary | ICD-10-CM

## 2021-01-14 PROCEDURE — 97164 PT RE-EVAL EST PLAN CARE: CPT | Performed by: PHYSICAL THERAPIST

## 2021-01-14 NOTE — PROGRESS NOTES
[facility] [facility]   Phone: [phone]   Fax: [phone]      Physical Therapy Daily Treatment Note    Date: 2021  Patient Name: Shaquille Walker  : 1940   MRN: [id number]  [facility] 1 [facility]  DOSx: NA  Referring Provider: Judie Tovar MD  [address] Diagnosis:  G20 (ICD-10-CM) - Parkinson disease ([facility] [address]   Outcome Measure:  PSFS walking 7. Balance 8, sitting and standing 7   Elias/56    S:  Pt reports no pain today. Time 1146- 1227      Visit 8 8 Repeat outcome measure at mid point and end. Pain 0/10     ROM      Modalities            Exercise      Bike  TE        Squats     Calf Raises  TE    step ups  6 inch  TA   Marching seated TE   Alt. Sidekicks  TE   LAQ HEP TE   Sit/Stands With UE support from chair;   TA   Hip adduction with ball seated  HEP TE   Hip abduction  BTB , HEP TE    Balance activities to aid in safe community and home ambulation    Marching Gait With cane NMR   Sidestepping With cane to R and L  NMR    Fwd/ Retrowalk With cane NMR   Heel to toe     Standing on foam   NMR   Resisted gait  Red X band , fwd, backward, lateral L and R  NMR    Walking without cane working on clearing feet from floor/ not scuffing/ not shuffling   NMR   A:  Reassessment completed today including Elias Balance. See note for details. Pt reports would like to continue to work on his endurance for activity. P: Continue with rehab 2x week for 4 weeks.    Simran Jacobo, PT  [facility], [place] [id number]    Treatment Charges: Mins Units   Initial Evaluation     Re-Evaluation 41 1   Ther Exercise         TE     Manual Therapy     MT     Ther Activities        TA     Gait Training          GT     Neuro Re-education NR     Modalities     Non-Billable Service Time     Other     Total Time/Units 41  1
Neck:   mildly decreased              Trunk:  mildly decreased              R UE:   4/5              L UE:   4/5              R LE:   4/5              L LE:    4- /5     Sensation:   Pt reports no c/o numbness/tingling in hands or feet.      Coordination: heel to shin WNL  Alternating toe taps slight decreased coordination      Functional Mobility/Tests:  DGI: NA  DUNN/56    CURRENT STATUS:  Observations: well nourished male       Gait: pt ambulates with shuffling gait and slight flexed forward trunk      Functional Strength: not able to toe walk, heel walk; able to squat.     Range of Motion:     Neck:               Flexion:                       [x]? Normal   []? Limited               Extension:                   []? Normal   [x]? Limited 20%               Right Rotation:            []? Normal   [x]? Limited 20%              Left Rotation:               []? Normal   [x]? Limited 20%              Right Side Bending:    []? Normal   [x]? Limited 30%              Left Side Bending:      []? Normal   [x]? Limited 30%     Upper Extremity:              Right:                           [x]? Normal   []? Limited               Left:                             [x]? Normal   []? Limited      Trunk:              Flexion:                       [x]? Normal   []? Limited               Extension:                   []? Normal   [x]? Limited 30%               Right Rotation:            []? Normal   [x]? Limited 30%              Left Rotation:               []? Normal   [x]? Limited 10%              Right Side Bending:    []? Normal   [x]? Limited 20%              Left Side Bending:      []? Normal   [x]? Limited 20%     Lower Extremity:              Right:                           [x]? Normal   []? Limited               Left:                             [x]? Normal   []?  Limited         Strength:                 Neck:   mildly decreased              Trunk:  mildly decreased              R UE:   4+/5

## 2021-01-22 ENCOUNTER — IMMUNIZATION (OUTPATIENT)
Dept: PRIMARY CARE CLINIC | Age: 81
End: 2021-01-22
Payer: MEDICARE

## 2021-01-22 DIAGNOSIS — Z23 NEED FOR VACCINATION: Primary | ICD-10-CM

## 2021-01-22 PROCEDURE — 0001A COVID-19, PFIZER VACCINE 30MCG/0.3ML DOSE: CPT | Performed by: PHYSICIAN ASSISTANT

## 2021-01-22 PROCEDURE — 91300 COVID-19, PFIZER VACCINE 30MCG/0.3ML DOSE: CPT | Performed by: PHYSICIAN ASSISTANT

## 2021-01-26 ENCOUNTER — TREATMENT (OUTPATIENT)
Dept: PHYSICAL THERAPY | Age: 81
End: 2021-01-26
Payer: MEDICARE

## 2021-01-26 DIAGNOSIS — G20 PARKINSON DISEASE (HCC): Primary | ICD-10-CM

## 2021-01-26 PROCEDURE — 97530 THERAPEUTIC ACTIVITIES: CPT | Performed by: PHYSICAL THERAPIST

## 2021-01-26 PROCEDURE — 97110 THERAPEUTIC EXERCISES: CPT | Performed by: PHYSICAL THERAPIST

## 2021-01-26 PROCEDURE — 97112 NEUROMUSCULAR REEDUCATION: CPT | Performed by: PHYSICAL THERAPIST

## 2021-01-26 NOTE — PROGRESS NOTES
[address] [facility]   Phone: [phone]   Fax: [phone]      Physical Therapy Daily Treatment Note    Date: 2021  Patient Name: Alfredo Britt  : 1940   MRN: [id number]  [facility]: 1 [facility]  DOSx: NA  Referring Provider: Júnior Leija MD  [address] Diagnosis:  G20 (ICD-10-CM) - Parkinson disease ([facility] [address]   Outcome Measure:  PSFS walking 7. Balance 8, sitting and standing 7   Elias/56    S:  Pt reports no pain today. Time 1053- 1135     Visit   1 Repeat outcome measure at mid point and end. Pain 0/10     ROM      Modalities            Exercise      Bike 10 min   . 55 miles TE   2 minute walk  150 ft (Baseline gait speed)   1.25 ft./sec    Squats     Calf Raises  TE    step ups  11 reps leading with R30 sec. TA   Marching 2 x 10 seated TE   Alt. Sidekicks  TE   LAQ HEP TE   Seated unsupported B PNF  With core activation 2 x 10          Sit/Stands 10 repsWith UE support from chair;   TA   Hip adduction with ball seated  HEP TE   Hip abduction  BTB , HEP TE    NMRBalance activities to aid in safe community and home ambulation    Marching Gait With cane NMR   Sidestepping With cane to R and L  NMR    Fwd/ Retrowalk With cane NMR   Heel to toe     Standing on foam   NMR   Resisted gait  1 x 5 fwd/back  Side step 6ft R/L 5 lb. NMR    Walking without cane working on clearing feet from floor/ not scuffing/ not shuffling   NMR   A:  Pt required significant recovery time (seated rest) approximately 2-4 minutes between exercises due to c/o SOB. Pulse ox % following activity. No c/o pain at end of session.      P: Continue with rehab plan   Caryle Housekeeper, PT  DPT, PT [id number]    Treatment Charges: Mins Units   Initial Evaluation     Re-Evaluation     Ther Exercise         TE 23 1   Manual Therapy     MT     Ther Activities        TA 10 1   Gait Training          GT     Neuro Re-education NR 10 1   Modalities Non-Billable Service Time     Other     Total Time/Units 43  3

## 2021-01-27 ENCOUNTER — OFFICE VISIT (OUTPATIENT)
Dept: FAMILY MEDICINE CLINIC | Age: 81
End: 2021-01-27
Payer: MEDICARE

## 2021-01-27 VITALS
SYSTOLIC BLOOD PRESSURE: 132 MMHG | DIASTOLIC BLOOD PRESSURE: 70 MMHG | TEMPERATURE: 97.7 F | BODY MASS INDEX: 27.83 KG/M2 | HEART RATE: 73 BPM | WEIGHT: 210 LBS | OXYGEN SATURATION: 97 % | HEIGHT: 73 IN

## 2021-01-27 DIAGNOSIS — E78.2 MIXED HYPERLIPIDEMIA: ICD-10-CM

## 2021-01-27 DIAGNOSIS — J44.9 CHRONIC OBSTRUCTIVE PULMONARY DISEASE, UNSPECIFIED COPD TYPE (HCC): ICD-10-CM

## 2021-01-27 DIAGNOSIS — N40.0 PROSTATE ENLARGEMENT: ICD-10-CM

## 2021-01-27 DIAGNOSIS — I50.22 CHF (CONGESTIVE HEART FAILURE), NYHA CLASS I, CHRONIC, SYSTOLIC (HCC): ICD-10-CM

## 2021-01-27 DIAGNOSIS — G47.33 OBSTRUCTIVE SLEEP APNEA SYNDROME: ICD-10-CM

## 2021-01-27 DIAGNOSIS — I25.10 CORONARY ARTERY DISEASE INVOLVING NATIVE CORONARY ARTERY OF NATIVE HEART WITHOUT ANGINA PECTORIS: ICD-10-CM

## 2021-01-27 DIAGNOSIS — R79.89 ELEVATED LFTS: ICD-10-CM

## 2021-01-27 DIAGNOSIS — U07.1 COVID-19 VIRUS INFECTION: Primary | ICD-10-CM

## 2021-01-27 DIAGNOSIS — F33.1 MODERATE EPISODE OF RECURRENT MAJOR DEPRESSIVE DISORDER (HCC): ICD-10-CM

## 2021-01-27 DIAGNOSIS — I10 ESSENTIAL HYPERTENSION: ICD-10-CM

## 2021-01-27 DIAGNOSIS — K83.09 CHOLANGITIS: ICD-10-CM

## 2021-01-27 DIAGNOSIS — R53.1 GENERAL WEAKNESS: ICD-10-CM

## 2021-01-27 DIAGNOSIS — H61.23 BILATERAL IMPACTED CERUMEN: ICD-10-CM

## 2021-01-27 DIAGNOSIS — G20 PARKINSON DISEASE (HCC): ICD-10-CM

## 2021-01-27 DIAGNOSIS — R73.01 IMPAIRED FASTING GLUCOSE: ICD-10-CM

## 2021-01-27 DIAGNOSIS — E55.9 VITAMIN D DEFICIENCY: ICD-10-CM

## 2021-01-27 DIAGNOSIS — I25.5 ISCHEMIC CARDIOMYOPATHY: ICD-10-CM

## 2021-01-27 DIAGNOSIS — G45.0 VERTEBROBASILAR ARTERY SYNDROME: ICD-10-CM

## 2021-01-27 PROCEDURE — G8510 SCR DEP NEG, NO PLAN REQD: HCPCS | Performed by: INTERNAL MEDICINE

## 2021-01-27 PROCEDURE — 99214 OFFICE O/P EST MOD 30 MIN: CPT | Performed by: INTERNAL MEDICINE

## 2021-01-27 PROCEDURE — 69209 REMOVE IMPACTED EAR WAX UNI: CPT | Performed by: INTERNAL MEDICINE

## 2021-01-27 RX ORDER — METOPROLOL SUCCINATE 50 MG/1
50 TABLET, EXTENDED RELEASE ORAL DAILY
Qty: 90 TABLET | Refills: 1 | Status: SHIPPED
Start: 2021-01-27 | End: 2021-07-27 | Stop reason: SDUPTHER

## 2021-01-27 RX ORDER — ATORVASTATIN CALCIUM 80 MG/1
80 TABLET, FILM COATED ORAL DAILY
Qty: 90 TABLET | Refills: 1 | Status: SHIPPED
Start: 2021-01-27 | End: 2021-07-27 | Stop reason: SDUPTHER

## 2021-01-27 RX ORDER — CLOPIDOGREL BISULFATE 75 MG/1
75 TABLET ORAL DAILY
Qty: 90 TABLET | Refills: 1 | Status: SHIPPED
Start: 2021-01-27 | End: 2021-07-27 | Stop reason: SDUPTHER

## 2021-01-27 RX ORDER — RASAGILINE 1 MG/1
TABLET ORAL
COMMUNITY
Start: 2021-01-10 | End: 2021-07-27 | Stop reason: SDUPTHER

## 2021-01-27 ASSESSMENT — ENCOUNTER SYMPTOMS
NAUSEA: 0
SHORTNESS OF BREATH: 1
EYE PAIN: 0
RHINORRHEA: 0
CHEST TIGHTNESS: 0
CONSTIPATION: 0
BLOOD IN STOOL: 0
SORE THROAT: 0
VOMITING: 0
ABDOMINAL PAIN: 0
COUGH: 0
DIARRHEA: 0

## 2021-01-27 ASSESSMENT — PATIENT HEALTH QUESTIONNAIRE - PHQ9
1. LITTLE INTEREST OR PLEASURE IN DOING THINGS: 0
SUM OF ALL RESPONSES TO PHQ QUESTIONS 1-9: 0

## 2021-01-27 NOTE — PROGRESS NOTES
[facility]   Internal Medicine     2021  Silke Baum : 1940 Sex: male  Age:80 y.o. Chief Complaint   Patient presents with    Positive For Covid-19     2020    Depression     States that he is doing well. Still on Mirtazipine. HPI:   Patient presents to office for review and management of chronic medical conditions.  was admitted to hospital for coivd () States had fatigue. States decreased appetite. States was dehydration. States fever. States became weak. States possible loss of taste and smell. No cough. States still with weaknss. States appetite is improved. Still in physical therapy. Improved      States having anxiety while in hospital. States family concerned about depression. States not moving around a lot. States restless. Started on remeron. Improved.      States was diagnosed with parkinson. States was started on medication. Rasagiline. States has elevated (2020) for elevated liver function tests. Initial thought was cholangitis. Has h/o cholecystectomy. 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 MRCP was also unremarkable. States having constipation. Taking fiber supplements. States has CAD. Follows with cardiology locally and at [facility]. States s/p stents. States ischemic cardiomyopathy. On metoprolol, losartan, atorvastatin, aspirin and plavix     States has COPD. States breathing is fair. Has seen pulmonary once. States placed on breo - stopped. Stable. States has high blood pressure. States occasionally checks blood pressure at home. On metoprolol, on losartan. States has high cholesterol. States trying to watch diet. On atorvastatin, no reported side effects     States has sleep apnea. States on cpap. States wears 6-8 hours per night, helps symptoms. States has had TIA. States had dizziness and difficulty walking.  On antiplatelets Apply topically daily. , Disp: 30 g, Rfl: 1    losartan (COZAAR) 25 MG tablet, Take 1 tablet by mouth daily, Disp: 90 tablet, Rfl: 1    tamsulosin (FLOMAX) 0.4 MG capsule, Take 1 capsule by mouth daily, Disp: 90 capsule, Rfl: 1    ketoconazole (NIZORAL) 2 % shampoo, Apply topically daily as needed. , Disp: 100 mL, Rfl: 1    nitroGLYCERIN (NITROSTAT) 0.4 MG SL tablet, Place 1 tablet under the tongue every 5 minutes as needed for Chest pain, Disp: 25 tablet, Rfl: 1    Multiple Vitamins-Minerals (THERAPEUTIC MULTIVITAMIN-MINERALS) tablet, Take 1 tablet by mouth daily, Disp: , Rfl:     CPAP Machine MISC, daily at bedtime. , Disp: , Rfl:     aspirin 81 MG EC tablet, Take 81 mg by mouth daily Last taken 05/11/2019, Disp: , Rfl:     Cholecalciferol (VITAMIN D) 2000 UNIT CAPS capsule, Take 1 capsule by mouth daily Last taken 05/13, Disp: , Rfl:     rasagiline mesylate 1 MG TABS, take 1 tablet by mouth once daily, Disp: , Rfl:     Allergies   Allergen Reactions    Lisinopril Itching       Past Medical History:   Diagnosis Date    CAD (coronary artery disease) 2006    follows with Dr Guerrero Ybarra    Cerebral artery occlusion with cerebral infarction [facility])     Cerebrovascular disease     CHF (congestive heart failure), NYHA class I, chronic, systolic ([facility]25    [facility]-- EF 39%    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/2018    Chronic maxillary sinusitis 04/2018    COPD (chronic obstructive pulmonary disease) ([facility] 04/2018    GARSIA (dyspnea on exertion)     follows with Dr Leonard Goel Gallbladder disease     History of non-ST elevation myocardial infarction (NSTEMI) 2015    [facility]    Hyperlipidemia     Hypertension     Ischemic cardiomyopathy     EF 40% [facility]    Obesity     Osteoarthritis     Skin cancer of eyelid     Sleep apnea     uses CPAP    Smokeless tobacco use     Stented coronary artery 2006    x1       Past Surgical History:   Procedure Laterality Date    CARDIAC CATHETERIZATION Left 2015    done [address] IMPLANT Bilateral     CHOLECYSTECTOMY, LAPAROSCOPIC N/A 2019    LAPAROSCOPIC ROBOTIC XI ASSISTED CHOLECYSTECTOMY performed [facility]      CORONARY ANGIOPLASTY  06    DIAGNOSTIC CARDIAC CATH LAB PROCEDURE  06    KNEE ARTHROPLASTY Left 2015    ROTATOR CUFF REPAIR      bilaterally    TONSILLECTOMY AND ADENOIDECTOMY      UPPER GASTROINTESTINAL ENDOSCOPY         Family History   Problem Relation Age of Onset    Heart Disease Mother          81 Y/O    Diabetes Mother     Liver Disease Father          66 Y/O       Social History     Socioeconomic History    Marital status:      Spouse name: Not on file    Number of children: Not on file    Years of education: Not on file    Highest education level: Not on file   Occupational History    Occupation: retired-     Social Needs    Financial resource strain: Not on file    Food insecurity     Worry: Not on file     Inability: Not on file   [employer] needs     Medical: Not on file     Non-medical: Not on file   Tobacco Use    Smoking status: Former Smoker     Packs/day: 1.00     Years: 15.00     Pack years: 15.00     Types: Cigarettes     Start date: 1957     Quit date: 1975     Years since quittin.1    Smokeless tobacco: Current User     Types: Snuff   Substance and Sexual Activity    Alcohol use: Yes     Comment: occassionally    Drug use: No    Sexual activity: Not on file   Lifestyle    Physical activity     Days per week: Not on file     Minutes per session: Not on file    Stress: Not on file   Relationships    Social connections     Talks on phone: Not on file     Gets together: Not on file     Attends Bahai service: Not on file     Active member of club or organization: Not on file     Attends meetings of clubs or organizations: Not on file     Relationship status: Not on file    Intimate partner violence     Fear of current or ex partner: Not on file     Emotionally abused: Not on file     Physically abused: Not on file     Forced sexual activity: Not on file   Other Topics Concern    Not on file   Social History Narrative    Not on file       Vitals:    01/27/21 0923   BP: 132/70   Pulse: 73   Temp: 97.7 °F (36.5 °C)   SpO2: 97%   Weight: 210 lb (95.3 kg)   Height: 6' 1\" (1.854 m)       Exam:  Physical Exam  Vitals signs reviewed. Constitutional:       Appearance: He is well-developed. HENT:      Head: Normocephalic and atraumatic. Right Ear: External ear normal. There is impacted cerumen. Left Ear: External ear normal. There is impacted cerumen. Eyes:      Pupils: Pupils are equal, round, and reactive to light. Neck:      Musculoskeletal: Normal range of motion and neck supple. Thyroid: No thyromegaly. Cardiovascular:      Rate and Rhythm: Normal rate and regular rhythm. Heart sounds: Normal heart sounds. No murmur. Pulmonary:      Effort: Pulmonary effort is normal.      Breath sounds: Normal breath sounds. No wheezing or rales. Abdominal:      General: Bowel sounds are normal.      Palpations: Abdomen is soft. Tenderness: There is no abdominal tenderness. There is no guarding or rebound. Musculoskeletal: Normal range of motion. Comments: Abnormal gait. Lymphadenopathy:      Cervical: No cervical adenopathy. Skin:     General: Skin is warm and dry. Neurological:      Mental Status: He is alert and oriented to person, place, and time. Cranial Nerves: No cranial nerve deficit.       Comments: Resting tremor    Psychiatric:         Judgment: Judgment normal.         Assessment and Plan:    Diagnoses and all orders for this visit:    COVID-19 virus infection  - continue present   - symptomatic treatment   - encourage oral and fluids   - improving      General weakness  - in physical therapy   - improving      Parkinson disease [facility])  - following with son   - started rasagiline   - unchanged      Moderate episode of recurrent major depressive disorder [facility])  - discussed treatment   - son was on call during office treatment (12/2020)   - now on remeron to help with anxiety and depression   - improved     Cholangitis  - uncertain as to cause  - CT abdo and MRCP - no acute changes   - s/p ATB, augmentin (6-7/2020)   - recheck labs were back to normal (7/2020)   - to follow up with GI - no schedule     Elevated LFTs  - uncertain as to cause  - CT abdo and MRCP - no acute changes   - felt pos cholangitis   - s/p ATB, augmentin (6-7/2020)   - recheck labs were back to normal (7/2020)   - to follow up with GI     Coronary artery disease involving native coronary artery of native heart without angina pectoris  - following with cardiology   - on losartan   - on metoprolol   - on atorvastatin   - on aspirin and Plavix     Mixed hyperlipidemia  - watch diet   - on atorvastatin   - follow labs (9/2019)     Ischemic cardiomyopathy  - following with cardiology   - on losartn   - on metoprolol   - on atrovastatin   - on aspirin and plavix     CHF (congestive heart failure), NYHA class I, chronic, systolic (HCC)  - Continue present treatment   - following with cardiology   - on losartn   - on metoprolol   - on atrovastatin   - on aspirin and plavix   - Stable     Chronic obstructive pulmonary disease, unspecified COPD type ([facility] [address]  - Continue present treatment   - not currently following with pulmonary   - did not see benefit with breo     Essential hypertension  - Continue present treatment   - watch diet   - monitor blood pressure at home   - on losartn   - on metoprolol   - on aspirin and plavix     Impaired fasting glucose  - continue present treatment  - watch diet   - follow A1c - last A1c (3/2020) 5.9     Obstructive sleep apnea syndrome  - Continue present treatment   - on CPAP  - wearing nightly 6-8 hours   - helping symptoms   - stable TIA (transient ischemic attack)  - Continue present treatment   - on atrovastatin   - on aspirin and plavix     Prostate enlargement  - continue present treatment   - has seen urology   - Has flomax - takes as needed  - stable     Vitamin D def  - On otc supplement   - follow labs     Other constipation  - discussed colace 100mg daily and miralax as needed  - taking mutamucil     Bilateral impacted cerumen  - for lavage today     Return in about 3 months (around 4/27/2021) for check up and review.     Orders Placed This Encounter   Procedures    ME REMOVAL IMPACTED CERUMEN IRRIGATION/LVG UNILAT     Requested Prescriptions     Signed Prescriptions Disp Refills    atorvastatin (LIPITOR) 80 MG tablet 90 tablet 1     Sig: Take 1 tablet by mouth daily    clopidogrel (PLAVIX) 75 MG tablet 90 tablet 1     Sig: Take 1 tablet by mouth daily    metoprolol succinate (TOPROL XL) 50 MG extended release tablet 90 tablet 1     Sig: Take 1 tablet by mouth daily     Ingris Davis MD  1/27/2021  11:48 AM

## 2021-01-28 ENCOUNTER — TREATMENT (OUTPATIENT)
Dept: PHYSICAL THERAPY | Age: 81
End: 2021-01-28
Payer: MEDICARE

## 2021-01-28 DIAGNOSIS — G20 PARKINSON DISEASE (HCC): Primary | ICD-10-CM

## 2021-01-28 PROCEDURE — 97110 THERAPEUTIC EXERCISES: CPT | Performed by: PHYSICAL THERAPIST

## 2021-01-28 PROCEDURE — 97112 NEUROMUSCULAR REEDUCATION: CPT | Performed by: PHYSICAL THERAPIST

## 2021-01-28 PROCEDURE — 97530 THERAPEUTIC ACTIVITIES: CPT | Performed by: PHYSICAL THERAPIST

## 2021-01-28 NOTE — PROGRESS NOTES
[address] [facility]   Phone: [phone]   Fax: [phone]      Physical Therapy Daily Treatment Note    Date: 2021  Patient Name: Corina Alexis  : 1940   MRN: [id number]  [facility]  DOSx: NA  Referring Provider: Doc Shields MD  [address] Diagnosis:  G20 (ICD-10-CM) - Parkinson disease ([facility] [address]   Outcome Measure:  PSFS walking 7. Balance 8, sitting and standing 7   Elias/56    S:  Pt reports no pain today. Time 1055- 1139     Visit   2 Repeat outcome measure at mid point and end. Pain 0/10     ROM      Modalities            Exercise      Bike 10 min   . 65 miles TE   2 minute walk     1.25 ft./sec    Squats     Calf Raises 2 x 10  TE    step ups  10 reps B   TA   Marching 2 x 10 seated TE   Alt. Sidekicks 2 x 10 seated TE   LAQ HEP TE   Seated unsupported B PNF  With core activation 2 x 10          Sit/Stands 10 repsWith UE support from chair;   TA   Hip adduction with ball seated  3 x 10 HEP TE   Hip abduction  3 x 10  BTB , HEP TE    NMRBalance activities to aid in safe community and home ambulation    Marching Gait With cane NMR   Sidestepping 1 x 40 ft   With cane to R and L  NMR    Fwd/ Retrowalk With cane NMR   Heel to toe     Standing on foam  1 min   NMR   Resisted gait  5 lb. NMR    Walking without cane working on clearing feet from floor/ not scuffing/ not shuffling  100 feet NMR   A:  No c/o pain following treatment.      P: Continue with rehab plan   Brigid Rossi, PT  DPT, PT [id number]    Treatment Charges: Mins Units   Initial Evaluation     Re-Evaluation     Ther Exercise         TE 24 1   Manual Therapy     MT     Ther Activities        TA 10 1   Gait Training          GT     Neuro Re-education NR 10 1   Modalities     Non-Billable Service Time     Other     Total Time/Units 44  3

## 2021-02-02 ENCOUNTER — TREATMENT (OUTPATIENT)
Dept: PHYSICAL THERAPY | Age: 81
End: 2021-02-02
Payer: MEDICARE

## 2021-02-02 DIAGNOSIS — G20 PARKINSON DISEASE (HCC): Primary | ICD-10-CM

## 2021-02-02 PROCEDURE — 97112 NEUROMUSCULAR REEDUCATION: CPT | Performed by: PHYSICAL THERAPIST

## 2021-02-02 PROCEDURE — 97530 THERAPEUTIC ACTIVITIES: CPT | Performed by: PHYSICAL THERAPIST

## 2021-02-02 PROCEDURE — 97110 THERAPEUTIC EXERCISES: CPT | Performed by: PHYSICAL THERAPIST

## 2021-02-02 NOTE — PROGRESS NOTES
[address] [facility]   Phone: [phone]   Fax: [phone]      Physical Therapy Daily Treatment Note    Date: 2021  Patient Name: José Neely  : 1940   MRN: [id number]  DOInjury: 1 year  DOSx: NA  Referring Provider: No referring provider defined for this encounter. Medical Diagnosis:  G20 (ICD-10-CM) - Parkinson disease ([facility] [address]   Outcome Measure:  PSFS walking 7. Balance 8, sitting and standing 7   Elias/56    S:  Pt reports no pain today. Time 1057- 1140     Visit   3 Repeat outcome measure at mid point and end. Pain 0/10     ROM      Modalities            Exercise      Bike 10 min   . 73  miles TE   2 minute walk  200 ft. (no AD)   1.6 ft./sec    Squats     Calf Raises 2 x 10  TE    step ups   TA   Marching 2 x 10 Added to HEP TE   Alt. Sidekicks seated TE   LAQ HEP TE   Seated unsupported B PNF  With core activation      SLR Flexion and ABD HEP    Sit/Stands 10 repsWith UE support from chair;   TA   Hip adduction with ball seated  3 x 10 HEP TE   Hip abduction  3 x 10  yTB , HEP TE    NMRBalance activities to aid in safe community and home ambulation    High knee marches 1 min. Marching Gait With cane NMR   Sidestepping  With cane to R and L  NMR    Fwd/ Retrowalk With cane NMR   Heel to toe gait 20 feet    Standing on foam  1 min   NMR   Resisted gait  5 lb. NMR    Walking without cane working on clearing feet from floor/ not scuffing/ not shuffling  200 feet NMR   A:  No c/o pain following treatment. Pt did not bring cane to this session, therefore all activities performed without. Pt given SLR flexion/ABD, and seated marches added to HEP this date.  Gait speed improved from previous 2 min. walk test.     P: Continue with rehab plan   Marisabel Dwyer, PT  DPT, PT [id number]    Treatment Charges: Mins Units   Initial Evaluation     Re-Evaluation     Ther Exercise         TE 23 1   Manual Therapy     MT     Ther Activities        TA 10 1 Gait Training          GT     Neuro Re-education NR 10 1   Modalities     Non-Billable Service Time     Other     Total Time/Units 43 3

## 2021-02-04 ENCOUNTER — TREATMENT (OUTPATIENT)
Dept: PHYSICAL THERAPY | Age: 81
End: 2021-02-04
Payer: MEDICARE

## 2021-02-04 DIAGNOSIS — G20 PARKINSON DISEASE (HCC): Primary | ICD-10-CM

## 2021-02-04 PROCEDURE — 97110 THERAPEUTIC EXERCISES: CPT | Performed by: PHYSICAL THERAPIST

## 2021-02-04 PROCEDURE — 97112 NEUROMUSCULAR REEDUCATION: CPT | Performed by: PHYSICAL THERAPIST

## 2021-02-04 PROCEDURE — 97530 THERAPEUTIC ACTIVITIES: CPT | Performed by: PHYSICAL THERAPIST

## 2021-02-04 NOTE — PROGRESS NOTES
[facility] [facility]   Phone: [phone]   Fax: [phone]      Physical Therapy Daily Treatment Note    Date: 2021  Patient Name: Carol Ann Dumont  : 1940   MRN: [id number]  [facility]: 1 year  DOSx: NA  Referring Provider: Clifton Hussein MD  [address] Diagnosis:  G20 (ICD-10-CM) - Parkinson disease ([facility] [address]   Outcome Measure:  PSFS walking 7. Balance 8, sitting and standing 7   Elias/56    S:  Pt reports no pain today but a slight upset stomach. Pt reports ever since he got his gall bladder out has to watch what he eats. Pt reports didn't start new HEP yet but reports will try some this weekend. Time 1102- 1145     Visit   4 Repeat outcome measure at mid point and end. Pain 0/10     ROM      Modalities            Exercise      Bike 10 min   . 69   miles TE   2 minute walk     1.6 ft./sec    Squats     Calf Raises 2 x 10  TE    step ups   TA   Marching 2 x 10 Added to HEP TE   Alt. Sidekicks seated TE   LAQ HEP TE   Seated unsupported B PNF  With core activation      SLR Flexion and ABD HEP    Sit/Stands 10 reps With UE support from chair;   TA   Hip adduction with ball seated  3 x 10 HEP TE   Hip abduction  3 x 10  BTB , HEP TE    NMRBalance activities to aid in safe community and home ambulation    High knee marches 1 min. On hard surface/floor   And on foam  TA    NMR   Marching Gait With cane NMR   Sidestepping  With cane to R and L  NMR    Fwd/ Retrowalk With cane NMR   Heel to toe gait     Standing on foam  1 min   NMR   Resisted gait  1 x 3  [id number] - cable system  NMR    Walking without cane working on clearing feet from floor/ not scuffing/ not shuffling  200 feet  NMR   A:  No c/o pain following treatment. Pt does require frequent rest breaks. Pt's bike distance decreased today but overall did well.        P: Continue with rehab plan   Lio Hanson, PT  DPT, PT [id number] Treatment Charges: Mins Units   Initial Evaluation     Re-Evaluation     Ther Exercise         TE 18 1   Manual Therapy     MT     Ther Activities        TA 10 1   Gait Training          GT     Neuro Re-education NR 15 1   Modalities     Non-Billable Service Time     Other     Total Time/Units 43 3

## 2021-02-11 ENCOUNTER — TREATMENT (OUTPATIENT)
Dept: PHYSICAL THERAPY | Age: 81
End: 2021-02-11
Payer: MEDICARE

## 2021-02-11 DIAGNOSIS — G20 PARKINSON DISEASE (HCC): Primary | ICD-10-CM

## 2021-02-11 PROCEDURE — 97112 NEUROMUSCULAR REEDUCATION: CPT | Performed by: PHYSICAL THERAPIST

## 2021-02-11 PROCEDURE — 97110 THERAPEUTIC EXERCISES: CPT | Performed by: PHYSICAL THERAPIST

## 2021-02-11 PROCEDURE — 97530 THERAPEUTIC ACTIVITIES: CPT | Performed by: PHYSICAL THERAPIST

## 2021-02-11 NOTE — PROGRESS NOTES
[address] [facility]   Phone: [phone]   Fax: [phone]      Physical Therapy Daily Treatment Note    Date: 2021  Patient Name: Lebron Romero  : 1940   MRN: [id number]  [facility] 1 [facility]  DOSx: NA  Referring Provider: Denise Sandhoff, MD  [address] Diagnosis:  G20 (ICD-10-CM) - Parkinson disease ([facility] [address]   Outcome Measure:  PSFS walking 7. Balance 8, sitting and standing 7   Elias/56    S:  Pt reports no pain today. Time 1102- 1142     Visit   5 Repeat outcome measure at mid point and end. Pain 0/10     ROM      Modalities            Exercise      Bike 10 min  . 70 miles TE   2 minute walk  250 ft. (no AD)   2.08 ft./sec      Squats     Calf Raises 2 x 10  TE    step ups   TA   Marching 2 x 10 Added to HEP TE   Alt. Sidekicks 2 x 10  TE   LAQ HEP TE   Seated unsupported B PNF  With core activation      SLR Flexion and ABD HEP    Sit/Stands 2x 10 reps With UE support from chair;   TA   Hip adduction with ball seated  3 x 10 HEP TE   Hip abduction  3 x 10  BTB , HEP TE    NMRBalance activities to aid in safe community and home ambulation    High knee marches On hard surface/floor   And on foam  TA    NMR   Marching Gait With cane NMR   Sidestepping  With cane to R and L  NMR    Fwd/ Retrowalk With cane NMR   Heel to toe gait 20 feet    Standing on foam    NMR   Resisted gait  1 x 3  Fwd, [id number] - cable system  NMR    Walking without cane working on clearing feet from floor/ not scuffing/ not shuffling   NMR   A:  With biking, pt was going about 3.7 mph but with cues for last 1.5 minutes to increase speed to 4 or more . Venancio Oconnor ..pt varied between 4.5 and 7.5 mph on bike. Pt reports thinks he will do better if given a speed to keep bike at.           P: Continue with rehab plan   Maci Finley, PT  DPT, PT [id number]    Treatment Charges: Mins Units   Initial Evaluation     Re-Evaluation Ther Exercise         TE 22 1   Manual Therapy     MT     Ther Activities        TA 8  1   Gait Training          GT     Neuro Re-education NR 10 1   Modalities     Non-Billable Service Time     Other     Total Time/Units 40  3

## 2021-02-12 ENCOUNTER — IMMUNIZATION (OUTPATIENT)
Dept: PRIMARY CARE CLINIC | Age: 81
End: 2021-02-12
Payer: MEDICARE

## 2021-02-12 PROCEDURE — 91300 COVID-19, PFIZER VACCINE 30MCG/0.3ML DOSE: CPT | Performed by: CLINICAL NURSE SPECIALIST

## 2021-02-12 PROCEDURE — 0002A COVID-19, PFIZER VACCINE 30MCG/0.3ML DOSE: CPT | Performed by: CLINICAL NURSE SPECIALIST

## 2021-02-12 NOTE — PROGRESS NOTES
Patient concerned about generalized lower abdominal pain that started right before his vaccine. Patient denied fever, nausea, vomiting or diarrhea. Patient stated that he has been anxious about receiving the 2nd dose of pfizer covid vaccine. Patient reported a 102 fever the day after the 1st pfizer vaccine. Dr Christy Mahoney is patient's PCP and he came to see the patient before the vaccine was administered. Patient decided to get the 2nd vaccine and was observed for 30 minutes after the vaccine administration. Patient stated that the abdominal pain resolved during the observation period and he was discharged home without incident.   Advised patient to go to the ER if the abdominal pain resumes and to follow up with Dr Christy Mahoney in the near future for a physical exam.

## 2021-02-18 ENCOUNTER — HOSPITAL ENCOUNTER (OUTPATIENT)
Age: 81
Discharge: HOME | End: 2021-02-18
Payer: MEDICARE

## 2021-02-18 LAB
ALBUMIN SERPL-MCNC: 4.1 G/DL (ref 3.5–5.2)
ALP BLD-CCNC: 137 U/L (ref 40–129)
ALT SERPL-CCNC: 36 U/L (ref 0–40)
AST SERPL-CCNC: 22 U/L (ref 0–39)
BILIRUB SERPL-MCNC: 0.5 MG/DL (ref 0–1.2)
BILIRUBIN DIRECT: <0.2 MG/DL (ref 0–0.3)
BILIRUBIN, INDIRECT: ABNORMAL MG/DL (ref 0–1)
TOTAL PROTEIN: 7 G/DL (ref 6.4–8.3)

## 2021-02-18 PROCEDURE — 36415 COLL VENOUS BLD VENIPUNCTURE: CPT

## 2021-02-18 PROCEDURE — 80076 HEPATIC FUNCTION PANEL: CPT

## 2021-02-24 ENCOUNTER — TREATMENT (OUTPATIENT)
Dept: PHYSICAL THERAPY | Age: 81
End: 2021-02-24
Payer: MEDICARE

## 2021-02-24 DIAGNOSIS — G20 PARKINSON DISEASE (HCC): Primary | ICD-10-CM

## 2021-02-24 PROCEDURE — 97164 PT RE-EVAL EST PLAN CARE: CPT | Performed by: PHYSICAL THERAPIST

## 2021-02-24 NOTE — PROGRESS NOTES
[address] [facility]   Phone: [phone]   Fax: [phone]        Referring Provider: Ingris Davis MD  [facility],  [address] [place] [address] Diagnosis:   Sukh Doss (ICD-10-CM) - Parkinson disease [facility])     CERTIFICATION PERIOD:   1/14/2021 thru 2/12/2021    ATTENDANCE:  Patient has attended 6  of 9 scheduled treatments from 1/20/2021  to 2/12/2021. TREATMENTS RECEIVED:  Therapeutic activity, therapeutic exercise, neuromuscular reeducation     INITIAL STATUS:  Observations: well nourished male       Gait: pt ambulates with shuffling gait and slight flexed forward trunk      Functional Strength: not able to toe walk, heel walk; able to squat.     Range of Motion:     Neck:               Flexion:                       [x]? ? Normal   []? ? Limited               DSBFQGMXY:                   []? ? Normal   [x]? ? Limited 20%               Right Rotation:            []? ? Normal   [x]? ? Limited 20%              Left Rotation:               []? ? Normal   [x]? ? Limited 20%              Right Side Bending:    []? ? Normal   [x]? ? Limited 30%              Left Side Bending:      []? ? Normal   [x]? ? Limited 30%     Upper Extremity:              Right:                           [x]? ? Normal   []? ? Limited               Left:                             [x]? ? Normal   []? ? Limited      Trunk:              Flexion:                       [x]? ? Normal   []? ? Limited               GTLVEBOVO:                   []? ? Normal   [x]? ? Limited 30%               Right Rotation:            []? ? Normal   [x]? ? Limited 30%              Left Rotation:               []? ? Normal   [x]? ? Limited 10%              Right Side Bending:    []? ? Normal   [x]? ? Limited 20%              Left Side Bending:      []? ? Normal   [x]? ? Limited 20%     Lower Extremity:              Right:                           [x]? ? Normal   []? ? Limited               Left:                             [x]? ? Normal   []? ? Limited        Strength:                 Neck:   mildly decreased              Trunk:  mildly decreased              R UE:   4+/5              L UE:   4+/5              R LE:   4+/5              L LE:    4 /5     Sensation:   Pt reports no c/o numbness/tingling in hands or feet.      Coordination: heel to shin WNL  Alternating toe taps slight decreased coordination      Functional Mobility/Tests:  DGI: JUSTIN  DUNN/56       CURRENT STATUS:  Observations: well nourished male       Gait: pt ambulates with shuffling gait and slight flexed forward trunk      Functional Strength: not able to toe walk, heel walk; able to squat.     Range of Motion:     Neck:               Flexion:                       [x]? ? Normal   []? ? Limited               Extension:                   [x]? ? Normal   []? ? Limited               WJYUB Rotation:            []? ? Normal   [x]? ? Limited 10%              Left Rotation:               []? ? Normal   [x]? ? Acjqmah43%              Right Side Bending:    []? ? Normal   [x]? ? Limited 20%              Left Side Bending:      []? ? Normal   [x]? ? Limited 20%     Upper Extremity:              Right:                           [x]? ? Normal   []? ? Limited               Left:                             [x]? ? Normal   []? ? Limited      Trunk:              Flexion:                       [x]? ? Normal   []? ? Limited               QRUHCOKWF:                   []? ? Normal   [x]? ? Limited 20%               Right Rotation:            []? ? Normal   [x]? ? Limited 20%              Left Rotation:               []? ? Normal   [x]? ? Limited 20%              Right Side Bending:    []? ? Normal   [x]? ? Limited 20%              Left Side Bending:      []? ? Normal   [x]? ? Limited 20%     Lower Extremity:              Right:                           [x]? ? Normal   []? ? Limited               Left:                             [x]? ? Normal   []? ? Limited         Strength:                 Neck:   mildly decreased             Trunk:  mildly decreased              R UE:   5-/5              L UE:   5-/5              R LE:   5-/5              L LE:    5- /5     Sensation:   Pt reports no c/o numbness/tingling in hands or feet.      Coordination: heel to shin WNL  Alternating toe taps slight decreased coordination      Functional Mobility/Tests:  DGI: NA  DUNN/56       Short Term goals (2 weeks)  · Increase Strength to 4+/5 (goal met)   · Able to perform/complete the following functions/tasks: pt able to walk 150+ ft with least restrictive device with good - balance.  Pt able to go up/down 5 steps with good - balance.  Pt able to perform 5 sit to stands with 2 UE support with good - balance. (goal met)     Long Term goals (4-6 weeks)  · Increase Strength to 5-/5 (goal met)  · Able to perform/complete the following functions/tasks: pt able to walk 300 ft with least restrictive device with good balance.  Pt able to go up/down flight of steps with good balance. Pt able to perform 10 sit to stands with 2 UE support with good balance. (partial goal met)  · Independent with Home Exercise Programs (see below)          OUTCOME MEASURE:   PSFS walking 7.  Balance 8, sitting and standing 8     COMMENTS AND RECOMMENDATIONS: Pt reports has been noncompliant with HEP at home. Offered to reprint HEP for pt if willing to continue to work on them;  Pt reports will work on HEP at home thus handout provided with band again. Today was pt's last scheduled visit and pt reports ready for it to be last day of therapy. Reassessment completed as above. Pt has made slow steady gains. Pt's sit to stand transfers are improved. With focus/attention, pt able to walk with good foot clearance , decreased shuffling but with distraction or fatigue begins to shuffle feet again. With step ups in session to simulate stair negotiation, again with focus able to place feet well onto step but with distraction or fatigue only places 1/2 of foot on step. Pt reports doesn't do much at home and today expressing worry over how his Parkinson's will progress. Encouragement provided. Will also inform referring physician. Thank you for the opportunity to work with your patient. Feliberto Piedra, PT DPT [id number]    I CERTIFY THAT THE ABOVE REASSESSMENT AND PLAN OF CARE FOR PHYSICAL THERAPY SERVICES ARE APPROPRIATE AND MEDICALLY NECESSARY.       ________________________                _______________  Physician     Date

## 2021-02-24 NOTE — PROGRESS NOTES
[address] [facility]   Phone: [phone]   Fax: [phone]      Physical Therapy Daily Treatment Note    Date: 2021  Patient Name: Mariangel Levin  : 1940   MRN: [id number]  [facility]  DOSx: NA  Referring Provider: Jaun Petty MD  [address] Diagnosis:  G20 (ICD-10-CM) - Parkinson disease ([facility] [address]   Outcome Measure:  PSFS walking 7. Balance 8, sitting and standing 8   Elias/56    S:  Pt reports no pain today but tired. Time 2867-5665      Visit   6 Repeat outcome measure at mid point and end. Pain 0/10     ROM      Modalities            Exercise     Bike . 70 miles TE   2 minute walk    2.08 ft./sec      Squats     Calf Raises  TE    step ups   TA   Marching Added to HEP TE   Alt. Sidekicks  TE   LAQ HEP TE   Seated unsupported B PNF  With core activation     SLR Flexion and ABD HEP    Sit/Stands With UE support from chair;   TA   Hip adduction with ball seated  HEP TE   Hip abduction  BTB , HEP TE    Balance activities to aid in safe community and home ambulation    High knee marches On hard surface/floor   And on foam  TA    NMR   Marching Gait With cane NMR   Sidestepping With cane to R and L  NMR    Fwd/ Retrowalk With cane NMR   Heel to toe gait     Standing on foam   NMR   Resisted gait  20lb - cable system  NMR    Walking without cane working on clearing feet from floor/ not scuffing/ not shuffling   NMR   A: Tolerated fair. Discussed with pt that today is last scheduled visit; Pt reports wants today to be last day of therapy. Reassessment completed today. See note for details. Pt reports has been noncompliant with HEP at home. Pt reports if given handout again that he will work on them. HEP handout given again for seated marching, LAQ, hip adduction ball squeeze, hip abduction with BTB. P: Will discharge pt at this time.     Dorcas Reyez, PT  DPT, PT [id number] Treatment Charges: Mins Units   Initial Evaluation     Re-Evaluation 45 1   Ther Exercise         TE     Manual Therapy     MT     Ther Activities        TA     Gait Training          GT     Neuro Re-education NR     Modalities     Non-Billable Service Time     Other     Total Time/Units 45  1

## 2021-04-02 ENCOUNTER — HOSPITAL ENCOUNTER (OUTPATIENT)
Age: 81
Discharge: HOME | End: 2021-04-04

## 2021-04-02 PROCEDURE — 87081 CULTURE SCREEN ONLY: CPT

## 2021-04-02 PROCEDURE — 88305 TISSUE EXAM BY PATHOLOGIST: CPT

## 2021-04-03 LAB — CLOTEST: NORMAL

## 2021-04-22 ENCOUNTER — APPOINTMENT (OUTPATIENT)
Dept: CT IMAGING | Age: 81
DRG: 871 | End: 2021-04-22
Payer: MEDICARE

## 2021-04-22 ENCOUNTER — HOSPITAL ENCOUNTER (INPATIENT)
Age: 81
LOS: 5 days | Discharge: HOME | DRG: 871 | End: 2021-04-28
Attending: EMERGENCY MEDICINE | Admitting: INTERNAL MEDICINE
Payer: MEDICARE

## 2021-04-22 DIAGNOSIS — R17 ELEVATED BILIRUBIN: ICD-10-CM

## 2021-04-22 DIAGNOSIS — R74.01 TRANSAMINITIS: Primary | ICD-10-CM

## 2021-04-22 DIAGNOSIS — R10.10 PAIN OF UPPER ABDOMEN: ICD-10-CM

## 2021-04-22 LAB
ALBUMIN SERPL-MCNC: 4 G/DL (ref 3.5–5.2)
ALP BLD-CCNC: 413 U/L (ref 40–129)
ALT SERPL-CCNC: 243 U/L (ref 0–40)
ANION GAP SERPL CALCULATED.3IONS-SCNC: 9 MMOL/L (ref 7–16)
AST SERPL-CCNC: 328 U/L (ref 0–39)
BASOPHILS ABSOLUTE: 0.05 E9/L (ref 0–0.2)
BASOPHILS RELATIVE PERCENT: 0.5 % (ref 0–2)
BILIRUB SERPL-MCNC: 1.9 MG/DL (ref 0–1.2)
BILIRUBIN URINE: NEGATIVE
BLOOD, URINE: NEGATIVE
BUN BLDV-MCNC: 15 MG/DL (ref 6–23)
CALCIUM SERPL-MCNC: 9.2 MG/DL (ref 8.6–10.2)
CHLORIDE BLD-SCNC: 105 MMOL/L (ref 98–107)
CLARITY: CLEAR
CO2: 27 MMOL/L (ref 22–29)
COLOR: YELLOW
CREAT SERPL-MCNC: 0.9 MG/DL (ref 0.7–1.2)
EOSINOPHILS ABSOLUTE: 0.1 E9/L (ref 0.05–0.5)
EOSINOPHILS RELATIVE PERCENT: 1 % (ref 0–6)
GFR AFRICAN AMERICAN: >60
GFR NON-AFRICAN AMERICAN: >60 ML/MIN/1.73
GLUCOSE BLD-MCNC: 141 MG/DL (ref 74–99)
GLUCOSE URINE: NEGATIVE MG/DL
HCT VFR BLD CALC: 45.3 % (ref 37–54)
HEMOGLOBIN: 14.4 G/DL (ref 12.5–16.5)
IMMATURE GRANULOCYTES #: 0.04 E9/L
IMMATURE GRANULOCYTES %: 0.4 % (ref 0–5)
KETONES, URINE: ABNORMAL MG/DL
LACTIC ACID: 2.1 MMOL/L (ref 0.5–2.2)
LEUKOCYTE ESTERASE, URINE: NEGATIVE
LIPASE: 42 U/L (ref 13–60)
LYMPHOCYTES ABSOLUTE: 1.16 E9/L (ref 1.5–4)
LYMPHOCYTES RELATIVE PERCENT: 12.2 % (ref 20–42)
MCH RBC QN AUTO: 27.4 PG (ref 26–35)
MCHC RBC AUTO-ENTMCNC: 31.8 % (ref 32–34.5)
MCV RBC AUTO: 86.1 FL (ref 80–99.9)
MONOCYTES ABSOLUTE: 0.74 E9/L (ref 0.1–0.95)
MONOCYTES RELATIVE PERCENT: 7.8 % (ref 2–12)
NEUTROPHILS ABSOLUTE: 7.45 E9/L (ref 1.8–7.3)
NEUTROPHILS RELATIVE PERCENT: 78.1 % (ref 43–80)
NITRITE, URINE: NEGATIVE
PDW BLD-RTO: 14.7 FL (ref 11.5–15)
PH UA: 7 (ref 5–9)
PLATELET # BLD: 225 E9/L (ref 130–450)
PMV BLD AUTO: 10.7 FL (ref 7–12)
POTASSIUM REFLEX MAGNESIUM: 4.3 MMOL/L (ref 3.5–5)
PROTEIN UA: NEGATIVE MG/DL
RBC # BLD: 5.26 E12/L (ref 3.8–5.8)
SODIUM BLD-SCNC: 141 MMOL/L (ref 132–146)
SPECIFIC GRAVITY UA: 1.02 (ref 1–1.03)
TOTAL PROTEIN: 6.9 G/DL (ref 6.4–8.3)
TROPONIN: <0.01 NG/ML (ref 0–0.03)
UROBILINOGEN, URINE: 1 E.U./DL
WBC # BLD: 9.5 E9/L (ref 4.5–11.5)

## 2021-04-22 PROCEDURE — 74177 CT ABD & PELVIS W/CONTRAST: CPT

## 2021-04-22 PROCEDURE — 6360000002 HC RX W HCPCS: Performed by: EMERGENCY MEDICINE

## 2021-04-22 PROCEDURE — 80053 COMPREHEN METABOLIC PANEL: CPT

## 2021-04-22 PROCEDURE — 87040 BLOOD CULTURE FOR BACTERIA: CPT

## 2021-04-22 PROCEDURE — 83690 ASSAY OF LIPASE: CPT

## 2021-04-22 PROCEDURE — 93005 ELECTROCARDIOGRAM TRACING: CPT | Performed by: EMERGENCY MEDICINE

## 2021-04-22 PROCEDURE — 84484 ASSAY OF TROPONIN QUANT: CPT

## 2021-04-22 PROCEDURE — 2580000003 HC RX 258: Performed by: EMERGENCY MEDICINE

## 2021-04-22 PROCEDURE — 81003 URINALYSIS AUTO W/O SCOPE: CPT

## 2021-04-22 PROCEDURE — 96360 HYDRATION IV INFUSION INIT: CPT

## 2021-04-22 PROCEDURE — 6360000004 HC RX CONTRAST MEDICATION: Performed by: RADIOLOGY

## 2021-04-22 PROCEDURE — 99284 EMERGENCY DEPT VISIT MOD MDM: CPT

## 2021-04-22 PROCEDURE — 83605 ASSAY OF LACTIC ACID: CPT

## 2021-04-22 PROCEDURE — 6370000000 HC RX 637 (ALT 250 FOR IP): Performed by: EMERGENCY MEDICINE

## 2021-04-22 PROCEDURE — 85025 COMPLETE CBC W/AUTO DIFF WBC: CPT

## 2021-04-22 RX ORDER — AMIODARONE HYDROCHLORIDE 50 MG/ML
INJECTION, SOLUTION INTRAVENOUS
Status: DISCONTINUED
Start: 2021-04-22 | End: 2021-04-22

## 2021-04-22 RX ORDER — 0.9 % SODIUM CHLORIDE 0.9 %
500 INTRAVENOUS SOLUTION INTRAVENOUS ONCE
Status: COMPLETED | OUTPATIENT
Start: 2021-04-22 | End: 2021-04-22

## 2021-04-22 RX ORDER — ACETAMINOPHEN 500 MG
1000 TABLET ORAL ONCE
Status: COMPLETED | OUTPATIENT
Start: 2021-04-22 | End: 2021-04-22

## 2021-04-22 RX ORDER — 0.9 % SODIUM CHLORIDE 0.9 %
1000 INTRAVENOUS SOLUTION INTRAVENOUS ONCE
Status: COMPLETED | OUTPATIENT
Start: 2021-04-23 | End: 2021-04-23

## 2021-04-22 RX ADMIN — PIPERACILLIN AND TAZOBACTAM 3375 MG: 3; .375 INJECTION, POWDER, LYOPHILIZED, FOR SOLUTION INTRAVENOUS at 23:55

## 2021-04-22 RX ADMIN — IOPAMIDOL 75 ML: 755 INJECTION, SOLUTION INTRAVENOUS at 23:19

## 2021-04-22 RX ADMIN — SODIUM CHLORIDE 500 ML: 9 INJECTION, SOLUTION INTRAVENOUS at 22:32

## 2021-04-22 RX ADMIN — ACETAMINOPHEN 1000 MG: 500 TABLET ORAL at 22:55

## 2021-04-22 RX ADMIN — SODIUM CHLORIDE 1000 ML: 9 INJECTION, SOLUTION INTRAVENOUS at 23:57

## 2021-04-23 ENCOUNTER — APPOINTMENT (OUTPATIENT)
Dept: MRI IMAGING | Age: 81
DRG: 871 | End: 2021-04-23
Payer: MEDICARE

## 2021-04-23 PROBLEM — R10.9 ABDOMINAL PAIN: Status: ACTIVE | Noted: 2021-04-23

## 2021-04-23 PROBLEM — R50.9 FEVER: Status: ACTIVE | Noted: 2021-04-23

## 2021-04-23 PROBLEM — R00.0 SINUS TACHYCARDIA: Status: ACTIVE | Noted: 2021-04-23

## 2021-04-23 LAB
ALBUMIN SERPL-MCNC: 3.4 G/DL (ref 3.5–5.2)
ALP BLD-CCNC: 316 U/L (ref 40–129)
ALT SERPL-CCNC: 262 U/L (ref 0–40)
AMYLASE: 84 U/L (ref 20–100)
ANION GAP SERPL CALCULATED.3IONS-SCNC: 9 MMOL/L (ref 7–16)
AST SERPL-CCNC: 241 U/L (ref 0–39)
BASOPHILS ABSOLUTE: 0.06 E9/L (ref 0–0.2)
BASOPHILS RELATIVE PERCENT: 0.4 % (ref 0–2)
BILIRUB SERPL-MCNC: 3.3 MG/DL (ref 0–1.2)
BILIRUBIN DIRECT: 2.6 MG/DL (ref 0–0.3)
BILIRUBIN, INDIRECT: 0.7 MG/DL (ref 0–1)
BUN BLDV-MCNC: 16 MG/DL (ref 6–23)
C-REACTIVE PROTEIN: 5.3 MG/DL (ref 0–0.4)
CALCIUM SERPL-MCNC: 8.5 MG/DL (ref 8.6–10.2)
CHLORIDE BLD-SCNC: 112 MMOL/L (ref 98–107)
CO2: 22 MMOL/L (ref 22–29)
CREAT SERPL-MCNC: 0.8 MG/DL (ref 0.7–1.2)
EKG ATRIAL RATE: 100 BPM
EKG Q-T INTERVAL: 328 MS
EKG QRS DURATION: 92 MS
EKG QTC CALCULATION (BAZETT): 420 MS
EKG R AXIS: 77 DEGREES
EKG T AXIS: 64 DEGREES
EKG VENTRICULAR RATE: 99 BPM
EOSINOPHILS ABSOLUTE: 0.07 E9/L (ref 0.05–0.5)
EOSINOPHILS RELATIVE PERCENT: 0.5 % (ref 0–6)
GFR AFRICAN AMERICAN: >60
GFR NON-AFRICAN AMERICAN: >60 ML/MIN/1.73
GLUCOSE BLD-MCNC: 117 MG/DL (ref 74–99)
HCT VFR BLD CALC: 38.5 % (ref 37–54)
HEMOGLOBIN: 12.5 G/DL (ref 12.5–16.5)
IMMATURE GRANULOCYTES #: 0.09 E9/L
IMMATURE GRANULOCYTES %: 0.6 % (ref 0–5)
LYMPHOCYTES ABSOLUTE: 1.91 E9/L (ref 1.5–4)
LYMPHOCYTES RELATIVE PERCENT: 12.5 % (ref 20–42)
MCH RBC QN AUTO: 27.7 PG (ref 26–35)
MCHC RBC AUTO-ENTMCNC: 32.5 % (ref 32–34.5)
MCV RBC AUTO: 85.2 FL (ref 80–99.9)
MONOCYTES ABSOLUTE: 1.59 E9/L (ref 0.1–0.95)
MONOCYTES RELATIVE PERCENT: 10.4 % (ref 2–12)
NEUTROPHILS ABSOLUTE: 11.54 E9/L (ref 1.8–7.3)
NEUTROPHILS RELATIVE PERCENT: 75.6 % (ref 43–80)
OVALOCYTES: ABNORMAL
PDW BLD-RTO: 14.9 FL (ref 11.5–15)
PLATELET # BLD: 204 E9/L (ref 130–450)
PMV BLD AUTO: 10.8 FL (ref 7–12)
POIKILOCYTES: ABNORMAL
POTASSIUM SERPL-SCNC: 4 MMOL/L (ref 3.5–5)
PROCALCITONIN: 2.53 NG/ML (ref 0–0.08)
RBC # BLD: 4.52 E12/L (ref 3.8–5.8)
SODIUM BLD-SCNC: 143 MMOL/L (ref 132–146)
TOTAL PROTEIN: 5.7 G/DL (ref 6.4–8.3)
WBC # BLD: 15.3 E9/L (ref 4.5–11.5)

## 2021-04-23 PROCEDURE — 6360000002 HC RX W HCPCS: Performed by: SPECIALIST

## 2021-04-23 PROCEDURE — 2580000003 HC RX 258: Performed by: INTERNAL MEDICINE

## 2021-04-23 PROCEDURE — A9579 GAD-BASE MR CONTRAST NOS,1ML: HCPCS | Performed by: RADIOLOGY

## 2021-04-23 PROCEDURE — 74183 MRI ABD W/O CNTR FLWD CNTR: CPT

## 2021-04-23 PROCEDURE — 96361 HYDRATE IV INFUSION ADD-ON: CPT

## 2021-04-23 PROCEDURE — 86255 FLUORESCENT ANTIBODY SCREEN: CPT

## 2021-04-23 PROCEDURE — 6370000000 HC RX 637 (ALT 250 FOR IP): Performed by: NURSE PRACTITIONER

## 2021-04-23 PROCEDURE — 80048 BASIC METABOLIC PNL TOTAL CA: CPT

## 2021-04-23 PROCEDURE — 6370000000 HC RX 637 (ALT 250 FOR IP): Performed by: INTERNAL MEDICINE

## 2021-04-23 PROCEDURE — 1200000000 HC SEMI PRIVATE

## 2021-04-23 PROCEDURE — 94660 CPAP INITIATION&MGMT: CPT

## 2021-04-23 PROCEDURE — 80076 HEPATIC FUNCTION PANEL: CPT

## 2021-04-23 PROCEDURE — 2580000003 HC RX 258: Performed by: SPECIALIST

## 2021-04-23 PROCEDURE — 84145 PROCALCITONIN (PCT): CPT

## 2021-04-23 PROCEDURE — 99222 1ST HOSP IP/OBS MODERATE 55: CPT | Performed by: INTERNAL MEDICINE

## 2021-04-23 PROCEDURE — 86140 C-REACTIVE PROTEIN: CPT

## 2021-04-23 PROCEDURE — 36415 COLL VENOUS BLD VENIPUNCTURE: CPT

## 2021-04-23 PROCEDURE — 6360000004 HC RX CONTRAST MEDICATION: Performed by: RADIOLOGY

## 2021-04-23 PROCEDURE — 85025 COMPLETE CBC W/AUTO DIFF WBC: CPT

## 2021-04-23 PROCEDURE — 82150 ASSAY OF AMYLASE: CPT

## 2021-04-23 PROCEDURE — 96365 THER/PROPH/DIAG IV INF INIT: CPT

## 2021-04-23 RX ORDER — LOSARTAN POTASSIUM 25 MG/1
25 TABLET ORAL DAILY
Status: DISCONTINUED | OUTPATIENT
Start: 2021-04-23 | End: 2021-04-23

## 2021-04-23 RX ORDER — NITROGLYCERIN 0.4 MG/1
0.4 TABLET SUBLINGUAL EVERY 5 MIN PRN
Status: DISCONTINUED | OUTPATIENT
Start: 2021-04-23 | End: 2021-04-28 | Stop reason: HOSPADM

## 2021-04-23 RX ORDER — M-VIT,TX,IRON,MINS/CALC/FOLIC 27MG-0.4MG
1 TABLET ORAL DAILY
Status: DISCONTINUED | OUTPATIENT
Start: 2021-04-23 | End: 2021-04-23

## 2021-04-23 RX ORDER — ATORVASTATIN CALCIUM 40 MG/1
80 TABLET, FILM COATED ORAL DAILY
Status: DISCONTINUED | OUTPATIENT
Start: 2021-04-23 | End: 2021-04-23

## 2021-04-23 RX ORDER — RASAGILINE 1 MG/1
1 TABLET ORAL DAILY
Status: DISCONTINUED | OUTPATIENT
Start: 2021-04-23 | End: 2021-04-28 | Stop reason: HOSPADM

## 2021-04-23 RX ORDER — 0.9 % SODIUM CHLORIDE 0.9 %
1000 INTRAVENOUS SOLUTION INTRAVENOUS ONCE
Status: COMPLETED | OUTPATIENT
Start: 2021-04-23 | End: 2021-04-23

## 2021-04-23 RX ORDER — SODIUM CHLORIDE 9 MG/ML
25 INJECTION, SOLUTION INTRAVENOUS PRN
Status: DISCONTINUED | OUTPATIENT
Start: 2021-04-23 | End: 2021-04-28 | Stop reason: HOSPADM

## 2021-04-23 RX ORDER — SODIUM CHLORIDE 0.9 % (FLUSH) 0.9 %
5-40 SYRINGE (ML) INJECTION EVERY 12 HOURS SCHEDULED
Status: DISCONTINUED | OUTPATIENT
Start: 2021-04-23 | End: 2021-04-28 | Stop reason: HOSPADM

## 2021-04-23 RX ORDER — CHOLECALCIFEROL (VITAMIN D3) 50 MCG
2000 TABLET ORAL DAILY
Status: DISCONTINUED | OUTPATIENT
Start: 2021-04-23 | End: 2021-04-23

## 2021-04-23 RX ORDER — SODIUM CHLORIDE 0.9 % (FLUSH) 0.9 %
5-40 SYRINGE (ML) INJECTION PRN
Status: DISCONTINUED | OUTPATIENT
Start: 2021-04-23 | End: 2021-04-28 | Stop reason: HOSPADM

## 2021-04-23 RX ORDER — KETOCONAZOLE 20 MG/G
CREAM TOPICAL DAILY PRN
Status: DISCONTINUED | OUTPATIENT
Start: 2021-04-23 | End: 2021-04-28 | Stop reason: HOSPADM

## 2021-04-23 RX ORDER — METOPROLOL SUCCINATE 50 MG/1
50 TABLET, EXTENDED RELEASE ORAL DAILY
Status: DISCONTINUED | OUTPATIENT
Start: 2021-04-23 | End: 2021-04-28 | Stop reason: HOSPADM

## 2021-04-23 RX ORDER — MIRTAZAPINE 15 MG/1
15 TABLET, FILM COATED ORAL NIGHTLY
Status: DISCONTINUED | OUTPATIENT
Start: 2021-04-23 | End: 2021-04-28 | Stop reason: HOSPADM

## 2021-04-23 RX ORDER — CLOPIDOGREL BISULFATE 75 MG/1
75 TABLET ORAL DAILY
Status: DISCONTINUED | OUTPATIENT
Start: 2021-04-23 | End: 2021-04-23

## 2021-04-23 RX ORDER — DOCUSATE SODIUM 100 MG/1
200 CAPSULE, LIQUID FILLED ORAL DAILY
Status: DISCONTINUED | OUTPATIENT
Start: 2021-04-23 | End: 2021-04-28 | Stop reason: HOSPADM

## 2021-04-23 RX ORDER — TAMSULOSIN HYDROCHLORIDE 0.4 MG/1
0.4 CAPSULE ORAL DAILY
Status: DISCONTINUED | OUTPATIENT
Start: 2021-04-23 | End: 2021-04-28 | Stop reason: HOSPADM

## 2021-04-23 RX ORDER — SODIUM CHLORIDE 9 MG/ML
INJECTION, SOLUTION INTRAVENOUS CONTINUOUS
Status: DISCONTINUED | OUTPATIENT
Start: 2021-04-23 | End: 2021-04-28 | Stop reason: HOSPADM

## 2021-04-23 RX ORDER — ASPIRIN 81 MG/1
81 TABLET ORAL DAILY
Status: DISCONTINUED | OUTPATIENT
Start: 2021-04-23 | End: 2021-04-23

## 2021-04-23 RX ADMIN — SODIUM CHLORIDE 1000 ML: 9 INJECTION, SOLUTION INTRAVENOUS at 07:55

## 2021-04-23 RX ADMIN — MIRTAZAPINE 15 MG: 15 TABLET, FILM COATED ORAL at 20:42

## 2021-04-23 RX ADMIN — DOCUSATE SODIUM 200 MG: 100 CAPSULE, LIQUID FILLED ORAL at 13:58

## 2021-04-23 RX ADMIN — SODIUM CHLORIDE: 9 INJECTION, SOLUTION INTRAVENOUS at 02:13

## 2021-04-23 RX ADMIN — SODIUM CHLORIDE: 9 INJECTION, SOLUTION INTRAVENOUS at 20:42

## 2021-04-23 RX ADMIN — GADOTERIDOL 18 ML: 279.3 INJECTION, SOLUTION INTRAVENOUS at 17:17

## 2021-04-23 RX ADMIN — WATER 2000 MG: 1 INJECTION INTRAMUSCULAR; INTRAVENOUS; SUBCUTANEOUS at 13:58

## 2021-04-23 RX ADMIN — SODIUM CHLORIDE, PRESERVATIVE FREE 10 ML: 5 INJECTION INTRAVENOUS at 20:43

## 2021-04-23 ASSESSMENT — PAIN SCALES - GENERAL
PAINLEVEL_OUTOF10: 0

## 2021-04-23 NOTE — PROGRESS NOTES
Removed monitor for room 437. Notified James Cantor RN on 5S that heart monitor 535 was sent and patient going to 537. Notified CMR that patient on 94 20 56 and 5S will change over to 537 once patient arrives to unit.       Rebekah QUIROZ  3:32 PM

## 2021-04-23 NOTE — PROGRESS NOTES
[facility] Hospitalist   Progress Note    Admitting Date and Time: 4/22/2021  9:43 PM  Admit Dx: Abdominal pain [R10.9]    Subjective:    Patient was admitted with Abdominal pain [R10.9]. Pain better currently. He is NPO. Seen at bedside with GI. Wife concerned as he apparently has RUQ abd pain many days since his cholecystectomy and he refrains from leaving the house because of it. Did have fever. Currently is afebrile. No chest pain or shortness of breath. ROS: denies fever, chills, cp, sob, n/v, HA unless stated above.      sodium chloride flush  5-40 mL Intravenous 2 times per day    metoprolol succinate  50 mg Oral Daily    mirtazapine  15 mg Oral Nightly    rasagiline mesylate  1 mg Oral Daily    tamsulosin  0.4 mg Oral Daily    cefTRIAXone (ROCEPHIN) IV  2,000 mg Intravenous Q24H     sodium chloride flush, 5-40 mL, PRN  sodium chloride, 25 mL, PRN  ketoconazole, , Daily PRN  nitroGLYCERIN, 0.4 mg, Q5 Min PRN         Objective:    /60   Pulse 73   Temp 98.7 °F (37.1 °C) (Infrared)   Resp 16   Ht 6' 1\" (1.854 m)   Wt 200 lb 7 oz (90.9 kg)   SpO2 95%   BMI 26.44 kg/m²   General Appearance: alert and oriented to person, place and time, well developed and well- nourished, in no acute distress  Skin: warm and dry, no rash or erythema, mild jaundice  Head: normocephalic and atraumatic  Eyes: pupils equal, round, and reactive to light, extraocular eye movements intact, conjunctivae normal  ENT: nose without deformity, nasal mucosa and turbinates normal without polyps  Neck: supple and non-tender without mass, no thyromegaly or thyroid nodules, no cervical lymphadenopathy  Pulmonary/Chest: clear to auscultation bilaterally- no wheezes, rales or rhonchi, normal air movement, no respiratory distress  Cardiovascular: normal rate, regular rhythm, normal S1 and S2, no murmurs  Abdomen: seems somewhat protuberant but soft and has no tenderness, no guarding  Extremities:

## 2021-04-23 NOTE — PROGRESS NOTES
8:20 AM  Consult placed to ID office, spoke with Beth Kohler, [facility]  4/23/2021

## 2021-04-23 NOTE — ED NOTES
Spoke to [facility] at ext [phone], confirmed sbar. Pt prepared for transport via cart and monitor.      Lisbeth Evans RN  04/23/21 [id number]

## 2021-04-23 NOTE — H&P
tobacco use     Stented coronary artery 2006    x1       Past Surgical History:        Procedure Laterality Date    CARDIAC CATHETERIZATION Left 11/30/2015    done [address] WITH IMPLANT Bilateral     CHOLECYSTECTOMY, LAPAROSCOPIC N/A 5/17/2019    LAPAROSCOPIC ROBOTIC XI ASSISTED CHOLECYSTECTOMY performed by Jesus Alberto Thomas MD at [place] [address]  2014    CORONARY ANGIOPLASTY  1/23/06    DIAGNOSTIC CARDIAC CATH LAB PROCEDURE  1/23/06    KNEE ARTHROPLASTY Left 02/2015    ROTATOR CUFF REPAIR      bilaterally    TONSILLECTOMY AND ADENOIDECTOMY  1951    UPPER GASTROINTESTINAL ENDOSCOPY  2015       Home Medications:  Prior to Admission medications    Medication Sig Start Date End Date Taking? Authorizing Provider   rasagiline mesylate 1 MG TABS take 1 tablet by mouth once daily 1/10/21  Yes Historical Provider, MD   atorvastatin (LIPITOR) 80 MG tablet Take 1 tablet by mouth daily 1/27/21  Yes Godwin Newton MD   clopidogrel (PLAVIX) 75 MG tablet Take 1 tablet by mouth daily 1/27/21  Yes Godwin Newton MD   metoprolol succinate (TOPROL XL) 50 MG extended release tablet Take 1 tablet by mouth daily 1/27/21  Yes Godwin Newton MD   mirtazapine (REMERON) 15 MG tablet Take 1 tablet by mouth nightly 12/7/20  Yes Godwin Newton MD   ketoconazole (NIZORAL) 2 % cream Apply topically daily. 12/7/20  Yes Godwin Newton MD   losartan (COZAAR) 25 MG tablet Take 1 tablet by mouth daily 8/24/20  Yes Godwin Newton MD   tamsulosin [facility]) 0.4 MG capsule Take 1 capsule by mouth daily 8/24/20  Yes Godwin Newton MD   ketoconazole (NIZORAL) 2 % shampoo Apply topically daily as needed. 7/6/20  Yes Godwin Newton MD   Multiple Vitamins-Minerals (THERAPEUTIC MULTIVITAMIN-MINERALS) tablet Take 1 tablet by mouth daily   Yes Historical Provider, MD   CPAP Machine MISC daily at bedtime.    Yes Historical Provider, MD   aspirin 81 MG EC tablet Take 81 mg by mouth daily Last taken 2019   Yes Historical Provider, MD   Cholecalciferol (VITAMIN D) 2000 UNIT CAPS capsule Take 1 capsule by mouth daily Last taken    Yes Historical Provider, MD   nitroGLYCERIN (NITROSTAT) 0.4 MG SL tablet Place 1 tablet under the tongue every 5 minutes as needed for Chest pain 20   Lenoria Foot, DO       Allergies:  Lisinopril    Social History:   TOBACCO:   reports that he quit smoking about 46 years ago. His smoking use included cigarettes. He started smoking about 64 years ago. He has a 15.00 pack-year smoking history. His smokeless tobacco use includes snuff. ETOH:   reports current alcohol use. Family History:       Problem Relation Age of Onset    Heart Disease Mother          79 Y/O    Diabetes Mother     Liver Disease Father          66 Y/O      Or deferred/otherwise considered non contributory to current admission  PHYSICAL EXAM:    VS: /73   Pulse 106   Temp 100.1 °F (37.8 °C) (Oral)   Resp 15   Ht 6' 1\" (1.854 m)   Wt 200 lb 7 oz (90.9 kg)   SpO2 95%   BMI 26.44 kg/m²     General Appearance:     no acute distress. Psych:  HEENT:    A.O. As per HPI details  NC/AT, PERRL, no pallor + icterus, lips/ext mucous membrane grossly dry    Neck:   Supple, trachea midline, no obvious JVD   Resp:     Dec bs No wheezes, No rhonchi   Chest wall:    No tenderness or deformity   Heart:    Tachy Regular rate and rhythm, S1 and S2 normal, no rub or gallop. Abdomen:     Soft, +upper abd-tender, bowel sounds active    no suspicious obvious masses/organomegaly   Genitalia & Rectal:    Deferred.    Extremities x4:   Extremities normal, atraumatic, no cyanosis, no clubbing   Musculoskeletal:      NO active synovitis or swollen b/l wrists, 2-5 MCPs examined   Skin:   Skin color, texture, juandice, no ACUTE rashes or lesions in lower legs and arms examined   Lymph nodes:   Cervical nodes grossly normal   Neurologic:  .Grossly symmetric  strength in UEs and LEs with symmetric grossly intact to light touch sensation     LABS:  CBC:   Lab Results   Component Value Date    WBC 9.5 04/22/2021    RBC 5.26 04/22/2021    HGB 14.4 04/22/2021    HCT 45.3 04/22/2021     04/22/2021    MCV 86.1 04/22/2021     BMP:    Lab Results   Component Value Date     04/22/2021    K 4.3 04/22/2021     04/22/2021    CO2 27 04/22/2021    BUN 15 04/22/2021    CREATININE 0.9 04/22/2021    GLUCOSE 141 04/22/2021    GLUCOSE 103 01/10/2012    CALCIUM 9.2 04/22/2021     Hepatic Function Panel:    Lab Results   Component Value Date    ALKPHOS 413 04/22/2021     04/22/2021     04/22/2021    PROT 6.9 04/22/2021    LABALBU 4.0 04/22/2021    LABALBU 3.9 01/10/2012    BILITOT 1.9 04/22/2021     Magnesium:    Lab Results   Component Value Date    MG 2.1 03/06/2020       PT/INR:    Lab Results   Component Value Date    PROTIME 12.9 06/28/2020    PROTIME 11.0 01/09/2012    INR 1.1 06/28/2020     U/A:   Lab Results   Component Value Date    LEUKOCYTESUR Negative 04/22/2021    PHUR 7.0 04/22/2021    WBCUA 1-3 06/25/2020    WBCUA 0-1 01/10/2012    RBCUA NONE 06/25/2020    RBCUA NONE 01/10/2012    BACTERIA RARE 06/25/2020    SPECGRAV 1.020 04/22/2021    BLOODU Negative 04/22/2021    GLUCOSEU Negative 04/22/2021    GLUCOSEU 100 01/10/2012     ABG:  No results found for: PHART, TQO1SGT, PO2ART, K9KEBMTR, QPV8LON, BEART  TSH:    Lab Results   Component Value Date    TSH 0.891 06/26/2020     Cardiac Enzymes:   Lab Results   Component Value Date    CKTOTAL 87 11/27/2015    CKTOTAL 32 11/19/2015    CKTOTAL 36 11/19/2015    CKMB 5.3 11/27/2015    CKMB <1.0 11/19/2015    CKMB <1.0 11/19/2015    TROPONINI <0.01 04/22/2021    TROPONINI <0.01 11/23/2020    TROPONINI <0.01 11/23/2020       Radiology: Ct Abdomen Pelvis W Iv Contrast Additional Contrast? None    Result Date: 4/23/2021  EXAMINATION: CT OF THE ABDOMEN AND PELVIS WITH CONTRAST 4/22/2021 10:18 pm TECHNIQUE: CT of the abdomen and pelvis was performed with the administration of intravenous contrast. Multiplanar reformatted images are provided for review. Dose modulation, iterative reconstruction, and/or weight based adjustment of the mA/kV was utilized to reduce the radiation dose to as low as reasonably achievable. COMPARISON: Left knee 06/25/2020 HISTORY: ORDERING SYSTEM PROVIDED HISTORY: upper abdominal pain, nausea TECHNOLOGIST PROVIDED HISTORY: Additional Contrast?->None Reason for exam:->upper abdominal pain, nausea Decision Support Exception->Emergency Medical Condition (MA) FINDINGS: Lower Chest: The lung bases are unremarkable. Small hiatal hernia. Organs: The liver, spleen, adrenal glands, kidneys are unremarkable. The gallbladder is absent. Normal common bile duct. Small amount of fluid/fat stranding adjacent to the head of the pancreas and region of the kevin hepatis. Correlate with abnormal amylase and lipase for pancreatitis. GI/Bowel: He diffuse colonic fecal retention. The small bowel and appendix are normal. Pelvis: Significant prostatomegaly with mild diffuse urinary bladder wall thickening. Peritoneum/Retroperitoneum: No free air or free fluid. Bones/Soft Tissues: The mild multilevel degenerative changes. Probable osseous hemangioma L4 vertebral body. Small fat containing umbilical hernia. Small amount of fluid/fat stranding adjacent to the head of the pancreas and region of the kevin hepatis. Correlate with abnormal amylase lipase for pancreatitis. Diffuse colonic fecal retention. Significant prostatomegaly. EKG:  Accelerated Junctional rhythm with occasional premature ventricular complexes  Abnormal ECG  When compared with ECG of 22-APR-2021 22:39,  Significant changes have occurred    ibntial EKG:  EKG performed on patient concerning for wide complex tachycardia. I immediately evaluated the patient. Patient has Parkinson's disease, and his arms are shaking. I held his arms still and repeated the EKG.  Repeat EKG does not show wide complex tachycardia. Assessment & Plan   ACTIVE hospital problems being addressed/reassessed for this admission:  Principal Problem:    Ascending cholangitis  Active Problems:    CAD (coronary artery disease)    Ischemic cardiomyopathy    COPD (chronic obstructive pulmonary disease) (HCC)    Elevated LFTs    Abdominal pain    Fever    Sinus tachycardia  Resolved Problems:    * No resolved hospital problems. *    Code status/DVT prophylaxis and PLAN --see orders   Note extensive time spent coordinating care between ER docs, ER and floor nurses, and transitioning care over to day providers  Plan of care/ clinical impressions/communication specifics detailed below:  79 y.o. male    One day of severe upper abdominal pain, +fever, +obstructive jaundice  Hx of paris in past, sees dr. Mayra Johnson- recent egd - for acute on chronic recurrent abdominal pain  Recurrence of moderately severe --feels similar to when he had his gallbladder attack. May of last year sp attack- he is status post cholecystectomy. Recurrent since then intermittent similar episodes that have been mild and normally resolve spontaneously. +nausea +chills  Denies fever but in ER temp 103      Hx of Bph, strains for urination  Also + chronic constipation   Covid back last November/dec    Hx of CAD/copd but NO chest pain, shortness of breath, cough  CT shows:  Small amount of fluid/fat stranding adjacent to the head of the pancreas and   region of the kevin hepatis.  Correlate with abnormal amylase lipase for   pancreatitis.       Diffuse colonic fecal retention. EGD by Dr. Burton Lazcano recently.   on 4/5/2021, and results showed GERD, gastritis, and hiatal hernia.    duondeum bx (no hpylori )-MARCELLE test result is negative  Duodenum: Chronic peptic duodenitis     Fever 103, WBC N  But inc neutrophils   Concerns for infection and ascending cholangitis- started on zosyn  Alk pos 413  ast 328, alt 243, Mike 1.9-- obstructive jaundice noted  Lipase N- no pancreatitis    This is New elevation liver enzymes-- alk phos 137,  Bili, ast/alt N-- fev 2021    Noted HIDA scan in 2-019 did show obstructive biliary; Abnormal cholescintigraphic exam with biliary tract nonvisualization. Apparent failure of radionuclide to exit liver potentially related to   obstructive cholestasis etiology      noted covid back in NOV  sma in past, low titer- work up autoimmune hepatitis, neg SHERICE  Negative ig G 4 in past negative for autoimmune pancreatitits      Tachycardia noted--Accelerated Junctional rhythm with occasional premature ventricular complexes  Also in ER--EKG performed on patient concerning for wide complex tachycardia. I immediately evaluated the patient. Patient has Parkinson's disease, and his arms are shaking. I held his arms still and repeated the EKG.  Repeat EKG does not show wide complex tachycardia        Leticia Lee MD   Night Officer, overnight admitting doctor at [facility] call day time doctor   for questions after 7:30am    Covering for [address] [facility]  If Qs please call [phone]  Electronically signed by Lenard Arevalo MD on 4/23/2021 at 6:43 AM

## 2021-04-23 NOTE — CONSULTS
 CATARACT REMOVAL WITH IMPLANT Bilateral     CHOLECYSTECTOMY, LAPAROSCOPIC N/A 2019    LAPAROSCOPIC ROBOTIC XI ASSISTED CHOLECYSTECTOMY performed by Lillie Mathew MD at [address] [place] [address]      CORONARY ANGIOPLASTY  06    DIAGNOSTIC CARDIAC CATH LAB PROCEDURE  06    KNEE ARTHROPLASTY Left 2015    ROTATOR CUFF REPAIR      bilaterally    TONSILLECTOMY AND ADENOIDECTOMY      UPPER GASTROINTESTINAL ENDOSCOPY       Current Medications:   Scheduled Meds:   sodium chloride flush  5-40 mL Intravenous 2 times per day    metoprolol succinate  50 mg Oral Daily    mirtazapine  15 mg Oral Nightly    rasagiline mesylate  1 mg Oral Daily    tamsulosin  0.4 mg Oral Daily     Continuous Infusions:   sodium chloride      sodium chloride 125 mL/hr at 21 0213     PRN Meds:sodium chloride flush, sodium chloride, ketoconazole, nitroGLYCERIN    Allergies:  Lisinopril    Social History:   Social History     Socioeconomic History    Marital status:      Spouse name: None    Number of children: None    Years of education: None    Highest education level: None   Occupational History    Occupation: retired-     Social Needs    Financial resource strain: None    Food insecurity     Worry: None     Inability: None    Transportation needs     Medical: None     Non-medical: None   Tobacco Use    Smoking status: Former Smoker     Packs/day: 1.00     Years: 15.00     Pack years: 15.00     Types: Cigarettes     Start date: 1957     Quit date: 1975     Years since quittin.3    Smokeless tobacco: Current User     Types: Snuff   Substance and Sexual Activity    Alcohol use: Yes     Comment: occassionally    Drug use: No    Sexual activity: None   Lifestyle    Physical activity     Days per week: None     Minutes per session: None    Stress: None   Relationships    Social connections     Talks on phone: None     Gets together: None     Attends [employer]ology service: None     Active member of club or organization: None     Attends meetings of clubs or organizations: None     Relationship status: None    Intimate partner violence     Fear of current or ex partner: None     Emotionally abused: None     Physically abused: None     Forced sexual activity: None   Other Topics Concern    None   Social History Narrative    None      Pets: None  Travel: No  The patient lives at home with his wife. He retired from [employer]    Family History:       Problem Relation Age of Onset    Heart Disease Mother          79 Y/O    Diabetes Mother     Liver Disease Father          66 Y/O   . Otherwise non-pertinent to the chief complaint. REVIEW OF SYSTEMS:    Constitutional: Positive for fevers, chills, diaphoresis  Neurologic: Negative   Psychiatric: Negative  Rheumatologic: Negative   Endocrine: Negative  Hematologic: Negative  Immunologic: Negative. Vaccinations, including SARS-CoV-2 up-to-date  ENT: Negative  Respiratory: Negative   Cardiovascular: Negative  GI: As in the HPI  : Negative  Musculoskeletal: Negative  Skin: No rashes. PHYSICAL EXAM:    Vitals:   /60   Pulse 73   Temp 98.7 °F (37.1 °C) (Infrared)   Resp 16   Ht 6' 1\" (1.854 m)   Wt 200 lb 7 oz (90.9 kg)   SpO2 95%   BMI 26.44 kg/m²   Constitutional: The patient is awake, alert, and oriented. Skin: Warm and dry. No rashes were noted. HEENT: Eyes show round, and reactive pupils. No jaundice. Moist mucous membranes, no ulcerations, no thrush. Neck: Supple to movements. No lymphadenopathy. Chest: No use of accessory muscles to breathe. Symmetrical expansion. Auscultation reveals no wheezing, crackles, or rhonchi. Cardiovascular: S1 and S2 are rhythmic and regular. No murmurs appreciated. Abdomen: Positive bowel sounds to auscultation. Benign to palpation. No masses felt. No hepatosplenomegaly. Extremities: No clubbing, no cyanosis, no edema.   Lines: peripheral      CBC+dif:  Recent Labs     04/22/21  2230 04/23/21  1020   WBC 9.5 15.3*   HGB 14.4 12.5   HCT 45.3 38.5   MCV 86.1 85.2    204   NEUTROABS 7.45* 11.54*     Lab Results   Component Value Date    CRP 1.2 (H) 11/22/2020      No results found for: CRPHS  No results found for: SEDRATE  Lab Results   Component Value Date     (H) 04/23/2021     (H) 04/23/2021    ALKPHOS 316 (H) 04/23/2021    BILITOT 3.3 (H) 04/23/2021     Lab Results   Component Value Date     04/23/2021    K 4.0 04/23/2021    K 4.3 04/22/2021     04/23/2021    CO2 22 04/23/2021    BUN 16 04/23/2021    CREATININE 0.8 04/23/2021    GFRAA >60 04/23/2021    LABGLOM >60 04/23/2021    GLUCOSE 117 04/23/2021    GLUCOSE 103 01/10/2012    PROT 5.7 04/23/2021    LABALBU 3.4 04/23/2021    LABALBU 3.9 01/10/2012    CALCIUM 8.5 04/23/2021    BILITOT 3.3 04/23/2021    ALKPHOS 316 04/23/2021     04/23/2021     04/23/2021       Lab Results   Component Value Date    PROTIME 12.9 06/28/2020    PROTIME 11.0 01/09/2012    INR 1.1 06/28/2020       Lab Results   Component Value Date    TSH 0.891 06/26/2020       Lab Results   Component Value Date    COLORU Yellow 04/22/2021    PHUR 7.0 04/22/2021    WBCUA 1-3 06/25/2020    WBCUA 0-1 01/10/2012    RBCUA NONE 06/25/2020    RBCUA NONE 01/10/2012    MUCUS Present 02/26/2015    BACTERIA RARE 06/25/2020    CLARITYU Clear 04/22/2021    SPECGRAV 1.020 04/22/2021    LEUKOCYTESUR Negative 04/22/2021    UROBILINOGEN 1.0 04/22/2021    BILIRUBINUR Negative 04/22/2021    BILIRUBINUR NEGATIVE 01/10/2012    BLOODU Negative 04/22/2021    GLUCOSEU Negative 04/22/2021    GLUCOSEU 100 01/10/2012       No results found for: HCO3, BE, O2SAT, PH, THGB, PCO2, PO2, TCO2  Radiology:  CT of the abdomen and pelvis reviewed    Microbiology:  Pending  No results for input(s): BC in the last 72 hours. No results for input(s): ORG in the last 72 hours.   No results for input(s): Mary Franklin in the last 72 hours. No results for input(s): STREPNEUMAGU in the last 72 hours. No results for input(s): LP1UAG in the last 72 hours. No results for input(s): ASO in the last 72 hours. No results for input(s): CULTRESP in the last 72 hours. No results for input(s): PROCAL in the last 72 hours. Assessment:  · Probable cholangitis  · Fever associated to the above  · Leukocytosis associated to cholangitis    Plan:    · Start Ceftriaxone  · Check cultures, baseline ESR, CRP  · MRCP  · Will follow with you    Thank you for having us see this patient in consultation. I will be discussing this case with the treating physicians.     Victory Hunt  1:18 PM  4/23/2021

## 2021-04-23 NOTE — PROGRESS NOTES
Patient wears home nitroglycerin around neck, does not want it removed. Patient educated to let nursing know if he is having chest pain and not to take his nitroglycerin.

## 2021-04-23 NOTE — PROGRESS NOTES
7:21 AM  Consult placed to Cindy in answering service for Dr. Moris Becerra.   Christiana Ordonez, [facility]  4/23/2021

## 2021-04-23 NOTE — CONSULTS
Gastroenterology Consult Note   CARMENZA Tiwari NP-C with Giovany Lux M.D. Consult Note        Date of Service: 4/23/2021  Reason for Consult: concerns for ascending cholangitis  Requesting Physician: Dr. Eleazar Berrios:  Abdominal pain    History Obtained From: patient, wife and EMR    HISTORY OF PRESENT ILLNESS:       Jocelin Abrams is a 79 y.o. male with significant past medical history of GERD, gastritis, hiatal hernia, cholecystectomy 2019, HTN, HLD, CHF, COPD and CAD admitted via ED for abdominal pain. Pt reports starting yesterday he had lower abdominal pain radiating across his abdomen described as \"just hurt\". Per wife Fransico Sarmiento was screaming in pain\" leading to ER for evaluation. Reports nausea a few times yesterday with no vomiting, taking Pepto Bismol with no relief. States this has happened in the past and reports the pain is similar to symptoms he experienced prior to cholecystectomy for cholelithiasis. Reports his bowel movements are QOD to every 3 days, stating \"I have constipation all the time\" taking colace and metamucil. Denies fever, chills, unintentional weight loss, melena or hematochezia. With recent EGD on 4/2/21 (see report below) reports he was feeling fine and eating well up until yesterday. Patient is currently scheduled for outpatient colonoscopy on 5/11/21. Reports 2-3 prior colonoscopies, all reportedly normal, denies polyps. Denies family history of colon or other GI cancers. Admission labs alk phos 413, , , bilirubin 1.9. CT abdomen/pelvis W IV contrast- Small amount of fluid/fat stranding adjacent to the head of the pancreas and region of the kevin hepatis. Correlate with abnormal amylase lipase for pancreatitis. Diffuse colonic fecal retention. Significant prostatomegaly. Consultation for concerns for ascending cholangitis. Pt is known to Dr. Mayra Malloy, last seen on 4/2/2021 for EGD.   EGD 4/2/2021-GERD, gastritis, small hiatal hernia; MARCELLE negative, surgical pathology chronic peptic duodenitis. EGD 11/20/2015-grade a GERD and gastritis. Patient currently scheduled for colonoscopy on 5/11/2021. Currently, pt reports no abdominal pain, nausea or vomiting. Currently n.p.o. Last BM 2 days ago. Labs today alk phos 316, , amylase 84, , bilirubin 3.3, direct bilirubin 2.6, WBC 15.3, chloride 112, glucose 117, calcium 8.5, total protein 5.7, pro calcitonin 2.53.     Past Medical History:        Diagnosis Date    CAD (coronary artery disease) 2006    follows with Dr Deena Morataya    Cerebral artery occlusion with cerebral infarction [facility])     Cerebrovascular disease     CHF (congestive heart failure), NYHA class I, chronic, systolic [facility] 3229    [facility]-- EF 39%    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/2018    Chronic maxillary sinusitis 04/2018    COPD [facility]chronic obstructive pulmonary disease) [facility] 04/2018    GARSIA (dyspnea on exertion)     follows with Dr Buck Orr Gallbladder disease     History of non-ST elevation myocardial infarction (NSTEMI) 2015    [facility]    Hyperlipidemia     Hypertension     Ischemic cardiomyopathy     EF 40% after NSTEMI, 11-15 at [facility]    Obesity     Osteoarthritis     Skin cancer of eyelid     Sleep apnea     uses CPAP    Smokeless tobacco use     Stented coronary artery 2006    x1     Past Surgical History:        Procedure Laterality Date    CARDIAC CATHETERIZATION Left 11/30/2015    done [facility] WITH IMPLANT Bilateral     CHOLECYSTECTOMY, LAPAROSCOPIC N/A 5/17/2019    LAPAROSCOPIC ROBOTIC XI ASSISTED CHOLECYSTECTOMY performed by Ratna Sebastian MD at [facility]  2014    CORONARY ANGIOPLASTY  1/23/06    DIAGNOSTIC CARDIAC CATH LAB PROCEDURE  1/23/06    KNEE ARTHROPLASTY Left 02/2015    ROTATOR CUFF REPAIR      bilaterally    TONSILLECTOMY AND ADENOIDECTOMY  1951    UPPER GASTROINTESTINAL ENDOSCOPY  2015     Current Medications:    Current Facility-Administered Medications: sodium chloride flush 0.9 % injection 5-40 mL, 5-40 mL, Intravenous, 2 times per day  sodium chloride flush 0.9 % injection 5-40 mL, 5-40 mL, Intravenous, PRN  0.9 % sodium chloride infusion, 25 mL, Intravenous, PRN  0.9 % sodium chloride infusion, , Intravenous, Continuous  ketoconazole (NIZORAL) 2 % cream, , Topical, Daily PRN  metoprolol succinate (TOPROL XL) extended release tablet 50 mg, 50 mg, Oral, Daily  mirtazapine (REMERON) tablet 15 mg, 15 mg, Oral, Nightly  nitroGLYCERIN (NITROSTAT) SL tablet 0.4 mg, 0.4 mg, Sublingual, Q5 Min PRN  rasagiline mesylate TABS 1 mg, 1 mg, Oral, Daily  tamsulosin (FLOMAX) capsule 0.4 mg, 0.4 mg, Oral, Daily  cefTRIAXone (ROCEPHIN) 2,000 mg in sterile water 20 mL IV syringe, 2,000 mg, Intravenous, Q24H    Allergies:  Lisinopril    Social History:    Tobacco:  Pt reports history smokeless tobacco use, quit 3 years ago  Alcohol:  Pt reports occasional use 1-2 times yearly  Illicit Drugs: Pt denies    Family History: Mother-, DM and heart disease  Father-, heart disease  Children 3-living, healthy  Denies family history of colon or other GI cancers    REVIEW OF SYSTEMS:    Aside from what was mentioned in the [facility] [address] and HPI, essentially unremarkable, all others negative. PHYSICAL EXAM:      Vitals:    /60   Pulse 73   Temp 98.7 °F (37.1 °C) (Infrared)   Resp 16   Ht 6' 1\" (1.854 m)   Wt 200 lb 7 oz (90.9 kg)   SpO2 95%   BMI 26.44 kg/m²       CONSTITUTIONAL:  awake, alert, cooperative, no apparent distress, and appears stated age  EYES:  pupils equal, round and reactive to light, sclera anicteric and conjunctiva normal  ENT:  normocephalic, oral pharynx with dry mucous membranes  NECK:  supple   HEMATOLOGIC/LYMPHATICS:  no cervical lymphadenopathy and no supraclavicular lymphadenopathy  LUNGS:  No increased work of breathing, good air exchange, clear to auscultation bilaterally.   CARDIOVASCULAR:  Normal apical impulse, regular rate and rhythm, no murmur noted; 2+ pulses; trace BLE edema  ABDOMEN:  normal bowel sounds, soft, non-distended, non-tender, no masses palpated, no hepatosplenomegally  MUSCULOSKELETAL:  full range of motion noted  motor strength is 5 out of 5 all extremities bilaterally  NEUROLOGIC:  Mental Status Exam:  Level of Alertness:   awake  Orientation:   person, place, time  Motor Exam:  Motor exam is symmetrical 5 out of 5 all extremities bilaterally  SKIN:  normal skin color, texture, turgor    DATA:    CBC with Differential:    Lab Results   Component Value Date    WBC 15.3 04/23/2021    RBC 4.52 04/23/2021    HGB 12.5 04/23/2021    HCT 38.5 04/23/2021     04/23/2021    MCV 85.2 04/23/2021    MCH 27.7 04/23/2021    MCHC 32.5 04/23/2021    RDW 14.9 04/23/2021    SEGSPCT 45 01/10/2012    LYMPHOPCT 12.5 04/23/2021    MONOPCT 10.4 04/23/2021    BASOPCT 0.4 04/23/2021    MONOSABS 1.59 04/23/2021    LYMPHSABS 1.91 04/23/2021    EOSABS 0.07 04/23/2021    BASOSABS 0.06 04/23/2021     CMP:    Lab Results   Component Value Date     04/23/2021    K 4.0 04/23/2021    K 4.3 04/22/2021     04/23/2021    CO2 22 04/23/2021    BUN 16 04/23/2021    CREATININE 0.8 04/23/2021    GFRAA >60 04/23/2021    LABGLOM >60 04/23/2021    GLUCOSE 117 04/23/2021    GLUCOSE 103 01/10/2012    PROT 5.7 04/23/2021    LABALBU 3.4 04/23/2021    LABALBU 3.9 01/10/2012    CALCIUM 8.5 04/23/2021    BILITOT 3.3 04/23/2021    ALKPHOS 316 04/23/2021     04/23/2021     04/23/2021     Hepatic Function Panel:    Lab Results   Component Value Date    ALKPHOS 316 04/23/2021     04/23/2021     04/23/2021    PROT 5.7 04/23/2021    BILITOT 3.3 04/23/2021    BILIDIR 2.6 04/23/2021    IBILI 0.7 04/23/2021    LABALBU 3.4 04/23/2021    LABALBU 3.9 01/10/2012     PT/INR:    Lab Results   Component Value Date    PROTIME 12.9 06/28/2020    PROTIME 11.0 01/09/2012    INR 1.1 06/28/2020     PTT:    Lab Results   Component Value Date    APTT 32.8 04/11/2018   [APTT}  Last 3 Troponin:    Lab Results   Component Value Date    TROPONINI <0.01 04/22/2021    TROPONINI <0.01 11/23/2020    TROPONINI <0.01 11/23/2020     TSH:    Lab Results   Component Value Date    TSH 0.891 06/26/2020     VITAMIN B12:   Lab Results   Component Value Date    VMCPKIOL19 921 04/12/2018     FOLATE:    Lab Results   Component Value Date    FOLATE >20.0 04/12/2018     IRON:    Lab Results   Component Value Date    IRON 80 06/26/2020     Iron Saturation:    Lab Results   Component Value Date    LABIRON 41 06/26/2020     TIBC:    Lab Results   Component Value Date    TIBC 195 06/26/2020     FERRITIN:    Lab Results   Component Value Date    FERRITIN 649 11/22/2020     HIV:  No results found for: HIV  SHERICE:    Lab Results   Component Value Date    SHERICE NEGATIVE 06/26/2020     No components found for: CHLPL    Lab Results   Component Value Date    TRIG 46 03/16/2019    TRIG 107 06/27/2018    TRIG 91 04/12/2018       Lab Results   Component Value Date    HDL 49 03/06/2020    HDL 47 09/18/2019    HDL 45 03/16/2019       Lab Results   Component Value Date    LDLCALC 66 03/06/2020    LDLCALC 64 09/18/2019    LDLCALC 33 03/16/2019       Lab Results   Component Value Date    LABVLDL 22 03/06/2020    LABVLDL 32 09/18/2019    LABVLDL 9 03/16/2019        Ct Abdomen Pelvis W Iv Contrast Additional Contrast? None    Result Date: 4/23/2021  EXAMINATION: CT OF THE ABDOMEN AND PELVIS WITH CONTRAST 4/22/2021 10:18 pm TECHNIQUE: CT of the abdomen and pelvis was performed with the administration of intravenous contrast. Multiplanar reformatted images are provided for review. Dose modulation, iterative reconstruction, and/or weight based adjustment of the mA/kV was utilized to reduce the radiation dose to as low as reasonably achievable.  COMPARISON: Left knee 06/25/2020 HISTORY: ORDERING SYSTEM PROVIDED HISTORY: upper abdominal pain, nausea TECHNOLOGIST PROVIDED HISTORY: Additional Contrast?->None Reason for exam:->upper abdominal pain, nausea Decision Support Exception->Emergency Medical Condition (MA) FINDINGS: Lower Chest: The lung bases are unremarkable. Small hiatal hernia. Organs: The liver, spleen, adrenal glands, kidneys are unremarkable. The gallbladder is absent. Normal common bile duct. Small amount of fluid/fat stranding adjacent to the head of the pancreas and region of the kevin hepatis. Correlate with abnormal amylase and lipase for pancreatitis. GI/Bowel: He diffuse colonic fecal retention. The small bowel and appendix are normal. Pelvis: Significant prostatomegaly with mild diffuse urinary bladder wall thickening. Peritoneum/Retroperitoneum: No free air or free fluid. Bones/Soft Tissues: The mild multilevel degenerative changes. Probable osseous hemangioma L4 vertebral body. Small fat containing umbilical hernia. Small amount of fluid/fat stranding adjacent to the head of the pancreas and region of the kevin hepatis. Correlate with abnormal amylase lipase for pancreatitis. Diffuse colonic fecal retention. Significant prostatomegaly. IMPRESSION:  · Abdominal pain, lower-resolved  · Nausea  · Elevated LFT's  · Hyperbilirubinemia   · Leukocytosis  · GERD, gastritis  · Hiatal hernia   · Constipation  · Hx cholecystectomy 2019 for choleithiasis     RECOMMENDATIONS:    · MRI/MRCP as ordered- will plan for ERCP if indicated  · Ok for low fat diet after MRI  · Monitor CBC, CMP daily  · ANCA and CRP as ordered  · Continue antibiotics  · Colace as ordered  · MOM daily PRN  · Medical management per PCP  · Medicate for pain and nausea per PCP  · Supportive care  · Continue to monitor    Note: This report was completed utilizing computer voice recognition software. Every effort has been made to ensure accuracy, however; inadvertent computerized transcription errors may be present. Thank you very much for your consultation. We will follow closely with you.     Discussed with

## 2021-04-23 NOTE — ED PROVIDER NOTES
HPI:  4/22/21, Time: 10:05 PM EDT         Meg Contreras is a 79 y.o. male presenting to the ED for abdominal pain beginning earlier today. Pain is in his upper abdomen radiating across the top. It is constant and moderate in severity. No exacerbating or alleviating factors. He took Pepto-Bismol without relief. He has difficulty describing the pain but states it feels similar to when he had his gallbladder attack. He is status post cholecystectomy. He reports associated symptoms of nausea but no emesis. He states that he has been having intermittent similar episodes that have been mild and normally resolve spontaneously. Today's episode is more severe. He states that he has chronic constipation which is unchanged. Denies documented fevers though he feels chilled. He denies chest pain, shortness of breath, cough, and dysuria. He states he recently underwent an EGD by Dr. Yovana Pinzon recently. He states he does not know the findings. I reviewed the patient's chart. He underwent the EGD on 4/5/2021, and results showed GERD, gastritis, and hiatal hernia. I reviewed the patient's chart. He had a cholecystectomy in 2019 by Dr. Bill Nixon.      Review of Systems:   Pertinent positives and negatives are stated within HPI, all other systems reviewed and are negative.          --------------------------------------------- PAST HISTORY ---------------------------------------------  Past Medical History:  has a past medical history of CAD (coronary artery disease), Cerebral artery occlusion with cerebral infarction [facility]), Cerebrovascular disease, CHF (congestive heart failure), NYHA class I, chronic, systolic ([address] Chronic frontoethmoidal sinusitis, Chronic maxillary sinusitis, COPD (chronic obstructive pulmonary disease) ([facility] [address] GARSIA (dyspnea on exertion), Gallbladder disease, History of non-ST elevation myocardial infarction (NSTEMI), Hyperlipidemia, Hypertension, Ischemic cardiomyopathy, Obesity, Osteoarthritis, Skin cancer of eyelid, Sleep apnea, Smokeless tobacco use, and Stented coronary artery. Past Surgical History:  has a past surgical history that includes Rotator cuff repair; Diagnostic Cardiac Cath Lab Procedure (1/23/06); Coronary angioplasty (1/23/06); Knee Arthroplasty (Left, 02/2015); Cardiac catheterization (Left, 11/30/2015); Tonsillectomy and adenoidectomy (1951); Cataract removal with implant (Bilateral); Upper gastrointestinal endoscopy (2015); Colonoscopy (2014); and Cholecystectomy, laparoscopic (N/A, 5/17/2019). Social History:  reports that he quit smoking about 46 years ago. His smoking use included cigarettes. He started smoking about 64 years ago. He has a 15.00 pack-year smoking history. His smokeless tobacco use includes snuff. He reports current alcohol use. He reports that he does not use drugs. Family History: family history includes Diabetes in his mother; Heart Disease in his mother; Liver Disease in his father. The patients home medications have been reviewed.     Allergies: Lisinopril    -------------------------------------------------- RESULTS -------------------------------------------------  All laboratory and radiology results have been personally reviewed by myself   LABS:  Results for orders placed or performed during the hospital encounter of 04/22/21   CBC Auto Differential   Result Value Ref Range    WBC 9.5 4.5 - 11.5 E9/L    RBC 5.26 3.80 - 5.80 E12/L    Hemoglobin 14.4 12.5 - 16.5 g/dL    Hematocrit 45.3 37.0 - 54.0 %    MCV 86.1 80.0 - 99.9 fL    MCH 27.4 26.0 - 35.0 pg    MCHC 31.8 (L) 32.0 - 34.5 %    RDW 14.7 11.5 - 15.0 fL    Platelets 321 785 - 291 E9/L    MPV 10.7 7.0 - 12.0 fL    Neutrophils % 78.1 43.0 - 80.0 %    Immature Granulocytes % 0.4 0.0 - 5.0 %    Lymphocytes % 12.2 (L) 20.0 - 42.0 %    Monocytes % 7.8 2.0 - 12.0 %    Eosinophils % 1.0 0.0 - 6.0 %    Basophils % 0.5 0.0 - 2.0 %    Neutrophils Absolute 7.45 (H) 1.80 - 7.30 E9/L    Immature Granulocytes # 0.04 E9/L    Lymphocytes Absolute 1.16 (L) 1.50 - 4.00 E9/L    Monocytes Absolute 0.74 0.10 - 0.95 E9/L    Eosinophils Absolute 0.10 0.05 - 0.50 E9/L    Basophils Absolute 0.05 0.00 - 0.20 E9/L   Comprehensive Metabolic Panel w/ Reflex to MG   Result Value Ref Range    Sodium 141 132 - 146 mmol/L    Potassium reflex Magnesium 4.3 3.5 - 5.0 mmol/L    Chloride 105 98 - 107 mmol/L    CO2 27 22 - 29 mmol/L    Anion Gap 9 7 - 16 mmol/L    Glucose 141 (H) 74 - 99 mg/dL    BUN 15 6 - 23 mg/dL    CREATININE 0.9 0.7 - 1.2 mg/dL    GFR Non-African American >60 >=60 mL/min/1.73    GFR African American >60     Calcium 9.2 8.6 - 10.2 mg/dL    Total Protein 6.9 6.4 - 8.3 g/dL    Albumin 4.0 3.5 - 5.2 g/dL    Total Bilirubin 1.9 (H) 0.0 - 1.2 mg/dL    Alkaline Phosphatase 413 (H) 40 - 129 U/L     (H) 0 - 40 U/L     (H) 0 - 39 U/L   Lipase   Result Value Ref Range    Lipase 42 13 - 60 U/L   Lactic Acid, Plasma   Result Value Ref Range    Lactic Acid 2.1 0.5 - 2.2 mmol/L   Troponin   Result Value Ref Range    Troponin <0.01 0.00 - 0.03 ng/mL   Urinalysis, reflex to microscopic   Result Value Ref Range    Color, UA Yellow Straw/Yellow    Clarity, UA Clear Clear    Glucose, Ur Negative Negative mg/dL    Bilirubin Urine Negative Negative    Ketones, Urine TRACE (A) Negative mg/dL    Specific Gravity, UA 1.020 1.005 - 1.030    Blood, Urine Negative Negative    pH, UA 7.0 5.0 - 9.0    Protein, UA Negative Negative mg/dL    Urobilinogen, Urine 1.0 <2.0 E.U./dL    Nitrite, Urine Negative Negative    Leukocyte Esterase, Urine Negative Negative   EKG 12 Lead   Result Value Ref Range    Ventricular Rate 99 BPM    Atrial Rate 100 BPM    QRS Duration 92 ms    Q-T Interval 328 ms    QTc Calculation (Bazett) 420 ms    R Axis 77 degrees    T Axis 64 degrees       RADIOLOGY:  Interpreted by Radiologist.  CT ABDOMEN PELVIS W IV CONTRAST Additional Contrast? None    (Results Pending)       ------------------------- NURSING NOTES AND VITALS REVIEWED ---------------------------   The nursing notes within the ED encounter and vital signs as below have been reviewed. BP (!) 167/83   Pulse 109   Temp 99.8 °F (37.7 °C) (Oral)   Resp 16   Ht 6' 1\" (1.854 m)   Wt 200 lb (90.7 kg)   SpO2 97%   BMI 26.39 kg/m²   Oxygen Saturation Interpretation: Normal      ---------------------------------------------------PHYSICAL EXAM--------------------------------------      Constitutional/General: Alert and oriented x3, well appearing, non toxic in NAD  Head: Normocephalic and atraumatic  Eyes: PERRL, EOMI  Mouth: Oropharynx clear, handling secretions, no trismus  Neck: Supple, full ROM,   Pulmonary: Lungs clear to auscultation bilaterally, no wheezes, rales, or rhonchi. Not in respiratory distress  Cardiovascular:  Regular rate and rhythm, no murmurs, gallops, or rubs. 2+ distal pulses  Abdomen: Soft, non distended, diffuse upper abdominal tenderness, no rebound, no guarding  Extremities: Moves all extremities x 4. Warm and well perfused  Skin: warm and dry without rash  Neurologic: GCS 15, no focal motor or sensory deficits   Psych: Normal Affect.  Behavior normal.      ------------------------------ ED COURSE/MEDICAL DECISION MAKING----------------------  Medications   sodium chloride flush 0.9 % injection 5-40 mL (has no administration in time range)   sodium chloride flush 0.9 % injection 5-40 mL (has no administration in time range)   0.9 % sodium chloride infusion (has no administration in time range)   0.9 % sodium chloride infusion (has no administration in time range)   0.9 % sodium chloride bolus (0 mLs Intravenous Stopped 4/22/21 1575)   acetaminophen (TYLENOL) tablet 1,000 mg (1,000 mg Oral Given 4/22/21 2255)   iopamidol (ISOVUE-370) 76 % injection 75 mL (75 mLs Intravenous Given 4/22/21 5889)   piperacillin-tazobactam (ZOSYN) 3,375 mg in dextrose 5 % 50 mL IVPB (mini-bag) (0 mg Intravenous Stopped 4/23/21 0033)   0.9 % sodium chloride bolus (0 mLs Intravenous Stopped 4/23/21 0033)       Medical Decision Making/ED COURSE:   Patient is an 62-year-old male presenting with abdominal pain and nausea. In the ED, patient was hemodynamically stable and afebrile. On ex. Patient was placed on the cardiac monitor. I interpreted findings. Rhythm - sinus. Labs and CT AP obtained. Patient administered IVF. Patient did spike fever shortly after arrival.  He was given Tylenol. I reviewed and interpreted labs. Labs were significant for an elevated bilirubin of 1.9 and transaminitis. Blood cultures sent. CT AP pending. Ordered zosyn for concern for cholangitis. Spoke with Dr. Odalys Huber who accepted the patient for admission. Patient remained hemodynamically stable throughout ED course. ED Course as of Apr 23 0058   Thu Apr 22, 2021   2245 EKG performed on patient concerning for wide complex tachycardia. I immediately evaluated the patient. Patient has Parkinson's disease, and his arms are shaking. I held his arms still and repeated the EKG. Repeat EKG does not show wide complex tachycardia. [JA]   2247 EKG: This EKG is signed and interpreted by me. Rate: 99  Rhythm: Accelerated junctional rhythm  Interpretation:Accelerated junctional rhythm though there is artifact, PVCs, normal MA interval, normal QRS, normal QT interval, no acute ST or T wave changes  Comparison: no significant change when compared to prior EKG        [JA]   2353 Patient noted to have fever. LFTs and bilirubin elevated. Clinical concern for cholangitis. CT AP still pending. Zosyn and additional IVF ordered. Blood cultures have been obtained. [JA]   Fri Apr 23, 2021   0038 Hospitalist was consulted. Dr. Odalys Huber accepted the patient for admission. [JA]      ED Course User Index  [JA] Jarad Redd MD       Counseling:    The emergency provider has spoken with the patient and spouse/SO and discussed todays results, in addition to providing specific details for the plan of care and counseling regarding the diagnosis and prognosis. Questions are answered at this time and they are agreeable with the plan.      --------------------------------- IMPRESSION AND DISPOSITION ---------------------------------    IMPRESSION  1. Transaminitis    2. Elevated bilirubin    3. Pain of upper abdomen        DISPOSITION  Disposition: Admit to telemetry  Patient condition is stable      NOTE: This report was transcribed using voice recognition software.  Every effort was made to ensure accuracy; however, inadvertent computerized transcription errors may be present    I, Gurmeet Del Rosario MD, am the primary provider of this record       Gurmeet Del Rosario MD  04/23/21 4289

## 2021-04-23 NOTE — PROGRESS NOTES
Date: 4/23/2021    Time: 4:05 AM    Patient Placed On BIPAP/CPAP/ Non-Invasive Ventilation? Yes    If no must comment. Facial area red/color change? No           If YES are Blister/Lesion present?no If yes must notify nursing staff  BIPAP/CPAP skin barrier?   Yes    Skin barrier type:mepilexlite       Comments:       04/23/21 0404   NIV Type   $NIV $Daily Charge   Skin Assessment Clean, dry, & intact   Skin Protection for O2 Device Yes   Orientation Middle   Location Nose   NIV Started/Stopped On   Equipment Type v60   Mode CPAP   Mask Type Full face mask   Mask Size Large   Settings/Measurements   CPAP/EPAP 8 cmH2O   Resp 15   FiO2  21 %   Vt Exhaled 424 mL   Minute Volume 7.4 Liters   Mask Leak (lpm) 68 lpm   Comfort Level Good   Using Accessory Muscles No       Tiffany Monreal Dial

## 2021-04-24 LAB
ALBUMIN SERPL-MCNC: 3.1 G/DL (ref 3.5–5.2)
ALP BLD-CCNC: 278 U/L (ref 40–129)
ALT SERPL-CCNC: 163 U/L (ref 0–40)
ANION GAP SERPL CALCULATED.3IONS-SCNC: 5 MMOL/L (ref 7–16)
AST SERPL-CCNC: 101 U/L (ref 0–39)
BASOPHILS ABSOLUTE: 0.05 E9/L (ref 0–0.2)
BASOPHILS RELATIVE PERCENT: 0.6 % (ref 0–2)
BILIRUB SERPL-MCNC: 4.6 MG/DL (ref 0–1.2)
BUN BLDV-MCNC: 11 MG/DL (ref 6–23)
C-REACTIVE PROTEIN: 6 MG/DL (ref 0–0.4)
CALCIUM SERPL-MCNC: 8.6 MG/DL (ref 8.6–10.2)
CHLORIDE BLD-SCNC: 109 MMOL/L (ref 98–107)
CO2: 24 MMOL/L (ref 22–29)
CREAT SERPL-MCNC: 0.8 MG/DL (ref 0.7–1.2)
EOSINOPHILS ABSOLUTE: 0.42 E9/L (ref 0.05–0.5)
EOSINOPHILS RELATIVE PERCENT: 5.2 % (ref 0–6)
GFR AFRICAN AMERICAN: >60
GFR NON-AFRICAN AMERICAN: >60 ML/MIN/1.73
GLUCOSE BLD-MCNC: 117 MG/DL (ref 74–99)
HCT VFR BLD CALC: 38.3 % (ref 37–54)
HEMOGLOBIN: 12.4 G/DL (ref 12.5–16.5)
IMMATURE GRANULOCYTES #: 0.02 E9/L
IMMATURE GRANULOCYTES %: 0.2 % (ref 0–5)
LYMPHOCYTES ABSOLUTE: 1.56 E9/L (ref 1.5–4)
LYMPHOCYTES RELATIVE PERCENT: 19.3 % (ref 20–42)
MCH RBC QN AUTO: 27.9 PG (ref 26–35)
MCHC RBC AUTO-ENTMCNC: 32.4 % (ref 32–34.5)
MCV RBC AUTO: 86.3 FL (ref 80–99.9)
MONOCYTES ABSOLUTE: 0.73 E9/L (ref 0.1–0.95)
MONOCYTES RELATIVE PERCENT: 9 % (ref 2–12)
NEUTROPHILS ABSOLUTE: 5.29 E9/L (ref 1.8–7.3)
NEUTROPHILS RELATIVE PERCENT: 65.7 % (ref 43–80)
PDW BLD-RTO: 14.9 FL (ref 11.5–15)
PLATELET # BLD: 176 E9/L (ref 130–450)
PMV BLD AUTO: 11 FL (ref 7–12)
POTASSIUM SERPL-SCNC: 3.5 MMOL/L (ref 3.5–5)
RBC # BLD: 4.44 E12/L (ref 3.8–5.8)
SEDIMENTATION RATE, ERYTHROCYTE: 7 MM/HR (ref 0–15)
SODIUM BLD-SCNC: 138 MMOL/L (ref 132–146)
TOTAL PROTEIN: 5.5 G/DL (ref 6.4–8.3)
WBC # BLD: 8.1 E9/L (ref 4.5–11.5)

## 2021-04-24 PROCEDURE — 36415 COLL VENOUS BLD VENIPUNCTURE: CPT

## 2021-04-24 PROCEDURE — 86140 C-REACTIVE PROTEIN: CPT

## 2021-04-24 PROCEDURE — 6370000000 HC RX 637 (ALT 250 FOR IP): Performed by: INTERNAL MEDICINE

## 2021-04-24 PROCEDURE — 6360000002 HC RX W HCPCS: Performed by: SPECIALIST

## 2021-04-24 PROCEDURE — 85651 RBC SED RATE NONAUTOMATED: CPT

## 2021-04-24 PROCEDURE — 80053 COMPREHEN METABOLIC PANEL: CPT

## 2021-04-24 PROCEDURE — 2580000003 HC RX 258: Performed by: SPECIALIST

## 2021-04-24 PROCEDURE — 6370000000 HC RX 637 (ALT 250 FOR IP): Performed by: NURSE PRACTITIONER

## 2021-04-24 PROCEDURE — 2580000003 HC RX 258: Performed by: INTERNAL MEDICINE

## 2021-04-24 PROCEDURE — 94660 CPAP INITIATION&MGMT: CPT

## 2021-04-24 PROCEDURE — 85025 COMPLETE CBC W/AUTO DIFF WBC: CPT

## 2021-04-24 PROCEDURE — 99232 SBSQ HOSP IP/OBS MODERATE 35: CPT | Performed by: INTERNAL MEDICINE

## 2021-04-24 PROCEDURE — 1200000000 HC SEMI PRIVATE

## 2021-04-24 RX ADMIN — SODIUM CHLORIDE, PRESERVATIVE FREE 10 ML: 5 INJECTION INTRAVENOUS at 20:34

## 2021-04-24 RX ADMIN — SODIUM CHLORIDE: 9 INJECTION, SOLUTION INTRAVENOUS at 17:18

## 2021-04-24 RX ADMIN — TAMSULOSIN HYDROCHLORIDE 0.4 MG: 0.4 CAPSULE ORAL at 10:37

## 2021-04-24 RX ADMIN — METOPROLOL SUCCINATE 50 MG: 50 TABLET, EXTENDED RELEASE ORAL at 10:37

## 2021-04-24 RX ADMIN — RASAGILINE 1 MG: 1 TABLET ORAL at 10:37

## 2021-04-24 RX ADMIN — MIRTAZAPINE 15 MG: 15 TABLET, FILM COATED ORAL at 20:33

## 2021-04-24 RX ADMIN — DOCUSATE SODIUM 200 MG: 100 CAPSULE, LIQUID FILLED ORAL at 10:37

## 2021-04-24 RX ADMIN — WATER 2000 MG: 1 INJECTION INTRAMUSCULAR; INTRAVENOUS; SUBCUTANEOUS at 15:44

## 2021-04-24 ASSESSMENT — PAIN SCALES - GENERAL
PAINLEVEL_OUTOF10: 0
PAINLEVEL_OUTOF10: 0

## 2021-04-24 NOTE — PROGRESS NOTES
PROGRESS NOTE    Patient Presents with/Seen in Consultation For      *concerns for ascending cholangitis  CHIEF COMPLAINT:  Abdominal pain    Subjective:     Patient seen laying in bed. Tolerating diet, denies abdominal pain, nausea or vomiting. No bowel movement today, + flatus. Plan of care discussed with patient. Review of Systems  Aside from what was mentioned in the PMH and HPI, essentially unremarkable, all others negative. Objective:     BP (!) 170/91   Pulse 79   Temp 97.9 °F (36.6 °C) (Oral)   Resp 16   Ht 6' 1\" (1.854 m)   Wt 199 lb 9 oz (90.5 kg)   SpO2 97%   BMI 26.33 kg/m²     General appearance: alert, awake, laying in bed, and cooperative  Eyes: conjunctiva normal, sclera anicteric. PERRL.   Lungs: clear to auscultation bilaterally  Heart: regular rate and rhythm, no murmur, 2+ pulses; no edema  Abdomen: soft, non-tender; bowel sounds normal; no masses,  no organomegaly  Extremities: extremities without edema  Pulses: 2+ and symmetric  Skin: Skin color, texture, turgor normal.   Neurologic: Grossly normal    sodium chloride flush 0.9 % injection 5-40 mL, 2 times per day  sodium chloride flush 0.9 % injection 5-40 mL, PRN  0.9 % sodium chloride infusion, PRN  0.9 % sodium chloride infusion, Continuous  ketoconazole (NIZORAL) 2 % cream, Daily PRN  metoprolol succinate (TOPROL XL) extended release tablet 50 mg, Daily  mirtazapine (REMERON) tablet 15 mg, Nightly  nitroGLYCERIN (NITROSTAT) SL tablet 0.4 mg, Q5 Min PRN  rasagiline mesylate TABS 1 mg, Daily  tamsulosin (FLOMAX) capsule 0.4 mg, Daily  cefTRIAXone (ROCEPHIN) 2,000 mg in sterile water 20 mL IV syringe, Q24H  docusate sodium (COLACE) capsule 200 mg, Daily  magnesium hydroxide (MILK OF MAGNESIA) 400 MG/5ML suspension 30 mL, Daily PRN         Data Review  CBC:   Lab Results   Component Value Date    WBC 7.9 04/25/2021    RBC 4.55 04/25/2021    HGB 12.3 04/25/2021    HCT 39.5 04/25/2021    MCV 86.8 04/25/2021    MCH 27.0 04/25/2021 MCHC 31.1 04/25/2021    RDW 15.2 04/25/2021     04/25/2021    MPV 10.9 04/25/2021     CMP:    Lab Results   Component Value Date     04/25/2021    K 3.7 04/25/2021    K 4.3 04/22/2021     04/25/2021    CO2 21 04/25/2021    BUN 10 04/25/2021    CREATININE 0.7 04/25/2021    GFRAA >60 04/25/2021    LABGLOM >60 04/25/2021    GLUCOSE 89 04/25/2021    GLUCOSE 103 01/10/2012    PROT 6.1 04/25/2021    LABALBU 3.2 04/25/2021    LABALBU 3.9 01/10/2012    CALCIUM 8.6 04/25/2021    BILITOT 2.2 04/25/2021    ALKPHOS 299 04/25/2021    AST 78 04/25/2021     04/25/2021     Hepatic Function Panel:    Lab Results   Component Value Date    ALKPHOS 299 04/25/2021     04/25/2021    AST 78 04/25/2021    PROT 6.1 04/25/2021    BILITOT 2.2 04/25/2021    BILIDIR 2.6 04/23/2021    IBILI 0.7 04/23/2021    LABALBU 3.2 04/25/2021    LABALBU 3.9 01/10/2012     No components found for: CHLPL    Lab Results   Component Value Date    TRIG 46 03/16/2019    TRIG 107 06/27/2018    TRIG 91 04/12/2018       Lab Results   Component Value Date    HDL 49 03/06/2020    HDL 47 09/18/2019    HDL 45 03/16/2019       Lab Results   Component Value Date    LDLCALC 66 03/06/2020    LDLCALC 64 09/18/2019    LDLCALC 33 03/16/2019       Lab Results   Component Value Date    LABVLDL 22 03/06/2020    LABVLDL 32 09/18/2019    LABVLDL 9 03/16/2019      PT/INR:    Lab Results   Component Value Date    PROTIME 12.9 06/28/2020    PROTIME 11.0 01/09/2012    INR 1.1 06/28/2020     IRON:    Lab Results   Component Value Date    IRON 80 06/26/2020     Iron Saturation:  No components found for: PERCENTFE  FERRITIN:    Lab Results   Component Value Date    FERRITIN 649 11/22/2020         Assessment:   Active problems:  ? Abdominal pain, lower-resolved  ? Nausea-resolved  ? Elevated LFT's  ? Hyperbilirubinemia  ? Elevated CRP at 5.3  ? Leukocytosis  ? GERD, gastritis  ? Hiatal hernia   ? Constipation  ?  Hx cholecystectomy 2019 for choleithiasis     Plan:   ? ERCP tomorrow with Dr. Odalis Conroy- Procedure details for ERCP drawn in detail. Complications including but not limited to pancreatitis, perforation, bleeding or infection are discussed in great detail. Risks, benefits, and alternatives have been explained. Pt has understood the information and has agreed to proceed. Cholangio-Pancreatography; Possible Sphincterotomy, stone extraction or stent placement. ? Continue diet as tolerated  ? NPO after midnight for procedure  ? Monitor CBC, CMP, amylase and lipase daily  ? Continue antibiotics as ordered  ? Continue IV fluids as ordered  ? Colace as ordered  ? MOM daily PRN  ? Medical management per PCP  ? Medicate for pain and nausea per PCP  ? Supportive care  ? Continue to monitor         Note: This report was completed utilizing computer voice recognition software. Every effort has been made to ensure accuracy, however; inadvertent computerized transcription errors may be present.      Discussed with Dr. Andrea Lin per Dr. Eulis Denver, APRN-NP-C 4/25/2021  10:15 AM For Dr. Odalis Conroy

## 2021-04-24 NOTE — PROGRESS NOTES
PROGRESS NOTE    Patient Presents with/Seen in Consultation For      *concerns for ascending cholangitis  CHIEF COMPLAINT:  Abdominal pain    Subjective:     Patient laying in bed. Tolerating diet, denies abdominal pain, nausea or vomiting. Bowel movement this morning described as brown and formed. Plan of care discussed with patient, ERCP to be done Monday with Dr. Benjamin Honor of Systems  Aside from what was mentioned in the [address] and HPI, essentially unremarkable, all others negative. Objective:     /66   Pulse 76   Temp 98.3 °F (36.8 °C) (Oral)   Resp 15   Ht 6' 1\" (1.854 m)   Wt 200 lb 7 oz (90.9 kg)   SpO2 97%   BMI 26.44 kg/m²     General appearance: alert, awake, laying in bed, and cooperative  Eyes: conjunctiva normal, sclera anicteric. PERRL.   Lungs: clear to auscultation bilaterally  Heart: regular rate and rhythm, no murmur, 2+ pulses; no edema  Abdomen: soft, non-tender; bowel sounds normal; no masses,  no organomegaly  Extremities: extremities without edema  Pulses: 2+ and symmetric  Skin: Skin color, texture, turgor normal.   Neurologic: Grossly normal    sodium chloride flush 0.9 % injection 5-40 mL, 2 times per day  sodium chloride flush 0.9 % injection 5-40 mL, PRN  0.9 % sodium chloride infusion, PRN  0.9 % sodium chloride infusion, Continuous  ketoconazole (NIZORAL) 2 % cream, Daily PRN  metoprolol succinate (TOPROL XL) extended release tablet 50 mg, Daily  mirtazapine (REMERON) tablet 15 mg, Nightly  nitroGLYCERIN (NITROSTAT) SL tablet 0.4 mg, Q5 Min PRN  rasagiline mesylate TABS 1 mg, Daily  tamsulosin (FLOMAX) capsule 0.4 mg, Daily  cefTRIAXone (ROCEPHIN) 2,000 mg in sterile water 20 mL IV syringe, Q24H  docusate sodium (COLACE) capsule 200 mg, Daily  magnesium hydroxide (MILK OF MAGNESIA) 400 MG/5ML suspension 30 mL, Daily PRN         Data Review  CBC:   Lab Results   Component Value Date    WBC 8.1 04/24/2021    RBC 4.44 04/24/2021    HGB 12.4 04/24/2021    HCT 38.3 04/24/2021    MCV 86.3 04/24/2021    MCH 27.9 04/24/2021    MCHC 32.4 04/24/2021    RDW 14.9 04/24/2021     04/24/2021    MPV 11.0 04/24/2021     CMP:    Lab Results   Component Value Date     04/23/2021    K 4.0 04/23/2021    K 4.3 04/22/2021     04/23/2021    CO2 22 04/23/2021    BUN 16 04/23/2021    CREATININE 0.8 04/23/2021    GFRAA >60 04/23/2021    LABGLOM >60 04/23/2021    GLUCOSE 117 04/23/2021    GLUCOSE 103 01/10/2012    PROT 5.7 04/23/2021    LABALBU 3.4 04/23/2021    LABALBU 3.9 01/10/2012    CALCIUM 8.5 04/23/2021    BILITOT 3.3 04/23/2021    ALKPHOS 316 04/23/2021     04/23/2021     04/23/2021     Hepatic Function Panel:    Lab Results   Component Value Date    ALKPHOS 316 04/23/2021     04/23/2021     04/23/2021    PROT 5.7 04/23/2021    BILITOT 3.3 04/23/2021    BILIDIR 2.6 04/23/2021    IBILI 0.7 04/23/2021    LABALBU 3.4 04/23/2021    LABALBU 3.9 01/10/2012     No components found for: CHLPL    Lab Results   Component Value Date    TRIG 46 03/16/2019    TRIG 107 06/27/2018    TRIG 91 04/12/2018       Lab Results   Component Value Date    HDL 49 03/06/2020    HDL 47 09/18/2019    HDL 45 03/16/2019       Lab Results   Component Value Date    LDLCALC 66 03/06/2020    LDLCALC 64 09/18/2019    LDLCALC 33 03/16/2019       Lab Results   Component Value Date    LABVLDL 22 03/06/2020    LABVLDL 32 09/18/2019    LABVLDL 9 03/16/2019      PT/INR:    Lab Results   Component Value Date    PROTIME 12.9 06/28/2020    PROTIME 11.0 01/09/2012    INR 1.1 06/28/2020     IRON:    Lab Results   Component Value Date    IRON 80 06/26/2020     FERRITIN:    Lab Results   Component Value Date    FERRITIN 649 11/22/2020         Assessment:   Active problems:  ? Abdominal pain, lower-resolved  ? Nausea-resolved  ? Elevated LFT's  ? Hyperbilirubinemia  ? Elevated CRP at 5.3  ? Leukocytosis  ? GERD, gastritis  ? Hiatal hernia   ? Constipation  ?  Hx cholecystectomy 2019 for

## 2021-04-24 NOTE — PROGRESS NOTES
[address] [place] [address] [facility]  Progress Note    SUBJECTIVE:  Chief Complaint   Patient presents with    Abdominal Pain     upper since 1500    Nausea     Patient is tolerating medications. Ambulating in halls, sitting by window in [place]. No fevers or chills. No N/V/D. Review of systems:  As stated above in the chief complaint, otherwise negative. Medications:  Scheduled Meds:   sodium chloride flush  5-40 mL Intravenous 2 times per day    metoprolol succinate  50 mg Oral Daily    mirtazapine  15 mg Oral Nightly    rasagiline mesylate  1 mg Oral Daily    tamsulosin  0.4 mg Oral Daily    cefTRIAXone (ROCEPHIN) IV  2,000 mg Intravenous Q24H    docusate sodium  200 mg Oral Daily     Continuous Infusions:   sodium chloride      sodium chloride 125 mL/hr at 21     PRN Meds:sodium chloride flush, sodium chloride, ketoconazole, nitroGLYCERIN, magnesium hydroxide    OBJECTIVE:  BP (!) 165/90   Pulse 67   Temp 98.7 °F (37.1 °C) (Oral)   Resp 16   Ht 6' 1\" (1.854 m)   Wt 200 lb 7 oz (90.9 kg)   SpO2 96%   BMI 26.44 kg/m²   Temp  Av.3 °F (36.8 °C)  Min: 97.8 °F (36.6 °C)  Max: 98.7 °F (37.1 °C)  Constitutional: The patient is awake, alert, and oriented. In no distress. Sitting in hallway  Skin: Warm and dry. No rashes were noted. HEENT: Round and reactive pupils. Moist mucous membranes. No ulcerations or thrush. Neck: Supple to movements. Chest: No respiratory distress. Symmetrical expansion. No wheezing, crackles or rhonchi. Cardiovascular: S1 and S2 are rhythmic and regular. No murmurs appreciated. Abdomen: Positive bowel sounds to auscultation. Benign to palpation. No masses felt. Extremities: No edema.   Lines: peripheral    Laboratory and Tests Review:  Lab Results   Component Value Date    WBC 8.1 2021    WBC 15.3 (H) 2021    WBC 9.5 2021    HGB 12.4 (L) 2021    HCT 38.3 2021    MCV 86.3 2021     04/24/2021     Lab Results   Component Value Date    NEUTROABS 5.29 04/24/2021    NEUTROABS 11.54 (H) 04/23/2021    NEUTROABS 7.45 (H) 04/22/2021     No results found for: CRPHS  Lab Results   Component Value Date     (H) 04/24/2021     (H) 04/24/2021    ALKPHOS 278 (H) 04/24/2021    BILITOT 4.6 (H) 04/24/2021     Lab Results   Component Value Date     04/24/2021    K 3.5 04/24/2021    K 4.3 04/22/2021     04/24/2021    CO2 24 04/24/2021    BUN 11 04/24/2021    CREATININE 0.8 04/24/2021    CREATININE 0.8 04/23/2021    CREATININE 0.9 04/22/2021    GFRAA >60 04/24/2021    LABGLOM >60 04/24/2021    GLUCOSE 117 04/24/2021    GLUCOSE 103 01/10/2012    PROT 5.5 04/24/2021    LABALBU 3.1 04/24/2021    LABALBU 3.9 01/10/2012    CALCIUM 8.6 04/24/2021    BILITOT 4.6 04/24/2021    ALKPHOS 278 04/24/2021     04/24/2021     04/24/2021     Lab Results   Component Value Date    CRP 5.3 (H) 04/23/2021    CRP 1.2 (H) 11/22/2020     Lab Results   Component Value Date    SEDRATE 7 04/24/2021     Radiology:  MRI Abdomen -    1. Questionable strictures involving the distal CBD without biliary   dilatation.  Finding could relate to the patient's reported cholangitis.  Be   further evaluated by ERCP.  No MRI evidence of choledocholithiasis. 2. 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  No peripancreatic fluid collection or   necrotic component.  Correlation with lipase levels is suggested.        Microbiology:   Blood cultures 4/22/2021 - negative so far     Recent Labs     04/23/21  1020   PROCAL 2.53*       ASSESSMENT:  · Probable cholangitis  · Elevation of transaminases related to the above, improving   · Fever associated to the above, improved  · Leukocytosis associated to cholangitis, improved     PLAN:  · Continue Ceftriaxone  · For ERCP Monday with GI  · Check final cultures  · MRI reviewed  · Monitor labs - WBC 8.1, CRP 5.3, ESR 7    Bernice Wilson  11:25 AM  4/24/2021     Patient seen and examined. I had a face to face encounter with the patient. Agree with exam.  Assessment and plan as outlined above and directed by me. Addition and corrections were done as deemed appropriate. My exam and plan include: The patient is tolerating antibiotics. He no longer has pain. He is ambulating. ERCP Monday.     Yrn Winter  4/24/2021  2:01 PM

## 2021-04-24 NOTE — PROGRESS NOTES
Date: 4/23/2021    Time: 10:26 PM    Patient Placed On BIPAP/CPAP/ Non-Invasive Ventilation? Yes    If no must comment. Facial area red/color change? No           If YES are Blister/Lesion present? No   If yes must notify nursing staff  BIPAP/CPAP skin barrier?   Yes    Skin barrier type:mepilexlite       Comments:        Korey Herrera

## 2021-04-24 NOTE — PLAN OF CARE
Problem: Falls - Risk of:  Goal: Will remain free from falls  Description: Will remain free from falls  0/88/7817 [id number] by Jasmeet Fonseca RN  Outcome: Ongoing  4/23/2021 2316 by Adrian Gonsales RN  Outcome: Met This Shift  Goal: Absence of physical injury  Description: Absence of physical injury  Outcome: Ongoing     Problem: Skin Integrity:  Goal: Will show no infection signs and symptoms  Description: Will show no infection signs and symptoms  2/99/4940 [id number] by Jasmeet Fonseca RN  Outcome: Ongoing  4/23/2021 2316 by Adrian Gonsales RN  Outcome: Met This Shift  Goal: Absence of new skin breakdown  Description: Absence of new skin breakdown  Outcome: Ongoing

## 2021-04-24 NOTE — PROGRESS NOTES
[facility]   Progress Note    Admitting Date and Time: 4/22/2021  9:43 PM  Admit Dx: Abdominal pain [R10.9]    Subjective:    Patient was admitted with Abdominal pain [R10.9]. Feeling much better. No abdominal pain. Able to eat. ROS: denies fever, chills, cp, sob, n/v, HA unless stated above.      sodium chloride flush  5-40 mL Intravenous 2 times per day    metoprolol succinate  50 mg Oral Daily    mirtazapine  15 mg Oral Nightly    rasagiline mesylate  1 mg Oral Daily    tamsulosin  0.4 mg Oral Daily    cefTRIAXone (ROCEPHIN) IV  2,000 mg Intravenous Q24H    docusate sodium  200 mg Oral Daily     sodium chloride flush, 5-40 mL, PRN  sodium chloride, 25 mL, PRN  ketoconazole, , Daily PRN  nitroGLYCERIN, 0.4 mg, Q5 Min PRN  magnesium hydroxide, 30 mL, Daily PRN         Objective:    BP (!) 165/90   Pulse 67   Temp 98.7 °F (37.1 °C) (Oral)   Resp 16   Ht 6' 1\" (1.854 m)   Wt 200 lb 7 oz (90.9 kg)   SpO2 96%   BMI 26.44 kg/m²   General Appearance: alert and oriented to person, place and time, well developed and well- nourished, in no acute distress  Skin: warm and dry, no rash or erythema, mild jaundice  Head: normocephalic and atraumatic  Eyes: pupils equal, round, and reactive to light, extraocular eye movements intact, conjunctivae normal  ENT: nose without deformity, nasal mucosa and turbinates normal without polyps  Neck: supple and non-tender without mass, no thyromegaly or thyroid nodules, no cervical lymphadenopathy  Pulmonary/Chest: clear to auscultation bilaterally- no wheezes, rales or rhonchi, normal air movement, no respiratory distress  Cardiovascular: normal rate, regular rhythm, normal S1 and S2, no murmurs  Abdomen: seems somewhat protuberant but soft and has no tenderness, no guarding  Extremities: mild edema pretibial    Recent Labs     04/22/21  2230 04/23/21  1020 04/24/21  0845    143 138   K 4.3 4.0 3.5    112* 109*   CO2 27 22 24 pancreatitis clinically. Plan is ERCP Monday to evaluate.     2. HTN  Continue toprol.     3. Parkinson's disease  Continue remeron and rasagiline.     Electronically signed by Henry De Santiago MD on 4/24/2021 at 2:30 PM

## 2021-04-25 ENCOUNTER — ANESTHESIA (OUTPATIENT)
Dept: ENDOSCOPY | Age: 81
DRG: 871 | End: 2021-04-25
Payer: MEDICARE

## 2021-04-25 ENCOUNTER — ANESTHESIA EVENT (OUTPATIENT)
Dept: ENDOSCOPY | Age: 81
DRG: 871 | End: 2021-04-25
Payer: MEDICARE

## 2021-04-25 LAB
ALBUMIN SERPL-MCNC: 3.2 G/DL (ref 3.5–5.2)
ALP BLD-CCNC: 299 U/L (ref 40–129)
ALT SERPL-CCNC: 138 U/L (ref 0–40)
ANION GAP SERPL CALCULATED.3IONS-SCNC: 8 MMOL/L (ref 7–16)
APTT: 36.7 SEC (ref 24.5–35.1)
AST SERPL-CCNC: 78 U/L (ref 0–39)
BASOPHILS ABSOLUTE: 0.05 E9/L (ref 0–0.2)
BASOPHILS RELATIVE PERCENT: 0.6 % (ref 0–2)
BILIRUB SERPL-MCNC: 2.2 MG/DL (ref 0–1.2)
BUN BLDV-MCNC: 10 MG/DL (ref 6–23)
CALCIUM SERPL-MCNC: 8.6 MG/DL (ref 8.6–10.2)
CHLORIDE BLD-SCNC: 110 MMOL/L (ref 98–107)
CO2: 21 MMOL/L (ref 22–29)
CREAT SERPL-MCNC: 0.7 MG/DL (ref 0.7–1.2)
EOSINOPHILS ABSOLUTE: 0.51 E9/L (ref 0.05–0.5)
EOSINOPHILS RELATIVE PERCENT: 6.5 % (ref 0–6)
GFR AFRICAN AMERICAN: >60
GFR NON-AFRICAN AMERICAN: >60 ML/MIN/1.73
GLUCOSE BLD-MCNC: 89 MG/DL (ref 74–99)
HCT VFR BLD CALC: 39.5 % (ref 37–54)
HEMOGLOBIN: 12.3 G/DL (ref 12.5–16.5)
IMMATURE GRANULOCYTES #: 0.03 E9/L
IMMATURE GRANULOCYTES %: 0.4 % (ref 0–5)
INR BLD: 1.2
LYMPHOCYTES ABSOLUTE: 2.6 E9/L (ref 1.5–4)
LYMPHOCYTES RELATIVE PERCENT: 33.1 % (ref 20–42)
MCH RBC QN AUTO: 27 PG (ref 26–35)
MCHC RBC AUTO-ENTMCNC: 31.1 % (ref 32–34.5)
MCV RBC AUTO: 86.8 FL (ref 80–99.9)
MONOCYTES ABSOLUTE: 0.81 E9/L (ref 0.1–0.95)
MONOCYTES RELATIVE PERCENT: 10.3 % (ref 2–12)
NEUTROPHILS ABSOLUTE: 3.86 E9/L (ref 1.8–7.3)
NEUTROPHILS RELATIVE PERCENT: 49.1 % (ref 43–80)
PDW BLD-RTO: 15.2 FL (ref 11.5–15)
PLATELET # BLD: 193 E9/L (ref 130–450)
PMV BLD AUTO: 10.9 FL (ref 7–12)
POTASSIUM SERPL-SCNC: 3.7 MMOL/L (ref 3.5–5)
PROTHROMBIN TIME: 12.4 SEC (ref 9.3–12.4)
RBC # BLD: 4.55 E12/L (ref 3.8–5.8)
SODIUM BLD-SCNC: 139 MMOL/L (ref 132–146)
TOTAL PROTEIN: 6.1 G/DL (ref 6.4–8.3)
WBC # BLD: 7.9 E9/L (ref 4.5–11.5)

## 2021-04-25 PROCEDURE — 2580000003 HC RX 258: Performed by: INTERNAL MEDICINE

## 2021-04-25 PROCEDURE — 2580000003 HC RX 258: Performed by: SPECIALIST

## 2021-04-25 PROCEDURE — 6370000000 HC RX 637 (ALT 250 FOR IP): Performed by: INTERNAL MEDICINE

## 2021-04-25 PROCEDURE — 99232 SBSQ HOSP IP/OBS MODERATE 35: CPT | Performed by: INTERNAL MEDICINE

## 2021-04-25 PROCEDURE — 36415 COLL VENOUS BLD VENIPUNCTURE: CPT

## 2021-04-25 PROCEDURE — 85730 THROMBOPLASTIN TIME PARTIAL: CPT

## 2021-04-25 PROCEDURE — 80053 COMPREHEN METABOLIC PANEL: CPT

## 2021-04-25 PROCEDURE — 1200000000 HC SEMI PRIVATE

## 2021-04-25 PROCEDURE — 6360000002 HC RX W HCPCS: Performed by: SPECIALIST

## 2021-04-25 PROCEDURE — 85025 COMPLETE CBC W/AUTO DIFF WBC: CPT

## 2021-04-25 PROCEDURE — 6370000000 HC RX 637 (ALT 250 FOR IP): Performed by: NURSE PRACTITIONER

## 2021-04-25 PROCEDURE — 94660 CPAP INITIATION&MGMT: CPT

## 2021-04-25 PROCEDURE — 85610 PROTHROMBIN TIME: CPT

## 2021-04-25 RX ORDER — SODIUM CHLORIDE, SODIUM LACTATE, POTASSIUM CHLORIDE, AND CALCIUM CHLORIDE .6; .31; .03; .02 G/100ML; G/100ML; G/100ML; G/100ML
500 INJECTION, SOLUTION INTRAVENOUS ONCE
Status: COMPLETED | OUTPATIENT
Start: 2021-04-26 | End: 2021-04-26

## 2021-04-25 RX ADMIN — METOPROLOL SUCCINATE 50 MG: 50 TABLET, EXTENDED RELEASE ORAL at 08:11

## 2021-04-25 RX ADMIN — SODIUM CHLORIDE: 9 INJECTION, SOLUTION INTRAVENOUS at 20:06

## 2021-04-25 RX ADMIN — MIRTAZAPINE 15 MG: 15 TABLET, FILM COATED ORAL at 20:02

## 2021-04-25 RX ADMIN — TAMSULOSIN HYDROCHLORIDE 0.4 MG: 0.4 CAPSULE ORAL at 08:11

## 2021-04-25 RX ADMIN — RASAGILINE 1 MG: 1 TABLET ORAL at 08:11

## 2021-04-25 RX ADMIN — WATER 2000 MG: 1 INJECTION INTRAMUSCULAR; INTRAVENOUS; SUBCUTANEOUS at 15:00

## 2021-04-25 RX ADMIN — DOCUSATE SODIUM 200 MG: 100 CAPSULE, LIQUID FILLED ORAL at 08:11

## 2021-04-25 RX ADMIN — SODIUM CHLORIDE: 9 INJECTION, SOLUTION INTRAVENOUS at 22:36

## 2021-04-25 ASSESSMENT — ENCOUNTER SYMPTOMS: SHORTNESS OF BREATH: 0

## 2021-04-25 NOTE — ANESTHESIA PRE PROCEDURE
Department of Anesthesiology  Preprocedure Note       Name:  Marion Loza   Age:  79 y.o.  :  1940                                          MRN:  [id number]         Date:  2021      Surgeon: Bhumika Christianson    Procedure: ERCP    Medications prior to admission:   Prior to Admission medications    Medication Sig Start Date End Date Taking? Authorizing Provider   rasagiline mesylate 1 MG TABS take 1 tablet by mouth once daily 1/10/21   Historical Provider, MD   atorvastatin (LIPITOR) 80 MG tablet Take 1 tablet by mouth daily 21   Keven Martinez MD   clopidogrel (PLAVIX) 75 MG tablet Take 1 tablet by mouth daily 21   Keven Martinez MD   metoprolol succinate (TOPROL XL) 50 MG extended release tablet Take 1 tablet by mouth daily 21   Keven Martinez MD   mirtazapine (REMERON) 15 MG tablet Take 1 tablet by mouth nightly 20   Keven Martinez MD   ketoconazole (NIZORAL) 2 % cream Apply topically daily. 20   Keven Martinez MD   losartan (COZAAR) 25 MG tablet Take 1 tablet by mouth daily 20   Keven Martinez MD   tamsulosin [facility]) 0.4 MG capsule Take 1 capsule by mouth daily 20   Keven Martinez MD   ketoconazole (NIZORAL) 2 % shampoo Apply topically daily as needed. 20   Keven Martinez MD   nitroGLYCERIN (NITROSTAT) 0.4 MG SL tablet Place 1 tablet under the tongue every 5 minutes as needed for Chest pain 20   Suzy Andujar DO   Multiple Vitamins-Minerals (THERAPEUTIC MULTIVITAMIN-MINERALS) tablet Take 1 tablet by mouth daily    Historical Provider, MD   CPAP Machine MISC daily at bedtime. Historical Provider, MD   aspirin 81 MG EC tablet Take 81 mg by mouth daily Last taken 2019    Historical Provider, MD   Cholecalciferol (VITAMIN D) 2000 UNIT CAPS capsule Take 1 capsule by mouth daily Last taken     Historical Provider, MD       Current medications:    No current facility-administered medications for this visit.       No current outpatient medications on file. Facility-Administered Medications Ordered in Other Visits   Medication Dose Route Frequency Provider Last Rate Last Av [place] ON 4/26/2021] lactated ringers bolus  500 mL Intravenous Once CARMENZA Santos CNP        [START ON 4/26/2021] indomethacin (INDOCIN) 50 MG suppository 100 mg  100 mg Rectal 60 Min Pre-Op CARMENZA Santos CNP        sodium chloride flush 0.9 % injection 5-40 mL  5-40 mL Intravenous 2 times per day Jana Hernandez MD   10 mL at 04/24/21 2034    sodium chloride flush 0.9 % injection 5-40 mL  5-40 mL Intravenous PRN Jana Hernandez MD        0.9 % sodium chloride infusion  25 mL Intravenous PRN Jana Hernandez MD        0.9 % sodium chloride infusion   Intravenous Continuous Jana Hernandez  mL/hr at 04/24/21 1718 New Bag at 04/24/21 1718    ketoconazole (NIZORAL) 2 % cream   Topical Daily PRN Jana Hernandez MD        metoprolol succinate (TOPROL XL) extended release tablet 50 mg  50 mg Oral Daily Jana Hernandez MD   50 mg at 04/25/21 5355    mirtazapine (REMERON) tablet 15 mg  15 mg Oral Nightly Jana Hernandez MD   15 mg at 04/24/21 2033    nitroGLYCERIN (NITROSTAT) SL tablet 0.4 mg  0.4 mg Sublingual Q5 Min PRN Jana Hernandez MD        rasagiline mesylate TABS 1 mg  1 mg Oral Daily Jana Hernandez MD   1 mg at 04/25/21 2162    tamsulosin (FLOMAX) capsule 0.4 mg  0.4 mg Oral Daily Jana Hernandez MD   0.4 mg at 04/25/21 6345    cefTRIAXone (ROCEPHIN) 2,000 mg in sterile water 20 mL IV syringe  2,000 mg Intravenous Q24H Haseeb Rodgers MD   2,000 mg at 04/25/21 1500    docusate sodium (COLACE) capsule 200 mg  200 mg Oral Daily CARMENZA Lucio CNP   200 mg at 04/25/21 0811    magnesium hydroxide (MILK OF MAGNESIA) 400 MG/5ML suspension 30 mL  30 mL Oral Daily PRN Delmas Pila, APRN - CNP           Allergies:     Allergies   Allergen Reactions    Lisinopril Itching       Problem List:    Patient Active Problem List Diagnosis Code    Hyperlipidemia E78.5    CAD (coronary artery disease) I25.10    Hypertension I10    Sleep apnea G47.30    Osteoarthritis M19.90    Smokeless tobacco use Z72.0    Ischemic cardiomyopathy I25.5    TIA (transient ischemic attack) G45.9    CHF (congestive heart failure), NYHA class I, chronic, systolic (HCC) Z50.30    COPD (chronic obstructive pulmonary disease) (HCC) J44.9    Pigmented skin lesion L81.9    Prostate enlargement N40.0    Fatigue R53.83    Disorder of bone M89.9    Other constipation K59.09    Vitamin D deficiency E55.9    Bilateral impacted cerumen H61.23    Impaired fasting glucose R73.01    Ascending cholangitis K83.09    Elevated LFTs R79.89    Leukocytosis D72.829    Lactic acidosis E87.2    COVID-19 virus infection U07.1    Orthostatic hypotension I95.1    COPD exacerbation (HCC) J44.1    Abdominal pain R10.9    Fever R50.9    Sinus tachycardia R00.0       Past Medical History:        Diagnosis Date    CAD (coronary artery disease) 2006    follows with Dr Jossie Fregoso    Cerebral artery occlusion with cerebral infarction [facility])     Cerebrovascular disease     CHF (congestive heart failure), NYHA class I, chronic, systolic ([facility] [address] 63[facility]    [facility]-- EF 39%    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/2018    Chronic maxillary sinusitis 04/2018    [facility] 04/[facility]    GARSIA (dyspnea on exertion)     follows with Dr Daryle Adjutant Gallbladder disease     History of non-ST elevation myocardial infarction (NSTEMI) 2015    [facility]    Hyperlipidemia     Hypertension     Ischemic cardiomyopathy     EF 40% after NSTEMI, 11-15 at [facility]    Obesity     Osteoarthritis     Skin cancer of eyelid     Sleep apnea     uses CPAP    Smokeless tobacco use     Stented coronary artery 2006    x1       Past Surgical History:        Procedure Laterality Date    CARDIAC CATHETERIZATION Left 11/30/2015    done [facility] WITH IMPLANT Bilateral     CHOLECYSTECTOMY, LAPAROSCOPIC N/A 2019    LAPAROSCOPIC ROBOTIC XI ASSISTED CHOLECYSTECTOMY performed by Minnie Conti MD at [facility]      CORONARY ANGIOPLASTY  06    DIAGNOSTIC CARDIAC CATH LAB PROCEDURE  06    KNEE ARTHROPLASTY Left 2015    ROTATOR CUFF REPAIR      bilaterally    TONSILLECTOMY AND ADENOIDECTOMY      UPPER GASTROINTESTINAL ENDOSCOPY         Social History:    Social History     Tobacco Use    Smoking status: Former Smoker     Packs/day: 1.00     Years: 15.00     Pack years: 15.00     Types: Cigarettes     Start date: 1957     Quit date: 1975     Years since quittin.3    Smokeless tobacco: Current User     Types: Snuff   Substance Use Topics    Alcohol use: Yes     Comment: occassionally                                Ready to quit: Not Answered  Counseling given: Not Answered      Vital Signs (Current): There were no vitals filed for this visit.                                            BP Readings from Last 3 Encounters:   21 (!) 170/91   21 132/70   20 110/66       NPO Status:  greater than 8 hours                                                                               BMI:   Wt Readings from Last 3 Encounters:   21 199 lb 9 oz (90.5 kg)   21 210 lb (95.3 kg)   20 198 lb (89.8 kg)     There is no height or weight on file to calculate BMI.    CBC:   Lab Results   Component Value Date    WBC 7.9 2021    RBC 4.55 2021    HGB 12.3 2021    HCT 39.5 2021    MCV 86.8 2021    RDW 15.2 2021     2021       CMP:   Lab Results   Component Value Date     2021    K 3.7 2021    K 4.3 2021     2021    CO2 21 2021    BUN 10 2021    CREATININE 0.7 2021    GFRAA >60 2021    LABGLOM >60 2021    GLUCOSE 89 2021    GLUCOSE 103 01/10/2012    PROT 6.1 2021 CALCIUM 8.6 04/25/2021    BILITOT 2.2 04/25/2021    ALKPHOS 299 04/25/2021    AST 78 04/25/2021     04/25/2021       POC Tests: No results for input(s): POCGLU, POCNA, POCK, POCCL, POCBUN, POCHEMO, POCHCT in the last 72 hours. Coags:   Lab Results   Component Value Date    PROTIME 12.4 04/25/2021    PROTIME 11.0 01/09/2012    INR 1.2 04/25/2021    APTT 36.7 04/25/2021       HCG (If Applicable): No results found for: PREGTESTUR, PREGSERUM, HCG, HCGQUANT     ABGs: No results found for: PHART, PO2ART, LWP3MLW, HDS7QGW, BEART, L3KHLHQL     Type & Screen (If Applicable):  No results found for: LABABO, [address]    Anesthesia Evaluation  Patient summary reviewed no history of anesthetic complications:   Airway: Mallampati: III  TM distance: >3 FB     Mouth opening: < 3 FB Dental:    (+) partials      Pulmonary: breath sounds clear to auscultation  (+) COPD:  sleep apnea: on CPAP,  asthma:     (-) shortness of breath                          ROS comment: Former Smoker  Smokeless tobacco use  Chronic frontoethmoidal sinusitis   Chronic maxillary sinusitis        Cardiovascular:    (+) hypertension:, past MI: > 6 months, CAD: obstructive, CABG/stent:, CHF: systolic, GARSIA:, hyperlipidemia      NYHA Classification: I  ECG reviewed  Rhythm: regular  Rate: normal    Stress test reviewed       Beta Blocker:  Dose within 24 Hrs         Neuro/Psych:   (+) neuromuscular disease: Parkinson's disease, TIA,    (-) CVA           GI/Hepatic/Renal:            ROS comment: NAUSEA  JAUNDICE   ELEVATED LFT'S  . Endo/Other:    (+) : arthritis: OA., .                 Abdominal:           Vascular: negative vascular ROS. Anesthesia Plan      MAC     ASA 4     (Backup GA if needed)  Induction: intravenous. Anesthetic plan and risks discussed with patient. Plan discussed with CRNA.         PATIENT TO BE RE-EVALUATED DOS BY ANESTHESIA TEAM      BRYSON DUPONT DO   4/25/2021      Changes made to above assessment and physical exam.  Spoke to patient about anesthetic plan.   Patient understands and wishes to proceed.  (this addendum was done preop but unable to be filed electronically at that time)

## 2021-04-25 NOTE — PLAN OF CARE
Problem: Falls - Risk of:  Goal: Will remain free from falls  Description: Will remain free from falls  4/25/2021 0146 by Sandra Sifuentes RN  Outcome: Met This Shift     Problem: Skin Integrity:  Goal: Will show no infection signs and symptoms  Description: Will show no infection signs and symptoms  4/25/2021 0146 by Sandra Sifuentes RN  Outcome: Met This Shift     Problem: Pain:  Goal: Pain level will decrease  Description: Pain level will decrease  Outcome: Met This Shift

## 2021-04-25 NOTE — PROGRESS NOTES
[facility] CARE AT [place] Hospitalist   Progress Note    Admitting Date and Time: 4/22/2021  9:43 PM  Admit Dx: Abdominal pain [R10.9]    Subjective:    Patient was admitted with Abdominal pain [R10.9]. Feels pretty good. No pain, less jaundiced, no difficulties at all with eating. ROS: denies fever, chills, cp, sob, n/v, HA unless stated above.      [START ON 4/26/2021] lactated ringers bolus  500 mL Intravenous Once    [START ON 4/26/2021] indomethacin  100 mg Rectal 60 Min Pre-Op    sodium chloride flush  5-40 mL Intravenous 2 times per day    metoprolol succinate  50 mg Oral Daily    mirtazapine  15 mg Oral Nightly    rasagiline mesylate  1 mg Oral Daily    tamsulosin  0.4 mg Oral Daily    cefTRIAXone (ROCEPHIN) IV  2,000 mg Intravenous Q24H    docusate sodium  200 mg Oral Daily     sodium chloride flush, 5-40 mL, PRN  sodium chloride, 25 mL, PRN  ketoconazole, , Daily PRN  nitroGLYCERIN, 0.4 mg, Q5 Min PRN  magnesium hydroxide, 30 mL, Daily PRN       Objective:    BP (!) 170/91   Pulse 79   Temp 97.9 °F (36.6 °C) (Oral)   Resp 16   Ht 6' 1\" (1.854 m)   Wt 199 lb 9 oz (90.5 kg)   SpO2 97%   BMI 26.33 kg/m²     General Appearance: alert and oriented to person, place and time, well developed and well- nourished, in no acute distress  Skin: warm and dry, no rash or erythema, mild jaundice  Head: normocephalic and atraumatic  Eyes: pupils equal, round, and reactive to light, extraocular eye movements intact, conjunctivae normal  ENT: nose without deformity, nasal mucosa and turbinates normal without polyps  Neck: supple and non-tender without mass, no thyromegaly or thyroid nodules, no cervical lymphadenopathy  Pulmonary/Chest: clear to auscultation bilaterally- no wheezes, rales or rhonchi, normal air movement, no respiratory distress  Cardiovascular: normal rate, regular rhythm, normal S1 and S2, no murmurs  Abdomen: much softer today, no longer as protuberant, no pain or guarding  Extremities: mild edema pretibial    Recent Labs     04/23/21  1020 04/24/21  0845 04/25/21  0400    138 139   K 4.0 3.5 3.7   * 109* 110*   CO2 22 24 21*   BUN 16 11 10   CREATININE 0.8 0.8 0.7   GLUCOSE 117* 117* 89   CALCIUM 8.5* 8.6 8.6       Recent Labs     04/23/21  1020 04/24/21  0845 04/25/21  0400   WBC 15.3* 8.1 7.9   RBC 4.52 4.44 4.55   HGB 12.5 12.4* 12.3*   HCT 38.5 38.3 39.5   MCV 85.2 86.3 86.8   MCH 27.7 27.9 27.0   MCHC 32.5 32.4 31.1*   RDW 14.9 14.9 15.2*    176 193   MPV 10.8 11.0 10.9       Radiology:   MRI ABDOMEN W WO CONTRAST MRCP   Final Result   1. Questionable strictures involving the distal CBD without biliary   dilatation. Finding could relate to the patient's reported cholangitis. Be   further evaluated by ERCP. No MRI evidence of choledocholithiasis. 2. 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 No peripancreatic fluid collection or   necrotic component. Correlation with lipase levels is suggested. CT ABDOMEN PELVIS W IV CONTRAST Additional Contrast? None   Final Result   Small amount of fluid/fat stranding adjacent to the head of the pancreas and   region of the kevin hepatis. Correlate with abnormal amylase lipase for   pancreatitis. Diffuse colonic fecal retention. Significant prostatomegaly. FL ERCP BILIARY AND PANCREATIC S&I    (Results Pending)       Assessment:    Principal Problem:    Ascending cholangitis  Active Problems:    CAD (coronary artery disease)    Ischemic cardiomyopathy    COPD (chronic obstructive pulmonary disease) (HCC)    Elevated LFTs    Abdominal pain    Fever    Sinus tachycardia  Resolved Problems:    * No resolved hospital problems. *      Plan:    1.  Probable cholangitis  Has had cholecystectomy  Definitely cholestatic picture from labs, has fevers and elevated WBC  WBC has resolved, no fevers. Seen by GI and ID  Antibiotics changed to ceftriaxone, from zosyn.     Fevers have stopped, patient with no pain, can eat without trouble.     MRCP shows possible CBD stricture and inflammation in head of the pancreas.     Doubt he has pancreatitis clinically.     Plan is ERCP tomorrow to evaluate.     2. HTN  Continue toprol.     3. Parkinson's disease  Continue remeron and rasagiline.     Electronically signed by Willena Bence, MD on 4/25/2021 at 11:49 AM

## 2021-04-25 NOTE — PROGRESS NOTES
[address] [facility]  [facility]  Progress Note    SUBJECTIVE:  Chief Complaint   Patient presents with    Abdominal Pain     upper since 1500    Nausea     Patient is tolerating medications. Lying in bed, resting comfortably. No fevers/chills. No N/V/D. No pain       Review of systems:  As stated above in the chief complaint, otherwise negative. Medications:  Scheduled Meds:   [START ON 2021] lactated ringers bolus  500 mL Intravenous Once    [START ON 2021] indomethacin  100 mg Rectal 60 Min Pre-Op    sodium chloride flush  5-40 mL Intravenous 2 times per day    metoprolol succinate  50 mg Oral Daily    mirtazapine  15 mg Oral Nightly    rasagiline mesylate  1 mg Oral Daily    tamsulosin  0.4 mg Oral Daily    cefTRIAXone (ROCEPHIN) IV  2,000 mg Intravenous Q24H    docusate sodium  200 mg Oral Daily     Continuous Infusions:   sodium chloride      sodium chloride 125 mL/hr at 21 1718     PRN Meds:sodium chloride flush, sodium chloride, ketoconazole, nitroGLYCERIN, magnesium hydroxide    OBJECTIVE:  BP (!) 170/91   Pulse 79   Temp 97.9 °F (36.6 °C) (Oral)   Resp 16   Ht 6' 1\" (1.854 m)   Wt 199 lb 9 oz (90.5 kg)   SpO2 97%   BMI 26.33 kg/m²   Temp  Av.7 °F (36.5 °C)  Min: 97.4 °F (36.3 °C)  Max: 97.9 °F (36.6 °C)  Constitutional: The patient is lying in bed, resting comfortably. No distress  Skin: Warm and dry. No rashes were noted. HEENT: Round and reactive pupils. Moist mucous membranes. No ulcerations or thrush. Neck: Supple to movements. Chest: No respiratory distress. Symmetrical expansion. No wheezing, crackles or rhonchi. Cardiovascular: S1 and S2 are rhythmic and regular. No murmurs appreciated. Abdomen: Positive bowel sounds to auscultation. Benign to palpation. No masses felt. No pain   Extremities: No edema.   Lines: peripheral    Laboratory and Tests Review:  Lab Results   Component Value Date    WBC 7.9 2021    WBC 8.1 04/24/2021    WBC 15.3 (H) 04/23/2021    HGB 12.3 (L) 04/25/2021    HCT 39.5 04/25/2021    MCV 86.8 04/25/2021     04/25/2021     Lab Results   Component Value Date    NEUTROABS 3.86 04/25/2021    NEUTROABS 5.29 04/24/2021    NEUTROABS 11.54 (H) 04/23/2021     No results found for: CRPHS  Lab Results   Component Value Date     (H) 04/25/2021    AST 78 (H) 04/25/2021    ALKPHOS 299 (H) 04/25/2021    BILITOT 2.2 (H) 04/25/2021     Lab Results   Component Value Date     04/25/2021    K 3.7 04/25/2021    K 4.3 04/22/2021     04/25/2021    CO2 21 04/25/2021    BUN 10 04/25/2021    CREATININE 0.7 04/25/2021    CREATININE 0.8 04/24/2021    CREATININE 0.8 04/23/2021    GFRAA >60 04/25/2021    LABGLOM >60 04/25/2021    GLUCOSE 89 04/25/2021    GLUCOSE 103 01/10/2012    PROT 6.1 04/25/2021    LABALBU 3.2 04/25/2021    LABALBU 3.9 01/10/2012    CALCIUM 8.6 04/25/2021    BILITOT 2.2 04/25/2021    ALKPHOS 299 04/25/2021    AST 78 04/25/2021     04/25/2021     Lab Results   Component Value Date    CRP 6.0 (H) 04/24/2021    CRP 5.3 (H) 04/23/2021    CRP 1.2 (H) 11/22/2020     Lab Results   Component Value Date    SEDRATE 7 04/24/2021     Radiology:  MRI Abdomen -    1. Questionable strictures involving the distal CBD without biliary   dilatation.  Finding could relate to the patient's reported cholangitis.  Be   further evaluated by ERCP.  No MRI evidence of choledocholithiasis. 2. 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  No peripancreatic fluid collection or   necrotic component.  Correlation with lipase levels is suggested.        Microbiology:   Blood cultures 4/22/2021 - negative so far     Recent Labs     04/23/21  1020   PROCAL 2.53*       ASSESSMENT:  · Probable cholangitis  · Elevation of transaminases related to the above, improving   · Fever associated to the above, improved  · Leukocytosis associated to cholangitis, improved     PLAN:  · Continue Ceftriaxone  · For ERCP tomorrow with GI  · Check final cultures  · MRI reviewed  · Monitor labs     Duane Guerin  11:41 AM  4/25/2021     Patient seen and examined. I had a face to face encounter with the patient. Agree with exam.  Assessment and plan as outlined above and directed by me. Addition and corrections were done as deemed appropriate. My exam and plan include: The patient has no pain. He is afebrile. Tolerating antibiotics. She ERCP tomorrow. Antonina Simmons  4/25/2021  3:36 PM

## 2021-04-26 ENCOUNTER — APPOINTMENT (OUTPATIENT)
Dept: GENERAL RADIOLOGY | Age: 81
DRG: 871 | End: 2021-04-26
Payer: MEDICARE

## 2021-04-26 VITALS
SYSTOLIC BLOOD PRESSURE: 171 MMHG | DIASTOLIC BLOOD PRESSURE: 89 MMHG | OXYGEN SATURATION: 95 % | RESPIRATION RATE: 16 BRPM

## 2021-04-26 LAB
ALBUMIN SERPL-MCNC: 3.2 G/DL (ref 3.5–5.2)
ALP BLD-CCNC: 271 U/L (ref 40–129)
ALT SERPL-CCNC: 90 U/L (ref 0–40)
AMYLASE: 89 U/L (ref 20–100)
ANION GAP SERPL CALCULATED.3IONS-SCNC: 6 MMOL/L (ref 7–16)
AST SERPL-CCNC: 41 U/L (ref 0–39)
BASOPHILS ABSOLUTE: 0.06 E9/L (ref 0–0.2)
BASOPHILS RELATIVE PERCENT: 0.9 % (ref 0–2)
BILIRUB SERPL-MCNC: 1.2 MG/DL (ref 0–1.2)
BUN BLDV-MCNC: 10 MG/DL (ref 6–23)
CALCIUM SERPL-MCNC: 8.3 MG/DL (ref 8.6–10.2)
CHLORIDE BLD-SCNC: 112 MMOL/L (ref 98–107)
CO2: 24 MMOL/L (ref 22–29)
CREAT SERPL-MCNC: 0.7 MG/DL (ref 0.7–1.2)
EOSINOPHILS ABSOLUTE: 0.43 E9/L (ref 0.05–0.5)
EOSINOPHILS RELATIVE PERCENT: 6.1 % (ref 0–6)
GFR AFRICAN AMERICAN: >60
GFR NON-AFRICAN AMERICAN: >60 ML/MIN/1.73
GLUCOSE BLD-MCNC: 102 MG/DL (ref 74–99)
HCT VFR BLD CALC: 36.6 % (ref 37–54)
HEMOGLOBIN: 11.8 G/DL (ref 12.5–16.5)
IMMATURE GRANULOCYTES #: 0.02 E9/L
IMMATURE GRANULOCYTES %: 0.3 % (ref 0–5)
LIPASE: 234 U/L (ref 13–60)
LYMPHOCYTES ABSOLUTE: 2.4 E9/L (ref 1.5–4)
LYMPHOCYTES RELATIVE PERCENT: 34.1 % (ref 20–42)
MCH RBC QN AUTO: 27.5 PG (ref 26–35)
MCHC RBC AUTO-ENTMCNC: 32.2 % (ref 32–34.5)
MCV RBC AUTO: 85.3 FL (ref 80–99.9)
MONOCYTES ABSOLUTE: 0.72 E9/L (ref 0.1–0.95)
MONOCYTES RELATIVE PERCENT: 10.2 % (ref 2–12)
NEUTROPHILS ABSOLUTE: 3.4 E9/L (ref 1.8–7.3)
NEUTROPHILS RELATIVE PERCENT: 48.4 % (ref 43–80)
PDW BLD-RTO: 15.2 FL (ref 11.5–15)
PLATELET # BLD: 195 E9/L (ref 130–450)
PMV BLD AUTO: 11.1 FL (ref 7–12)
POTASSIUM SERPL-SCNC: 3.8 MMOL/L (ref 3.5–5)
RBC # BLD: 4.29 E12/L (ref 3.8–5.8)
SODIUM BLD-SCNC: 142 MMOL/L (ref 132–146)
TOTAL PROTEIN: 5.6 G/DL (ref 6.4–8.3)
WBC # BLD: 7 E9/L (ref 4.5–11.5)

## 2021-04-26 PROCEDURE — 99232 SBSQ HOSP IP/OBS MODERATE 35: CPT | Performed by: INTERNAL MEDICINE

## 2021-04-26 PROCEDURE — 36415 COLL VENOUS BLD VENIPUNCTURE: CPT

## 2021-04-26 PROCEDURE — 3609015200 HC ERCP REMOVE CALCULI/DEBRIS BILIARY/PANCREAS DUCT: Performed by: INTERNAL MEDICINE

## 2021-04-26 PROCEDURE — 3700000001 HC ADD 15 MINUTES (ANESTHESIA): Performed by: INTERNAL MEDICINE

## 2021-04-26 PROCEDURE — 3700000000 HC ANESTHESIA ATTENDED CARE: Performed by: INTERNAL MEDICINE

## 2021-04-26 PROCEDURE — 2720000010 HC SURG SUPPLY STERILE: Performed by: INTERNAL MEDICINE

## 2021-04-26 PROCEDURE — 6360000002 HC RX W HCPCS: Performed by: NURSE ANESTHETIST, CERTIFIED REGISTERED

## 2021-04-26 PROCEDURE — 6370000000 HC RX 637 (ALT 250 FOR IP): Performed by: INTERNAL MEDICINE

## 2021-04-26 PROCEDURE — 85025 COMPLETE CBC W/AUTO DIFF WBC: CPT

## 2021-04-26 PROCEDURE — 82150 ASSAY OF AMYLASE: CPT

## 2021-04-26 PROCEDURE — 6360000002 HC RX W HCPCS: Performed by: INTERNAL MEDICINE

## 2021-04-26 PROCEDURE — 2580000003 HC RX 258: Performed by: NURSE ANESTHETIST, CERTIFIED REGISTERED

## 2021-04-26 PROCEDURE — 94660 CPAP INITIATION&MGMT: CPT

## 2021-04-26 PROCEDURE — 0F7D8DZ DILATION OF PANCREATIC DUCT WITH INTRALUMINAL DEVICE, VIA NATURAL OR ARTIFICIAL OPENING ENDOSCOPIC: ICD-10-PCS | Performed by: INTERNAL MEDICINE

## 2021-04-26 PROCEDURE — 0F798DZ DILATION OF COMMON BILE DUCT WITH INTRALUMINAL DEVICE, VIA NATURAL OR ARTIFICIAL OPENING ENDOSCOPIC: ICD-10-PCS | Performed by: INTERNAL MEDICINE

## 2021-04-26 PROCEDURE — C1874 STENT, COATED/COV W/DEL SYS: HCPCS | Performed by: INTERNAL MEDICINE

## 2021-04-26 PROCEDURE — 6360000004 HC RX CONTRAST MEDICATION: Performed by: INTERNAL MEDICINE

## 2021-04-26 PROCEDURE — 83690 ASSAY OF LIPASE: CPT

## 2021-04-26 PROCEDURE — 7100000010 HC PHASE II RECOVERY - FIRST 15 MIN: Performed by: INTERNAL MEDICINE

## 2021-04-26 PROCEDURE — 2580000003 HC RX 258: Performed by: NURSE PRACTITIONER

## 2021-04-26 PROCEDURE — 74330 X-RAY BILE/PANC ENDOSCOPY: CPT

## 2021-04-26 PROCEDURE — 0FC98ZZ EXTIRPATION OF MATTER FROM COMMON BILE DUCT, VIA NATURAL OR ARTIFICIAL OPENING ENDOSCOPIC: ICD-10-PCS | Performed by: INTERNAL MEDICINE

## 2021-04-26 PROCEDURE — 1200000000 HC SEMI PRIVATE

## 2021-04-26 PROCEDURE — 2580000003 HC RX 258: Performed by: INTERNAL MEDICINE

## 2021-04-26 PROCEDURE — C1769 GUIDE WIRE: HCPCS | Performed by: INTERNAL MEDICINE

## 2021-04-26 PROCEDURE — 7100000011 HC PHASE II RECOVERY - ADDTL 15 MIN: Performed by: INTERNAL MEDICINE

## 2021-04-26 PROCEDURE — 2709999900 HC NON-CHARGEABLE SUPPLY: Performed by: INTERNAL MEDICINE

## 2021-04-26 PROCEDURE — C2625 STENT, NON-COR, TEM W/DEL SY: HCPCS | Performed by: INTERNAL MEDICINE

## 2021-04-26 PROCEDURE — 80053 COMPREHEN METABOLIC PANEL: CPT

## 2021-04-26 PROCEDURE — 0FCD8ZZ EXTIRPATION OF MATTER FROM PANCREATIC DUCT, VIA NATURAL OR ARTIFICIAL OPENING ENDOSCOPIC: ICD-10-PCS | Performed by: INTERNAL MEDICINE

## 2021-04-26 PROCEDURE — 2500000003 HC RX 250 WO HCPCS: Performed by: NURSE ANESTHETIST, CERTIFIED REGISTERED

## 2021-04-26 DEVICE — STENT SYSTEM RMV
Type: IMPLANTABLE DEVICE | Site: BILE DUCT | Status: NON-FUNCTIONAL
Brand: WALLFLEX BILIARY
Removed: 2021-06-16

## 2021-04-26 DEVICE — PANCREATIC STENT KIT
Type: IMPLANTABLE DEVICE | Site: BILE DUCT | Status: FUNCTIONAL
Brand: ADVANIX™ PANCREATIC STENT KIT

## 2021-04-26 RX ORDER — PROPOFOL 10 MG/ML
INJECTION, EMULSION INTRAVENOUS PRN
Status: DISCONTINUED | OUTPATIENT
Start: 2021-04-26 | End: 2021-04-26 | Stop reason: SDUPTHER

## 2021-04-26 RX ORDER — FENTANYL CITRATE 50 UG/ML
INJECTION, SOLUTION INTRAMUSCULAR; INTRAVENOUS
Status: DISPENSED
Start: 2021-04-26 | End: 2021-04-27

## 2021-04-26 RX ORDER — FENTANYL CITRATE 50 UG/ML
INJECTION, SOLUTION INTRAMUSCULAR; INTRAVENOUS PRN
Status: DISCONTINUED | OUTPATIENT
Start: 2021-04-26 | End: 2021-04-26 | Stop reason: SDUPTHER

## 2021-04-26 RX ORDER — ONDANSETRON 2 MG/ML
4 INJECTION INTRAMUSCULAR; INTRAVENOUS EVERY 4 HOURS PRN
Status: DISCONTINUED | OUTPATIENT
Start: 2021-04-26 | End: 2021-04-28 | Stop reason: HOSPADM

## 2021-04-26 RX ORDER — PROPOFOL 10 MG/ML
INJECTION, EMULSION INTRAVENOUS CONTINUOUS PRN
Status: DISCONTINUED | OUTPATIENT
Start: 2021-04-26 | End: 2021-04-26 | Stop reason: SDUPTHER

## 2021-04-26 RX ORDER — SODIUM CHLORIDE, SODIUM LACTATE, POTASSIUM CHLORIDE, CALCIUM CHLORIDE 600; 310; 30; 20 MG/100ML; MG/100ML; MG/100ML; MG/100ML
INJECTION, SOLUTION INTRAVENOUS CONTINUOUS PRN
Status: DISCONTINUED | OUTPATIENT
Start: 2021-04-26 | End: 2021-04-26 | Stop reason: SDUPTHER

## 2021-04-26 RX ORDER — DEXTROSE AND SODIUM CHLORIDE 5; .45 G/100ML; G/100ML
INJECTION, SOLUTION INTRAVENOUS CONTINUOUS
Status: DISCONTINUED | OUTPATIENT
Start: 2021-04-26 | End: 2021-04-28 | Stop reason: HOSPADM

## 2021-04-26 RX ORDER — GLYCOPYRROLATE 0.2 MG/ML
INJECTION INTRAMUSCULAR; INTRAVENOUS PRN
Status: DISCONTINUED | OUTPATIENT
Start: 2021-04-26 | End: 2021-04-26 | Stop reason: SDUPTHER

## 2021-04-26 RX ADMIN — PROPOFOL 50 MG: 10 INJECTION, EMULSION INTRAVENOUS at 12:18

## 2021-04-26 RX ADMIN — FENTANYL CITRATE 25 MCG: 50 INJECTION, SOLUTION INTRAMUSCULAR; INTRAVENOUS at 12:41

## 2021-04-26 RX ADMIN — FENTANYL CITRATE 25 MCG: 50 INJECTION, SOLUTION INTRAMUSCULAR; INTRAVENOUS at 12:33

## 2021-04-26 RX ADMIN — GLUCAGON HYDROCHLORIDE 0.25 MG: KIT at 12:46

## 2021-04-26 RX ADMIN — PROPOFOL 70 MCG/KG/MIN: 10 INJECTION, EMULSION INTRAVENOUS at 12:18

## 2021-04-26 RX ADMIN — FENTANYL CITRATE 25 MCG: 50 INJECTION, SOLUTION INTRAMUSCULAR; INTRAVENOUS at 12:18

## 2021-04-26 RX ADMIN — PROPOFOL 20 MG: 10 INJECTION, EMULSION INTRAVENOUS at 12:23

## 2021-04-26 RX ADMIN — GLYCOPYRROLATE 0.1 MG: 0.2 INJECTION INTRAMUSCULAR; INTRAVENOUS at 12:20

## 2021-04-26 RX ADMIN — PROPOFOL 30 MG: 10 INJECTION, EMULSION INTRAVENOUS at 12:37

## 2021-04-26 RX ADMIN — SODIUM CHLORIDE, POTASSIUM CHLORIDE, SODIUM LACTATE AND CALCIUM CHLORIDE 500 ML: 600; 310; 30; 20 INJECTION, SOLUTION INTRAVENOUS at 10:59

## 2021-04-26 RX ADMIN — FENTANYL CITRATE 25 MCG: 50 INJECTION, SOLUTION INTRAMUSCULAR; INTRAVENOUS at 12:22

## 2021-04-26 RX ADMIN — MIRTAZAPINE 15 MG: 15 TABLET, FILM COATED ORAL at 21:08

## 2021-04-26 RX ADMIN — GLUCAGON HYDROCHLORIDE 0.25 MG: KIT at 12:39

## 2021-04-26 RX ADMIN — DEXTROSE AND SODIUM CHLORIDE: 5; 450 INJECTION, SOLUTION INTRAVENOUS at 15:07

## 2021-04-26 RX ADMIN — SODIUM CHLORIDE, POTASSIUM CHLORIDE, SODIUM LACTATE AND CALCIUM CHLORIDE: 600; 310; 30; 20 INJECTION, SOLUTION INTRAVENOUS at 12:14

## 2021-04-26 RX ADMIN — GLUCAGON HYDROCHLORIDE 0.25 MG: KIT at 12:25

## 2021-04-26 RX ADMIN — IOPAMIDOL 18 ML: 612 INJECTION, SOLUTION INTRAVENOUS at 13:10

## 2021-04-26 RX ADMIN — HYDROMORPHONE HYDROCHLORIDE 0.5 MG: 1 INJECTION, SOLUTION INTRAMUSCULAR; INTRAVENOUS; SUBCUTANEOUS at 14:10

## 2021-04-26 RX ADMIN — ONDANSETRON 4 MG: 2 INJECTION INTRAMUSCULAR; INTRAVENOUS at 21:16

## 2021-04-26 RX ADMIN — INDOMETHACIN 100 MG: 50 SUPPOSITORY RECTAL at 11:33

## 2021-04-26 RX ADMIN — ONDANSETRON 4 MG: 2 INJECTION INTRAMUSCULAR; INTRAVENOUS at 15:12

## 2021-04-26 RX ADMIN — WATER 2000 MG: 1 INJECTION INTRAMUSCULAR; INTRAVENOUS; SUBCUTANEOUS at 15:13

## 2021-04-26 ASSESSMENT — PAIN SCALES - GENERAL
PAINLEVEL_OUTOF10: 0
PAINLEVEL_OUTOF10: 4
PAINLEVEL_OUTOF10: 0
PAINLEVEL_OUTOF10: 0
PAINLEVEL_OUTOF10: 6
PAINLEVEL_OUTOF10: 0

## 2021-04-26 ASSESSMENT — PAIN DESCRIPTION - PAIN TYPE
TYPE: ACUTE PAIN

## 2021-04-26 ASSESSMENT — PAIN DESCRIPTION - LOCATION
LOCATION: ABDOMEN
LOCATION: ABDOMEN

## 2021-04-26 ASSESSMENT — PAIN DESCRIPTION - DESCRIPTORS: DESCRIPTORS: DISCOMFORT;CRAMPING

## 2021-04-26 NOTE — PROGRESS NOTES
[facility]   Progress Note    Admitting Date and Time: 4/22/2021  9:43 PM  Admit Dx: Abdominal pain [R10.9]    Subjective:    Patient was admitted with Abdominal pain [R10.9]. Was waiting for ERCP this morning with no pain, no nausea, no issues. Has tolerated diet without any side effect. ROS: denies fever, chills, cp, sob, n/v, HA unless stated above.      fentaNYL        lactated ringers bolus  500 mL Intravenous Once    indomethacin  100 mg Rectal 60 Min Pre-Op    sodium chloride flush  5-40 mL Intravenous 2 times per day    metoprolol succinate  50 mg Oral Daily    mirtazapine  15 mg Oral Nightly    rasagiline mesylate  1 mg Oral Daily    tamsulosin  0.4 mg Oral Daily    cefTRIAXone (ROCEPHIN) IV  2,000 mg Intravenous Q24H    docusate sodium  200 mg Oral Daily     ondansetron, 4 mg, Q4H PRN  HYDROmorphone, 0.5 mg, Q4H PRN  sodium chloride flush, 5-40 mL, PRN  sodium chloride, 25 mL, PRN  ketoconazole, , Daily PRN  nitroGLYCERIN, 0.4 mg, Q5 Min PRN  magnesium hydroxide, 30 mL, Daily PRN         Objective:    BP (!) 180/104 Comment: onn bedpan  Pulse 88   Temp 97.6 °F (36.4 °C) (Temporal)   Resp 18   Ht 6' 1\" (1.854 m)   Wt 199 lb 9 oz (90.5 kg)   SpO2 99%   BMI 26.33 kg/m²   General Appearance: alert and oriented to person, place and time, well developed and well- nourished, in no acute distress  Skin: warm and dry, no rash or erythema, mild jaundice  Head: normocephalic and atraumatic  Eyes: pupils equal, round, and reactive to light, extraocular eye movements intact, conjunctivae normal  ENT: nose without deformity, nasal mucosa and turbinates normal without polyps  Neck: supple and non-tender without mass, no thyromegaly or thyroid nodules, no cervical lymphadenopathy  Pulmonary/Chest: clear to auscultation bilaterally- no wheezes, rales or rhonchi, normal air movement, no respiratory distress  Cardiovascular: normal rate, regular rhythm, normal S1 and S2, no cholecystectomy  Definitely cholestatic picture from labs, has fevers and elevated WBC  WBC has resolved, no fevers. Seen by GI and ID  Antibiotics changed to ceftriaxone, from zosyn. Change to po tomorrow per ID note.     Fevers have stopped, patient with no pain, can eat without trouble. Now bili, AST, ALT, alk phos have almost normalized     MRCP shows possible CBD stricture and inflammation in head of the pancreas.     Lipase is very mildly elevated today but clinically no pancreatitis.     ERCP showed severe CBD distal stricture, and papillotomy resulted in 3 small stones. Stricture was then stented, and pancreatic stent placed prophylactically. If no pancreatitis likely discharge tomorrow.     2. HTN  Continue toprol.     3. Parkinson's disease  Continue remeron and rasagiline.     Electronically signed by Severo Blow, MD on 4/26/2021 at 2:28 PM

## 2021-04-26 NOTE — OP NOTE
[address] [address]                                OPERATIVE REPORT    PATIENT NAME: Renetta Bustamante                 :        1940  MED REC NO:   [id number]                            ROOM:       [id number]  ACCOUNT NO:   [de-identified]                           ADMIT DATE: 2021  PROVIDER:     Sherman Tineo MD    DATE OF PROCEDURE:  2021    PROCEDURE PERFORMED:  ERCP with papillotomy, common bile duct and  pancreatic duct stent placement, and stone extraction. PREOPERATIVE DIAGNOSIS:  Jaundice and picture consistent with  cholangitis. 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 With great difficulty, deep cannulation was  obtained and papillotomy was performed. Balloon sweeping resulted in 4  to 5 small stones recovery and a 10-Khmer 60-mm fully covered Wallstent  was placed with somewhat of difficulty through the stricture with  excellent drainage obtained. Common bile duct was cannulated repeatedly  and appeared unremarkable and a 4 x 5 pancreatic stent was placed to  guard against post ERCP pancreatitis. ESTIMATED BLOOD LOSS:  N/A    ANESTHESIA:  LMAC. DESCRIPTION OF PROCEDURE:  With the patient on his left lateral  decubitus position, the Olympus side-viewing video duodenoscope was  introduced into the esophagus, advanced through the GE junction,  advanced through the stomach into duodenal bulb and second portion of  the duodenum, where papilla was visualized and positioned at 12 o'clock  position. Selective cannulation of the pancreatic duct was done,  appeared to be unremarkable, was repeatedly done again, so a guidewire  was left into the pancreatic duct.   Another papillotome was used and the  common bile duct was finally cannulated and multiple attempts to  introduce a guidewire were successful at the end and with great  difficulty the papillotome was advanced into the common bile duct area. Severe stricture was seen at the distal most portion of the common bile  duct. Over the guidewire, the papillotome was then adjusted and  adequately sized papillotomy was performed. Over the guidewire in the  pancreatic duct, a pancreatic stent measuring 5 x 4 in diameter to  prevent post ERCP pancreatitis since the pancreatic duct was repeatedly  cannulated. Over the guidewire in the common bile duct, a 10 x 60 fully  covered wall stent was placed with excellent drainage obtained. The  scope was retrieved, area decompressed, and procedure was terminated.         Jorge Gorman MD    D: 04/26/2021 14:58:57       T: 04/26/2021 15:04:54     [email or web address]  Job#: [id number]     Doc#: [id number]    CC:  MD Lawson Mccoy MD Cristie Herbert, MD

## 2021-04-26 NOTE — PROGRESS NOTES
[address] [place] [address] [facility]  [facility]  Progress Note    SUBJECTIVE:  Chief Complaint   Patient presents with    Abdominal Pain     upper since 1500    Nausea     Patient is lying in bed. Awaiting ERCP this afternoon. Tolerating antibiotics  No fevers, no abdominal pain       Review of systems:  As stated above in the chief complaint, otherwise negative. Medications:  Scheduled Meds:   lactated ringers bolus  500 mL Intravenous Once    indomethacin  100 mg Rectal 60 Min Pre-Op    sodium chloride flush  5-40 mL Intravenous 2 times per day    metoprolol succinate  50 mg Oral Daily    mirtazapine  15 mg Oral Nightly    rasagiline mesylate  1 mg Oral Daily    tamsulosin  0.4 mg Oral Daily    cefTRIAXone (ROCEPHIN) IV  2,000 mg Intravenous Q24H    docusate sodium  200 mg Oral Daily     Continuous Infusions:   sodium chloride      sodium chloride 125 mL/hr at 21     PRN Meds:sodium chloride flush, sodium chloride, ketoconazole, nitroGLYCERIN, magnesium hydroxide    OBJECTIVE:  /75   Pulse 68   Temp 97.7 °F (36.5 °C) (Oral)   Resp 16   Ht 6' 1\" (1.854 m)   Wt 199 lb 9 oz (90.5 kg)   SpO2 94%   BMI 26.33 kg/m²   Temp  Av.6 °F (36.4 °C)  Min: 97 °F (36.1 °C)  Max: 98 °F (36.7 °C)  Constitutional: The patient is lying in bed, in no distress. Skin: Warm and dry. No rashes were noted. HEENT: Round and reactive pupils. Moist mucous membranes. No ulcerations or thrush. Neck: Supple to movements. Chest: No respiratory distress. Symmetrical expansion. No wheezing, crackles or rhonchi. Cardiovascular: S1 and S2 are rhythmic and regular. No murmurs appreciated. Abdomen: Positive bowel sounds to auscultation. Benign to palpation. No masses felt. No pain   Extremities: No edema.   Lines: peripheral    Laboratory and Tests Review:  Lab Results   Component Value Date    WBC 7.0 2021    WBC 7.9 2021    WBC 8.1 2021    HGB 11.8 (L) 2021    HCT 36.6 (L) 04/26/2021    MCV 85.3 04/26/2021     04/26/2021     Lab Results   Component Value Date    NEUTROABS 3.40 04/26/2021    NEUTROABS 3.86 04/25/2021    NEUTROABS 5.29 04/24/2021     No results found for: CRPHS  Lab Results   Component Value Date    ALT 90 (H) 04/26/2021    AST 41 (H) 04/26/2021    ALKPHOS 271 (H) 04/26/2021    BILITOT 1.2 04/26/2021     Lab Results   Component Value Date     04/26/2021    K 3.8 04/26/2021    K 4.3 04/22/2021     04/26/2021    CO2 24 04/26/2021    BUN 10 04/26/2021    CREATININE 0.7 04/26/2021    CREATININE 0.7 04/25/2021    CREATININE 0.8 04/24/2021    GFRAA >60 04/26/2021    LABGLOM >60 04/26/2021    GLUCOSE 102 04/26/2021    GLUCOSE 103 01/10/2012    PROT 5.6 04/26/2021    LABALBU 3.2 04/26/2021    LABALBU 3.9 01/10/2012    CALCIUM 8.3 04/26/2021    BILITOT 1.2 04/26/2021    ALKPHOS 271 04/26/2021    AST 41 04/26/2021    ALT 90 04/26/2021     Lab Results   Component Value Date    CRP 6.0 (H) 04/24/2021    CRP 5.3 (H) 04/23/2021    CRP 1.2 (H) 11/22/2020     Lab Results   Component Value Date    SEDRATE 7 04/24/2021     Radiology:  MRI Abdomen -    1. Questionable strictures involving the distal CBD without biliary   dilatation.  Finding could relate to the patient's reported cholangitis.  Be   further evaluated by ERCP.  No MRI evidence of choledocholithiasis. 2. 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  No peripancreatic fluid collection or   necrotic component.  Correlation with lipase levels is suggested. Microbiology:   Blood cultures 4/22/2021 - negative so far     No results for input(s): PROCAL in the last 72 hours.     ASSESSMENT:  · Probable cholangitis  · Elevation of transaminases related to the above, improving   · Fever associated to the above, improved  · Leukocytosis associated to cholangitis, improved

## 2021-04-26 NOTE — CARE COORDINATION
Pt for ERCP today ; abnormal MRI; HX cholecystectomy. Iv atb's continued; LFFT's trending down. Plan is home at discharge; no needs. Lily Schmidt.

## 2021-04-26 NOTE — BRIEF OP NOTE
Brief Postoperative Note    Dawna Quiroz  YOB: 1940  [id number]    Procedure:  ERCP    Anesthesia: [facility]      Surgeon:  Nicol Huber MD      Findings: CBD: SEVERE  STRICTURE AT DISTAL CBD , WITH GREAT DIFFICULTY , IT WAS BYPASSED. PAPILLOTOMY WAS DONE WITH BALLOON SWEEPING RESULTING IN 3 SMALL STONES RECOVERED AND STRICTURE WAS TREATED WITH 10X60 FULLY COVERED WALL STENT WITH GREAT DRAINAGE OBTAINED                      PD: CANNULATED REPEATEDLY AND WNL .  4X5 PANCREATIC STENT WAS PLACED TO GUARD AGAINST POST ERCP  PANCREATITIS                        Complications: None      Estimated blood loss: none        Sacha Dewitt MD

## 2021-04-26 NOTE — PROGRESS NOTES
to admission, NPO or clear liquid status due to medical condition, constipation(Abd pain)    Nutrition Interventions:   Food and/or Nutrient Delivery:  Continue NPO(Will monitor for nutrition progression.)  Nutrition Education/Counseling:  Education not indicated   Coordination of Nutrition Care:  Continue to monitor while inpatient    Goals:  Nutrition Progression       Nutrition Monitoring and Evaluation:   Behavioral-Environmental Outcomes:  None Identified   Food/Nutrient Intake Outcomes:  Diet Advancement/Tolerance  Physical Signs/Symptoms Outcomes:  Biochemical Data, Constipation, GI Status, Fluid Status or Edema, Weight, Skin, Nutrition Focused Physical Findings     Discharge Planning:     Too soon to determine     Electronically signed by Michelle Cabezas RD, LD on 4/26/21 at 12:08 PM EDT    Contact: [phone]

## 2021-04-27 ENCOUNTER — ANESTHESIA EVENT (OUTPATIENT)
Dept: ENDOSCOPY | Age: 81
DRG: 871 | End: 2021-04-27
Payer: MEDICARE

## 2021-04-27 LAB
ALBUMIN SERPL-MCNC: 3.1 G/DL (ref 3.5–5.2)
ALP BLD-CCNC: 260 U/L (ref 40–129)
ALT SERPL-CCNC: 75 U/L (ref 0–40)
AMYLASE: 96 U/L (ref 20–100)
ANCA IFA: NORMAL
ANION GAP SERPL CALCULATED.3IONS-SCNC: 6 MMOL/L (ref 7–16)
AST SERPL-CCNC: 32 U/L (ref 0–39)
BASOPHILS ABSOLUTE: 0.05 E9/L (ref 0–0.2)
BASOPHILS RELATIVE PERCENT: 0.6 % (ref 0–2)
BILIRUB SERPL-MCNC: 1 MG/DL (ref 0–1.2)
BILIRUBIN DIRECT: 0.5 MG/DL (ref 0–0.3)
BILIRUBIN, INDIRECT: 0.5 MG/DL (ref 0–1)
BUN BLDV-MCNC: 7 MG/DL (ref 6–23)
CALCIUM SERPL-MCNC: 8.8 MG/DL (ref 8.6–10.2)
CHLORIDE BLD-SCNC: 108 MMOL/L (ref 98–107)
CO2: 26 MMOL/L (ref 22–29)
CREAT SERPL-MCNC: 0.7 MG/DL (ref 0.7–1.2)
EOSINOPHILS ABSOLUTE: 0.43 E9/L (ref 0.05–0.5)
EOSINOPHILS RELATIVE PERCENT: 5.4 % (ref 0–6)
GFR AFRICAN AMERICAN: >60
GFR NON-AFRICAN AMERICAN: >60 ML/MIN/1.73
GLUCOSE BLD-MCNC: 119 MG/DL (ref 74–99)
HCT VFR BLD CALC: 37.3 % (ref 37–54)
HEMOGLOBIN: 12.2 G/DL (ref 12.5–16.5)
IMMATURE GRANULOCYTES #: 0.03 E9/L
IMMATURE GRANULOCYTES %: 0.4 % (ref 0–5)
LIPASE: 206 U/L (ref 13–60)
LYMPHOCYTES ABSOLUTE: 2.35 E9/L (ref 1.5–4)
LYMPHOCYTES RELATIVE PERCENT: 29.6 % (ref 20–42)
MCH RBC QN AUTO: 28.1 PG (ref 26–35)
MCHC RBC AUTO-ENTMCNC: 32.7 % (ref 32–34.5)
MCV RBC AUTO: 85.9 FL (ref 80–99.9)
MONOCYTES ABSOLUTE: 0.87 E9/L (ref 0.1–0.95)
MONOCYTES RELATIVE PERCENT: 11 % (ref 2–12)
NEUTROPHILS ABSOLUTE: 4.21 E9/L (ref 1.8–7.3)
NEUTROPHILS RELATIVE PERCENT: 53 % (ref 43–80)
PDW BLD-RTO: 15.1 FL (ref 11.5–15)
PLATELET # BLD: 208 E9/L (ref 130–450)
PMV BLD AUTO: 10.9 FL (ref 7–12)
POTASSIUM SERPL-SCNC: 3.6 MMOL/L (ref 3.5–5)
RBC # BLD: 4.34 E12/L (ref 3.8–5.8)
SODIUM BLD-SCNC: 140 MMOL/L (ref 132–146)
TOTAL PROTEIN: 5.6 G/DL (ref 6.4–8.3)
WBC # BLD: 7.9 E9/L (ref 4.5–11.5)

## 2021-04-27 PROCEDURE — 6360000002 HC RX W HCPCS

## 2021-04-27 PROCEDURE — 94660 CPAP INITIATION&MGMT: CPT

## 2021-04-27 PROCEDURE — 82248 BILIRUBIN DIRECT: CPT

## 2021-04-27 PROCEDURE — 99233 SBSQ HOSP IP/OBS HIGH 50: CPT | Performed by: INTERNAL MEDICINE

## 2021-04-27 PROCEDURE — 97165 OT EVAL LOW COMPLEX 30 MIN: CPT

## 2021-04-27 PROCEDURE — 83690 ASSAY OF LIPASE: CPT

## 2021-04-27 PROCEDURE — 6360000002 HC RX W HCPCS: Performed by: INTERNAL MEDICINE

## 2021-04-27 PROCEDURE — 97161 PT EVAL LOW COMPLEX 20 MIN: CPT

## 2021-04-27 PROCEDURE — 85025 COMPLETE CBC W/AUTO DIFF WBC: CPT

## 2021-04-27 PROCEDURE — 36415 COLL VENOUS BLD VENIPUNCTURE: CPT

## 2021-04-27 PROCEDURE — 6370000000 HC RX 637 (ALT 250 FOR IP): Performed by: INTERNAL MEDICINE

## 2021-04-27 PROCEDURE — 82150 ASSAY OF AMYLASE: CPT

## 2021-04-27 PROCEDURE — 1200000000 HC SEMI PRIVATE

## 2021-04-27 PROCEDURE — 2580000003 HC RX 258: Performed by: INTERNAL MEDICINE

## 2021-04-27 PROCEDURE — 2500000003 HC RX 250 WO HCPCS

## 2021-04-27 PROCEDURE — 97530 THERAPEUTIC ACTIVITIES: CPT

## 2021-04-27 PROCEDURE — 97535 SELF CARE MNGMENT TRAINING: CPT

## 2021-04-27 PROCEDURE — 80053 COMPREHEN METABOLIC PANEL: CPT

## 2021-04-27 RX ADMIN — SODIUM CHLORIDE, PRESERVATIVE FREE 10 ML: 5 INJECTION INTRAVENOUS at 20:41

## 2021-04-27 RX ADMIN — DEXTROSE AND SODIUM CHLORIDE: 5; 450 INJECTION, SOLUTION INTRAVENOUS at 00:18

## 2021-04-27 RX ADMIN — DEXTROSE AND SODIUM CHLORIDE: 5; 450 INJECTION, SOLUTION INTRAVENOUS at 15:08

## 2021-04-27 RX ADMIN — RASAGILINE 1 MG: 1 TABLET ORAL at 09:19

## 2021-04-27 RX ADMIN — MIRTAZAPINE 15 MG: 15 TABLET, FILM COATED ORAL at 20:41

## 2021-04-27 RX ADMIN — DOCUSATE SODIUM 200 MG: 100 CAPSULE, LIQUID FILLED ORAL at 09:18

## 2021-04-27 RX ADMIN — METOPROLOL SUCCINATE 50 MG: 50 TABLET, EXTENDED RELEASE ORAL at 09:17

## 2021-04-27 RX ADMIN — TAMSULOSIN HYDROCHLORIDE 0.4 MG: 0.4 CAPSULE ORAL at 09:17

## 2021-04-27 RX ADMIN — WATER 2000 MG: 1 INJECTION INTRAMUSCULAR; INTRAVENOUS; SUBCUTANEOUS at 14:30

## 2021-04-27 ASSESSMENT — PAIN SCALES - GENERAL
PAINLEVEL_OUTOF10: 0

## 2021-04-27 ASSESSMENT — ENCOUNTER SYMPTOMS: SHORTNESS OF BREATH: 0

## 2021-04-27 NOTE — ANESTHESIA PRE PROCEDURE
Department of Anesthesiology  Preprocedure Note       Name:  Aliza Arshad   Age:  79 y.o.  :  1940                                          MRN:  [id number]         Date:  2021      Surgeon: Pratima Singh    Procedure: ERCP WITH PANCREATIC STENT REMOVAL    Medications prior to admission:   Prior to Admission medications    Medication Sig Start Date End Date Taking? Authorizing Provider   rasagiline mesylate 1 MG TABS take 1 tablet by mouth once daily 1/10/21   Historical Provider, MD   atorvastatin (LIPITOR) 80 MG tablet Take 1 tablet by mouth daily 21   Kavin Hsieh MD   clopidogrel (PLAVIX) 75 MG tablet Take 1 tablet by mouth daily 21   Kavin Hsieh MD   metoprolol succinate (TOPROL XL) 50 MG extended release tablet Take 1 tablet by mouth daily 21   Kavin Hsieh MD   mirtazapine (REMERON) 15 MG tablet Take 1 tablet by mouth nightly 20   Kavin Hsieh MD   ketoconazole (NIZORAL) 2 % cream Apply topically daily. 20   Kavin Hsieh MD   losartan (COZAAR) 25 MG tablet Take 1 tablet by mouth daily 20   Kavin Hsieh MD   tamsulosin [facility]) 0.4 MG capsule Take 1 capsule by mouth daily 20   Kavin Hsieh MD   ketoconazole (NIZORAL) 2 % shampoo Apply topically daily as needed. 20   Kavin Hsieh MD   nitroGLYCERIN (NITROSTAT) 0.4 MG SL tablet Place 1 tablet under the tongue every 5 minutes as needed for Chest pain 20   Nicole Wolff DO   Multiple Vitamins-Minerals (THERAPEUTIC MULTIVITAMIN-MINERALS) tablet Take 1 tablet by mouth daily    Historical Provider, MD   CPAP Machine MISC daily at bedtime.     Historical Provider, MD   aspirin 81 MG EC tablet Take 81 mg by mouth daily Last taken 2019    Historical Provider, MD   Cholecalciferol (VITAMIN D) 2000 UNIT CAPS capsule Take 1 capsule by mouth daily Last taken     Historical Provider, MD       Current medications:    No current facility-administered medications for this visit. No current outpatient medications on file.      Facility-Administered Medications Ordered in Other Visits   Medication Dose Route Frequency Provider Last Rate Last Admin    dextrose 5 % and 0.45 % sodium chloride infusion   Intravenous Continuous Sandi Mcnally MD 75 mL/hr at 04/27/21 1508 New Bag at 04/27/21 1508    ondansetron (ZOFRAN) injection 4 mg  4 mg Intravenous Q4H PRN Sandi Mcnally MD   4 mg at 04/26/21 2116    HYDROmorphone (DILAUDID) injection 0.5 mg  0.5 mg Intravenous Q4H PRN Sandi Mcnally MD   0.5 mg at 04/26/21 1410    indomethacin (INDOCIN) 50 MG suppository 100 mg  100 mg Rectal 60 Min Pre-Op Sandi Mcnally MD   100 mg at 04/26/21 1133    sodium chloride flush 0.9 % injection 5-40 mL  5-40 mL Intravenous 2 times per day Sandi Mcnally MD   10 mL at 04/24/21 2034    sodium chloride flush 0.9 % injection 5-40 mL  5-40 mL Intravenous PRN Sandi Mcnally MD        0.9 % sodium chloride infusion  25 mL Intravenous PRN Sandi Mcnally MD        0.9 % sodium chloride infusion   Intravenous Continuous Sandi Mcnally  mL/hr at 04/25/21 2236 New Bag at 04/25/21 2236    ketoconazole (NIZORAL) 2 % cream   Topical Daily PRN Sandi Mcnally MD        metoprolol succinate (TOPROL XL) extended release tablet 50 mg  50 mg Oral Daily Sandi Mcnally MD   50 mg at 04/27/21 0917    mirtazapine (REMERON) tablet 15 mg  15 mg Oral Nightly Sandi Mcnally MD   15 mg at 04/26/21 2108    nitroGLYCERIN (NITROSTAT) SL tablet 0.4 mg  0.4 mg Sublingual Q5 Min PRN Sandi Mcnally MD        rasagiline mesylate TABS 1 mg  1 mg Oral Daily Sandi Mcnally MD   1 mg at 04/27/21 0919    tamsulosin (FLOMAX) capsule 0.4 mg  0.4 mg Oral Daily Sandi Mcnally MD   0.4 mg at 04/27/21 0917    cefTRIAXone (ROCEPHIN) 2,000 mg in sterile water 20 mL IV syringe  2,000 mg Intravenous Q24H Sandi Mcnally MD   2,000 mg at 04/27/21 1430    docusate sodium (COLACE) capsule 200 mg  200 mg Oral Daily Sayed LIONEL Ramsey Raymond MD   200 mg at 04/27/21 0918    magnesium hydroxide (MILK OF MAGNESIA) 400 MG/5ML suspension 30 mL  30 mL Oral Daily PRN Garrett Jett MD           Allergies:     Allergies   Allergen Reactions    Lisinopril Itching       Problem List:    Patient Active Problem List   Diagnosis Code    Hyperlipidemia E78.5    CAD (coronary artery disease) I25.10    Hypertension I10    Sleep apnea G47.30    Osteoarthritis M19.90    Smokeless tobacco use Z72.0    Ischemic cardiomyopathy I25.5    TIA (transient ischemic attack) G45.9    CHF (congestive heart failure), NYHA class I, chronic, systolic ([facility]) S88.18    COPD (chronic obstructive pulmonary disease) ([facility]) J44.9    Pigmented skin lesion L81.9    Prostate enlargement N40.0    Fatigue R53.83    Disorder of bone M89.9    Other constipation K59.09    Vitamin D deficiency E55.9    Bilateral impacted cerumen H61.23    Impaired fasting glucose R73.01    Ascending cholangitis K83.09    Elevated LFTs R79.89    Leukocytosis D72.829    Lactic acidosis E87.2    COVID-19 virus infection U07.1    Orthostatic hypotension I95.1    COPD exacerbation (HCC) J44.1    Abdominal pain R10.9    Fever R50.9    Sinus tachycardia R00.0       Past Medical History:        Diagnosis Date    CAD (coronary artery disease) 2006    follows with Dr Barrett Noonan    Cerebral artery occlusion with cerebral infarction [facility])     Cerebrovascular disease     CHF (congestive heart failure), NYHA class I, chronic, systolic ([facility] [address] 6944    [facility]-- EF 39%    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/2018    Chronic maxillary sinusitis 04/2018    COPD (chronic obstructive pulmonary disease) ([email or web address] 04/2018    GARSIA (dyspnea on exertion)     follows with Dr Enrique Valentino    Gallbladder disease     History of non-ST elevation myocardial infarction (NSTEMI) 2015    [facility]    Hyperlipidemia     Hypertension     Ischemic cardiomyopathy     EF 40% [facility]-15 at [facility]    Obesity     Osteoarthritis     Skin cancer of eyelid     Sleep apnea     uses CPAP    Smokeless tobacco use     Stented coronary artery 2006    x1       Past Surgical History:        Procedure Laterality Date    CARDIAC CATHETERIZATION Left 2015    done [address] WITH IMPLANT Bilateral     CHOLECYSTECTOMY, LAPAROSCOPIC N/A 2019    LAPAROSCOPIC ROBOTIC XI ASSISTED CHOLECYSTECTOMY performed by Vida Espinoza MD at [address] [facility]  2014    CORONARY ANGIOPLASTY  06    DIAGNOSTIC CARDIAC CATH LAB PROCEDURE  06    ERCP N/A 2021    ERCP STONE REMOVAL performed by Grabiel Michel MD at [address] [facility] [place] Left 2015    ROTATOR CUFF REPAIR      bilaterally    TONSILLECTOMY AND ADENOIDECTOMY      UPPER GASTROINTESTINAL ENDOSCOPY         Social History:    Social History     Tobacco Use    Smoking status: Former Smoker     Packs/day: 1.00     Years: 15.00     Pack years: 15.00     Types: Cigarettes     Start date: 1957     Quit date: 1975     Years since quittin.3    Smokeless tobacco: Current User     Types: Snuff   Substance Use Topics    Alcohol use: Yes     Comment: occassionally                                Ready to quit: Not Answered  Counseling given: Not Answered      Vital Signs (Current): There were no vitals filed for this visit.                                            BP Readings from Last 3 Encounters:   21 (!) 156/74   21 (!) 171/89   21 132/70       NPO Status:                                                                                 BMI:   Wt Readings from Last 3 Encounters:   21 199 lb 9 oz (90.5 kg)   21 210 lb (95.3 kg)   20 198 lb (89.8 kg)     There is no height or weight on file to calculate BMI.    CBC:   Lab Results   Component Value Date    WBC 7.9 2021    RBC 4.34 2021    HGB 12.2 2021    HCT 37.3 2021    MCV 85.9 2021 RDW 15.1 04/27/2021     04/27/2021       CMP:   Lab Results   Component Value Date     04/27/2021    K 3.6 04/27/2021    K 4.3 04/22/2021     04/27/2021    CO2 26 04/27/2021    BUN 7 04/27/2021    CREATININE 0.7 04/27/2021    GFRAA >60 04/27/2021    LABGLOM >60 04/27/2021    GLUCOSE 119 04/27/2021    GLUCOSE 103 01/10/2012    PROT 5.6 04/27/2021    CALCIUM 8.8 04/27/2021    BILITOT 1.0 04/27/2021    ALKPHOS 260 04/27/2021    AST 32 04/27/2021    ALT 75 04/27/2021       POC Tests: No results for input(s): POCGLU, POCNA, POCK, POCCL, POCBUN, POCHEMO, POCHCT in the last 72 hours. Coags:   Lab Results   Component Value Date    PROTIME 12.4 04/25/2021    PROTIME 11.0 01/09/2012    INR 1.2 04/25/2021    APTT 36.7 04/25/2021       HCG (If Applicable): No results found for: PREGTESTUR, PREGSERUM, HCG, HCGQUANT     ABGs: No results found for: PHART, PO2ART, DWM9FXR, BQP0LIG, BEART, J9QMDJVR     Type & Screen (If Applicable):  No results found for: LABABO, [address]    Anesthesia Evaluation  Patient summary reviewed no history of anesthetic complications:   Airway: Mallampati: III  TM distance: >3 FB     Mouth opening: < 3 FB Dental:    (+) partials      Pulmonary: breath sounds clear to auscultation  (+) COPD:  sleep apnea: on CPAP,  asthma:     (-) shortness of breath                          ROS comment: Former Smoker  Smokeless tobacco use  Chronic frontoethmoidal sinusitis   Chronic maxillary sinusitis        Cardiovascular:    (+) hypertension:, past MI: > 6 months, CAD: obstructive, CABG/stent:, CHF: systolic, GARSIA:, hyperlipidemia      NYHA Classification: I  ECG reviewed  Rhythm: regular  Rate: normal    Stress test reviewed       Beta Blocker:  Dose within 24 Hrs         Neuro/Psych:   (+) neuromuscular disease: Parkinson's disease, TIA,    (-) CVA           GI/Hepatic/Renal:            ROS comment: NAUSEA  JAUNDICE   ELEVATED LFT'S  .    Endo/Other:    (+) : arthritis: OA., .

## 2021-04-27 NOTE — CARE COORDINATION
ERCP + stent/stone extraction 4/26; ivf; for EGD stent removal in am. Plan is home ; no additional needs. Kathya Orellana.

## 2021-04-27 NOTE — PLAN OF CARE
Problem: Falls - Risk of:  Goal: Will remain free from falls  Description: Will remain free from falls  4/27/2021 0026 by Rehana Cosme RN  Outcome: Met This Shift     Problem: Skin Integrity:  Goal: Absence of new skin breakdown  Description: Absence of new skin breakdown  Outcome: Met This Shift     Problem: Pain:  Goal: Control of acute pain  Description: Control of acute pain  Outcome: Met This Shift

## 2021-04-27 NOTE — PROGRESS NOTES
[address] [place] [address] [facility]  Progress Note    SUBJECTIVE:  Chief Complaint   Patient presents with    Abdominal Pain     upper since 1500    Nausea     Patient is tolerating medications. Lying in bed. Son present  No fevers, chills. No N/V/D. No abdominal pain       Review of systems:  As stated above in the chief complaint, otherwise negative. Medications:  Scheduled Meds:   indomethacin  100 mg Rectal 60 Min Pre-Op    sodium chloride flush  5-40 mL Intravenous 2 times per day    metoprolol succinate  50 mg Oral Daily    mirtazapine  15 mg Oral Nightly    rasagiline mesylate  1 mg Oral Daily    tamsulosin  0.4 mg Oral Daily    cefTRIAXone (ROCEPHIN) IV  2,000 mg Intravenous Q24H    docusate sodium  200 mg Oral Daily     Continuous Infusions:   dextrose 5 % and 0.45 % NaCl 75 mL/hr at 21 0839    sodium chloride      sodium chloride 125 mL/hr at 216     PRN Meds:ondansetron, HYDROmorphone, sodium chloride flush, sodium chloride, ketoconazole, nitroGLYCERIN, magnesium hydroxide    OBJECTIVE:  /73   Pulse 78   Temp 97.9 °F (36.6 °C) (Oral)   Resp 16   Ht 6' 1\" (1.854 m)   Wt 199 lb 9 oz (90.5 kg)   SpO2 96%   BMI 26.33 kg/m²   Temp  Av.7 °F (36.5 °C)  Min: 97 °F (36.1 °C)  Max: 98.5 °F (36.9 °C)  Constitutional: The patient is lying in bed, in no distress. Son present   Skin: Warm and dry. No rashes were noted. HEENT: Round and reactive pupils. Moist mucous membranes. No ulcerations or thrush. Neck: Supple to movements. Chest: No respiratory distress. Symmetrical expansion. Clear bilaterally  Cardiovascular: S1 and S2 are rhythmic and regular. No murmurs appreciated. Abdomen: Positive bowel sounds to auscultation. Benign to palpation. No masses felt. No pain   Extremities: No edema.   Lines: peripheral    Laboratory and Tests Review:  Lab Results   Component Value Date    WBC 7.9 2021    WBC 7.0 2021    WBC 7.9 2021 HGB 12.2 (L) 04/27/2021    HCT 37.3 04/27/2021    MCV 85.9 04/27/2021     04/27/2021     Lab Results   Component Value Date    NEUTROABS 4.21 04/27/2021    NEUTROABS 3.40 04/26/2021    NEUTROABS 3.86 04/25/2021     No results found for: CRPHS  Lab Results   Component Value Date    ALT 75 (H) 04/27/2021    AST 32 04/27/2021    ALKPHOS 260 (H) 04/27/2021    BILITOT 1.0 04/27/2021     Lab Results   Component Value Date     04/27/2021    K 3.6 04/27/2021    K 4.3 04/22/2021     04/27/2021    CO2 26 04/27/2021    BUN 7 04/27/2021    CREATININE 0.7 04/27/2021    CREATININE 0.7 04/26/2021    CREATININE 0.7 04/25/2021    GFRAA >60 04/27/2021    LABGLOM >60 04/27/2021    GLUCOSE 119 04/27/2021    GLUCOSE 103 01/10/2012    PROT 5.6 04/27/2021    LABALBU 3.1 04/27/2021    LABALBU 3.9 01/10/2012    CALCIUM 8.8 04/27/2021    BILITOT 1.0 04/27/2021    ALKPHOS 260 04/27/2021    AST 32 04/27/2021    ALT 75 04/27/2021     Lab Results   Component Value Date    CRP 6.0 (H) 04/24/2021    CRP 5.3 (H) 04/23/2021    CRP 1.2 (H) 11/22/2020     Lab Results   Component Value Date    SEDRATE 7 04/24/2021     Radiology:  MRI Abdomen -    1. Questionable strictures involving the distal CBD without biliary   dilatation.  Finding could relate to the patient's reported cholangitis.  Be   further evaluated by ERCP.  No MRI evidence of choledocholithiasis. 2. 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  No peripancreatic fluid collection or   necrotic component.  Correlation with lipase levels is suggested. Microbiology:   Blood cultures 4/22/2021 - negative so far     No results for input(s): PROCAL in the last 72 hours.     ASSESSMENT:  · Probable cholangitis  · Elevation of transaminases related to the above, improving   · Fever associated to the above, improved  · Leukocytosis associated to cholangitis, improved   · Status post ERCP 4/26/2021 with stone removal, stent placement    PLAN:  · Continue Ceftriaxone for now   · Patient is going to have pancreatic stent removed tomorrow   · Check final cultures  · Monitor labs    Payal Wiggins  11:17 AM  4/27/2021     Patient seen and examined. I had a face to face encounter with the patient. Agree with exam.  Assessment and plan as outlined above and directed by me. Addition and corrections were done as deemed appropriate. My exam and plan include: The patient is doing well. Tolerating antibiotics well. He will be going for an ERCP tomorrow. We will leave him on IV antibiotics for now.     Madhav Ever  4/27/2021  1:18 PM

## 2021-04-27 NOTE — PROGRESS NOTES
well-developed and well-nourished, in no acute distress  Skin: warm and dry, no rash or erythema  Head: normocephalic and atraumatic  Eyes: pupils equal, round, and reactive to light, extraocular eye movements intact, conjunctivae normal  ENT: tympanic membrane, external ear and ear canal normal bilaterally, oropharynx clear and moist with normal mucous membranes  Neck: neck supple and non tender without mass, no thyromegaly or thyroid nodules, no cervical lymphadenopathy   Pulmonary/Chest: clear to auscultation bilaterally- no wheezes, rales or rhonchi, normal air movement, no respiratory distress  Cardiovascular: normal rate, normal S1 and S2, no gallops, intact distal pulses and no carotid bruits  Abdomen: soft, non-tender, non-distended, normal bowel sounds, no masses or organomegaly      Recent Labs     04/25/21  0400 04/26/21  0512 04/27/21  0423    142 140   K 3.7 3.8 3.6   * 112* 108*   CO2 21* 24 26   BUN 10 10 7   CREATININE 0.7 0.7 0.7   GLUCOSE 89 102* 119*   CALCIUM 8.6 8.3* 8.8       Recent Labs     04/25/21  0400 04/26/21  0512 04/27/21  0423   WBC 7.9 7.0 7.9   RBC 4.55 4.29 4.34   HGB 12.3* 11.8* 12.2*   HCT 39.5 36.6* 37.3   MCV 86.8 85.3 85.9   MCH 27.0 27.5 28.1   MCHC 31.1* 32.2 32.7   RDW 15.2* 15.2* 15.1*    195 208   MPV 10.9 11.1 10.9     Alk phos steadily improving  Also steady improvement in ALT as well as AST  Bilirubin also has improved steadily now 1    Radiology:   FL ERCP BILIARY AND PANCREATIC S&I   Final Result   Fluoroscopic support for ERCP. Please refer to the procedure report for   further details. MRI ABDOMEN W WO CONTRAST MRCP   Final Result   1. Questionable strictures involving the distal CBD without biliary   dilatation. Finding could relate to the patient's reported cholangitis. Be   further evaluated by ERCP. No MRI evidence of choledocholithiasis.    2. 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 No peripancreatic fluid collection or   necrotic component. Correlation with lipase levels is suggested. CT ABDOMEN PELVIS W IV CONTRAST Additional Contrast? None   Final Result   Small amount of fluid/fat stranding adjacent to the head of the pancreas and   region of the kevin hepatis. Correlate with abnormal amylase lipase for   pancreatitis. Diffuse colonic fecal retention. Significant prostatomegaly. Assessment:    Principal Problem:    Ascending cholangitis  Active Problems:    CAD (coronary artery disease)    Ischemic cardiomyopathy    COPD (chronic obstructive pulmonary disease) (HCC)    Elevated LFTs    Abdominal pain    Fever    Sinus tachycardia  Resolved Problems:    * No resolved hospital problems. *      Plan:  1. Cholangitis, likely related to stricture, stones, now. ERCP, removal of stones, stent placement. 2. Transaminitis, due to above, steady improvement. 3. Cholangitis, ID involved, on antibiotics, ceftriaxone. 4. Fevers due to cholangitis, has improved. 5. Patient is planned for ERCP and removal of pancreatic stent tomorrow. 6. Hypertension, on metoprolol, controlled.   7. Parkinson's, stable on rasagiline        Electronically signed by Jessenia Jang MD on 4/27/2021 at 9:08 AM

## 2021-04-27 NOTE — PROGRESS NOTES
Physical Therapy    Facility/Department: [address] MED SURG/TELE  Initial Assessment    NAME: Karla Rosenthal  : 1940  MRN: [id number]    Date of Service: 2021      Attending Provider:  Lucky Blizzard*    Evaluating PT:  Justo Wright. Guillermo Florain PBERNARDO. Room #:  7447/7589-M  Diagnosis:  Abdominal pain  Pertinent PMHx/PSHx:  Parkinson's disease, impaired balance  Procedure/Surgery:  21 ERCP with papillotomy, common bile duct and pancreatic duct stent placement, stone extraction. Precautions:  Falls, bed/chair alarm    SUBJECTIVE:    Pt lives with his wife in a 1 story home with 3 stairs and 1 rail to enter. Pt ambulated with no AD PTA. OBJECTIVE:   Initial Evaluation  Date: 21 Treatment Short Term/ Long Term   Goals   Was pt agreeable to Eval/treatment? yes     Does pt have pain? No c/o pain     Bed Mobility  Rolling: SBA  Supine to sit: MIN A  Sit to supine: NA  Scooting: MIN A  Independent    Transfers Sit to stand: SBA/MIN A  Stand to sit: MIN A  Stand pivot: MIN A  SBA   Ambulation   35+250 feet with no AD CGA/MIN A  300 feet with AAD SBA   Stair negotiation: ascended and descended 4 steps with 1 rail SBA/CGA  4 steps with 1 rail SBA   AM-PAC 6 Clicks 56/38       BLE ROM is WFL. BLE strength is grossly 4/5 to 4+/5. Sensation:  Pt denies numbness and tingling to extremities  Edema:  None noted  Balance: sitting is supervision and standing with no AD is SBA/MIN A  Endurance: fair+    Patient education  Pt educated on increasing step lengths during amb to make DOLORES bigger. Patient response to education:   Pt verbalized understanding Pt demonstrated skill Pt requires further education in this area   yes inconsistent yes     ASSESSMENT:    Comments:  Pt was in bed and agreeable to PT. He required MIN A to get to EOB and MIN A to stand up from bed. He stood up 1st attempt and lower his hospital pants and then sat back down with MIN A.   I helped him doff these pants and then villa a pair of his hospital pants with a draw string his family brought in from home. Pt stood up a second time again with MIN A and required MIN A for balance while he pulled up his pants and tied the drawstring. Pt then walked with no AD in the gutierrez to stairwell. He at times had increased anterior lean forward and festinating gait that required MIN A to help correct his balance. He ascended stairs with CGA for safety and descended with SBA both ways use a rail. Pt walked to chair in the waiting area and using arm rest sat down with SBA for short rest break. He stood up from chair using arm rest with SBA and walked in the gutierrez again with occasional anterior forward lean causing his to be off balance and required MIN A to correct. He is a high fall risk at this time and should have someone who is able to correct his balance safely walk with him AAT to decrease his risk of falling. Pt sat down in chair in waiting area with student nurse while student completed his assessment of the pt. Treatment:  Patient practiced and was instructed in the following treatment:     Bed mobility, transfers, gait, and stairs to improve functional strength and endurance. Pt's/ family goals   1. To go home. Patient and or family understand(s) diagnosis, prognosis, and plan of care. PLAN OF CARE:    Current Treatment Recommendations      [x] Strengthening     [] ROM   [x] Balance Training   [x] Endurance Training   [x] Transfer Training   [x] Gait Training   [x] Stair Training   [] Positioning   [] Safety and Education Training   [x] Patient/Caregiver Education   [] HEP  [] Other     PT care will be provided in accordance with the objectives noted above. Exercises and functional mobility practice will be used as well as appropriate assistive devices or modalities to obtain goals. Patient and family education will also be administered as needed. Frequency of treatments: 2-5x/week x 1-2 weeks.     Time in 10:55  Time out  11:30    Total Treatment Time  20 minutes     Evaluation Time includes thorough review of current medical information, gathering information on past medical history/social history and prior level of function, completion of standardized testing/informal observation of tasks, assessment of data and education on plan of care and goals. CPT codes:  [x] Low Complexity PT evaluation 63496  [] Moderate Complexity PT evaluation 06518  [] High Complexity PT evaluation 82463  [] PT Re-evaluation 94624  [] Gait training 86909 ** minutes  [] Manual therapy 76164 ** minutes  [x] Therapeutic activities 37946 20 minutes  [] Therapeutic exercises 34230 ** minutes  [] Neuromuscular reeducation 59352 ** minutes     Song Del [employer] P.T.   License Number: [id number]

## 2021-04-27 NOTE — PROGRESS NOTES
Occupational Therapy  OCCUPATIONAL THERAPY INITIAL EVALUATION      Date:2021  Patient Name: Dori Portillo  MRN: [id number]  : 1940  Room: [address] [place] [address]    Referring Provider: Miles Daly MD  Evaluating OT: Carlos Ramsay. David Adrienne - [email or web address]    AM-PAC Daily Activity Raw Score: 16/24    Recommended Adaptive Equipment: Continue to assess. Diagnosis: Abdominal pain [R10.9]   Surgery: Patient underwent ERCP with papillotomy, common bile duct and  pancreatic duct stent placement, and stone extraction on 2021. Pertinent Medical History: CHF, CAD, HTN, obesity, OA, sleep apnea, COPD      Precautions: falls, bed alarm    Home Living: Patient lives with his wife in a one-floor home. Bathroom Setup: tub shower and walk-in shower available (no shower seat)    Prior Level of Function (PLOF): Patient reported that he was independent with ADLs, but needed assistance with IADLs. Patient noted that he was independent with functional mobility (without devie) prior to this hospitalization. Driving: Yes    Pain Level: Patient denied experiencing pain. Cognition: Patient alert and oriented grossly. WFL command follow demonstrated. Patient pleasant and cooperative. Memory: Fair+   Sequencing: WFL   Problem Solving: Fair+   Judgement/Safety: Fair    Functional Assessment:   Initial Eval Status  Date: 2021 Treatment Status  Date:  Short Term Goals   Feeding Setup     Grooming Min A  Supervision  (seated/standing at sink)   UB Dressing SBA  Setup   LB Dressing Mod A  SBA - with use of AE, as needed/appropriate   Bathing Mod A  SBA - with use of AE/DME, as needed/appropriate   Toileting Min A for use of handheld urinal while standing. Mod I / Independent   Bed Mobility  Supine-to-Sit: Min A  Sit-to-Supine: Min A      Functional Transfers Sit-to-Stand: Min A   from EOB  Supervision   Functional Mobility Min A   (without device) within patient's room; unsteadiness demonstrated.   Supervision with functional mobility (with device, as needed/appropriate) in order to maximize independence with ADLs/IADLs and other functional tasks. Balance Sitting: Good  (at EOB)  Standing: Fair-  (without device)  Fair+ dynamic standing balance during completion of ADLs/IADLs and other functional tasks. Activity Tolerance Fair  Patient will demonstrate Good understanding and consistent implementation of energy conservation techniques and work simplification techniques into ADL/IADL routines. Visual/  Perceptual WFL     N/A   B UE Strength 4-/5  Patient will demonstrate 4+/5 B UE strength in order to maximize independence with ADLs and functional transfers. Long-Term Goal: Patient will increase functional independence to PLOF in order to allow patient to live in least restrictive environment. ROM: Additional Information:    R UE  WFL    L UE WFL      Hearing: WFL  Sensation: No complaints of numbness/tingling in B UEs. Tone: WFL  Edema: No    Comments: RN approved patient's participation in [address] [place] [address] activities. Upon arrival, patient supine in bed. At end of session, patient supine in bed with call light and phone within reach, bed alarm activated, and all lines and tubes intact. Patient would benefit from continued skilled OT to increase safety and independence with completion of ADL/IADL tasks for functional independence and quality of life. Treatment: OT treatment provided this date included:    Instruction/training on safety and adapted techniques for completion of ADLs.   Instruction/training on safe functional mobility/transfer techniques.   Instruction/training on energy conservation/work simplification for completion of ADLs. Patient education provided regardin) potential benefits of using handheld urinal or BSC, as needed, to prevent falls at night in home environment, 2) importance of having staff assistance with ADLs and other OOB activities to prevent falls/injury during hospitalization. Patient verbalized understanding. Further skilled OT treatment indicated to increase patient's safety and independence with completion of ADL/IADL tasks in order to maximize patient's functional independence and quality of life.     Assessment of Current Deficits:   Functional Mobility [x]  ADLs [x] Strength [x]  Cognition []  Functional Transfers  [x] IADLs [x] Safety Awareness [x]  Endurance [x]  Fine Motor Coordination [] Balance [x] Vision/Perception [] Sensation []   Gross Motor Coordination [] ROM [] Delirium []                  Motor Control []    Plan of Care: 2-5 days/week for 1-2 weeks PRN  [x]ADL retraining/adaptive techniques and AE recommendations to increase functional independence within precautions  [x]Energy conservation techniques to improve tolerance for ADLs/IADLs  [x]Functional transfer/mobility training/DME recommendations for increased independence, safety and fall prevention  [x]Patient/family education to increase safety and functional independence  [x]Environmental modifications for safe mobility and completion of ADLs/IADLs  []Cognitive retraining exercises to improve problem solving skills & safe participation in ADLs/IADLs   []Sensory re-education techniques to improve extremity awareness, maintain skin integrity and improve hand function   []Visual/Perceptual retraining  to improve body awareness and safety during transfers and ADLs   []Splinting/positioning needs to maintain joint/skin integrity and prevent contractures   [x]Therapeutic activity to improve functional performance during ADLs/IADLs  [x]Therapeutic exercise to improve tolerance and functional strength for ADLs/IADLs  [x]Balance retraining/tolerance tasks for facilitation of postural control with dynamic challenges during ADLs   [x]Neuromuscular re-education: facilitate righting/equilibrium reactions, midline orientation, scapular stability/mobility, normalize muscle tone, and facilitate active functional

## 2021-04-27 NOTE — PROGRESS NOTES
PROGRESS NOTE    Patient Presents with/Seen in Consultation For      *concerns for ascending cholangitis  CHIEF COMPLAINT:  Abdominal pain    Subjective:     Patient seen Mayela Olguin in bed fatigued stating he didn't sleep much the last couple nights. Denies abdominal pain, nausea or vomiting. With BM last evening brown, denies melena or hematochezia. Discussed ERCP at length with patient with additional questions/concerns addressed. Plan of care discussed with patient and charge nurse. Review of Systems  Aside from what was mentioned in the PMH and HPI, essentially unremarkable, all others negative. Objective:     /73   Pulse 78   Temp 97.9 °F (36.6 °C) (Oral)   Resp 16   Ht 6' 1\" (1.854 m)   Wt 199 lb 9 oz (90.5 kg)   SpO2 96%   BMI 26.33 kg/m²     General appearance: alert, awake, fatigued, laying in bed, and cooperative  Eyes: conjunctiva normal, sclera anicteric. PERRL.   Lungs: clear to auscultation bilaterally  Heart: regular rate and rhythm, no murmur, 2+ pulses; no edema  Abdomen: soft, non-tender; bowel sounds normal; no masses,  no organomegaly  Extremities: extremities without edema  Pulses: 2+ and symmetric  Skin: Skin color, texture, turgor normal.   Neurologic: Grossly normal    dextrose 5 % and 0.45 % sodium chloride infusion, Continuous  ondansetron (ZOFRAN) injection 4 mg, Q4H PRN  HYDROmorphone (DILAUDID) injection 0.5 mg, Q4H PRN  indomethacin (INDOCIN) 50 MG suppository 100 mg, 60 Min Pre-Op  sodium chloride flush 0.9 % injection 5-40 mL, 2 times per day  sodium chloride flush 0.9 % injection 5-40 mL, PRN  0.9 % sodium chloride infusion, PRN  0.9 % sodium chloride infusion, Continuous  ketoconazole (NIZORAL) 2 % cream, Daily PRN  metoprolol succinate (TOPROL XL) extended release tablet 50 mg, Daily  mirtazapine (REMERON) tablet 15 mg, Nightly  nitroGLYCERIN (NITROSTAT) SL tablet 0.4 mg, Q5 Min PRN  rasagiline mesylate TABS 1 mg, Daily  tamsulosin (FLOMAX) capsule 0.4 mg, Daily  cefTRIAXone (ROCEPHIN) 2,000 mg in sterile water 20 mL IV syringe, Q24H  docusate sodium (COLACE) capsule 200 mg, Daily  magnesium hydroxide (MILK OF MAGNESIA) 400 MG/5ML suspension 30 mL, Daily PRN         Data Review  CBC:   Lab Results   Component Value Date    WBC 7.9 04/27/2021    RBC 4.34 04/27/2021    HGB 12.2 04/27/2021    HCT 37.3 04/27/2021    MCV 85.9 04/27/2021    MCH 28.1 04/27/2021    MCHC 32.7 04/27/2021    RDW 15.1 04/27/2021     04/27/2021    MPV 10.9 04/27/2021     CMP:    Lab Results   Component Value Date     04/27/2021    K 3.6 04/27/2021    K 4.3 04/22/2021     04/27/2021    CO2 26 04/27/2021    BUN 7 04/27/2021    CREATININE 0.7 04/27/2021    GFRAA >60 04/27/2021    LABGLOM >60 04/27/2021    GLUCOSE 119 04/27/2021    GLUCOSE 103 01/10/2012    PROT 5.6 04/27/2021    LABALBU 3.1 04/27/2021    LABALBU 3.9 01/10/2012    CALCIUM 8.8 04/27/2021    BILITOT 1.0 04/27/2021    ALKPHOS 260 04/27/2021    AST 32 04/27/2021    ALT 75 04/27/2021     Hepatic Function Panel:    Lab Results   Component Value Date    ALKPHOS 260 04/27/2021    ALT 75 04/27/2021    AST 32 04/27/2021    PROT 5.6 04/27/2021    BILITOT 1.0 04/27/2021    BILIDIR 0.5 04/27/2021    IBILI 0.5 04/27/2021    LABALBU 3.1 04/27/2021    LABALBU 3.9 01/10/2012     No components found for: CHLPL    Lab Results   Component Value Date    TRIG 46 03/16/2019    TRIG 107 06/27/2018    TRIG 91 04/12/2018       Lab Results   Component Value Date    HDL 49 03/06/2020    HDL 47 09/18/2019    HDL 45 03/16/2019       Lab Results   Component Value Date    LDLCALC 66 03/06/2020    LDLCALC 64 09/18/2019    LDLCALC 33 03/16/2019       Lab Results   Component Value Date    LABVLDL 22 03/06/2020    LABVLDL 32 09/18/2019    LABVLDL 9 03/16/2019      PT/INR:    Lab Results   Component Value Date    PROTIME 12.4 04/25/2021    PROTIME 11.0 01/09/2012    INR 1.2 04/25/2021     IRON:    Lab Results   Component Value Date    IRON 80

## 2021-04-28 ENCOUNTER — ANESTHESIA (OUTPATIENT)
Dept: ENDOSCOPY | Age: 81
DRG: 871 | End: 2021-04-28
Payer: MEDICARE

## 2021-04-28 VITALS
HEART RATE: 68 BPM | TEMPERATURE: 98.3 F | DIASTOLIC BLOOD PRESSURE: 87 MMHG | SYSTOLIC BLOOD PRESSURE: 156 MMHG | HEIGHT: 73 IN | BODY MASS INDEX: 26.45 KG/M2 | OXYGEN SATURATION: 97 % | WEIGHT: 199.56 LBS | RESPIRATION RATE: 18 BRPM

## 2021-04-28 VITALS — DIASTOLIC BLOOD PRESSURE: 69 MMHG | SYSTOLIC BLOOD PRESSURE: 140 MMHG | OXYGEN SATURATION: 96 %

## 2021-04-28 LAB
ALBUMIN SERPL-MCNC: 3 G/DL (ref 3.5–5.2)
ALP BLD-CCNC: 233 U/L (ref 40–129)
ALT SERPL-CCNC: 60 U/L (ref 0–40)
AMYLASE: 68 U/L (ref 20–100)
ANION GAP SERPL CALCULATED.3IONS-SCNC: 7 MMOL/L (ref 7–16)
AST SERPL-CCNC: 25 U/L (ref 0–39)
BASOPHILS ABSOLUTE: 0.05 E9/L (ref 0–0.2)
BASOPHILS RELATIVE PERCENT: 0.6 % (ref 0–2)
BILIRUB SERPL-MCNC: 0.9 MG/DL (ref 0–1.2)
BLOOD CULTURE, ROUTINE: NORMAL
BUN BLDV-MCNC: 6 MG/DL (ref 6–23)
CALCIUM SERPL-MCNC: 8.8 MG/DL (ref 8.6–10.2)
CHLORIDE BLD-SCNC: 107 MMOL/L (ref 98–107)
CO2: 27 MMOL/L (ref 22–29)
CREAT SERPL-MCNC: 0.8 MG/DL (ref 0.7–1.2)
CULTURE, BLOOD 2: NORMAL
EOSINOPHILS ABSOLUTE: 0.55 E9/L (ref 0.05–0.5)
EOSINOPHILS RELATIVE PERCENT: 6.5 % (ref 0–6)
GFR AFRICAN AMERICAN: >60
GFR NON-AFRICAN AMERICAN: >60 ML/MIN/1.73
GLUCOSE BLD-MCNC: 109 MG/DL (ref 74–99)
HCT VFR BLD CALC: 38.5 % (ref 37–54)
HEMOGLOBIN: 12 G/DL (ref 12.5–16.5)
IMMATURE GRANULOCYTES #: 0.02 E9/L
IMMATURE GRANULOCYTES %: 0.2 % (ref 0–5)
LIPASE: 106 U/L (ref 13–60)
LYMPHOCYTES ABSOLUTE: 2.77 E9/L (ref 1.5–4)
LYMPHOCYTES RELATIVE PERCENT: 32.9 % (ref 20–42)
MCH RBC QN AUTO: 26.9 PG (ref 26–35)
MCHC RBC AUTO-ENTMCNC: 31.2 % (ref 32–34.5)
MCV RBC AUTO: 86.3 FL (ref 80–99.9)
MONOCYTES ABSOLUTE: 0.94 E9/L (ref 0.1–0.95)
MONOCYTES RELATIVE PERCENT: 11.2 % (ref 2–12)
NEUTROPHILS ABSOLUTE: 4.09 E9/L (ref 1.8–7.3)
NEUTROPHILS RELATIVE PERCENT: 48.6 % (ref 43–80)
PDW BLD-RTO: 15.2 FL (ref 11.5–15)
PLATELET # BLD: 215 E9/L (ref 130–450)
PMV BLD AUTO: 10.4 FL (ref 7–12)
POTASSIUM SERPL-SCNC: 3.5 MMOL/L (ref 3.5–5)
RBC # BLD: 4.46 E12/L (ref 3.8–5.8)
SODIUM BLD-SCNC: 141 MMOL/L (ref 132–146)
TOTAL PROTEIN: 5.7 G/DL (ref 6.4–8.3)
WBC # BLD: 8.4 E9/L (ref 4.5–11.5)

## 2021-04-28 PROCEDURE — 7100000010 HC PHASE II RECOVERY - FIRST 15 MIN: Performed by: INTERNAL MEDICINE

## 2021-04-28 PROCEDURE — 80053 COMPREHEN METABOLIC PANEL: CPT

## 2021-04-28 PROCEDURE — 3700000000 HC ANESTHESIA ATTENDED CARE: Performed by: INTERNAL MEDICINE

## 2021-04-28 PROCEDURE — 2709999900 HC NON-CHARGEABLE SUPPLY: Performed by: INTERNAL MEDICINE

## 2021-04-28 PROCEDURE — 85025 COMPLETE CBC W/AUTO DIFF WBC: CPT

## 2021-04-28 PROCEDURE — 7100000011 HC PHASE II RECOVERY - ADDTL 15 MIN: Performed by: INTERNAL MEDICINE

## 2021-04-28 PROCEDURE — 36415 COLL VENOUS BLD VENIPUNCTURE: CPT

## 2021-04-28 PROCEDURE — 2580000003 HC RX 258: Performed by: NURSE ANESTHETIST, CERTIFIED REGISTERED

## 2021-04-28 PROCEDURE — 82150 ASSAY OF AMYLASE: CPT

## 2021-04-28 PROCEDURE — 6360000002 HC RX W HCPCS: Performed by: INTERNAL MEDICINE

## 2021-04-28 PROCEDURE — 6370000000 HC RX 637 (ALT 250 FOR IP): Performed by: INTERNAL MEDICINE

## 2021-04-28 PROCEDURE — 2580000003 HC RX 258: Performed by: INTERNAL MEDICINE

## 2021-04-28 PROCEDURE — 3700000001 HC ADD 15 MINUTES (ANESTHESIA): Performed by: INTERNAL MEDICINE

## 2021-04-28 PROCEDURE — 94660 CPAP INITIATION&MGMT: CPT

## 2021-04-28 PROCEDURE — 6360000002 HC RX W HCPCS: Performed by: NURSE ANESTHETIST, CERTIFIED REGISTERED

## 2021-04-28 PROCEDURE — 83690 ASSAY OF LIPASE: CPT

## 2021-04-28 PROCEDURE — 99239 HOSP IP/OBS DSCHRG MGMT >30: CPT | Performed by: INTERNAL MEDICINE

## 2021-04-28 PROCEDURE — 0FPD8DZ REMOVAL OF INTRALUMINAL DEVICE FROM PANCREATIC DUCT, VIA NATURAL OR ARTIFICIAL OPENING ENDOSCOPIC: ICD-10-PCS | Performed by: INTERNAL MEDICINE

## 2021-04-28 PROCEDURE — 3609155700 HC EGD STENT REMOVAL: Performed by: INTERNAL MEDICINE

## 2021-04-28 RX ORDER — CEFUROXIME AXETIL 500 MG/1
500 TABLET ORAL EVERY 12 HOURS SCHEDULED
Status: DISCONTINUED | OUTPATIENT
Start: 2021-04-28 | End: 2021-04-28 | Stop reason: HOSPADM

## 2021-04-28 RX ORDER — CEFUROXIME AXETIL 500 MG/1
500 TABLET ORAL EVERY 12 HOURS SCHEDULED
Qty: 10 TABLET | Refills: 0 | Status: SHIPPED | OUTPATIENT
Start: 2021-04-28 | End: 2021-05-03

## 2021-04-28 RX ORDER — SODIUM CHLORIDE 9 MG/ML
INJECTION, SOLUTION INTRAVENOUS CONTINUOUS PRN
Status: DISCONTINUED | OUTPATIENT
Start: 2021-04-28 | End: 2021-04-28 | Stop reason: SDUPTHER

## 2021-04-28 RX ORDER — PROPOFOL 10 MG/ML
INJECTION, EMULSION INTRAVENOUS PRN
Status: DISCONTINUED | OUTPATIENT
Start: 2021-04-28 | End: 2021-04-28 | Stop reason: SDUPTHER

## 2021-04-28 RX ADMIN — METOPROLOL SUCCINATE 50 MG: 50 TABLET, EXTENDED RELEASE ORAL at 08:17

## 2021-04-28 RX ADMIN — WATER 2000 MG: 1 INJECTION INTRAMUSCULAR; INTRAVENOUS; SUBCUTANEOUS at 13:33

## 2021-04-28 RX ADMIN — TAMSULOSIN HYDROCHLORIDE 0.4 MG: 0.4 CAPSULE ORAL at 08:17

## 2021-04-28 RX ADMIN — RASAGILINE 1 MG: 1 TABLET ORAL at 08:17

## 2021-04-28 RX ADMIN — PROPOFOL 80 MG: 10 INJECTION, EMULSION INTRAVENOUS at 12:07

## 2021-04-28 RX ADMIN — SODIUM CHLORIDE: 9 INJECTION, SOLUTION INTRAVENOUS at 11:59

## 2021-04-28 ASSESSMENT — PAIN SCALES - GENERAL
PAINLEVEL_OUTOF10: 0

## 2021-04-28 NOTE — CARE COORDINATION
For EGD /STENT REMOVAL today. LFFT's improving. Poss discharge today? Plan is home , no needs at discharge. Qamar Cool.

## 2021-04-28 NOTE — ADT AUTH CERT
GI OP NOTE by Donnell Grewal RN       Review Status Review Entered   In Primary 4/28/2021 12:37      Criteria Review   4-     Procedure: EGD with biopsy     Anesthesia: [facility]     Surgeon: Prasanth Harp MD     Findings:        Esophagus:  GERD        Stomach:  Gastritis           Duodenum:  Normal    PANCREATIC STENT WAS REMOVED        Complications: None        Estimated blood loss: none         Gallbladder or Bile Duct Inflammation or Stone - Care Day 6 (4/28/2021) by Donnell Grewal RN       Review Status Review Entered   Completed 4/28/2021 12:33      Criteria Review      Care Day: 6 Care Date: 4/28/2021 Level of Care: Telemetry    Guideline Day 3    Clinical Status    ( ) * Hemodynamic stability    ( ) * Afebrile or temperature acceptable for next level of care    ( ) * Nausea and vomiting absent or controlled and acceptable for next level of care    ( ) * Pain absent or managed    ( ) * Laboratory test results normal or acceptable for next level of care    ( ) * Discharge plans and education understood    Activity    ( ) * Ambulatory or acceptable for next level of care [G]    Routes    (X) * Oral hydration, medications, and diet    4/28/2021 12:33 PM EDT by Justin Hubbard NPO  see note review for meds    Medications    ( ) * Antibiotics absent or administration arranged for next level of care    * Milestone   Additional Notes   4-      BP (!) 167/88   Pulse 69   Temp 97.6 °F (36.4 °C) (Oral)   Resp 18   Ht 6' 1\" (1.854 m)   Wt 199 lb 9 oz (90.5 kg)   SpO2 97%   BMI 26.33 kg/m²          4/28/2021 06:55   Glucose: 109 (H)      Total Protein: 5.7 (L)   Albumin: 3.0 (L)   Alk Phos: 233 (H)   ALT: 60 (H)      Lipase: 106 (H)         ** ** ** INFECTIOUS DISEASE      Abdomen: Positive bowel sounds to auscultation. Benign to palpation. No masses felt.  No pain             ASSESSMENT:   · Probable cholangitis   · Elevation of transaminases related to the above, improving    · Fever associated to the above, improved   · Leukocytosis associated to cholangitis, improved    · Status post ERCP 4/26/2021 with stone removal, stent placement       PLAN:   · Continue Ceftriaxone for now, will likely change to Cefuroxime after procedure today    · Patient is going to have pancreatic stent removed today via EGD    · Check final cultures   · Monitor labs      MEDS:   D5 .45 @ 75ml/H    ·  metoprolol succinate, 50 mg, Oral, Daily   ·  mirtazapine, 15 mg, Oral, Nightly   ·  rasagiline mesylate, 1 mg, Oral, Daily   ·  tamsulosin, 0.4 mg, Oral, Daily   ·  cefTRIAXone (ROCEPHIN) IV, 2,000 mg, Intravenous, Q24H   ·  docusate sodium, 200 mg, Oral, Daily

## 2021-04-28 NOTE — PROGRESS NOTES
[address] [place] [address] [facility]  Progress Note    SUBJECTIVE:  Chief Complaint   Patient presents with    Abdominal Pain     upper since 1500    Nausea     Patient is tolerating medications. Awaiting EGD with stent removal today  Was able to tolerate diet yesterday. Review of systems:  As stated above in the chief complaint, otherwise negative. Medications:  Scheduled Meds:   sodium chloride flush  5-40 mL Intravenous 2 times per day    metoprolol succinate  50 mg Oral Daily    mirtazapine  15 mg Oral Nightly    rasagiline mesylate  1 mg Oral Daily    tamsulosin  0.4 mg Oral Daily    cefTRIAXone (ROCEPHIN) IV  2,000 mg Intravenous Q24H    docusate sodium  200 mg Oral Daily     Continuous Infusions:   dextrose 5 % and 0.45 % NaCl 75 mL/hr at 21 1508    sodium chloride      sodium chloride 125 mL/hr at 21 2236     PRN Meds:ondansetron, HYDROmorphone, sodium chloride flush, sodium chloride, ketoconazole, nitroGLYCERIN, magnesium hydroxide    OBJECTIVE:  BP (!) 167/88   Pulse 69   Temp 97.6 °F (36.4 °C) (Oral)   Resp 18   Ht 6' 1\" (1.854 m)   Wt 199 lb 9 oz (90.5 kg)   SpO2 97%   BMI 26.33 kg/m²   Temp  Av.8 °F (36.6 °C)  Min: 97.6 °F (36.4 °C)  Max: 98 °F (36.7 °C)  Constitutional: The patient is lying in bed, in no distress. Room air   Skin: Warm and dry. No rashes were noted. HEENT: Round and reactive pupils. Moist mucous membranes. No ulcerations or thrush. Neck: Supple to movements. Chest: No respiratory distress. Clear bilaterally  Cardiovascular: heart sounds rhythmic & regular   Abdomen: Positive bowel sounds to auscultation. Benign to palpation. No masses felt. No pain   Extremities: No edema.   Lines: peripheral    Laboratory and Tests Review:  Lab Results   Component Value Date    WBC 8.4 2021    WBC 7.9 2021    WBC 7.0 2021    HGB 12.0 (L) 2021    HCT 38.5 2021    MCV 86.3 2021     2021 Lab Results   Component Value Date    NEUTROABS 4.09 04/28/2021    NEUTROABS 4.21 04/27/2021    NEUTROABS 3.40 04/26/2021     No results found for: CRPHS  Lab Results   Component Value Date    ALT 60 (H) 04/28/2021    AST 25 04/28/2021    ALKPHOS 233 (H) 04/28/2021    BILITOT 0.9 04/28/2021     Lab Results   Component Value Date     04/28/2021    K 3.5 04/28/2021    K 4.3 04/22/2021     04/28/2021    CO2 27 04/28/2021    BUN 6 04/28/2021    CREATININE 0.8 04/28/2021    CREATININE 0.7 04/27/2021    CREATININE 0.7 04/26/2021    GFRAA >60 04/28/2021    LABGLOM >60 04/28/2021    GLUCOSE 109 04/28/2021    GLUCOSE 103 01/10/2012    PROT 5.7 04/28/2021    LABALBU 3.0 04/28/2021    LABALBU 3.9 01/10/2012    CALCIUM 8.8 04/28/2021    BILITOT 0.9 04/28/2021    ALKPHOS 233 04/28/2021    AST 25 04/28/2021    ALT 60 04/28/2021     Lab Results   Component Value Date    CRP 6.0 (H) 04/24/2021    CRP 5.3 (H) 04/23/2021    CRP 1.2 (H) 11/22/2020     Lab Results   Component Value Date    SEDRATE 7 04/24/2021     Radiology:  MRI Abdomen -    1. Questionable strictures involving the distal CBD without biliary   dilatation.  Finding could relate to the patient's reported cholangitis.  Be   further evaluated by ERCP.  No MRI evidence of choledocholithiasis. 2. 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  No peripancreatic fluid collection or   necrotic component.  Correlation with lipase levels is suggested. Microbiology:   Blood cultures 4/22/2021 - negative final     No results for input(s): PROCAL in the last 72 hours.     ASSESSMENT:  · Probable cholangitis  · Elevation of transaminases related to the above, improving   · Fever associated to the above, improved  · Leukocytosis associated to cholangitis, improved   · Status post ERCP 4/26/2021 with stone removal, stent placement    PLAN:  · Continue Ceftriaxone for now, will likely change to Cefuroxime after procedure today   · Patient is going to have pancreatic stent removed today via EGD   · Check final cultures  · Monitor labs    Hardeep Marshall  11:08 AM  4/28/2021     Patient seen and examined. I had a face to face encounter with the patient. Agree with exam.  Assessment and plan as outlined above and directed by me. Addition and corrections were done as deemed appropriate. My exam and plan include: The patient had EGD today. Pancreatic stent was removed. Denies any complaints. Change Ceftriaxone over to Cefuroxime x5 more days. He can be discharged from ID standpoint. He will be seen on an as-needed basis.     Mey Martínez  4/28/2021  1:45 PM

## 2021-04-28 NOTE — PLAN OF CARE
Problem: Falls - Risk of:  Goal: Will remain free from falls  Description: Will remain free from falls  4/27/2021 1734 by Rose Clifton RN  Outcome: Met This Shift     Problem: Skin Integrity:  Goal: Will show no infection signs and symptoms  Description: Will show no infection signs and symptoms  Outcome: Met This Shift     Problem: Pain:  Goal: Pain level will decrease  Description: Pain level will decrease  4/27/2021 1734 by Rose Clifton RN  Outcome: Met This Shift

## 2021-04-28 NOTE — PROGRESS NOTES
Patient for EGD with pancreatic stent removal today with Dr. Sana Soto. Additional questions/concerns addressed  Assessment unchanged  Labs/chart reviewed. Ok to proceed.     Dory HERR NP-C 4/28/2021 8:47 AM

## 2021-04-28 NOTE — BRIEF OP NOTE
Brief Postoperative Note    Mellissa Diaz  YOB: 1940  [id number]    Procedure: EGD with biopsy    Anesthesia: [facility]    Surgeon:  Edward Al MD    Findings:       Esophagus:  GERD      Stomach:  Gastritis        Duodenum:  Normal    PANCREATIC STENT WAS REMOVED       Complications: None      Estimated blood loss: none      Morales Hankins MD

## 2021-04-28 NOTE — PROGRESS NOTES
[facility] Hospitalist   Progress Note    Admitting Date and Time: 4/22/2021  9:43 PM  Admit Dx: Abdominal pain [R10.9]    Subjective: Admitted on 23rd, came to ED with abdominal pain, did have nausea, patient with recent EGD showing GERD, s/p cholecystectomy in 2019. Did have a fever, evidence of increased bilirubin and transaminitis. Patient with known BPH, chronic constipation, Covid in last November, hypertension, Parkinson's, known CAD, COPD. Prior tobacco use. Seen by GI on 23rd, MRCP showed possible CBD stricture and inflammation in the head of pancreas, ERCP planned. Impression from ID of cholangitis, patient kept on ceftriaxone. Patient had a ERCP, papillotomy, balloon sweeping with removal of 3 stones, also stricture treated with Wall stent. Patient patient did undergo EGD with pancreatic stent removal today. AM-PAC of 18/24. ID has  changed antibiotic to cefuroxime. Patient was admitted with Abdominal pain [R10.9]. Patient resting comfortably, wakes up easily, does communicate well, has no complaints. From his EGD and removal of pancreatic stent. Per RN: No new issues. Patient is not started on p.o., waiting for input from GI.    ROS: denies fever, chills, cp, sob, n/v, HA unless stated above.      indomethacin  100 mg Rectal 60 Min Pre-Op    sodium chloride flush  5-40 mL Intravenous 2 times per day    metoprolol succinate  50 mg Oral Daily    mirtazapine  15 mg Oral Nightly    rasagiline mesylate  1 mg Oral Daily    tamsulosin  0.4 mg Oral Daily    cefTRIAXone (ROCEPHIN) IV  2,000 mg Intravenous Q24H    docusate sodium  200 mg Oral Daily     ondansetron, 4 mg, Q4H PRN  HYDROmorphone, 0.5 mg, Q4H PRN  sodium chloride flush, 5-40 mL, PRN  sodium chloride, 25 mL, PRN  ketoconazole, , Daily PRN  nitroGLYCERIN, 0.4 mg, Q5 Min PRN  magnesium hydroxide, 30 mL, Daily PRN         Objective:    BP (!) 167/88   Pulse 69   Temp 97.6 °F (36.4 °C) (Oral)   Resp 18   Ht 6' ERCP.  No MRI evidence of choledocholithiasis. 2. 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 No peripancreatic fluid collection or   necrotic component. Correlation with lipase levels is suggested. CT ABDOMEN PELVIS W IV CONTRAST Additional Contrast? None   Final Result   Small amount of fluid/fat stranding adjacent to the head of the pancreas and   region of the kevin hepatis. Correlate with abnormal amylase lipase for   pancreatitis. Diffuse colonic fecal retention. Significant prostatomegaly. Assessment:    Principal Problem:    Ascending cholangitis  Active Problems:    CAD (coronary artery disease)    Ischemic cardiomyopathy    COPD (chronic obstructive pulmonary disease) (HCC)    Elevated LFTs    Abdominal pain    Fever    Sinus tachycardia  Resolved Problems:    * No resolved hospital problems. *      Plan:  1. Cholangitis, likely related to stricture, stones, now. ERCP, removal of stones, stent placement. 2. Transaminitis, due to above, steady improvement. 3. Cholangitis, ID involved, on antibiotics, now on p.o. cefuroxime. 4. Fevers due to cholangitis, has improved. 5. Patient has undergone EGD and removal of pancreatic stent. Further GI input pending. 6. Hypertension, on metoprolol, controlled. 7. Parkinson's, stable on rasagiline   8. DC planning, when okay with GI, depending upon patient's ability to tolerate p.o. as well as decision by GI if patient needs to be observed till tomorrow or not.   DC can happen later today or tomorrow assuming LFTs as well as pancreatic enzymes have been normal        Electronically signed by Marino Guadarrama MD on 4/28/2021 at 8:43 AM

## 2021-04-28 NOTE — PROGRESS NOTES
Date: 4/27/2021    Time: 8:47 PM    Patient Placed On BIPAP/CPAP/ Non-Invasive Ventilation? No    If no must comment. Facial area red/color change? No           If YES are Blister/Lesion present? No   If yes must notify nursing staff  BIPAP/CPAP skin barrier? No    Skin barrier type:na       Comments: Pt continues to refuse his CPAP. Machine remains in room.         Brooklynn Lobato

## 2021-04-28 NOTE — PATIENT CARE CONFERENCE
P Quality Flow/Interdisciplinary Rounds Progress Note        Quality Flow Rounds held on April 28, 2021    Disciplines Attending:  Bedside Nurse, ,  and Nursing Unit Leadership    Jessika Sweet was admitted on 4/22/2021  9:43 PM    Anticipated Discharge Date:  Expected Discharge Date: 04/28/21    Disposition:    Andrew Score:  Andrew Scale Score: 21    Readmission Score:         Discussed patient goal for the day, patient clinical progression, and barriers to discharge.   The following Goal(s) of the Day/Commitment(s) have been identified:  Stent removal, discharge planning       Zen Numbers  April 28, 2021

## 2021-04-28 NOTE — DISCHARGE SUMMARY
also stricture treated with Wall stent. Patient patient did undergo EGD with pancreatic stent removal today. AM-PAC of 18/24. ID has  changed antibiotic to cefuroxime. As of now patient is awake, alert, comfortable, only complaint is this bed is not comfortable to sleep, will plan to DC home if he tolerates p.o. diet, as per nursing both subspecialty okay with DC. Discharge Exam:  Vitals:    04/28/21 1222 04/28/21 1237 04/28/21 1252 04/28/21 1330   BP: (!) 140/81 (!) 165/89 (!) 166/89 (!) 156/87   Pulse: 68 72 70 68   Resp: 16 16 16 18   Temp:    98.3 °F (36.8 °C)   TempSrc:    Oral   SpO2: 94% 96% 96% 97%   Weight:       Height:           General Appearance: alert and oriented to person, place and time, well-developed and well-nourished, in no acute distress  Skin: warm and dry, no rash or erythema  Head: normocephalic and atraumatic  Eyes: pupils equal, round, and reactive to light, extraocular eye movements intact, conjunctivae normal  ENT: tympanic membrane, external ear and ear canal normal bilaterally, oropharynx clear and moist with normal mucous membranes  Neck: neck supple and non tender without mass, no thyromegaly or thyroid nodules, no cervical lymphadenopathy   Pulmonary/Chest: clear to auscultation bilaterally- no wheezes, rales or rhonchi, normal air movement, no respiratory distress  Cardiovascular: normal rate, normal S1 and S2, no gallops, intact distal pulses and no carotid bruits  Abdomen: soft, non-tender, non-distended, normal bowel sounds, no masses or organomegaly  I/O last 3 completed shifts:   In: 691 [P.O.:480; I.V.:211]  Out: 1875 [Urine:1875]  I/O this shift:  In: 200 [I.V.:200]  Out: 250 [Urine:250]      LABS:  Recent Labs     04/26/21  0512 04/27/21  0423 04/28/21  0655    140 141   K 3.8 3.6 3.5   * 108* 107   CO2 24 26 27   BUN 10 7 6   CREATININE 0.7 0.7 0.8   GLUCOSE 102* 119* 109*   CALCIUM 8.3* 8.8 8.8       Recent Labs     04/26/21  0512 04/27/21  0423 04/28/21  0655   WBC 7.0 7.9 8.4   RBC 4.29 4.34 4.46   HGB 11.8* 12.2* 12.0*   HCT 36.6* 37.3 38.5   MCV 85.3 85.9 86.3   MCH 27.5 28.1 26.9   MCHC 32.2 32.7 31.2*   RDW 15.2* 15.1* 15.2*    208 215   MPV 11.1 10.9 10.4       Imaging:   FL ERCP BILIARY AND PANCREATIC S&I   Final Result   Fluoroscopic support for ERCP. Please refer to the procedure report for   further details. MRI ABDOMEN W WO CONTRAST MRCP   Final Result   1. Questionable strictures involving the distal CBD without biliary   dilatation. Finding could relate to the patient's reported cholangitis. Be   further evaluated by ERCP. No MRI evidence of choledocholithiasis. 2. 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 No peripancreatic fluid collection or   necrotic component. Correlation with lipase levels is suggested. CT ABDOMEN PELVIS W IV CONTRAST Additional Contrast? None   Final Result   Small amount of fluid/fat stranding adjacent to the head of the pancreas and   region of the kevin hepatis. Correlate with abnormal amylase lipase for   pancreatitis. Diffuse colonic fecal retention. Significant prostatomegaly. Patient Instructions:      Medication List      START taking these medications    cefUROXime 500 MG tablet  Commonly known as: CEFTIN  Take 1 tablet by mouth every 12 hours for 5 days        CONTINUE taking these medications    aspirin 81 MG EC tablet     atorvastatin 80 MG tablet  Commonly known as: LIPITOR  Take 1 tablet by mouth daily     clopidogrel 75 MG tablet  Commonly known as: PLAVIX  Take 1 tablet by mouth daily     CPAP Machine Misc     * ketoconazole 2 % shampoo  Commonly known as: Nizoral  Apply topically daily as needed. * ketoconazole 2 % cream  Commonly known as: NIZORAL  Apply topically daily.      losartan 25 MG tablet  Commonly known as: COZAAR  Take 1 tablet by mouth daily     metoprolol succinate 50 MG extended release tablet  Commonly known as: TOPROL XL  Take 1 tablet by mouth daily     mirtazapine 15 MG tablet  Commonly known as: REMERON  Take 1 tablet by mouth nightly     nitroGLYCERIN 0.4 MG SL tablet  Commonly known as: Nitrostat  Place 1 tablet under the tongue every 5 minutes as needed for Chest pain     rasagiline mesylate 1 MG Tabs     tamsulosin 0.4 MG capsule  Commonly known as: FLOMAX  Take 1 capsule by mouth daily     therapeutic multivitamin-minerals tablet     vitamin D 50 MCG (2000 UT) Caps capsule         * This list has 2 medication(s) that are the same as other medications prescribed for you. Read the directions carefully, and ask your doctor or other care provider to review them with you. Where to Get Your Medications      These medications were sent to [facility], [facility] John Mcgregor [phone]  [facility] [place] [address]    Phone: [phone]   · cefUROXime 500 MG tablet           Note that more than 30 minutes was spent in preparing discharge papers, discussing discharge with patient, medication review, etc.    Signed:  Electronically signed by Soumya Jackman MD on 4/28/2021 at 2:26 PM    NOTE: This report was transcribed using voice recognition software. Every effort was made to ensure accuracy; however, inadvertent computerized transcription errors may be present.

## 2021-04-28 NOTE — PROGRESS NOTES
Occupational Therapy  Patient treatment attempted this AM.  Patient laying in the bed. States he had a bad night and requesting therapist return later this date. Returned in the PM.  Pt at surgery. Will attempt at a later time.    Ric LEDEZMA/CAROLINE [id number]

## 2021-04-29 NOTE — ANESTHESIA POSTPROCEDURE EVALUATION
Department of Anesthesiology  Postprocedure Note    Patient: Radha Bernardo  MRN: [id number]  YOB: 1940  Date of evaluation: 4/28/2021  Time:  8:19 PM     Procedure Summary     Date: 04/28/21 Room / Location: [facility] / [facility]    Anesthesia Start: 1159 Anesthesia Stop: 5194    Procedure: EGD ESOPHAGOGASTRODUODENOSCOPY WITH PANCREATIC STENT REMOVAL (N/A ) Diagnosis: (/)    Surgeons: Nannette Biggs MD Responsible Provider: Le Burton MD    Anesthesia Type: MAC ASA Status: 4          Anesthesia Type: MAC    Reina Phase I: Reina Score: 10    Reina Phase II: Reina Score: 10    Last vitals: Reviewed and per EMR flowsheets.        Anesthesia Post Evaluation    Patient location during evaluation: PACU  Patient participation: complete - patient participated  Level of consciousness: awake and alert  Pain score: 0  Airway patency: patent  Nausea & Vomiting: no vomiting and no nausea  Complications: no  Cardiovascular status: hemodynamically stable  Respiratory status: spontaneous ventilation  Hydration status: stable

## 2021-04-29 NOTE — OP NOTE
[address] [address]                                OPERATIVE REPORT    PATIENT NAME: Briseyda Lancaster                 :        1940  MED REC NO:   [id number]                            ROOM:       62  ACCOUNT NO:   [de-identified]                           ADMIT DATE: 2021  PROVIDER:     Chrissy Doe MD    DATE OF PROCEDURE:  2021    PROCEDURE PERFORMED:  Upper endoscopy with pancreatic stent removal.    PREOPERATIVE DIAGNOSIS:  The patient is status post ERCP with CBD and  pancreatic duct stent placement. He is here for removal of the  pancreatic stent after 48 hours. POSTOPERATIVE DIAGNOSIS:  Grade B LA classification GERD and stomach  showed gastritis. Pancreatic stent was removed without difficulty. Common bile duct Wallstent appeared to be in adequate placement. ESTIMATED BLOOD LOSS:  N/A    ANESTHESIA:  LMAC. CONSENT NOTE:  Prior to the procedure an informed consent was obtained  from the patient after explaining the benefits as well as the risks,  alternatives, and complications of the procedure to the patient, who  understood and agreed. DESCRIPTION OF PROCEDURE:  With the patient in the left lateral  decubitus position, the Olympus GIF-100 forward-viewing videoscope was  introduced into the esophagus, the evaluation of which grade B LA  classification GERD. No hiatal hernia was seen. The scope was then advanced through the gastroesophageal junction into  the gastric body, along the greater curvature. Evaluation of the body  of the stomach showed mild gastritis. The scope was then advanced through the pylorus into the duodenal bulb  and second portion of the duodenum, both of which appeared to be  unremarkable. Two stents were seen protruding from the papilla, the  pancreatic and the common bile duct.   Using a biopsy forceps, the  pancreatic stent was grasped and removed without difficulty. The  patient tolerated the procedure well. The scope was then retrieved and retroflexed in the prepyloric antrum,  with thorough evaluation of the cardiac and fundal portions of the  stomach, which appeared to be within normal limits. The scope was then straightened, the area deflated, and the procedure  was terminated by withdrawing the scope and conducting a second look on  the way out, which was essentially the same. The patient tolerated the procedure well.         Rico Randolph MD    D: 04/28/2021 13:57:57       T: 04/28/2021 14:05:55     [email or web address]  Job#: [id number]     Doc#: [id number]    CC:  MD Sherman Mccoy MD

## 2021-04-29 NOTE — PROGRESS NOTES
CLINICAL PHARMACY NOTE: MEDS TO [address] [place] [address] Drive Select Patient?: No  Total # of Prescriptions Filled: 1   The following medications were delivered to the patient:  · Cefuroxime 500 mg  Total # of Interventions Completed: 6  Time Spent (min): 45    Additional Documentation:

## 2021-05-03 ENCOUNTER — TELEPHONE (OUTPATIENT)
Dept: FAMILY MEDICINE CLINIC | Age: 81
End: 2021-05-03

## 2021-05-03 NOTE — TELEPHONE ENCOUNTER
SANIYA's ED 4/22 for abdominal pain. D/c 4/28/21 Dx Ascending cholangitis. I called and left a message at the home number to see how Chrissy Fajardo was doing and to schedule a hospital follow up appointment with Dr Josy Perera. I asked that he call the office to schedule.

## 2021-05-04 ENCOUNTER — OFFICE VISIT (OUTPATIENT)
Dept: FAMILY MEDICINE CLINIC | Age: 81
End: 2021-05-04
Payer: MEDICARE

## 2021-05-04 VITALS
BODY MASS INDEX: 26.54 KG/M2 | HEART RATE: 76 BPM | TEMPERATURE: 98.2 F | DIASTOLIC BLOOD PRESSURE: 66 MMHG | HEIGHT: 73 IN | SYSTOLIC BLOOD PRESSURE: 106 MMHG | WEIGHT: 200.25 LBS | OXYGEN SATURATION: 98 %

## 2021-05-04 DIAGNOSIS — R73.01 IMPAIRED FASTING GLUCOSE: ICD-10-CM

## 2021-05-04 DIAGNOSIS — J44.9 CHRONIC OBSTRUCTIVE PULMONARY DISEASE, UNSPECIFIED COPD TYPE (HCC): ICD-10-CM

## 2021-05-04 DIAGNOSIS — M54.2 NECK PAIN: ICD-10-CM

## 2021-05-04 DIAGNOSIS — I25.10 CORONARY ARTERY DISEASE INVOLVING NATIVE CORONARY ARTERY OF NATIVE HEART WITHOUT ANGINA PECTORIS: ICD-10-CM

## 2021-05-04 DIAGNOSIS — I25.5 ISCHEMIC CARDIOMYOPATHY: ICD-10-CM

## 2021-05-04 DIAGNOSIS — R53.1 GENERAL WEAKNESS: ICD-10-CM

## 2021-05-04 DIAGNOSIS — E78.2 MIXED HYPERLIPIDEMIA: ICD-10-CM

## 2021-05-04 DIAGNOSIS — I10 ESSENTIAL HYPERTENSION: ICD-10-CM

## 2021-05-04 DIAGNOSIS — G20 PARKINSON DISEASE (HCC): ICD-10-CM

## 2021-05-04 DIAGNOSIS — I50.22 CHF (CONGESTIVE HEART FAILURE), NYHA CLASS I, CHRONIC, SYSTOLIC (HCC): ICD-10-CM

## 2021-05-04 DIAGNOSIS — F33.1 MODERATE EPISODE OF RECURRENT MAJOR DEPRESSIVE DISORDER (HCC): ICD-10-CM

## 2021-05-04 DIAGNOSIS — K83.09 CHOLANGITIS: Primary | ICD-10-CM

## 2021-05-04 DIAGNOSIS — R79.89 ELEVATED LFTS: ICD-10-CM

## 2021-05-04 PROCEDURE — 1111F DSCHRG MED/CURRENT MED MERGE: CPT | Performed by: INTERNAL MEDICINE

## 2021-05-04 PROCEDURE — 99214 OFFICE O/P EST MOD 30 MIN: CPT | Performed by: INTERNAL MEDICINE

## 2021-05-04 PROCEDURE — 3288F FALL RISK ASSESSMENT DOCD: CPT | Performed by: INTERNAL MEDICINE

## 2021-05-04 RX ORDER — MIRTAZAPINE 15 MG/1
15 TABLET, FILM COATED ORAL NIGHTLY
Qty: 90 TABLET | Refills: 1 | Status: SHIPPED
Start: 2021-05-04 | End: 2022-02-01 | Stop reason: SDUPTHER

## 2021-05-04 ASSESSMENT — ENCOUNTER SYMPTOMS
DIARRHEA: 0
CHEST TIGHTNESS: 0
RHINORRHEA: 0
SHORTNESS OF BREATH: 1
SORE THROAT: 0
EYE PAIN: 0
NAUSEA: 0
BLOOD IN STOOL: 0
CONSTIPATION: 0
ABDOMINAL PAIN: 0
COUGH: 0
VOMITING: 0

## 2021-05-04 NOTE — PROGRESS NOTES
States has COPD. States breathing is fair. Has seen pulmonary once. States placed on breo - stopped. Stable. States has high blood pressure. States occasionally checks blood pressure at home. On metoprolol, on losartan. States has high cholesterol. States trying to watch diet. On atorvastatin, no reported side effects     States has sleep apnea. States on cpap. States wears 6-8 hours per night, helps symptoms. States has had TIA. States had dizziness and difficulty walking. On antiplatelets (plavix and aspirin)     States has enlarged prostate. Has seen urology. States has urinary frequency. States flomax taking infrequently 1-2 times per week. Concern for dizzy and lightheaded    Vitamin D def. States on otc supplement. Health Maintenance   - immunizations:   Influenza Vaccination - declines   Pneumonia Vaccination - (9/20219) - prevnar 13   Zoster/Shingles Vaccine  Tetanus Vaccination  covid (1/2021) #1, (2/12/2021) #2 - pfizer     - Screenings:   Colonoscopy (2013) - normal   Prostate     ROS:  Review of Systems   Constitutional: Positive for fatigue. Negative for appetite change, chills, fever and unexpected weight change. HENT: Negative for congestion, rhinorrhea and sore throat. Eyes: Negative for pain and visual disturbance. Respiratory: Positive for shortness of breath. Negative for cough and chest tightness. Cardiovascular: Negative for chest pain and palpitations. Gastrointestinal: Negative for abdominal pain, blood in stool, constipation, diarrhea, nausea and vomiting. Genitourinary: Negative for difficulty urinating, dysuria, hematuria, scrotal swelling, testicular pain and urgency. Musculoskeletal: Negative for arthralgias and gait problem. Skin: Negative for rash. Neurological: Positive for dizziness. Negative for syncope, weakness, light-headedness and headaches. Hematological: Negative for adenopathy. Does not bruise/bleed easily.    Psychiatric/Behavioral: Negative for suicidal ideas. The patient is not nervous/anxious. Current Outpatient Medications:     mirtazapine (REMERON) 15 MG tablet, Take 1 tablet by mouth nightly, Disp: 90 tablet, Rfl: 1    rasagiline mesylate 1 MG TABS, take 1 tablet by mouth once daily, Disp: , Rfl:     atorvastatin (LIPITOR) 80 MG tablet, Take 1 tablet by mouth daily, Disp: 90 tablet, Rfl: 1    clopidogrel (PLAVIX) 75 MG tablet, Take 1 tablet by mouth daily, Disp: 90 tablet, Rfl: 1    metoprolol succinate (TOPROL XL) 50 MG extended release tablet, Take 1 tablet by mouth daily, Disp: 90 tablet, Rfl: 1    ketoconazole (NIZORAL) 2 % cream, Apply topically daily. , Disp: 30 g, Rfl: 1    losartan (COZAAR) 25 MG tablet, Take 1 tablet by mouth daily, Disp: 90 tablet, Rfl: 1    tamsulosin (FLOMAX) 0.4 MG capsule, Take 1 capsule by mouth daily, Disp: 90 capsule, Rfl: 1    ketoconazole (NIZORAL) 2 % shampoo, Apply topically daily as needed. , Disp: 100 mL, Rfl: 1    nitroGLYCERIN (NITROSTAT) 0.4 MG SL tablet, Place 1 tablet under the tongue every 5 minutes as needed for Chest pain, Disp: 25 tablet, Rfl: 1    Multiple Vitamins-Minerals (THERAPEUTIC MULTIVITAMIN-MINERALS) tablet, Take 1 tablet by mouth daily, Disp: , Rfl:     CPAP Machine MISC, daily at bedtime. , Disp: , Rfl:     aspirin 81 MG EC tablet, Take 81 mg by mouth daily Last taken 05/11/2019, Disp: , Rfl:     Cholecalciferol (VITAMIN D) 2000 UNIT CAPS capsule, Take 1 capsule by mouth daily Last taken 05/13, Disp: , Rfl:     carbidopa-levodopa (SINEMET)  MG per tablet, , Disp: , Rfl:     Allergies   Allergen Reactions    Lisinopril Itching       Past Medical History:   Diagnosis Date    CAD (coronary artery disease) 2006    follows with Dr Perales Gall    Cerebral artery occlusion with cerebral infarction [facility])     Cerebrovascular disease     CHF (congestive heart failure), NYHA class I, chronic, systolic ([facility] 2627    [facility]-- EF 39%    Chronic frontoethmoidal sinusitis 2018    Chronic maxillary sinusitis 2018    COPD (chronic obstructive pulmonary disease) ([facility] 2018    GARSIA (dyspnea on exertion)     follows with Dr Chelsy Castano Gallbladder disease     History of non-ST elevation myocardial infarction (NSTEMI)     [facility]    Hyperlipidemia     Hypertension     Ischemic cardiomyopathy     EF 40% after NSTEMI, 11-15 at [facility]    Obesity     Osteoarthritis     Skin cancer of eyelid     Sleep apnea     uses CPAP    Smokeless tobacco use     Stented coronary artery 2006    x1       Past Surgical History:   Procedure Laterality Date    CARDIAC CATHETERIZATION Left 2015    done [address] WITH IMPLANT Bilateral     CHOLECYSTECTOMY, LAPAROSCOPIC N/A 2019    LAPAROSCOPIC ROBOTIC XI ASSISTED CHOLECYSTECTOMY performed by Tracy Ahumada MD at [address] [place] [address]      CORONARY ANGIOPLASTY  06    DIAGNOSTIC CARDIAC CATH LAB PROCEDURE  06    ERCP N/A 2021    ERCP STONE REMOVAL performed by West Boggs MD at [address] [facility] [place] Left 2015    ROTATOR CUFF REPAIR      bilaterally    TONSILLECTOMY AND ADENOIDECTOMY      UPPER GASTROINTESTINAL ENDOSCOPY      UPPER GASTROINTESTINAL ENDOSCOPY N/A 2021    EGD ESOPHAGOGASTRODUODENOSCOPY WITH PANCREATIC STENT REMOVAL performed by West Boggs MD at [facility] ENDOSCOPY       Family History   Problem Relation Age of Onset    Heart Disease Mother          79 Y/O    Diabetes Mother     Liver Disease Father          66 Y/O       Social History     Socioeconomic History    Marital status:      Spouse name: Not on file    Number of children: Not on file    Years of education: Not on file    Highest education level: Not on file   Occupational History    Occupation: retired-     Social Needs    Financial resource strain: Not on file    Food insecurity     Worry: Not on file     Inability: Not on file    Transportation needs     Medical: Not on file     Non-medical: Not on file   Tobacco Use    Smoking status: Former Smoker     Packs/day: 1.00     Years: 15.00     Pack years: 15.00     Types: Cigarettes     Start date: 1957     Quit date: 1975     Years since quittin.2    Smokeless tobacco: Current User     Types: Snuff   Substance and Sexual Activity    Alcohol use: Yes     Comment: occassionally    Drug use: No    Sexual activity: Not on file   Lifestyle    Physical activity     Days per week: Not on file     Minutes per session: Not on file    Stress: Not on file   Relationships    Social connections     Talks on phone: Not on file     Gets together: Not on file     Attends Zoroastrianism service: Not on file     Active member of club or organization: Not on file     Attends meetings of clubs or organizations: Not on file     Relationship status: Not on file    Intimate partner violence     Fear of current or ex partner: Not on file     Emotionally abused: Not on file     Physically abused: Not on file     Forced sexual activity: Not on file   Other Topics Concern    Not on file   Social History Narrative    Not on file       Vitals:    21 0954   BP: 106/66   Pulse: 76   Temp: 98.2 °F (36.8 °C)   SpO2: 98%   Weight: 200 lb 4 oz (90.8 kg)   Height: 6' 1\" (1.854 m)       Exam:  Physical Exam  Vitals signs reviewed. Constitutional:       Appearance: He is well-developed. HENT:      Head: Normocephalic and atraumatic. Right Ear: External ear normal. There is impacted cerumen. Left Ear: External ear normal. There is impacted cerumen. Eyes:      Pupils: Pupils are equal, round, and reactive to light. Neck:      Musculoskeletal: Normal range of motion and neck supple. Thyroid: No thyromegaly. Cardiovascular:      Rate and Rhythm: Normal rate and regular rhythm. Heart sounds: Normal heart sounds. No murmur.    Pulmonary:      Effort: Pulmonary effort is normal.      Breath sounds: Normal breath sounds. No wheezing or rales. Abdominal:      General: Bowel sounds are normal.      Palpations: Abdomen is soft. Tenderness: There is no abdominal tenderness. There is no guarding or rebound. Musculoskeletal: Normal range of motion. Comments: Abnormal gait. Lymphadenopathy:      Cervical: No cervical adenopathy. Skin:     General: Skin is warm and dry. Neurological:      Mental Status: He is alert and oriented to person, place, and time. Cranial Nerves: No cranial nerve deficit.       Comments: Resting tremor    Psychiatric:         Judgment: Judgment normal.         Assessment and Plan:    Diagnoses and all orders for this visit:    Neck pain  - recommend home exercises   - declines prednisone   - physical therapy     Cholangitis  - hospital stay (6/2020) and (4/2021)   - s/p ERCP and papillotomy and stent placement   - s/p Antibiotics augmentin (6-7/2020), cefuoxime (4/2021)   - for follow up with GI   - improved      General weakness  - s/p physical therapy - not sure if helped   - stable per patient      Parkinson disease ([facility]  - following with son   - started rasagiline   - to add carbidopa and levodopa   - unchanged      Moderate episode of recurrent major depressive disorder ([facility]  - son was on call during office (12/2020)   - now on remeron to help with anxiety and depression   - stable and improved per patient     Elevated LFTs  - uncertain as to cause  - CT abdo and MRCP - previously  no acute changes   - felt pos cholangitis   - s/p hospital stay ,ATB, augmentin (6-7/2020), cefuoxime (4/2021)   - s/p ERCP (4/2021) - papillotomy and stent   - to follow up with GI     Coronary artery disease involving native coronary artery of native heart without angina pectoris  - following with cardiology   - on losartan   - on metoprolol   - on atorvastatin   - on aspirin and Plavix     Mixed hyperlipidemia  - watch diet   - on atorvastatin   - follow labs (9/2019)     Ischemic cardiomyopathy  - following with cardiology   - on losartn   - on metoprolol   - on atrovastatin   - on aspirin and plavix     CHF (congestive heart failure), NYHA class I, chronic, systolic (HCC)  - following with cardiology   - on losartn   - on metoprolol   - on atrovastatin   - on aspirin and plavix   - Stable     Chronic obstructive pulmonary disease, unspecified COPD type ([facility]  - not currently following with pulmonary   - did not see benefit with breo     Essential hypertension  - watch diet   - monitor blood pressure at home   - on losartn   - on metoprolol   - on aspirin and plavix     Impaired fasting glucose  - continue present treatment  - watch diet   - follow A1c - last A1c (3/2020) 5.9     Obstructive sleep apnea syndrome  - Continue present treatment   - on CPAP  - wearing nightly 6-8 hours   - helping symptoms   - stable     TIA (transient ischemic attack)  - Continue present treatment   - on atrovastatin   - on aspirin and plavix     Prostate enlargement  - continue present treatment   - has seen urology   - Has flomax - takes as needed  - stable     Vitamin D def  - On otc supplement   - follow labs     Other constipation  - discussed colace 100mg daily and miralax as needed  - taking mutamucil     Return in about 3 months (around 8/4/2021) for check up and review and labs.     Orders Placed This Encounter   Procedures    External Referral To Physical Therapy     Referral Priority:   Routine     Referral Type:   Eval and Treat     Referral Reason:   Specialty Services Required     Requested Specialty:   Physical Therapy     Number of Visits Requested:   1    [place] DISCHARGE MEDS RECONCILED W/ CURRENT OUTPATIENT MED LIST     Requested Prescriptions     Signed Prescriptions Disp Refills    mirtazapine (REMERON) 15 MG tablet 90 tablet 1     Sig: Take 1 tablet by mouth nightly     Kirti Mtz MD  5/4/2021  10:41 AM

## 2021-05-04 NOTE — PATIENT INSTRUCTIONS
Patient Education        Neck: Exercises  Introduction  Here are some examples of exercises for you to try. The exercises may be suggested for a condition or for rehabilitation. Start each exercise slowly. Ease off the exercises if you start to have pain. You will be told when to start these exercises and which ones will work best for you. How to do the exercises  Neck stretch   1. This stretch works best if you keep your shoulder down as you lean away from it. To help you remember to do this, start by relaxing your shoulders and lightly holding on to your thighs or your chair. 2. Tilt your head toward your shoulder and hold for 15 to 30 seconds. Let the weight of your head stretch your muscles. 3. If you would like a little added stretch, use your hand to gently and steadily pull your head toward your shoulder. For example, keeping your right shoulder down, lean your head to the left. 4. Repeat 2 to 4 times toward each shoulder. Diagonal neck stretch   1. Turn your head slightly toward the direction you will be stretching, and tilt your head diagonally toward your chest and hold for 15 to 30 seconds. 2. If you would like a little added stretch, use your hand to gently and steadily pull your head forward on the diagonal.  3. Repeat 2 to 4 times toward each side. Dorsal glide stretch   The dorsal glide stretches the back of the neck. If you feel pain, do not glide so far back. Some people find this exercise easier to do while lying on their backs with an ice pack on the neck. 1. Sit or stand tall and look straight ahead. 2. Slowly tuck your chin as you glide your head backward over your body  3. Hold for a count of 6, and then relax for up to 10 seconds. 4. Repeat 8 to 12 times. Chest and shoulder stretch   1. Sit or stand tall and glide your head backward as in the dorsal glide stretch. 2. Raise both arms so that your hands are next to your ears.   3. Take a deep breath, and as you breathe out, lower your elbows down and behind your back. You will feel your shoulder blades slide down and together, and at the same time you will feel a stretch across your chest and the front of your shoulders. 4. Hold for about 6 seconds, and then relax for up to 10 seconds. 5. Repeat 8 to 12 times. Strengthening: Hands on head   1. Move your head backward, forward, and side to side against gentle pressure from your hands, holding each position for about 6 seconds. 2. Repeat 8 to 12 times. Follow-up care is a key part of your treatment and safety. Be sure to make and go to all appointments, and call your doctor if you are having problems. It's also a good idea to know your test results and keep a list of the medicines you take. Where can you learn more? Go to [email or web address]://[email or web address]. org and sign in to your [employer] account. Enter P975 in the [employer] ([employer]ICA) box to learn more about \"Neck: Exercises. \"     If you do not have an account, please click on the \"Sign Up Now\" link. Current as of: November 16, 2020               Content Version: 12.8  © 4657-5184 Healthwise, Incorporated. Care instructions adapted under license by [facility] ([facility]). If you have questions about a medical condition or this instruction, always ask your healthcare professional. [address] any warranty or liability for your use of this information.

## 2021-05-25 ENCOUNTER — HOSPITAL ENCOUNTER (OUTPATIENT)
Age: 81
Discharge: HOME | End: 2021-05-25
Payer: MEDICARE

## 2021-05-25 LAB
ALBUMIN SERPL-MCNC: 3.8 G/DL (ref 3.5–5.2)
ALP BLD-CCNC: 83 U/L (ref 40–129)
ALT SERPL-CCNC: 9 U/L (ref 0–40)
AMYLASE: 57 U/L (ref 20–100)
ANION GAP SERPL CALCULATED.3IONS-SCNC: 7 MMOL/L (ref 7–16)
AST SERPL-CCNC: 14 U/L (ref 0–39)
BASOPHILS ABSOLUTE: 0.09 E9/L (ref 0–0.2)
BASOPHILS RELATIVE PERCENT: 1.2 % (ref 0–2)
BILIRUB SERPL-MCNC: 0.6 MG/DL (ref 0–1.2)
BUN BLDV-MCNC: 18 MG/DL (ref 6–23)
CALCIUM SERPL-MCNC: 9 MG/DL (ref 8.6–10.2)
CHLORIDE BLD-SCNC: 107 MMOL/L (ref 98–107)
CO2: 28 MMOL/L (ref 22–29)
CREAT SERPL-MCNC: 0.9 MG/DL (ref 0.7–1.2)
EOSINOPHILS ABSOLUTE: 0.48 E9/L (ref 0.05–0.5)
EOSINOPHILS RELATIVE PERCENT: 6.2 % (ref 0–6)
GFR AFRICAN AMERICAN: >60
GFR NON-AFRICAN AMERICAN: >60 ML/MIN/1.73
GLUCOSE BLD-MCNC: 98 MG/DL (ref 74–99)
HCT VFR BLD CALC: 42.8 % (ref 37–54)
HEMOGLOBIN: 13.5 G/DL (ref 12.5–16.5)
IMMATURE GRANULOCYTES #: 0.02 E9/L
IMMATURE GRANULOCYTES %: 0.3 % (ref 0–5)
LIPASE: 59 U/L (ref 13–60)
LYMPHOCYTES ABSOLUTE: 3.43 E9/L (ref 1.5–4)
LYMPHOCYTES RELATIVE PERCENT: 44.4 % (ref 20–42)
MCH RBC QN AUTO: 27.3 PG (ref 26–35)
MCHC RBC AUTO-ENTMCNC: 31.5 % (ref 32–34.5)
MCV RBC AUTO: 86.6 FL (ref 80–99.9)
MONOCYTES ABSOLUTE: 0.64 E9/L (ref 0.1–0.95)
MONOCYTES RELATIVE PERCENT: 8.3 % (ref 2–12)
NEUTROPHILS ABSOLUTE: 3.07 E9/L (ref 1.8–7.3)
NEUTROPHILS RELATIVE PERCENT: 39.6 % (ref 43–80)
PDW BLD-RTO: 15.4 FL (ref 11.5–15)
PLATELET # BLD: 218 E9/L (ref 130–450)
PMV BLD AUTO: 11 FL (ref 7–12)
POTASSIUM SERPL-SCNC: 4 MMOL/L (ref 3.5–5)
RBC # BLD: 4.94 E12/L (ref 3.8–5.8)
SODIUM BLD-SCNC: 142 MMOL/L (ref 132–146)
TOTAL PROTEIN: 6.5 G/DL (ref 6.4–8.3)
WBC # BLD: 7.7 E9/L (ref 4.5–11.5)

## 2021-05-25 PROCEDURE — 85025 COMPLETE CBC W/AUTO DIFF WBC: CPT

## 2021-05-25 PROCEDURE — 82150 ASSAY OF AMYLASE: CPT

## 2021-05-25 PROCEDURE — 83690 ASSAY OF LIPASE: CPT

## 2021-05-25 PROCEDURE — 80053 COMPREHEN METABOLIC PANEL: CPT

## 2021-05-25 PROCEDURE — 36415 COLL VENOUS BLD VENIPUNCTURE: CPT

## 2021-06-15 ENCOUNTER — PREP FOR PROCEDURE (OUTPATIENT)
Dept: GASTROENTEROLOGY | Age: 81
End: 2021-06-15

## 2021-06-15 RX ORDER — SODIUM CHLORIDE 0.9 % (FLUSH) 0.9 %
5-40 SYRINGE (ML) INJECTION EVERY 12 HOURS SCHEDULED
Status: CANCELLED | OUTPATIENT
Start: 2021-06-15

## 2021-06-15 RX ORDER — SODIUM CHLORIDE 0.9 % (FLUSH) 0.9 %
5-40 SYRINGE (ML) INJECTION PRN
Status: CANCELLED | OUTPATIENT
Start: 2021-06-15

## 2021-06-15 RX ORDER — SODIUM CHLORIDE 9 MG/ML
25 INJECTION, SOLUTION INTRAVENOUS PRN
Status: CANCELLED | OUTPATIENT
Start: 2021-06-15

## 2021-06-15 RX ORDER — SODIUM CHLORIDE 9 MG/ML
INJECTION, SOLUTION INTRAVENOUS CONTINUOUS
Status: CANCELLED | OUTPATIENT
Start: 2021-06-15

## 2021-06-15 NOTE — PROGRESS NOTES
Patient has history of CAD with cardiac stent. Patient has not followed up with cardiology for past few years. He has  seen Dr Emely Mahoney on 1/27/21. Patient denies any cardiac symptoms. Dr Gonzalez Punch notified of history. No further instructions given.

## 2021-06-15 NOTE — PROGRESS NOTES
Messages  left to patient's home phone and cell phone regarding procedure to be done on 6/16/21  and no return call received. Dr Prashant Palacio office notified.

## 2021-06-15 NOTE — PROGRESS NOTES
[facility] PRE-ADMISSION TESTING INSTRUCTIONS    The Preadmission Testing patient is instructed accordingly using the following criteria (check applicable):    ARRIVAL INSTRUCTIONS:  [x] Parking the day of Surgery is located in the Main Entrance lot. Upon entering the door, make an immediate right to the surgery reception desk    [x] Bring photo ID and insurance card    [x] Bring in a copy of Living will or Durable Power of  papers. [x] Please be sure to arrange for responsible adult to provide transportation to and from the hospital    [x] Please arrange for responsible adult to be with you for the 24 hour period post procedure due to having anesthesia      GENERAL INSTRUCTIONS:    [x] Nothing by mouth after midnight, including gum, candy, mints or water    [x] You may brush your teeth, but do not swallow any water    [x] Take medications as instructed with 1-2 oz of water    [x] Stop herbal supplements and vitamins 5 days prior to procedure    [x] Follow preop dosing of blood thinners per physician instructions    [] Take 1/2 dose of evening insulin, but no insulin after midnight    [] No oral diabetic medications after midnight    [] If diabetic and have low blood sugar or feel symptomatic, take 1-2oz apple juice only    [] Bring inhalers day of surgery    [] Bring C-PAP/ Bi-Pap day of surgery    [] Bring urine specimen day of surgery    [] Shower or bath with soap, lather and rinse well, AM of Surgery, no lotion, powders or creams to surgical site    [] Follow bowel prep as instructed per surgeon    [] No tobacco products within 24 hours of surgery     [x] No alcohol or illegal drug use within 24 hours of surgery.     [x] Jewelry, body piercing's, eyeglasses, contact lenses and dentures are not permitted into surgery (bring cases)      [] Please do not wear any nail polish, make up or hair products on the day of surgery    [] You can expect a call the business day prior to procedure to notify you if your arrival time changes    [] If you receive a survey after surgery we would greatly appreciate your comments    [] Parent/guardian of a minor must accompany their child and remain on the premises  the entire time they are under our care     [] Pediatric patients may bring favorite toy, blanket or comfort item with them    [] A caregiver or family member must remain with the patient during their stay if they are mentally handicapped, have dementia, disoriented or unable to use a call light or would be a safety concern if left unattended    [x] Please notify surgeon if you develop any illness between now and time of surgery (cold, cough, sore throat, fever, nausea, vomiting) or any signs of infections  including skin, wounds, and dental.    []  The Outpatient Pharmacy is available to fill your prescription here on your day of surgery, ask your preop nurse for details    [] Other instructions    EDUCATIONAL MATERIALS PROVIDED:    [] PAT Preoperative Education Packet/Booklet     [] Medication List    [] Transfusion bracelet applied with instructions    [] Shower with soap, lather and rinse well, and use CHG wipes provided the evening before surgery as instructed    [] Incentive spirometer with instructions

## 2021-06-16 ENCOUNTER — ANESTHESIA (OUTPATIENT)
Dept: ENDOSCOPY | Age: 81
End: 2021-06-16
Payer: MEDICARE

## 2021-06-16 ENCOUNTER — ANESTHESIA EVENT (OUTPATIENT)
Dept: ENDOSCOPY | Age: 81
End: 2021-06-16
Payer: MEDICARE

## 2021-06-16 ENCOUNTER — HOSPITAL ENCOUNTER (OUTPATIENT)
Age: 81
Setting detail: OUTPATIENT SURGERY
Discharge: HOME | End: 2021-06-16
Attending: INTERNAL MEDICINE | Admitting: INTERNAL MEDICINE
Payer: MEDICARE

## 2021-06-16 ENCOUNTER — APPOINTMENT (OUTPATIENT)
Dept: GENERAL RADIOLOGY | Age: 81
End: 2021-06-16
Attending: INTERNAL MEDICINE
Payer: MEDICARE

## 2021-06-16 VITALS
DIASTOLIC BLOOD PRESSURE: 63 MMHG | SYSTOLIC BLOOD PRESSURE: 114 MMHG | RESPIRATION RATE: 14 BRPM | OXYGEN SATURATION: 92 %

## 2021-06-16 VITALS
RESPIRATION RATE: 16 BRPM | WEIGHT: 190 LBS | BODY MASS INDEX: 25.18 KG/M2 | HEIGHT: 73 IN | OXYGEN SATURATION: 97 % | HEART RATE: 67 BPM | DIASTOLIC BLOOD PRESSURE: 78 MMHG | TEMPERATURE: 97.2 F | SYSTOLIC BLOOD PRESSURE: 141 MMHG

## 2021-06-16 LAB
AMYLASE: 37 U/L (ref 20–100)
HCT VFR BLD CALC: 42.3 % (ref 37–54)
HEMOGLOBIN: 13.1 G/DL (ref 12.5–16.5)
LIPASE: 31 U/L (ref 13–60)
MCH RBC QN AUTO: 27.1 PG (ref 26–35)
MCHC RBC AUTO-ENTMCNC: 31 % (ref 32–34.5)
MCV RBC AUTO: 87.4 FL (ref 80–99.9)
PDW BLD-RTO: 15.6 FL (ref 11.5–15)
PLATELET # BLD: 261 E9/L (ref 130–450)
PMV BLD AUTO: 11.2 FL (ref 7–12)
RBC # BLD: 4.84 E12/L (ref 3.8–5.8)
WBC # BLD: 8.9 E9/L (ref 4.5–11.5)

## 2021-06-16 PROCEDURE — 82150 ASSAY OF AMYLASE: CPT

## 2021-06-16 PROCEDURE — 3609015000 HC ERCP REMOVE FOREIGN BODY/STENT BILIARY/PANC DUCT: Performed by: INTERNAL MEDICINE

## 2021-06-16 PROCEDURE — 2580000003 HC RX 258: Performed by: INTERNAL MEDICINE

## 2021-06-16 PROCEDURE — 83690 ASSAY OF LIPASE: CPT

## 2021-06-16 PROCEDURE — 7100000010 HC PHASE II RECOVERY - FIRST 15 MIN: Performed by: INTERNAL MEDICINE

## 2021-06-16 PROCEDURE — 36415 COLL VENOUS BLD VENIPUNCTURE: CPT

## 2021-06-16 PROCEDURE — 6360000004 HC RX CONTRAST MEDICATION: Performed by: INTERNAL MEDICINE

## 2021-06-16 PROCEDURE — 2709999900 HC NON-CHARGEABLE SUPPLY: Performed by: INTERNAL MEDICINE

## 2021-06-16 PROCEDURE — 2720000010 HC SURG SUPPLY STERILE: Performed by: INTERNAL MEDICINE

## 2021-06-16 PROCEDURE — 74330 X-RAY BILE/PANC ENDOSCOPY: CPT

## 2021-06-16 PROCEDURE — 2500000003 HC RX 250 WO HCPCS

## 2021-06-16 PROCEDURE — 7100000011 HC PHASE II RECOVERY - ADDTL 15 MIN: Performed by: INTERNAL MEDICINE

## 2021-06-16 PROCEDURE — 6360000002 HC RX W HCPCS

## 2021-06-16 PROCEDURE — 3700000001 HC ADD 15 MINUTES (ANESTHESIA): Performed by: INTERNAL MEDICINE

## 2021-06-16 PROCEDURE — 3700000000 HC ANESTHESIA ATTENDED CARE: Performed by: INTERNAL MEDICINE

## 2021-06-16 PROCEDURE — C1769 GUIDE WIRE: HCPCS | Performed by: INTERNAL MEDICINE

## 2021-06-16 PROCEDURE — 85027 COMPLETE CBC AUTOMATED: CPT

## 2021-06-16 RX ORDER — LIDOCAINE HYDROCHLORIDE 20 MG/ML
INJECTION, SOLUTION INTRAVENOUS PRN
Status: DISCONTINUED | OUTPATIENT
Start: 2021-06-16 | End: 2021-06-16 | Stop reason: SDUPTHER

## 2021-06-16 RX ORDER — SODIUM CHLORIDE 9 MG/ML
INJECTION, SOLUTION INTRAVENOUS CONTINUOUS
Status: DISCONTINUED | OUTPATIENT
Start: 2021-06-16 | End: 2021-06-16 | Stop reason: HOSPADM

## 2021-06-16 RX ORDER — SODIUM CHLORIDE 0.9 % (FLUSH) 0.9 %
5-40 SYRINGE (ML) INJECTION EVERY 12 HOURS SCHEDULED
Status: DISCONTINUED | OUTPATIENT
Start: 2021-06-16 | End: 2021-06-16 | Stop reason: HOSPADM

## 2021-06-16 RX ORDER — PROPOFOL 10 MG/ML
INJECTION, EMULSION INTRAVENOUS CONTINUOUS PRN
Status: DISCONTINUED | OUTPATIENT
Start: 2021-06-16 | End: 2021-06-16 | Stop reason: SDUPTHER

## 2021-06-16 RX ORDER — PROPOFOL 10 MG/ML
INJECTION, EMULSION INTRAVENOUS PRN
Status: DISCONTINUED | OUTPATIENT
Start: 2021-06-16 | End: 2021-06-16

## 2021-06-16 RX ORDER — KETAMINE HYDROCHLORIDE 10 MG/ML
INJECTION, SOLUTION INTRAMUSCULAR; INTRAVENOUS PRN
Status: DISCONTINUED | OUTPATIENT
Start: 2021-06-16 | End: 2021-06-16 | Stop reason: SDUPTHER

## 2021-06-16 RX ORDER — SODIUM CHLORIDE 0.9 % (FLUSH) 0.9 %
5-40 SYRINGE (ML) INJECTION PRN
Status: DISCONTINUED | OUTPATIENT
Start: 2021-06-16 | End: 2021-06-16 | Stop reason: HOSPADM

## 2021-06-16 RX ORDER — PROPOFOL 10 MG/ML
INJECTION, EMULSION INTRAVENOUS PRN
Status: DISCONTINUED | OUTPATIENT
Start: 2021-06-16 | End: 2021-06-16 | Stop reason: SDUPTHER

## 2021-06-16 RX ORDER — SODIUM CHLORIDE 9 MG/ML
25 INJECTION, SOLUTION INTRAVENOUS PRN
Status: DISCONTINUED | OUTPATIENT
Start: 2021-06-16 | End: 2021-06-16 | Stop reason: HOSPADM

## 2021-06-16 RX ADMIN — IOPAMIDOL 9 ML: 612 INJECTION, SOLUTION INTRAVENOUS at 15:14

## 2021-06-16 RX ADMIN — PROPOFOL 20 MG: 10 INJECTION, EMULSION INTRAVENOUS at 14:51

## 2021-06-16 RX ADMIN — PROPOFOL 40 MG: 10 INJECTION, EMULSION INTRAVENOUS at 14:46

## 2021-06-16 RX ADMIN — LIDOCAINE HYDROCHLORIDE 20 MG: 20 INJECTION, SOLUTION INTRAVENOUS at 14:45

## 2021-06-16 RX ADMIN — KETAMINE HYDROCHLORIDE 20 MG: 10 INJECTION INTRAMUSCULAR; INTRAVENOUS at 14:48

## 2021-06-16 RX ADMIN — SODIUM CHLORIDE: 9 INJECTION, SOLUTION INTRAVENOUS at 14:33

## 2021-06-16 RX ADMIN — PROPOFOL 120 MCG/KG/MIN: 10 INJECTION, EMULSION INTRAVENOUS at 14:47

## 2021-06-16 ASSESSMENT — PAIN SCALES - GENERAL
PAINLEVEL_OUTOF10: 0

## 2021-06-16 ASSESSMENT — ENCOUNTER SYMPTOMS: SHORTNESS OF BREATH: 0

## 2021-06-16 NOTE — H&P
Gastroenterology      Pre-operative History and Physical      HISTORY OF PRESENT ILLNESS:    Aliza Arshad is a 79 y.o. male with significant past medical history of GERD, gastritis, hiatal hernia, cholecystectomy 2019, HTN, HLD, CHF, COPD and CAD admitted via ED for abdominal pain. Pt reports starting yesterday he had lower abdominal pain radiating across his abdomen described as \"just hurt\". Per wife Lexi Castellanos was screaming in pain\" leading to ER for evaluation. Reports nausea a few times yesterday with no vomiting, taking Pepto Bismol with no relief. States this has happened in the past and reports the pain is similar to symptoms he experienced prior to cholecystectomy for cholelithiasis. Reports his bowel movements are QOD to every 3 days, stating \"I have constipation all the time\" taking colace and metamucil. Denies fever, chills, unintentional weight loss, melena or hematochezia. With recent EGD on 4/2/21 (see report below) reports he was feeling fine and eating well up until yesterday. Patient is currently scheduled for outpatient colonoscopy on 5/11/21. Reports 2-3 prior colonoscopies, all reportedly normal, denies polyps. Denies family history of colon or other GI cancers. Admission labs alk phos 413, , , bilirubin 1.9. CT abdomen/pelvis W IV contrast- Small amount of fluid/fat stranding adjacent to the head of the pancreas and region of the kevin hepatis. Correlate with abnormal amylase lipase for pancreatitis. Diffuse colonic fecal retention. Significant prostatomegaly. Consultation for concerns for ascending cholangitis. Pt is known to Dr. Romero Zhou, last seen on 4/2/2021 for EGD. EGD 4/2/2021GERD, gastritis, small hiatal hernia; MARCELLE negative, surgical pathology chronic peptic duodenitis. EGD 11/20/2015grade a GERD and gastritis. Patient currently scheduled for colonoscopy on 5/11/2021. Currently, pt reports no abdominal pain, nausea or vomiting.  Aliza Arshad is a 79 y.o. male with significant past medical history of GERD, gastritis, hiatal hernia, cholecystectomy 2019, HTN, HLD, CHF, COPD and CAD admitted via ED for abdominal pain. Pt reports starting yesterday he had lower abdominal pain radiating across his abdomen described as \"just hurt\". Per wife Isi Freedman was screaming in pain\" leading to ER for evaluation. Reports nausea a few times yesterday with no vomiting, taking Pepto Bismol with no relief. States this has happened in the past and reports the pain is similar to symptoms he experienced prior to cholecystectomy for cholelithiasis. Reports his bowel movements are QOD to every 3 days, stating \"I have constipation all the time\" taking colace and metamucil. Denies fever, chills, unintentional weight loss, melena or hematochezia. With recent EGD on 4/2/21 (see report below) reports he was feeling fine and eating well up until yesterday. Patient is currently scheduled for outpatient colonoscopy on 5/11/21. Reports 2-3 prior colonoscopies, all reportedly normal, denies polyps. Denies family history of colon or other GI cancers. Admission labs alk phos 413, , , bilirubin 1.9. CT abdomen/pelvis W IV contrast- Small amount of fluid/fat stranding adjacent to the head of the pancreas and region of the kevin hepatis. Correlate with abnormal amylase lipase for pancreatitis. Diffuse colonic fecal retention. Significant prostatomegaly. Consultation for concerns for ascending cholangitis. Pt is known to Dr. Isra Bonilla, last seen on 4/2/2021 for EGD. EGD 4/2/2021GERD, gastritis, small hiatal hernia; MARCELLE negative, surgical pathology chronic peptic duodenitis. EGD 11/20/2015grade a GERD and gastritis. Patient currently scheduled for colonoscopy on 5/11/2021. Currently, pt reports no abdominal pain, nausea or vomiting.  Patient had ERCP with stent placement on 4/26/2021 and is here to have ERCP with stent removal.    HISTORY:   Past Medical History:   Diagnosis Date    CAD (coronary artery disease)     follows with Dr Delphine Lee    Cerebral artery occlusion with cerebral infarction [facility])     Cerebrovascular disease     CHF (congestive heart failure), NYHA class I, chronic, systolic ([facility] 7810    CC-- EF 39%    Chronic frontoethmoidal sinusitis 2018    Chronic maxillary sinusitis 2018    COPD (chronic obstructive pulmonary disease) ([facility] 2018    GARSIA (dyspnea on exertion)     follows with Dr Brock Goncalves Gallbladder disease     History of non-ST elevation myocardial infarction (NSTEMI)     [facility]    Hyperlipidemia     Hypertension     Ischemic cardiomyopathy     EF 40% after NSTEMI, 11-15 at [facility]    Obesity     Osteoarthritis     Skin cancer of eyelid     Sleep apnea     uses CPAP    Smokeless tobacco use     Stented coronary artery 2006    x1       PERTINENT FAMILY HISTORY:    Family History   Problem Relation Age of Onset    Heart Disease Mother          79 Y/O    Diabetes Mother     Liver Disease Father          68 Y/O       MEDICATIONS:  No current outpatient medications on file. ALLERGIES:  Lisinopril    PHYSICAL EXAM/GENERAL APPEARANCE:     CONSTITUTIONAL:  awake, alert, cooperative, no apparent distress, and appears stated age  EYES:  pupils equal, round and reactive to light, sclera anicteric and conjunctiva normal  ENT:  normocephalic, oral pharynx with dry mucous membranes  NECK:  supple   HEMATOLOGIC/LYMPHATICS:  no cervical lymphadenopathy and no supraclavicular lymphadenopathy  LUNGS:  No increased work of breathing, good air exchange, clear to auscultation bilaterally.   CARDIOVASCULAR:  Normal apical impulse, regular rate and rhythm, no murmur noted; 2+ pulses; trace BLE edema  ABDOMEN:  normal bowel sounds, soft, non-distended, non-tender, no masses palpated, no hepatosplenomegally  MUSCULOSKELETAL:  full range of motion noted  motor strength is 5 out of 5 all extremities bilaterally  NEUROLOGIC:  Mental Status Exam:  Level of Alertness: awake  Orientation:   person, place, time  Motor Exam:  Motor exam is symmetrical 5 out of 5 all extremities bilaterally  SKIN:  normal skin color, texture, turgor  OTHER SIGNIFICANT FINDINGS: None    IMPRESSION/INITIAL DIAGNOSIS:   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

## 2021-06-16 NOTE — BRIEF OP NOTE
Brief Postoperative Note    Omid Tavarez  YOB: 1940  [id number]    Procedure:  ERCP    Anesthesia: [facility]      Surgeon:  Dara Sanchez MD      Findings: CBD: OLD STENT REMOVED BALLOON  CHOLANGIOGRAPHY AND SWEEPING WNL                      PD: NOT ENTERED                       Complications: None      Estimated blood loss: none        Cassy Sawant MD

## 2021-06-16 NOTE — ANESTHESIA PRE PROCEDURE
Department of Anesthesiology  Preprocedure Note       Name:  Judie Valdivia   Age:  79 y.o.  :  1940                                          MRN:  [id number]         Date:  2021      Surgeon: Ivan Martinez    Procedure: ERCP WITH PANCREATIC STENT REMOVAL    Medications prior to admission:   Prior to Admission medications    Medication Sig Start Date End Date Taking? Authorizing Provider   mirtazapine (REMERON) 15 MG tablet Take 1 tablet by mouth nightly 21   Hali Solorio MD   carbidopa-levodopa (SINEMET)  MG per tablet New prescription 5/3/21   Historical Provider, MD   rasagiline mesylate 1 MG TABS take 1 tablet by mouth once daily 1/10/21   Historical Provider, MD   atorvastatin (LIPITOR) 80 MG tablet Take 1 tablet by mouth daily 21   Hali Solorio MD   clopidogrel (PLAVIX) 75 MG tablet Take 1 tablet by mouth daily 21   Hali Solorio MD   metoprolol succinate (TOPROL XL) 50 MG extended release tablet Take 1 tablet by mouth daily 21   Hali Solorio MD   ketoconazole (NIZORAL) 2 % cream Apply topically daily. 20   Hali Solorio MD   losartan (COZAAR) 25 MG tablet Take 1 tablet by mouth daily 20   Hali Solorio MD   tamsulosin [facility]) 0.4 MG capsule Take 1 capsule by mouth daily 20   Hali Solorio MD   ketoconazole (NIZORAL) 2 % shampoo Apply topically daily as needed. 20   Hali Solorio MD   nitroGLYCERIN (NITROSTAT) 0.4 MG SL tablet Place 1 tablet under the tongue every 5 minutes as needed for Chest pain 20   Zachary Lawson DO   Multiple Vitamins-Minerals (THERAPEUTIC MULTIVITAMIN-MINERALS) tablet Take 1 tablet by mouth daily    Historical Provider, MD   CPAP Machine MISC daily at bedtime.     Historical Provider, MD   aspirin 81 MG EC tablet Take 81 mg by mouth daily Last taken 2019    Historical Provider, MD   Cholecalciferol (VITAMIN D) 2000 UNIT CAPS capsule Take 1 capsule by mouth daily Last taken     Historical Signs (Current): There were no vitals filed for this visit. BP Readings from Last 3 Encounters:   06/16/21 (!) 172/86   05/04/21 106/66   04/28/21 (!) 140/69       NPO Status:                                                                                 BMI:   Wt Readings from Last 3 Encounters:   06/16/21 190 lb (86.2 kg)   05/04/21 200 lb 4 oz (90.8 kg)   04/25/21 199 lb 9 oz (90.5 kg)     There is no height or weight on file to calculate BMI.    CBC:   Lab Results   Component Value Date    WBC 7.7 05/25/2021    RBC 4.94 05/25/2021    HGB 13.5 05/25/2021    HCT 42.8 05/25/2021    MCV 86.6 05/25/2021    RDW 15.4 05/25/2021     05/25/2021       CMP:   Lab Results   Component Value Date     05/25/2021    K 4.0 05/25/2021    K 4.3 04/22/2021     05/25/2021    CO2 28 05/25/2021    BUN 18 05/25/2021    CREATININE 0.9 05/25/2021    GFRAA >60 05/25/2021    LABGLOM >60 05/25/2021    GLUCOSE 98 05/25/2021    GLUCOSE 103 01/10/2012    PROT 6.5 05/25/2021    CALCIUM 9.0 05/25/2021    BILITOT 0.6 05/25/2021    ALKPHOS 83 05/25/2021    AST 14 05/25/2021    ALT 9 05/25/2021       POC Tests: No results for input(s): POCGLU, POCNA, POCK, POCCL, POCBUN, POCHEMO, POCHCT in the last 72 hours.     Coags:   Lab Results   Component Value Date    PROTIME 12.4 04/25/2021    PROTIME 11.0 01/09/2012    INR 1.2 04/25/2021    APTT 36.7 04/25/2021       HCG (If Applicable): No results found for: PREGTESTUR, PREGSERUM, HCG, HCGQUANT     ABGs: No results found for: PHART, PO2ART, FYS2TWD, BDG3GBS, BEART, J3OSATEQ     Type & Screen (If Applicable):  No results found for: LABABO, [address]    Anesthesia Evaluation  Patient summary reviewed and Nursing notes reviewed no history of anesthetic complications:   Airway: Mallampati: III  TM distance: >3 FB     Mouth opening: < 3 FB Dental:    (+) partials      Pulmonary: breath sounds clear to auscultation  (+) COPD:  sleep apnea: on CPAP, asthma:     (-) shortness of breath                          ROS comment: Former Smoker  Smokeless tobacco use  Chronic frontoethmoidal sinusitis   Chronic maxillary sinusitis        Cardiovascular:  Exercise tolerance: poor (<4 METS),   (+) hypertension:, past MI: > 6 months, CAD: obstructive, CABG/stent:, CHF: systolic, GARSIA:, hyperlipidemia      NYHA Classification: I  ECG reviewed  Rhythm: regular  Rate: normal    Stress test reviewed       Beta Blocker:  Dose within 24 Hrs         Neuro/Psych:   (+) neuromuscular disease: Parkinson's disease, TIA,    (-) CVA           GI/Hepatic/Renal:            ROS comment: NAUSEA  JAUNDICE   ELEVATED LFT'S  . Endo/Other:    (+) : arthritis: OA., .                 Abdominal:           Vascular: negative vascular ROS. Anesthesia Plan      MAC     ASA 4       Induction: intravenous. Anesthetic plan and risks discussed with patient and spouse. Plan discussed with CRNA. PATIENT TO BE RE-EVALUATED Ras Raymond MD   6/16/2021        DOS STAFF ADDENDUM:    Pt seen and examined, physical exam updated, chart reviewed including anesthesia, drug and allergy history. H&P reviewed. No interval changes to history or physical examination (unless noted above). NPO status confirmed. Anesthetic plan, risks, benefits, alternatives discussed with patient. Patient verbalized an understanding and agrees to proceed.      Lissa Pinzon MD   Anesthesiologist

## 2021-06-17 NOTE — ANESTHESIA POSTPROCEDURE EVALUATION
Department of Anesthesiology  Postprocedure Note    Patient: Jimmie Mendoza  MRN: [id number]  YOB: 1940  Date of evaluation: 6/17/2021  Time:  10:35 AM     Procedure Summary     Date: 06/16/21 Room / Location: [address]    Anesthesia Start: 2952 Anesthesia Stop: 1518    Procedure: ERCP STENT REMOVAL (N/A ) Diagnosis: (SEVERE STRICTURE DISTAL COMMON BILE DUCT)    Surgeons: Sintia Mcginnis MD Responsible Provider: Armand Martin MD    Anesthesia Type: MAC ASA Status: 4          Anesthesia Type: MAC    Reina Phase I: Reina Score: 10    Reina Phase II: Reina Score: 10    Last vitals: Reviewed and per EMR flowsheets.        Anesthesia Post Evaluation    Patient location during evaluation: PACU  Patient participation: complete - patient participated  Level of consciousness: awake and alert  Airway patency: patent  Nausea & Vomiting: no vomiting and no nausea  Complications: no  Cardiovascular status: blood pressure returned to baseline  Respiratory status: acceptable  Hydration status: euvolemic

## 2021-06-17 NOTE — OP NOTE
[address] [address]                                OPERATIVE REPORT    PATIENT NAME: Saundra Odonnell                 :        1940  MED REC NO:   [id number]                            ROOM:  ACCOUNT NO:   [de-identified]                           ADMIT DATE: 2021  PROVIDER:     Matteo Powers MD    DATE OF PROCEDURE:  2021    PROCEDURE PERFORMED:  ERCP with stent removal.    PREOPERATIVE DIAGNOSES:  The patient with biliary stricture and common  bile duct dilatation, who underwent ERCP with papillotomy, stent  placement, and stone gravel removal, is here for stent removal and  reevaluation. POSTOPERATIVE DIAGNOSES:  Old stent was removed. Balloon sweeping and  cholangiography revealed small stones and gravel removed and excellent  drainage of clear biliary drainage. Marked and near return to normal  improvement of the previously seen distal stricture. ANESTHESIA:  LMAC. ESTIMATED BLOOD LOSS:  N/A    DESCRIPTION OF PROCEDURE:  With the patient in his left lateral  decubitus position, the Olympus side-viewing video duodenoscope was  introduced into the esophagus, advanced through the GE junction into the  gastric body, advanced through the pylorus into duodenal bulb, second  portion of duodenum, where papilla was visualized and positioned at 12  o'clock position. The papilla was seen with the stent protruding and  the stent was successfully removed. Common bile duct was then  cannulated using a papillotome, deeply cannulated with a guidewire over  which 9- to 12-Czech balloon was introduced and balloon sweeping  resulted in removal of few gravels and small stones and excellent clear  biliary drainage thereafter. Multiple pictures were taken, showed  marked improvement of the previously seen stricture and almost return to  normal expected size and distribution. Multiple pictures were taken.

## 2021-07-08 ENCOUNTER — TELEPHONE (OUTPATIENT)
Dept: PHARMACY | Facility: CLINIC | Age: 81
End: 2021-07-08

## 2021-07-08 NOTE — TELEPHONE ENCOUNTER
[facility] HEALTH CLINICAL PHARMACY REVIEW: ADHERENCE REVIEW  Identified care gap per [employer]; fills at [address]: Statin adherence    Last Visit: 5/4/21    Patient also appears to be prescribed: Atorvastatin 80mg    Patient not found in Outcomes MTM    [address]    Per Insurance Records through [employer] (2020 [facility] = 100%; YTD [facility] = 78%; Potential Fail Date: 7/14/21): Atorvastatin last filled on 2/10/21 for 90 day supply. Next refill due: 5/11/21    Per Reconciled Dispense Report:  Atorvastatin last filled on 2/10/21 for 90 day supply. Lab Results   Component Value Date    CHOL 87 03/16/2019    TRIG 46 03/16/2019    HDL 49 03/06/2020    LDLCALC 66 03/06/2020     ALT   Date Value Ref Range Status   05/25/2021 9 0 - 40 U/L Final     AST   Date Value Ref Range Status   05/25/2021 14 0 - 39 U/L Final     The ASCVD Risk score ([address] et al., 2013) failed to calculate for the following reasons: The 2013 ASCVD risk score is only valid for ages 36 to 78     PLAN  The following are interventions that have been identified:   - Patient overdue refilling Atorvastatin and active on home medication list.     Attempting to reach patient to review.  Left message asking for return call.      Future Appointments   Date Time Provider Peter Martines   7/20/2021  9:40 AM DO Johnny Felipe [facility]   7/27/2021 11:00 AM Brandyn Sotelo MD [address]  Direct: [phone]  Department, toll free [phone], option 7

## 2021-07-09 ENCOUNTER — TELEPHONE (OUTPATIENT)
Dept: FAMILY MEDICINE CLINIC | Age: 81
End: 2021-07-09

## 2021-07-09 NOTE — TELEPHONE ENCOUNTER
Patient returned call regarding statin adherence below. Confirmed his last fill was back in February. Still has meds on hand and doesn't need a refill at this time. (prior fill was 1/27/21 for 90 day supply) Uses an organizer and takes other meds at night. Denies any skipped or missing doses despite not being refilled.

## 2021-07-09 NOTE — TELEPHONE ENCOUNTER
Last Appointment:  5/4/2021  Future Appointments   Date Time Provider Peter Barrerai   7/20/2021  9:40 AM DO Johnny Gomez [facility]   7/27/2021 11:00 AM No Esparza MD [facility]        Patient is asking for a script for a handicap placard. He does see you on 7/27 but he would like it now.

## 2021-07-09 NOTE — LETTER
[address] [place] [address] Drive  [address] [place] [address]  Phone: [phone]  Fax: [phone]    Jessica Graham MD         July 9, 2021     Patient: Celsa Jalloh   YOB: 1940   Date of Visit: 7/9/2021       To Whom It May Concern: It is my medical opinion that Shabnam Aguero requires a disability parking placard for the following reasons:  He has limited walking ability due to a neurologic condition. Duration of need: 5 years    If you have any questions or concerns, please don't hesitate to call.     Sincerely,        Jessica Graham MD

## 2021-07-19 DIAGNOSIS — R53.83 OTHER FATIGUE: ICD-10-CM

## 2021-07-19 DIAGNOSIS — Z79.899 LONG TERM CURRENT USE OF THERAPEUTIC DRUG: ICD-10-CM

## 2021-07-19 DIAGNOSIS — R73.01 IMPAIRED FASTING GLUCOSE: ICD-10-CM

## 2021-07-19 DIAGNOSIS — E78.2 MIXED HYPERLIPIDEMIA: ICD-10-CM

## 2021-07-19 DIAGNOSIS — E55.9 VITAMIN D DEFICIENCY: ICD-10-CM

## 2021-07-19 LAB
ALBUMIN SERPL-MCNC: 3.9 G/DL (ref 3.5–5.2)
ALP BLD-CCNC: 70 U/L (ref 40–129)
ALT SERPL-CCNC: 14 U/L (ref 0–40)
ANION GAP SERPL CALCULATED.3IONS-SCNC: 7 MMOL/L (ref 7–16)
AST SERPL-CCNC: 16 U/L (ref 0–39)
BACTERIA: ABNORMAL /HPF
BILIRUB SERPL-MCNC: 0.6 MG/DL (ref 0–1.2)
BILIRUBIN URINE: NEGATIVE
BLOOD, URINE: NEGATIVE
BUN BLDV-MCNC: 13 MG/DL (ref 6–23)
CALCIUM SERPL-MCNC: 9.1 MG/DL (ref 8.6–10.2)
CHLORIDE BLD-SCNC: 108 MMOL/L (ref 98–107)
CHOLESTEROL, FASTING: 137 MG/DL (ref 0–199)
CLARITY: NORMAL
CO2: 29 MMOL/L (ref 22–29)
COLOR: YELLOW
CREAT SERPL-MCNC: 0.9 MG/DL (ref 0.7–1.2)
CRYSTALS, UA: ABNORMAL /HPF
FOLATE: 19.5 NG/ML (ref 4.8–24.2)
GFR AFRICAN AMERICAN: >60
GFR NON-AFRICAN AMERICAN: >60 ML/MIN/1.73
GLUCOSE BLD-MCNC: 94 MG/DL (ref 74–99)
GLUCOSE URINE: NEGATIVE MG/DL
HBA1C MFR BLD: 5.4 % (ref 4–5.6)
HDLC SERPL-MCNC: 47 MG/DL
KETONES, URINE: NEGATIVE MG/DL
LDL CHOLESTEROL CALCULATED: 63 MG/DL (ref 0–99)
LEUKOCYTE ESTERASE, URINE: NEGATIVE
MAGNESIUM: 1.9 MG/DL (ref 1.6–2.6)
NITRITE, URINE: NEGATIVE
PH UA: 5.5 (ref 5–9)
POTASSIUM SERPL-SCNC: 3.9 MMOL/L (ref 3.5–5)
PROTEIN UA: NEGATIVE MG/DL
RBC UA: ABNORMAL /HPF (ref 0–2)
SODIUM BLD-SCNC: 144 MMOL/L (ref 132–146)
SPECIFIC GRAVITY UA: >=1.03 (ref 1–1.03)
TOTAL PROTEIN: 6.6 G/DL (ref 6.4–8.3)
TRIGLYCERIDE, FASTING: 135 MG/DL (ref 0–149)
TSH SERPL DL<=0.05 MIU/L-ACNC: 3.01 UIU/ML (ref 0.27–4.2)
UROBILINOGEN, URINE: 0.2 E.U./DL
VITAMIN B-12: 594 PG/ML (ref 211–946)
VITAMIN D 25-HYDROXY: 30 NG/ML (ref 30–100)
VLDLC SERPL CALC-MCNC: 27 MG/DL
WBC UA: ABNORMAL /HPF (ref 0–5)

## 2021-07-20 ENCOUNTER — OFFICE VISIT (OUTPATIENT)
Dept: CARDIOLOGY CLINIC | Age: 81
End: 2021-07-20
Payer: MEDICARE

## 2021-07-20 ENCOUNTER — TELEPHONE (OUTPATIENT)
Dept: CARDIOLOGY CLINIC | Age: 81
End: 2021-07-20

## 2021-07-20 VITALS
HEIGHT: 73 IN | WEIGHT: 196 LBS | DIASTOLIC BLOOD PRESSURE: 80 MMHG | HEART RATE: 81 BPM | SYSTOLIC BLOOD PRESSURE: 128 MMHG | BODY MASS INDEX: 25.98 KG/M2 | RESPIRATION RATE: 14 BRPM

## 2021-07-20 DIAGNOSIS — I25.10 CORONARY ARTERY DISEASE INVOLVING NATIVE CORONARY ARTERY OF NATIVE HEART WITHOUT ANGINA PECTORIS: Primary | ICD-10-CM

## 2021-07-20 PROCEDURE — 93000 ELECTROCARDIOGRAM COMPLETE: CPT | Performed by: INTERNAL MEDICINE

## 2021-07-20 PROCEDURE — 99213 OFFICE O/P EST LOW 20 MIN: CPT | Performed by: INTERNAL MEDICINE

## 2021-07-20 NOTE — TELEPHONE ENCOUNTER
Per Dr. Michelle Mays, check with neurology to see if patient is okay to stay on Plavix and discontinue Aspirin. Neurologist may be patient's son. Left message for patient to call the office to confirm this and if so, get son's name and phone number.

## 2021-07-20 NOTE — PROGRESS NOTES
Paola Duncan  1940  Date of Service: 7/20/2021    Patient Active Problem List    Diagnosis Date Noted    Abdominal pain 04/23/2021    Fever 04/23/2021    Sinus tachycardia 04/23/2021    Orthostatic hypotension     COPD exacerbation ([facility]     COVID-19 virus infection 11/22/2020    Ascending cholangitis 06/25/2020    Elevated LFTs 06/25/2020    Leukocytosis 06/25/2020    Lactic acidosis 06/25/2020    Impaired fasting glucose 03/18/2020    Bilateral impacted cerumen 06/27/2019    Other constipation 03/21/2019    Vitamin D deficiency 03/21/2019    Pigmented skin lesion 07/11/2018    Prostate enlargement 07/11/2018    Fatigue 07/11/2018    Disorder of bone 07/11/2018    TIA (transient ischemic attack) 04/11/2018    COPD (chronic obstructive pulmonary disease) ([facility] 04/01/2018    Ischemic cardiomyopathy      Overview Note:     EF 40% after NSTEMI, 11-15 at [facility]      CHF (congestive heart failure), NYHA class I, chronic, systolic ([facility] 35/12/1423     Overview Note:     [facility]      CAD (coronary artery disease)      Overview Note:     A. Treadmill nuclear stress test (8/15/08): Sim protocol 8 minutes, 10.1 METS, 100% MPHR, PVCs during exercise, no ischemia, small fixed defect involving inferior wall. B. Treadmill nuclear stress test (7/11/06): Sim protocol 7:20 minutes, negative ischemia, negative scar, EF 73%. C. PTCA and stenting LAD (1/23/06): 3.5 x 12 mm Taxus drug-eluting stent. D. Cardiac catheterization (1/23/06): Left main normal; LAD 20-30% ostial, 80% prox; Cx normal; RCA dominant, normal; EF 55%. E. NSTEMI 11-30-15: 4.0x12 MARTHA ISR LAD at [facility]. Dr. Jocelyn Andre.        Hypertension     Sleep apnea     Osteoarthritis     Smokeless tobacco use     Hyperlipidemia 01/09/2012       Social History     Socioeconomic History    Marital status:      Spouse name: None    Number of children: None    Years of education: None    Highest education level: None   Occupational History    Occupation: retired-     Tobacco Use    Smoking status: Former Smoker     Packs/day: 1.00     Years: 15.00     Pack years: 15.00     Types: Cigarettes     Start date: 1957     Quit date: 1975     Years since quittin.11    Smokeless tobacco: Current User     Types: Snuff   Vaping Use    Vaping Use: Never used   Substance and Sexual Activity    Alcohol use: Yes     Comment: occassionally    Drug use: No    Sexual activity: None   Other Topics Concern    None   Social History Narrative    None     Social Determinants of Health     Financial Resource Strain:     Difficulty of Paying Living Expenses:    Food Insecurity:     Worried About Running Out of Food in the Last Year:     Ran Out of Food in the Last Year:    Transportation Needs:     Lack of Transportation (Medical):      Lack of Transportation (Non-Medical):    Physical Activity:     Days of Exercise per Week:     Minutes of Exercise per Session:    Stress:     Feeling of Stress :    Social Connections:     Frequency of Communication with Friends and Family:     Frequency of Social Gatherings with Friends and Family:     Attends Jewish Services:     Active Member of Clubs or Organizations:     Attends Club or Organization Meetings:     Marital Status:    Intimate Partner Violence:     Fear of Current or Ex-Partner:     Emotionally Abused:     Physically Abused:     Sexually Abused:        Current Outpatient Medications   Medication Sig Dispense Refill    mirtazapine (REMERON) 15 MG tablet Take 1 tablet by mouth nightly 90 tablet 1    carbidopa-levodopa (SINEMET)  MG per tablet New prescription      rasagiline mesylate 1 MG TABS take 1 tablet by mouth once daily      atorvastatin (LIPITOR) 80 MG tablet Take 1 tablet by mouth daily 90 tablet 1    clopidogrel (PLAVIX) 75 MG tablet Take 1 tablet by mouth daily 90 tablet 1    metoprolol succinate (TOPROL XL) 50 MG extended release tablet Take 1 tablet by mouth daily 90 tablet 1    ketoconazole (NIZORAL) 2 % cream Apply topically daily. 30 g 1    losartan (COZAAR) 25 MG tablet Take 1 tablet by mouth daily 90 tablet 1    tamsulosin (FLOMAX) 0.4 MG capsule Take 1 capsule by mouth daily 90 capsule 1    ketoconazole (NIZORAL) 2 % shampoo Apply topically daily as needed. 100 mL 1    nitroGLYCERIN (NITROSTAT) 0.4 MG SL tablet Place 1 tablet under the tongue every 5 minutes as needed for Chest pain 25 tablet 1    Multiple Vitamins-Minerals (THERAPEUTIC MULTIVITAMIN-MINERALS) tablet Take 1 tablet by mouth daily      CPAP Machine MISC daily at bedtime.  aspirin 81 MG EC tablet Take 81 mg by mouth daily Last taken 05/11/2019      Cholecalciferol (VITAMIN D) 2000 UNIT CAPS capsule Take 1 capsule by mouth daily Last taken 05/13       No current facility-administered medications for this visit. Allergies   Allergen Reactions    Lisinopril Itching       Chief Complaint:  Verna Boxer is here today for follow up and management/recomendations for CAD, ICM. History of Present Illness: Verna Boxer states that He does house work, goes up the stairs, & goes shopping. He denies any chest discomfort or dyspnea with any of these activities. He denies orthopnea/PND. He denies any palpitations or presyncopal symptoms. He denies any lower extremity edema. He states that he has recently been diagnosed with Parkinson's. REVIEW OF SYSTEMS:  As above. Patient does not complain of any fever, chills, nausea, vomiting or diarrhea. No focal, motor or neurological deficits. No changes in his/her vision, hearing, bowel or bladder habits. He is not known to have a history of thyroid problems. No recent nose bleeds. PHYSICAL EXAM:  Vitals:    07/20/21 0945   BP: 128/80   Pulse: 81   Resp: 14   Weight: 196 lb (88.9 kg)   Height: 6' 1\" (1.854 m)       GENERAL:  He is alert and oriented x 3, communicates well, in no distress.    NECK:  No masses, trachea is mid position. Supple, full ROM, no JVD or bruits. No palpable thyromegaly or lymphadenopathy. HEART:  Distant to auscultation. Regular rate and rhythm. Normal S1 and S2. There is an S4 gallop and a I/VI (physiologic) systolic murmur. LUNGS:  Clear to auscultation bilaterally. No use of accessory muscles. symmetrical excursion. Mildly decreased air movement. ABDOMEN:  Soft, non-tender. Normal bowel sounds. EXTREMITIES:  Full ROM x 4.  trace to mild left lower extremity edema. Good distal pulses. EYES:  Extraocular muscles intact. PERRL. Normal lids & conjunctiva. ENT:  Nares are clear & not bleeding. Moist mucosa. Normal lips formation. No external masses   NEURO: Intermittent right upper extremity tremor, full ROM x 4, EOMI. SKIN:  Warm, dry and intact. Normal turgor. EKG: Sinus rhythm, 70 bpm, nl axis, nonspecific ST - T wave changes. q-waves V1-V2. Poor R wave progression. Artifact. Assessment:   1. Coronary artery disease as outlined above. No symptoms of recurring ischemia at this time. 2. ischemic cardiomyopathy as outlined above. Clinically euvolemic and no decompensation at this time. 3. Hypertension, well controled at this time. 4. Trace aortic regurgitation. None seen on echo 418 by Dr. Moon Salinas. 5. Hypercholesterolemia  6. LINETTE  7. Former smoker & now uses smokeless tobacco.  8. TIA 418. Has since followed with his son whom he states is a Neurologist in [place]. 9. COPD with chronic dyspnea on exertion. He denies dyspnea this visit. 10. Parkinson's        Recommendations:  1. He is following the cholesterol with Dr. Oneal Cifuentes.    2. It is okay from a cardiology standpoint to discontinue the aspirin. I would stay on the Plavix. I will check with neurology. Thank you for allowing me to participate in your patient's care.       [address], [address] [facility]  Interventional Cardiology

## 2021-07-21 LAB
BASOPHILS ABSOLUTE: 0.1 E9/L (ref 0–0.2)
BASOPHILS RELATIVE PERCENT: 1.2 % (ref 0–2)
EOSINOPHILS ABSOLUTE: 0.49 E9/L (ref 0.05–0.5)
EOSINOPHILS RELATIVE PERCENT: 5.7 % (ref 0–6)
HCT VFR BLD CALC: 41.1 % (ref 37–54)
HEMOGLOBIN: 12.6 G/DL (ref 12.5–16.5)
IMMATURE GRANULOCYTES #: 0.02 E9/L
IMMATURE GRANULOCYTES %: 0.2 % (ref 0–5)
LYMPHOCYTES ABSOLUTE: 3.25 E9/L (ref 1.5–4)
LYMPHOCYTES RELATIVE PERCENT: 37.9 % (ref 20–42)
MCH RBC QN AUTO: 27.7 PG (ref 26–35)
MCHC RBC AUTO-ENTMCNC: 30.7 % (ref 32–34.5)
MCV RBC AUTO: 90.3 FL (ref 80–99.9)
MONOCYTES ABSOLUTE: 0.77 E9/L (ref 0.1–0.95)
MONOCYTES RELATIVE PERCENT: 9 % (ref 2–12)
NEUTROPHILS ABSOLUTE: 3.94 E9/L (ref 1.8–7.3)
NEUTROPHILS RELATIVE PERCENT: 46 % (ref 43–80)
PDW BLD-RTO: 16 FL (ref 11.5–15)
PLATELET # BLD: 211 E9/L (ref 130–450)
PMV BLD AUTO: 10.9 FL (ref 7–12)
RBC # BLD: 4.55 E12/L (ref 3.8–5.8)
WBC # BLD: 8.6 E9/L (ref 4.5–11.5)

## 2021-07-23 NOTE — TELEPHONE ENCOUNTER
Spoke with patient. He states his new neurologist is Dr. Reggie Lr at [facility]. Spoke with Roseann Huggins in Dr. Ruiz Day office. Roseann Huggins will check with Dr. Brodie Haro and get back to me.

## 2021-07-27 ENCOUNTER — OFFICE VISIT (OUTPATIENT)
Dept: FAMILY MEDICINE CLINIC | Age: 81
End: 2021-07-27
Payer: MEDICARE

## 2021-07-27 VITALS
DIASTOLIC BLOOD PRESSURE: 60 MMHG | SYSTOLIC BLOOD PRESSURE: 102 MMHG | OXYGEN SATURATION: 97 % | TEMPERATURE: 98.8 F | BODY MASS INDEX: 26.12 KG/M2 | HEART RATE: 87 BPM | WEIGHT: 198 LBS

## 2021-07-27 DIAGNOSIS — J44.9 CHRONIC OBSTRUCTIVE PULMONARY DISEASE, UNSPECIFIED COPD TYPE (HCC): ICD-10-CM

## 2021-07-27 DIAGNOSIS — G47.33 OBSTRUCTIVE SLEEP APNEA SYNDROME: ICD-10-CM

## 2021-07-27 DIAGNOSIS — I25.10 CORONARY ARTERY DISEASE INVOLVING NATIVE CORONARY ARTERY OF NATIVE HEART WITHOUT ANGINA PECTORIS: ICD-10-CM

## 2021-07-27 DIAGNOSIS — F33.1 MODERATE EPISODE OF RECURRENT MAJOR DEPRESSIVE DISORDER (HCC): ICD-10-CM

## 2021-07-27 DIAGNOSIS — N40.0 PROSTATE ENLARGEMENT: ICD-10-CM

## 2021-07-27 DIAGNOSIS — R73.01 IMPAIRED FASTING GLUCOSE: ICD-10-CM

## 2021-07-27 DIAGNOSIS — E55.9 VITAMIN D DEFICIENCY: ICD-10-CM

## 2021-07-27 DIAGNOSIS — E78.2 MIXED HYPERLIPIDEMIA: ICD-10-CM

## 2021-07-27 DIAGNOSIS — G45.0 VERTEBROBASILAR ARTERY SYNDROME: ICD-10-CM

## 2021-07-27 DIAGNOSIS — Z00.00 ROUTINE GENERAL MEDICAL EXAMINATION AT A HEALTH CARE FACILITY: Primary | ICD-10-CM

## 2021-07-27 DIAGNOSIS — I50.22 CHF (CONGESTIVE HEART FAILURE), NYHA CLASS I, CHRONIC, SYSTOLIC (HCC): ICD-10-CM

## 2021-07-27 DIAGNOSIS — K83.09 CHOLANGITIS: Primary | ICD-10-CM

## 2021-07-27 DIAGNOSIS — I10 ESSENTIAL HYPERTENSION: ICD-10-CM

## 2021-07-27 DIAGNOSIS — H61.23 BILATERAL IMPACTED CERUMEN: ICD-10-CM

## 2021-07-27 DIAGNOSIS — G20 PARKINSON DISEASE (HCC): ICD-10-CM

## 2021-07-27 DIAGNOSIS — I25.5 ISCHEMIC CARDIOMYOPATHY: ICD-10-CM

## 2021-07-27 DIAGNOSIS — K59.09 OTHER CONSTIPATION: ICD-10-CM

## 2021-07-27 DIAGNOSIS — Z23 NEED FOR PROPHYLACTIC VACCINATION AGAINST STREPTOCOCCUS PNEUMONIAE (PNEUMOCOCCUS): ICD-10-CM

## 2021-07-27 PROCEDURE — 90732 PPSV23 VACC 2 YRS+ SUBQ/IM: CPT | Performed by: INTERNAL MEDICINE

## 2021-07-27 PROCEDURE — 99214 OFFICE O/P EST MOD 30 MIN: CPT | Performed by: INTERNAL MEDICINE

## 2021-07-27 PROCEDURE — 69210 REMOVE IMPACTED EAR WAX UNI: CPT | Performed by: INTERNAL MEDICINE

## 2021-07-27 PROCEDURE — G0438 PPPS, INITIAL VISIT: HCPCS | Performed by: INTERNAL MEDICINE

## 2021-07-27 PROCEDURE — G0009 ADMIN PNEUMOCOCCAL VACCINE: HCPCS | Performed by: INTERNAL MEDICINE

## 2021-07-27 RX ORDER — ATORVASTATIN CALCIUM 80 MG/1
80 TABLET, FILM COATED ORAL DAILY
Qty: 90 TABLET | Refills: 1 | Status: SHIPPED
Start: 2021-07-27 | End: 2022-02-01 | Stop reason: SDUPTHER

## 2021-07-27 RX ORDER — METOPROLOL SUCCINATE 50 MG/1
50 TABLET, EXTENDED RELEASE ORAL DAILY
Qty: 90 TABLET | Refills: 1 | Status: SHIPPED
Start: 2021-07-27 | End: 2021-10-27 | Stop reason: SDUPTHER

## 2021-07-27 RX ORDER — RASAGILINE 1 MG/1
TABLET ORAL
Qty: 90 TABLET | Refills: 1 | Status: SHIPPED
Start: 2021-07-27 | End: 2022-02-01 | Stop reason: SDUPTHER

## 2021-07-27 RX ORDER — LOSARTAN POTASSIUM 25 MG/1
25 TABLET ORAL DAILY
Qty: 90 TABLET | Refills: 1 | Status: SHIPPED
Start: 2021-07-27 | End: 2022-02-01 | Stop reason: SDUPTHER

## 2021-07-27 RX ORDER — CLOPIDOGREL BISULFATE 75 MG/1
75 TABLET ORAL DAILY
Qty: 90 TABLET | Refills: 1 | Status: SHIPPED
Start: 2021-07-27 | End: 2022-02-01 | Stop reason: SDUPTHER

## 2021-07-27 SDOH — ECONOMIC STABILITY: FOOD INSECURITY: WITHIN THE PAST 12 MONTHS, THE FOOD YOU BOUGHT JUST DIDN'T LAST AND YOU DIDN'T HAVE MONEY TO GET MORE.: NEVER TRUE

## 2021-07-27 SDOH — ECONOMIC STABILITY: FOOD INSECURITY: WITHIN THE PAST 12 MONTHS, YOU WORRIED THAT YOUR FOOD WOULD RUN OUT BEFORE YOU GOT MONEY TO BUY MORE.: NEVER TRUE

## 2021-07-27 ASSESSMENT — LIFESTYLE VARIABLES
HOW OFTEN DO YOU HAVE SIX OR MORE DRINKS ON ONE OCCASION: NEVER
HAS A RELATIVE, FRIEND, DOCTOR, OR ANOTHER HEALTH PROFESSIONAL EXPRESSED CONCERN ABOUT YOUR DRINKING OR SUGGESTED YOU CUT DOWN: NO
HOW MANY STANDARD DRINKS CONTAINING ALCOHOL DO YOU HAVE ON A TYPICAL DAY: 0
HOW OFTEN DURING THE LAST YEAR HAVE YOU FOUND THAT YOU WERE NOT ABLE TO STOP DRINKING ONCE YOU HAD STARTED: NEVER
AUDIT TOTAL SCORE: 0
AUDIT-C TOTAL SCORE: 1
HOW OFTEN DO YOU HAVE SIX OR MORE DRINKS ON ONE OCCASION: 0
HOW OFTEN DURING THE LAST YEAR HAVE YOU HAD A FEELING OF GUILT OR REMORSE AFTER DRINKING: NEVER
HOW OFTEN DURING THE LAST YEAR HAVE YOU FOUND THAT YOU WERE NOT ABLE TO STOP DRINKING ONCE YOU HAD STARTED: 0
HAVE YOU OR SOMEONE ELSE BEEN INJURED AS A RESULT OF YOUR DRINKING: NO
AUDIT TOTAL SCORE: 1
HOW OFTEN DURING THE LAST YEAR HAVE YOU BEEN UNABLE TO REMEMBER WHAT HAPPENED THE NIGHT BEFORE BECAUSE YOU HAD BEEN DRINKING: NEVER
HOW OFTEN DURING THE LAST YEAR HAVE YOU FAILED TO DO WHAT WAS NORMALLY EXPECTED FROM YOU BECAUSE OF DRINKING: 0
HOW OFTEN DURING THE LAST YEAR HAVE YOU NEEDED AN ALCOHOLIC DRINK FIRST THING IN THE MORNING TO GET YOURSELF GOING AFTER A NIGHT OF HEAVY DRINKING: NEVER
HOW OFTEN DURING THE LAST YEAR HAVE YOU HAD A FEELING OF GUILT OR REMORSE AFTER DRINKING: 0
HAVE YOU OR SOMEONE ELSE BEEN INJURED AS A RESULT OF YOUR DRINKING: 0
HOW OFTEN DURING THE LAST YEAR HAVE YOU BEEN UNABLE TO REMEMBER WHAT HAPPENED THE NIGHT BEFORE BECAUSE YOU HAD BEEN DRINKING: 0
AUDIT-C TOTAL SCORE: 0
HOW MANY STANDARD DRINKS CONTAINING ALCOHOL DO YOU HAVE ON A TYPICAL DAY: ONE OR TWO
HAS A RELATIVE, FRIEND, DOCTOR, OR ANOTHER HEALTH PROFESSIONAL EXPRESSED CONCERN ABOUT YOUR DRINKING OR SUGGESTED YOU CUT DOWN: 0
HOW OFTEN DO YOU HAVE A DRINK CONTAINING ALCOHOL: MONTHLY OR LESS
HOW OFTEN DURING THE LAST YEAR HAVE YOU NEEDED AN ALCOHOLIC DRINK FIRST THING IN THE MORNING TO GET YOURSELF GOING AFTER A NIGHT OF HEAVY DRINKING: 0
HOW OFTEN DURING THE LAST YEAR HAVE YOU FAILED TO DO WHAT WAS NORMALLY EXPECTED FROM YOU BECAUSE OF DRINKING: NEVER
HOW OFTEN DO YOU HAVE A DRINK CONTAINING ALCOHOL: 1

## 2021-07-27 ASSESSMENT — PATIENT HEALTH QUESTIONNAIRE - PHQ9
SUM OF ALL RESPONSES TO PHQ QUESTIONS 1-9: 0
SUM OF ALL RESPONSES TO PHQ9 QUESTIONS 1 & 2: 0
SUM OF ALL RESPONSES TO PHQ QUESTIONS 1-9: 0
SUM OF ALL RESPONSES TO PHQ9 QUESTIONS 1 & 2: 0
2. FEELING DOWN, DEPRESSED OR HOPELESS: 0
1. LITTLE INTEREST OR PLEASURE IN DOING THINGS: 0
2. FEELING DOWN, DEPRESSED OR HOPELESS: 0
SUM OF ALL RESPONSES TO PHQ QUESTIONS 1-9: 0
SUM OF ALL RESPONSES TO PHQ QUESTIONS 1-9: 0
1. LITTLE INTEREST OR PLEASURE IN DOING THINGS: 0

## 2021-07-27 ASSESSMENT — ENCOUNTER SYMPTOMS
BLOOD IN STOOL: 0
DIARRHEA: 0
SORE THROAT: 0
COUGH: 0
EYE PAIN: 0
CHEST TIGHTNESS: 0
SHORTNESS OF BREATH: 1
CONSTIPATION: 0
ABDOMINAL PAIN: 0
RHINORRHEA: 0
NAUSEA: 0
VOMITING: 0

## 2021-07-27 ASSESSMENT — SOCIAL DETERMINANTS OF HEALTH (SDOH): HOW HARD IS IT FOR YOU TO PAY FOR THE VERY BASICS LIKE FOOD, HOUSING, MEDICAL CARE, AND HEATING?: NOT HARD AT ALL

## 2021-07-27 NOTE — PATIENT INSTRUCTIONS
Personalized Preventive Plan for Barrera Romero - 7/27/2021  Medicare offers a range of preventive health benefits. Some of the tests and screenings are paid in full while other may be subject to a deductible, co-insurance, and/or copay. Some of these benefits include a comprehensive review of your medical history including lifestyle, illnesses that may run in your family, and various assessments and screenings as appropriate. After reviewing your medical record and screening and assessments performed today your provider may have ordered immunizations, labs, imaging, and/or referrals for you. A list of these orders (if applicable) as well as your Preventive Care list are included within your After Visit Summary for your review. Other Preventive Recommendations:    · A preventive eye exam performed by an eye specialist is recommended every 1-2 years to screen for glaucoma; cataracts, macular degeneration, and other eye disorders. · A preventive dental visit is recommended every 6 months. · Try to get at least 150 minutes of exercise per week or 10,000 steps per day on a pedometer . · Order or download the FREE \"Exercise & Physical Activity: Your Everyday Guide\" from [employer]. Call 9-239.522.8552 or search [employer] online. · You need 1392-8889 mg of calcium and 6649-4703 IU of vitamin D per day. It is possible to meet your calcium requirement with diet alone, but a vitamin D supplement is usually necessary to meet this goal.  · When exposed to the sun, use a sunscreen that protects against both UVA and UVB radiation with an SPF of 30 or greater. Reapply every 2 to 3 hours or after sweating, drying off with a towel, or swimming. · Always wear a seat belt when traveling in a car. Always wear a helmet when riding a bicycle or motorcycle. Heart-Healthy Diet   Sodium, Fat, and Cholesterol Controlled Diet       What Is a Heart Healthy Diet?    A heart-healthy diet is one that limits sodium , certain types of fat , and cholesterol . This type of diet is recommended for:   People with any form of cardiovascular disease (eg, coronary heart disease , peripheral vascular disease , previous heart attack , previous stroke )   People with risk factors for cardiovascular disease, such as high blood pressure , high cholesterol , or diabetes   Anyone who wants to lower their risk of developing cardiovascular disease   Sodium    Sodium is a mineral found in many foods. In general, most people consume much more sodium than they need. Diets high in sodium can increase blood pressure and lead to edema (water retention). On a heart-healthy diet, you should consume no more than 2,300 mg (milligrams) of sodium per dayabout the amount in one teaspoon of table salt. The foods highest in sodium include table salt (about 50% sodium), processed foods, convenience foods, and preserved foods. Cholesterol    Cholesterol is a fat-like, waxy substance in your blood. Our bodies make some cholesterol. It is also found in animal products, with the highest amounts in fatty meat, egg yolks, whole milk, cheese, shellfish, and organ meats. On a heart-healthy diet, you should limit your cholesterol intake to less than 200 mg per day. It is normal and important to have some cholesterol in your bloodstream. But too much cholesterol can cause plaque to build up within your arteries, which can eventually lead to a heart attack or stroke. The two types of cholesterol that are most commonly referred to are:   Low-density lipoprotein (LDL) cholesterol  Also known as bad cholesterol, this is the cholesterol that tends to build up along your arteries. Bad cholesterol levels are increased by eating fats that are saturated or hydrogenated. Optimal level of this cholesterol is less than 100. Over 130 starts to get risky for heart disease.    High-density lipoprotein (HDL) cholesterol  Also known as good cholesterol, this type of cholesterol actually carries cholesterol away from your arteries and may, therefore, help lower your risk of having a heart attack. You want this level to be high (ideally greater than 60). It is a risk to have a level less than 40. You can raise this good cholesterol by eating olive oil, canola oil, avocados, or nuts. Exercise raises this level, too. Fat    Fat is calorie dense and packs a lot of calories into a small amount of food. Even though fats should be limited due to their high calorie content, not all fats are bad. In fact, some fats are quite healthful. Fat can be broken down into four main types. The good-for-you fats are:   Monounsaturated fat  found in oils such as olive and canola, avocados, and nuts and natural nut butters; can decrease cholesterol levels, while keeping levels of HDL cholesterol high   Polyunsaturated fat  found in oils such as safflower, sunflower, soybean, corn, and sesame; can decrease total cholesterol and LDL cholesterol   Omega-3 fatty acids  particularly those found in fatty fish (such as salmon, trout, tuna, mackerel, herring, and sardines); can decrease risk of arrhythmias, decrease triglyceride levels, and slightly lower blood pressure   The fats that you want to limit are:   Saturated fat  found in animal products, many fast foods, and a few vegetables; increases total blood cholesterol, including LDL levels   Animal fats that are saturated include: butter, lard, whole-milk dairy products, meat fat, and poultry skin   Vegetable fats that are saturated include: hydrogenated shortening, palm oil, coconut oil, cocoa butter   Hydrogenated or trans fat  found in margarine and vegetable shortening, most shelf stable snack foods, and fried foods; increases LDL and decreases HDL     It is generally recommended that you limit your total fat for the day to less than 30% of your total calories.  If you follow an 1800-calorie heart healthy diet, for example, this would mean 60 grams of fat or less per day. Saturated fat and trans fat in your diet raises your blood cholesterol the most, much more than dietary cholesterol does. For this reason, on a heart-healthy diet, less than 7% of your calories should come from saturated fat and ideally 0% from trans fat. On an 1800-calorie diet, this translates into less than 14 grams of saturated fat per day, leaving 46 grams of fat to come from mono- and polyunsaturated fats.    Food Choices on a Heart Healthy Diet   Food Category   Foods Recommended   Foods to Avoid   Grains   Breads and rolls without salted tops Most dry and cooked cereals Unsalted crackers and breadsticks Low-sodium or homemade breadcrumbs or stuffing All rice and pastas   Breads, rolls, and crackers with salted tops High-fat baked goods (eg, muffins, donuts, pastries) Quick breads, self-rising flour, and biscuit mixes Regular bread crumbs Instant hot cereals Commercially prepared rice, pasta, or stuffing mixes   Vegetables   Most fresh, frozen, and low-sodium canned vegetables Low-sodium and salt-free vegetable juices Canned vegetables if unsalted or rinsed   Regular canned vegetables and juices, including sauerkraut and pickled vegetables Frozen vegetables with sauces Commercially prepared potato and vegetable mixes   Fruits   Most fresh, frozen, and canned fruits All fruit juices   Fruits processed with salt or sodium   Milk   Nonfat or low-fat (1%) milk Nonfat or low-fat yogurt Cottage cheese, low-fat ricotta, cheeses labeled as low-fat and low-sodium   Whole milk Reduced-fat (2%) milk Malted and chocolate milk Full fat yogurt Most cheeses (unless low-fat and low salt) Buttermilk (no more than 1 cup per week)   Meats and Beans   Lean cuts of fresh or frozen beef, veal, lamb, or pork (look for the word loin) Fresh or frozen poultry without the skin Fresh or frozen fish and some shellfish Egg whites and egg substitutes (Limit whole eggs to three per week) Tofu Nuts or seeds (unsalted, dry-roasted), low-sodium peanut butter Dried peas, beans, and lentils   Any smoked, cured, salted, or canned meat, fish, or poultry (including fleming, chipped beef, cold cuts, hot dogs, sausages, sardines, and anchovies) Poultry skins Breaded and/or fried fish or meats Canned peas, beans, and lentils Salted nuts   Fats and Oils   Olive oil and canola oil Low-sodium, low-fat salad dressings and mayonnaise   Butter, margarine, coconut and palm oils, fleming fat   Snacks, Sweets, and Condiments   Low-sodium or unsalted versions of broths, soups, soy sauce, and condiments Pepper, herbs, and spices; vinegar, lemon, or lime juice Low-fat frozen desserts (yogurt, sherbet, fruit bars) Sugar, cocoa powder, honey, syrup, jam, and preserves Low-fat, trans-fat free cookies, cakes, and pies Jeffrey and animal crackers, fig bars, gracia snaps   High-fat desserts Broth, soups, gravies, and sauces, made from instant mixes or other high-sodium ingredients Salted snack foods Canned olives Meat tenderizers, seasoning salt, and most flavored vinegars   Beverages   Low-sodium carbonated beverages Tea and coffee in moderation Soy milk   Commercially softened water   Suggestions   Make whole grains, fruits, and vegetables the base of your diet. Choose heart-healthy fats such as canola, olive, and flaxseed oil, and foods high in heart-healthy fats, such as nuts, seeds, soybeans, tofu, and fish. Eat fish at least twice per week; the fish highest in omega-3 fatty acids and lowest in mercury include salmon, herring, mackerel, sardines, and canned chunk light tuna. If you eat fish less than twice per week or have high triglycerides, talk to your doctor about taking fish oil supplements. Read food labels.    For products low in fat and cholesterol, look for fat free, low-fat, cholesterol free, saturated fat free, and trans fat freeAlso scan the Nutrition Facts Label, which lists saturated fat, trans fat, and cholesterol amounts. For products low in sodium, look for sodium free, very low sodium, low sodium, no added salt, and unsalted   Skip the salt when cooking or at the table; if food needs more flavor, get creative and try out different herbs and spices. Garlic and onion also add substantial flavor to foods. Trim any visible fat off meat and poultry before cooking, and drain the fat off after david. Use cooking methods that require little or no added fat, such as grilling, boiling, baking, poaching, broiling, roasting, steaming, stir-frying, and sauting. Avoid fast food and convenience food. They tend to be high in saturated and trans fat and have a lot of added salt. Talk to a registered dietitian for individualized diet advice. Last Reviewed: March 2011 Christine Srinivasan MS, MPH, RD   Updated: 3/29/2011   ·     Keep Your Memory [place]       Many factors can affect your ability to remembera hectic lifestyle, aging, stress, chronic disease, and certain medicines. But, there are steps you can take to sharpen your mind and help preserve your memory. Challenge Your Brain   Regularly challenging your mind may help keeps it in top shape. Good mental exercises include:   Crossword puzzlesUse a dictionary if you need it; you will learn more that way. Brainteasers Try some! Crafts, such as wood working and sewing   Hobbies, such as gardening and building model airplanes   SocializingVisit old friends or join groups to meet new ones. Reading   Learning a new language   Taking a class, whether it be art history or trice chi   TravelingExperience the food, history, and culture of your destination   Learning to use a computer   Going to museums, the theater, or thought-provoking movies   Changing things in your daily life, such as reversing your pattern in the grocery store or brushing your teeth using your nondominant hand   Use Memory Aids   There is no need to remember every detail on your own.  These relaxation. Choose activities that calm you down, and make it routine. Manage Chronic Conditions    Side effects of high blood pressure , diabetes, and heart disease can interfere with mental function. Many of the lifestyle steps discussed here can help manage these conditions. Strive to eat a healthy diet, exercise regularly, get stress under control, and follow your doctor's advice for your condition. Minimize Medications    Talk to your doctor about the medicines that you take. Some may be unnecessary. Also, healthy lifestyle habits may lower the need for certain drugs.      Last Reviewed: April 2010 David Martinez MD   Updated: 4/13/2010   ·

## 2021-07-27 NOTE — PROGRESS NOTES
[facility]) Physicians   Internal Medicine     2021  Barrera Romero : 1940 Sex: male  Age:80 y.o. Chief Complaint   Patient presents with    Hyperlipidemia    Hypertension        HPI:   Patient presents to office for evaluation of the following medical concerns. -  was found to have jaundice. Ct ado suggested poss pancreatitis. Placed on antibiotics for possible cholangitis. Had MRCP that suggested stricture and inflammation at the head of the pancreas. Had ERCP showing Severe stricture at the distal most portion of the cbd, 3 stones removed and stents placed. Had subsequent ERCP with removal of one stent. Had f/u ERCP () - Severe stricture at distal most portion of CBD, CBD stones  ERCP with stent removal. Appetite is good and tolerating diet. No current abo pain. No diarrhea or constipation. No melena or hematochezia    States having anxiety and family concerned about depression. States not moving around a lot. States restless. Started on remeron. Improved.      -  has parkinson.  has established [facility] new neurologist at [facility].  was started on caribodopa/levodopa, had nausea, suggested restart at half dose three times a day. On Rasagiline. Concern over memory. Follow up in 2 months. Neurology office visit reviewed from care everywhere from (2021)     States having constipation. Taking fiber supplements. States has CAD. Follows with cardiology locally and at [facility]. States s/p stents. States ischemic cardiomyopathy. On metoprolol, losartan, atorvastatin, aspirin and plavix. Last visit with cardio per reviewed note () - sugg stop asa, isch cardiomyopathy. Decided to keep both aspirin and plavix. Follow up in 1 year.  has COPD. States breathing is fair. Has seen pulmonary once, not following. States placed on breo - stopped. Stable. States has high blood pressure. States occasionally checks blood pressure at home.  On metoprolol, on losartan. Some lightheaded and dizzy if gets up to quickly. No loc/syncope. No headaches or vision changes. No chest pain. States has high cholesterol. States trying to watch diet. On atorvastatin, no reported side effects. Last lab (7/2021) stable. States has sleep apnea. States on cpap. States wears 6-8 hours per night, helps symptoms. States has had TIA. States had dizziness and difficulty walking. On antiplatelets (plavix and aspirin)     States has enlarged prostate. Has seen urology. States has urinary frequency. States flomax taking infrequently 1-2 times per week. Concern for dizzy and lightheaded    Vitamin D def. States on otc supplement. - labs from 7/19/2021 reviewed with patient 7/27/2021    Health Maintenance   - immunizations:   Influenza Vaccination - declines   Pneumonia Vaccination - (9/20219) - prevnar 13, (7/2021) - pneumococcal 23   Zoster/Shingles Vaccine - (2016) - zostavax   Tetanus Vaccination  covid (1/2021) #1, (2/12/2021) #2 - [place]     - Screenings:   Colonoscopy (2013) - normal, (5/21) - diverticulosis, otherwise neg f/u 10yrs  Prostate     ROS:  Review of Systems   Constitutional: Positive for fatigue. Negative for appetite change, chills, fever and unexpected weight change. HENT: Negative for congestion, rhinorrhea and sore throat. Eyes: Negative for pain and visual disturbance. Respiratory: Positive for shortness of breath. Negative for cough and chest tightness. Cardiovascular: Negative for chest pain and palpitations. Gastrointestinal: Negative for abdominal pain, blood in stool, constipation, diarrhea, nausea and vomiting. Genitourinary: Negative for difficulty urinating, dysuria, hematuria, scrotal swelling, testicular pain and urgency. Musculoskeletal: Negative for arthralgias and gait problem. Skin: Negative for rash. Neurological: Positive for dizziness. Negative for syncope, weakness, light-headedness and headaches.    Hematological: Negative for adenopathy. Does not bruise/bleed easily. Psychiatric/Behavioral: Negative for suicidal ideas. The patient is not nervous/anxious. Current Outpatient Medications:     rasagiline mesylate 1 MG TABS, take 1 tablet by mouth once daily, Disp: 90 tablet, Rfl: 1    atorvastatin (LIPITOR) 80 MG tablet, Take 1 tablet by mouth daily, Disp: 90 tablet, Rfl: 1    clopidogrel (PLAVIX) 75 MG tablet, Take 1 tablet by mouth daily, Disp: 90 tablet, Rfl: 1    metoprolol succinate (TOPROL XL) 50 MG extended release tablet, Take 1 tablet by mouth daily, Disp: 90 tablet, Rfl: 1    losartan (COZAAR) 25 MG tablet, Take 1 tablet by mouth daily, Disp: 90 tablet, Rfl: 1    mirtazapine (REMERON) 15 MG tablet, Take 1 tablet by mouth nightly, Disp: 90 tablet, Rfl: 1    carbidopa-levodopa (SINEMET)  MG per tablet, New prescription, Disp: , Rfl:     ketoconazole (NIZORAL) 2 % cream, Apply topically daily. , Disp: 30 g, Rfl: 1    tamsulosin (FLOMAX) 0.4 MG capsule, Take 1 capsule by mouth daily, Disp: 90 capsule, Rfl: 1    ketoconazole (NIZORAL) 2 % shampoo, Apply topically daily as needed. , Disp: 100 mL, Rfl: 1    nitroGLYCERIN (NITROSTAT) 0.4 MG SL tablet, Place 1 tablet under the tongue every 5 minutes as needed for Chest pain, Disp: 25 tablet, Rfl: 1    Multiple Vitamins-Minerals (THERAPEUTIC MULTIVITAMIN-MINERALS) tablet, Take 1 tablet by mouth daily, Disp: , Rfl:     CPAP Machine MISC, daily at bedtime. , Disp: , Rfl:     Cholecalciferol (VITAMIN D) 2000 UNIT CAPS capsule, Take 1 capsule by mouth daily Last taken 05/13, Disp: , Rfl:     Allergies   Allergen Reactions    Lisinopril Itching       Past Medical History:   Diagnosis Date    CAD (coronary artery disease) 2006    follows with Dr Maggie Trinidad    Cerebral artery occlusion with cerebral infarction [facility])     Cerebrovascular disease     CHF (congestive heart failure), NYHA class I, chronic, systolic [facility] 6973    [facility]-- EF 39%    Chronic frontoethmoidal sinusitis 2018    Chronic maxillary sinusitis 2018    COPD (chronic obstructive pulmonary disease) ([facility] 2018    GARSIA (dyspnea on exertion)     follows with Dr Flor Augustine Gallbladder disease     History of non-ST elevation myocardial infarction (NSTEMI)     [facility]    Hyperlipidemia     Hypertension     Ischemic cardiomyopathy     EF 40% after [facility]STEMI, 11-15 at [facility]    Obesity     Osteoarthritis     Skin cancer of eyelid     Sleep apnea     uses CPAP    Smokeless tobacco use     Stented coronary artery 2006    x1       Past Surgical History:   Procedure Laterality Date    CARDIAC CATHETERIZATION Left 2015    done [facility] WITH IMPLANT Bilateral     CHOLECYSTECTOMY, LAPAROSCOPIC N/A 2019    LAPAROSCOPIC ROBOTIC XI ASSISTED CHOLECYSTECTOMY performed by Eric Ayoub MD at [address]      CORONARY ANGIOPLASTY  06    DIAGNOSTIC CARDIAC CATH LAB PROCEDURE  06    ERCP N/A 2021    ERCP STONE REMOVAL performed by Trevon Forrest MD at [address] ERCP N/A 2021    ERCP STENT REMOVAL performed by Trevon Forrest MD at [address] Left 2015    ROTATOR CUFF REPAIR      bilaterally    TONSILLECTOMY AND ADENOIDECTOMY      UPPER GASTROINTESTINAL ENDOSCOPY      UPPER GASTROINTESTINAL ENDOSCOPY N/A 2021    EGD ESOPHAGOGASTRODUODENOSCOPY WITH PANCREATIC STENT REMOVAL performed by Trevon [facility] MD at [facility] ENDOSCOPY       Family History   Problem Relation Age of Onset    Heart Disease Mother          81 Y/O    Diabetes Mother     Liver Disease Father          66 Y/O       Social History     Socioeconomic History    Marital status:      Spouse name: Not on file    Number of children: Not on file    Years of education: Not on file    Highest education level: Not on file   Occupational History    Occupation: retired-     Tobacco Use  Smoking status: Former Smoker     Packs/day: 1.00     Years: 15.00     Pack years: 15.00     Types: Cigarettes     Start date: 1957     Quit date: 1975     Years since quittin.6    Smokeless tobacco: Current User     Types: Snuff   Vaping Use    Vaping Use: Never used   Substance and Sexual Activity    Alcohol use: Yes     Comment: occassionally    Drug use: No    Sexual activity: Not on file   Other Topics Concern    Not on file   Social History Narrative    Not on file     Social Determinants of Health     Financial Resource Strain: Low Risk     Difficulty of Paying Living Expenses: Not hard at all   Food Insecurity: No Food Insecurity    Worried About Running Out of Food in the Last Year: Never true    Shayan of Food in the Last Year: Never true   Transportation Needs:     Lack of Transportation (Medical):  Lack of Transportation (Non-Medical):    Physical Activity:     Days of Exercise per Week:     Minutes of Exercise per Session:    Stress:     Feeling of Stress :    Social Connections:     Frequency of Communication with Friends and Family:     Frequency of Social Gatherings with Friends and Family:     Attends [employer] Services:     Active Member of Clubs or Organizations:     Attends Club or Organization Meetings:     Marital Status:    Intimate Partner Violence:     Fear of Current or Ex-Partner:     Emotionally Abused:     Physically Abused:     Sexually Abused:        Vitals:    21 1104   BP: 102/60   Pulse: 87   Temp: 98.8 °F (37.1 °C)   TempSrc: Temporal   SpO2: 97%   Weight: 198 lb (89.8 kg)       Exam:  Physical Exam  Vitals reviewed. Constitutional:       Appearance: He is well-developed. HENT:      Head: Normocephalic and atraumatic. Right Ear: External ear normal. There is impacted cerumen. Left Ear: External ear normal. There is impacted cerumen. Eyes:      Pupils: Pupils are equal, round, and reactive to light.    Neck: Thyroid: No thyromegaly. Cardiovascular:      Rate and Rhythm: Normal rate and regular rhythm. Heart sounds: Normal heart sounds. No murmur heard. Pulmonary:      Effort: Pulmonary effort is normal.      Breath sounds: Normal breath sounds. No wheezing or rales. Abdominal:      General: Bowel sounds are normal.      Palpations: Abdomen is soft. Tenderness: There is no abdominal tenderness. There is no guarding or rebound. Musculoskeletal:         General: Normal range of motion. Cervical back: Normal range of motion and neck supple. Comments: Abnormal gait. Lymphadenopathy:      Cervical: No cervical adenopathy. Skin:     General: Skin is warm and dry. Neurological:      Mental Status: He is alert and oriented to person, place, and time. Cranial Nerves: No cranial nerve deficit.       Comments: Resting tremor    Psychiatric:         Judgment: Judgment normal.         Assessment and Plan:    Diagnoses and all orders for this visit:    Bilateral impacted cerumen  - attempted removal with curette - some removed but cerumen remained, no noted truama   - attempt lavage     Cholangitis  - hospital stay (6/2020) and (4/2021)   - s/p ERCP and papillotomy and stent placement   - s/p ERCP with stent removal (6/2021)    - improved      Parkinson disease ([address]  - following with neurology at [facility]   - on rasagiline   - on carbidopa and levodopa tid, decreased to half tab due to nausea  - stable      Moderate episode of recurrent major depressive disorder ([address]  - son was on call during office (12/2020)   - now on remeron to help with anxiety and depression   - stable and improved per patient     Coronary artery disease involving native coronary artery of native heart without angina pectoris  - following with cardiology   - on losartan   - on metoprolol   - on atorvastatin   - on aspirin and Plavix   - stable     Mixed hyperlipidemia  - watch diet   - on atorvastatin   - follow labs   - last lab (7/2021) - stable     Ischemic cardiomyopathy  - following with cardiology   - on losartn   - on metoprolol   - on atrovastatin   - on aspirin and plavix     CHF (congestive heart failure), NYHA class I, chronic, systolic (HCC)  - following with cardiology   - on losartn   - on metoprolol   - on atrovastatin   - on aspirin and plavix   - Stable     Chronic obstructive pulmonary disease, unspecified COPD type ([facility]  - not currently following with pulmonary   - did not see benefit with breo   - stable per patient     Essential hypertension  - watch diet   - monitor blood pressure at home   - on losartn   - on metoprolol   - on aspirin and plavix     Impaired fasting glucose  - continue present treatment  - watch diet   - follow A1c - last A1c (7/2021) 5.4    Obstructive sleep apnea syndrome  - Continue present treatment   - on CPAP  - wearing nightly 6-8 hours   - helping symptoms   - stable     TIA (transient ischemic attack)  - on atrovastatin   - on aspirin and plavix   - stable     Prostate enlargement  - continue present treatment   - has seen urology   - Has flomax - takes as needed  - stable     Vitamin D def  - On otc supplement   - follow labs     Other constipation  - discussed colace 100mg daily and miralax as needed  - taking mutamucil     Return in about 3 months (around 10/27/2021) for check up and review. No orders of the defined types were placed in this encounter.     Requested Prescriptions     Signed Prescriptions Disp Refills    rasagiline mesylate 1 MG TABS 90 tablet 1     Sig: take 1 tablet by mouth once daily    atorvastatin (LIPITOR) 80 MG tablet 90 tablet 1     Sig: Take 1 tablet by mouth daily    clopidogrel (PLAVIX) 75 MG tablet 90 tablet 1     Sig: Take 1 tablet by mouth daily    metoprolol succinate (TOPROL XL) 50 MG extended release tablet 90 tablet 1     Sig: Take 1 tablet by mouth daily    losartan (COZAAR) 25 MG tablet 90 tablet 1     Sig: Take 1 tablet by mouth daily Nory Mcgarry MD  7/27/2021  11:55 AM

## 2021-07-27 NOTE — PROGRESS NOTES
Medicare Annual Wellness Visit  Name: El Foley Date: 2021   MRN: [id number] Sex: Male   Age: 79 y.o. Ethnicity: Non- / Non    : 1940 Race: White (non-)      Anai Lane is here for Medicare AWV    Screenings for behavioral, psychosocial and functional/safety risks, and cognitive dysfunction are all negative except as indicated below. These results, as well as other patient data from the [address] form, are documented in Flowsheets linked to this Encounter. Allergies   Allergen Reactions    Lisinopril Itching         Prior to Visit Medications    Medication Sig Taking? Authorizing Provider   mirtazapine (REMERON) 15 MG tablet Take 1 tablet by mouth nightly Yes Jhony Cam MD   carbidopa-levodopa (SINEMET)  MG per tablet New prescription Yes Historical Provider, MD   ketoconazole (NIZORAL) 2 % cream Apply topically daily. Yes Jhony Cam MD   tamsulosin [facility]) 0.4 MG capsule Take 1 capsule by mouth daily Yes Jhony Cam MD   ketoconazole (NIZORAL) 2 % shampoo Apply topically daily as needed. Yes Jhony Cam MD   nitroGLYCERIN (NITROSTAT) 0.4 MG SL tablet Place 1 tablet under the tongue every 5 minutes as needed for Chest pain Yes Arie Christianson,    Multiple Vitamins-Minerals (THERAPEUTIC MULTIVITAMIN-MINERALS) tablet Take 1 tablet by mouth daily Yes Historical Provider, MD   CPAP Machine MISC daily at bedtime.  Yes Historical Provider, MD   Cholecalciferol (VITAMIN D) 2000 UNIT CAPS capsule Take 1 capsule by mouth daily Last taken  Yes Historical Provider, MD   rasagiline mesylate 1 MG TABS take 1 tablet by mouth once daily  Jhony Cam MD   atorvastatin (LIPITOR) 80 MG tablet Take 1 tablet by mouth daily  Jhony Cam MD   clopidogrel (PLAVIX) 75 MG tablet Take 1 tablet by mouth daily  Jhony Cam MD   metoprolol succinate (TOPROL XL) 50 MG extended release tablet Take 1 tablet by mouth daily Aurelio Lemus MD   losartan (COZAAR) 25 MG tablet Take 1 tablet by mouth daily  Aurelio Lemus MD         Past Medical History:   Diagnosis Date    CAD (coronary artery disease) 2006    follows with Dr Batsheva Meza Cerebral artery occlusion with cerebral infarction [facility])     Cerebrovascular disease     CHF (congestive heart failure), NYHA class I, chronic, systolic ([facility] 0262    [facility]-- EF 39%    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/2018    Chronic maxillary sinusitis 04/2018    COPD (chronic obstructive pulmonary disease) ([facility] 04/2018    GARSIA (dyspnea on exertion)     follows with Dr Mary Stokes Gallbladder disease     History of non-ST elevation myocardial infarction (NSTEMI) 2015    [facility]    Hyperlipidemia     Hypertension     Ischemic cardiomyopathy     EF 40% [facility]    Obesity     Osteoarthritis     Skin cancer of eyelid     Sleep apnea     uses CPAP    Smokeless tobacco use     Stented coronary artery 2006    x1       Past Surgical History:   Procedure Laterality Date    CARDIAC CATHETERIZATION Left 11/30/2015    done [facility] WITH IMPLANT Bilateral     CHOLECYSTECTOMY, LAPAROSCOPIC N/A 5/17/2019    LAPAROSCOPIC ROBOTIC XI ASSISTED CHOLECYSTECTOMY performed by Alisson Ross MD at [address] [place] [address]  2014    CORONARY ANGIOPLASTY  1/23/06    DIAGNOSTIC CARDIAC CATH LAB PROCEDURE  1/23/06    ERCP N/A 4/26/2021    ERCP STONE REMOVAL performed by Vera Hodges MD at [address] [facility] ERCP N/A 6/16/2021    ERCP STENT REMOVAL performed by Vera Hodges MD at [address] [facility] Left 02/2015    ROTATOR CUFF REPAIR      bilaterally   [address] ENDOSCOPY  2015    UPPER GASTROINTESTINAL ENDOSCOPY N/A 4/28/2021    EGD ESOPHAGOGASTRODUODENOSCOPY WITH PANCREATIC STENT REMOVAL performed [facility]ges [facility] at [facility] ENDOSCOPY         Family History   Problem Relation Age of Onset    Heart Disease Mother          79 Y/O    Diabetes Mother     Liver Disease Father          68 Y/O       CareTeam (Including outside providers/suppliers regularly involved in providing care):   Patient Care Team:  John Harper MD as PCP - General (Internal Medicine)  John Harper MD as PCP - [facility] Empaneled Provider  Juliet Espinoza DO (Internal Medicine Cardiovascular Disease)  Ismael Rankin MD as Surgeon (Gastroenterology)  Reginaldo Nicholas MD as Consulting Physician (Dermatology)  Jason Alberto DO as Consulting Physician (Cardiology)    Wt Readings from Last 3 Encounters:   21 198 lb (89.8 kg)   21 196 lb (88.9 kg)   21 190 lb (86.2 kg)     There were no vitals filed for this visit. There is no height or weight on file to calculate BMI. Based upon direct observation of the patient, evaluation of cognition reveals recalled 2 out 3 words. Clock was normal. Has see neuro psych . Patient's complete Health Risk Assessment and screening values have been reviewed and are found in Flowsheets. The following problems were reviewed today and where indicated follow up appointments were made and/or referrals ordered. Positive Risk Factor Screenings with Interventions:         Substance History:  Social History     Tobacco History     Smoking Status  Former Smoker Smoking Start Date  1957 Quit date  1975 Smoking Frequency  1 pack/day for 15 years (15 pk yrs)    Smoking Tobacco Type  Cigarettes    Smokeless Tobacco Use  Current User Smokeless Tobacco Type  Snuff          Alcohol History     Alcohol Use Status  Yes Comment  occassionally          Drug Use     Drug Use Status  No          Sexual Activity     Sexually Active  Not Asked               Alcohol Screening: Audit-C Score: 1  Total Score: 1    A score of 8 or more is associated with harmful or hazardous drinking.  A score of 13 or more in women, and 15 or more in men, is likely to indicate alcohol dependence.   Substance Abuse Interventions:  · uncertain      Health Habits/Nutrition:  Health Habits/Nutrition  Do you exercise for at least 20 minutes 2-3 times per week?: (!) No  Have you lost any weight without trying in the past 3 months?: (!) Yes  Do you eat only one meal per day?: No  Have you seen the dentist within the past year?: Yes     Health Habits/Nutrition Interventions:  · Inadequate physical activity:  educational materials provided to promote increased physical activity  · Nutritional issues:  educational materials for healthy, well-balanced diet provided    Hearing/Vision:  No exam data present  Hearing/Vision  Do you or your family notice any trouble with your hearing that hasn't been managed with hearing aids?: (!) Yes  Do you have difficulty driving, watching TV, or doing any of your daily activities because of your eyesight?: No  Have you had an eye exam within the past year?: Yes  Hearing/Vision Interventions:  · Hearing concerns:  patient declines any further evaluation/treatment for hearing issues    Safety:  Safety  Do you have working smoke detectors?: Yes  Have all throw rugs been removed or fastened?: (!) No  Do you have non-slip mats or surfaces in all bathtubs/showers?: (!) No  Do all of your stairways have a railing or banister?: (!) No  Are your doorways, halls and stairs free of clutter?: Yes  Do you always fasten your seatbelt when you are in a car?: Yes  Safety Interventions:  · Home safety tips provided     Personalized Preventive Plan   Current Health Maintenance Status  Immunization History   Administered Date(s) Administered    COVID-19, Gerard Peter, PF, 30mcg/0.3mL 01/22/2021, 02/12/2021    Influenza, High-dose, Quadv, 65 yrs +, IM (Fluzone) 10/22/2020    Pneumococcal Conjugate 13-valent (Pollyann Demetrio) 09/24/2019    Zoster Live (Zostavax) 08/31/2015, 06/07/2016        Health Maintenance   Topic Date Due    DTaP/Tdap/Td vaccine (1 - Tdap) Never done    Shingles Vaccine (2 of 3) 08/02/2016    Annual Wellness Visit (AWV)  Never done    Pneumococcal 65+ years Vaccine (2 of 2 - PPSV23) 09/24/2020    Flu vaccine (1) 09/01/2021    Lipid screen  07/19/2022    Potassium monitoring  07/19/2022    Creatinine monitoring  07/19/2022    Colon cancer screen colonoscopy  05/11/2031    COVID-19 Vaccine  Completed    Hepatitis A vaccine  Aged Out    Hepatitis B vaccine  Aged Out    Hib vaccine  Aged Out    Meningococcal (ACWY) vaccine  Aged Out     Recommendations for [employer] Due: see orders and patient instructions/AVS.  . Recommended screening schedule for the next 5-10 years is provided to the patient in written form: see Patient Instructions/AVS.    Bhakti Cortez was seen today for medicare awv. Diagnoses and all orders for this visit:    Routine general medical examination at a health care facility    Need for prophylactic vaccination against Streptococcus pneumoniae (pneumococcus)  -     Pneumococcal polysaccharide vaccine 23-valent PPSV23           Health Maintenance   - immunizations:   Influenza Vaccination - declines   Pneumonia Vaccination - (9/20219) - prevnar 13, (7/2021) - pneumococcal 23   Zoster/Shingles Vaccine - (2016) - zostavax   Tetanus Vaccination  covid (1/2021) #1, (2/12/2021) #2 - pfizer     - Screenings:   Colonoscopy (2013) - normal, (5/21) - diverticulosis, otherwise neg f/u 10yrs  Prostate     Return for Medicare Annual Wellness Visit in 1 year.     Orders Placed This Encounter   Procedures    Pneumococcal polysaccharide vaccine 23-valent PPSV23     Requested Prescriptions      No prescriptions requested or ordered in this encounter     Electronically signed by No Esparza MD on 7/27/2021 at 12:02 PM

## 2021-07-28 NOTE — TELEPHONE ENCOUNTER
NOTES FROM [facility]:    Telephone Encounter - Alfredo Aragon RN - 07/23/2021 3:24 PM EDT  Formatting of this note might be different from the original.  Spoke to patient, he does not want to see another provider. Patient states he will talk to his son about it and see how it goes. Advised patient to [facility], agreed.    Electronically signed by Makeda Pablo RN at 07/23/2021 3:25 PM EDT    Back to top of Miscellaneous Notes    Telephone Encounter - Ketan Glass MD - 07/23/2021 3:11 PM EDT  Formatting of this note might be different from the original.  July 23, 2021 3:11 PM  Im not really managing his stroke just his movement disorders issues. His son is a general neurologist and probably would want to know about the recommended change. We can have him see a stroke neurologist here if necessary.     Celsa Ward MD      Electronically signed by Yong Ennis MD at 07/23/2021 3:12 PM EDT

## 2021-07-28 NOTE — TELEPHONE ENCOUNTER
Spoke with patient's son ( Hakeem Benson). He states he believes patient may have failed with Plavix alone as a stroke prevention. However, he will look into and advise patient what to do.

## 2021-10-19 DIAGNOSIS — R53.83 OTHER FATIGUE: ICD-10-CM

## 2021-10-19 DIAGNOSIS — E78.2 MIXED HYPERLIPIDEMIA: Primary | ICD-10-CM

## 2021-10-19 DIAGNOSIS — R73.01 IMPAIRED FASTING GLUCOSE: ICD-10-CM

## 2021-10-19 DIAGNOSIS — E78.2 MIXED HYPERLIPIDEMIA: ICD-10-CM

## 2021-10-19 LAB
ALBUMIN SERPL-MCNC: 4.1 G/DL (ref 3.5–5.2)
ALP BLD-CCNC: 59 U/L (ref 40–129)
ALT SERPL-CCNC: 9 U/L (ref 0–40)
ANION GAP SERPL CALCULATED.3IONS-SCNC: 14 MMOL/L (ref 7–16)
AST SERPL-CCNC: 13 U/L (ref 0–39)
BASOPHILS ABSOLUTE: 0.11 E9/L (ref 0–0.2)
BASOPHILS RELATIVE PERCENT: 1.2 % (ref 0–2)
BILIRUB SERPL-MCNC: 0.5 MG/DL (ref 0–1.2)
BUN BLDV-MCNC: 19 MG/DL (ref 6–23)
CALCIUM SERPL-MCNC: 9.6 MG/DL (ref 8.6–10.2)
CHLORIDE BLD-SCNC: 106 MMOL/L (ref 98–107)
CHOLESTEROL, FASTING: 153 MG/DL (ref 0–199)
CO2: 22 MMOL/L (ref 22–29)
CREAT SERPL-MCNC: 1 MG/DL (ref 0.7–1.2)
EOSINOPHILS ABSOLUTE: 0.53 E9/L (ref 0.05–0.5)
EOSINOPHILS RELATIVE PERCENT: 6 % (ref 0–6)
GFR AFRICAN AMERICAN: >60
GFR NON-AFRICAN AMERICAN: >60 ML/MIN/1.73
GLUCOSE BLD-MCNC: 94 MG/DL (ref 74–99)
HBA1C MFR BLD: 5.7 % (ref 4–5.6)
HCT VFR BLD CALC: 44.7 % (ref 37–54)
HDLC SERPL-MCNC: 52 MG/DL
HEMOGLOBIN: 14 G/DL (ref 12.5–16.5)
IMMATURE GRANULOCYTES #: 0.02 E9/L
IMMATURE GRANULOCYTES %: 0.2 % (ref 0–5)
LDL CHOLESTEROL CALCULATED: 80 MG/DL (ref 0–99)
LYMPHOCYTES ABSOLUTE: 3.95 E9/L (ref 1.5–4)
LYMPHOCYTES RELATIVE PERCENT: 44.7 % (ref 20–42)
MAGNESIUM: 2.1 MG/DL (ref 1.6–2.6)
MCH RBC QN AUTO: 27.6 PG (ref 26–35)
MCHC RBC AUTO-ENTMCNC: 31.3 % (ref 32–34.5)
MCV RBC AUTO: 88.2 FL (ref 80–99.9)
MONOCYTES ABSOLUTE: 0.83 E9/L (ref 0.1–0.95)
MONOCYTES RELATIVE PERCENT: 9.4 % (ref 2–12)
NEUTROPHILS ABSOLUTE: 3.39 E9/L (ref 1.8–7.3)
NEUTROPHILS RELATIVE PERCENT: 38.5 % (ref 43–80)
PDW BLD-RTO: 15.7 FL (ref 11.5–15)
PLATELET # BLD: 239 E9/L (ref 130–450)
PMV BLD AUTO: 10.8 FL (ref 7–12)
POTASSIUM SERPL-SCNC: 4.3 MMOL/L (ref 3.5–5)
RBC # BLD: 5.07 E12/L (ref 3.8–5.8)
SODIUM BLD-SCNC: 142 MMOL/L (ref 132–146)
TOTAL PROTEIN: 6.8 G/DL (ref 6.4–8.3)
TRIGLYCERIDE, FASTING: 104 MG/DL (ref 0–149)
TSH SERPL DL<=0.05 MIU/L-ACNC: 1.81 UIU/ML (ref 0.27–4.2)
VLDLC SERPL CALC-MCNC: 21 MG/DL
WBC # BLD: 8.8 E9/L (ref 4.5–11.5)

## 2021-10-20 ENCOUNTER — TELEPHONE (OUTPATIENT)
Dept: FAMILY MEDICINE CLINIC | Age: 81
End: 2021-10-20

## 2021-10-20 NOTE — TELEPHONE ENCOUNTER
Please let the patient know that blood work results showed    Cholesterol levels were fair slightly increased when compared to previous. Would recommend to  continue present medications and continue to watch diet    Hemoglobin A1c is a measure of 3-month sugar control was slightly elevated at 5.7.   This is considered slight prediabetes and recommend to watch diet from carbohydrates and sugars    Thyroid levels were normal    Electrolytes, liver, and kidney function values were normal     Blood counts were normal    Thanks

## 2021-10-27 ENCOUNTER — OFFICE VISIT (OUTPATIENT)
Dept: FAMILY MEDICINE CLINIC | Age: 81
End: 2021-10-27
Payer: MEDICARE

## 2021-10-27 VITALS
HEART RATE: 83 BPM | BODY MASS INDEX: 26.64 KG/M2 | WEIGHT: 201 LBS | SYSTOLIC BLOOD PRESSURE: 94 MMHG | OXYGEN SATURATION: 99 % | TEMPERATURE: 97.7 F | DIASTOLIC BLOOD PRESSURE: 60 MMHG | HEIGHT: 73 IN

## 2021-10-27 DIAGNOSIS — I10 ESSENTIAL HYPERTENSION: Primary | ICD-10-CM

## 2021-10-27 DIAGNOSIS — G47.33 OBSTRUCTIVE SLEEP APNEA SYNDROME: ICD-10-CM

## 2021-10-27 DIAGNOSIS — H61.23 BILATERAL IMPACTED CERUMEN: ICD-10-CM

## 2021-10-27 DIAGNOSIS — I50.22 CHF (CONGESTIVE HEART FAILURE), NYHA CLASS I, CHRONIC, SYSTOLIC (HCC): ICD-10-CM

## 2021-10-27 DIAGNOSIS — G45.0 VERTEBROBASILAR ARTERY SYNDROME: ICD-10-CM

## 2021-10-27 DIAGNOSIS — E78.2 MIXED HYPERLIPIDEMIA: ICD-10-CM

## 2021-10-27 DIAGNOSIS — Z79.899 LONG TERM CURRENT USE OF THERAPEUTIC DRUG: ICD-10-CM

## 2021-10-27 DIAGNOSIS — I25.5 ISCHEMIC CARDIOMYOPATHY: ICD-10-CM

## 2021-10-27 DIAGNOSIS — G20 PARKINSON DISEASE (HCC): ICD-10-CM

## 2021-10-27 DIAGNOSIS — I25.10 CORONARY ARTERY DISEASE INVOLVING NATIVE CORONARY ARTERY OF NATIVE HEART WITHOUT ANGINA PECTORIS: ICD-10-CM

## 2021-10-27 DIAGNOSIS — J44.9 CHRONIC OBSTRUCTIVE PULMONARY DISEASE, UNSPECIFIED COPD TYPE (HCC): ICD-10-CM

## 2021-10-27 DIAGNOSIS — R53.83 OTHER FATIGUE: ICD-10-CM

## 2021-10-27 DIAGNOSIS — R73.01 IMPAIRED FASTING GLUCOSE: ICD-10-CM

## 2021-10-27 DIAGNOSIS — Z12.5 SCREENING FOR MALIGNANT NEOPLASM OF PROSTATE: ICD-10-CM

## 2021-10-27 DIAGNOSIS — E55.9 VITAMIN D DEFICIENCY: ICD-10-CM

## 2021-10-27 DIAGNOSIS — F33.1 MODERATE EPISODE OF RECURRENT MAJOR DEPRESSIVE DISORDER (HCC): ICD-10-CM

## 2021-10-27 PROCEDURE — 69209 REMOVE IMPACTED EAR WAX UNI: CPT | Performed by: INTERNAL MEDICINE

## 2021-10-27 PROCEDURE — 99214 OFFICE O/P EST MOD 30 MIN: CPT | Performed by: INTERNAL MEDICINE

## 2021-10-27 RX ORDER — METOPROLOL SUCCINATE 25 MG/1
25 TABLET, EXTENDED RELEASE ORAL DAILY
Qty: 90 TABLET | Refills: 1 | Status: SHIPPED | OUTPATIENT
Start: 2021-10-27

## 2021-10-27 ASSESSMENT — ENCOUNTER SYMPTOMS
BLOOD IN STOOL: 0
CONSTIPATION: 0
SORE THROAT: 0
SHORTNESS OF BREATH: 1
CHEST TIGHTNESS: 0
DIARRHEA: 0
ABDOMINAL PAIN: 0
EYE PAIN: 0
NAUSEA: 0
VOMITING: 0
RHINORRHEA: 0
COUGH: 0

## 2021-10-27 NOTE — PROGRESS NOTES
[facility]) Physicians   Internal Medicine     10/27/2021  Good Barriga : 1940 Sex: male  Age:80 y.o. Chief Complaint   Patient presents with    3 Month Follow-Up    Hyperlipidemia    Hypertension    COPD        HPI:   Patient presents to office for evaluation of the following medical concerns.  has high blood pressure. States occasionally checks blood pressure at home. On metoprolol, on losartan. Some lightheaded and dizzy if gets up to quickly. No loc/syncope. No headaches or vision changes. No chest pain. Blood pressure is low today (10/2021)     -  has parkinson. [facility] [place] [facility] new neurologist at [facility].  was started on caribodopa/levodopa increased to 1 tablet three times a day. On Rasagiline. Added remeron. Concern over memory. Neurology office visit reviewed from care everywhere from (2021) - Increase sinemet to 1 tablet 3x per day, continue rasagline and remeron, for repeat memory testing, f/u 4 months.  has joined [employer] to help with parkinsons symptoms. neuropsych ()  - dx vascular dementia     States having anxiety and depression. States not moving around a lot. States restless. Started on remeron. Improved. States having constipation. Taking fiber supplements. States has CAD. Follows with cardiology locally and at [facility]. States s/p stents. States ischemic cardiomyopathy. On metoprolol, losartan, atorvastatin, aspirin and plavix. Last visit with cardio per reviewed note () - sugg stop asa, isch cardiomyopathy. Decided to keep both aspirin and plavix. Follow up in 1 year.  has COPD. States breathing is fair. Has seen pulmonary once, not following. States placed on breo - stopped. Stable. States has high cholesterol. States trying to watch diet. On atorvastatin, no reported side effects. Last lab (2021) stable.     - last showed elevated A1c. LAst was 5.7 (10/2021)      States has sleep apnea. States on cpap. States wears 6-8 hours per night, helps symptoms. States has had TIA. States had dizziness and difficulty walking. On antiplatelets (plavix and aspirin)      States has enlarged prostate. Has seen urology. States has urinary frequency. States flomax taking infrequently 1-2 times per week. Concern for dizzy and lightheaded    Vitamin D def. States on otc supplement. - H/o cholangitis. Had MRCP that suggested stricture and inflammation at the head of the pancreas. f/u ERCP (6/21) - Severe stricture at distal most portion of CBD, CBD stones  ERCP with stent removal.     - labs from 10/19/2021 reviewed with patient 10/27/2021    Health Maintenance   - immunizations:   Influenza Vaccination - declines   Pneumonia Vaccination - (9/20219) - prevnar 13, (7/2021) - pneumococcal 23   Zoster/Shingles Vaccine - (2016) - zostavax   Tetanus Vaccination  covid (1/2021) #1, (2/12/2021) #2 - pfizer     - Screenings:   Colonoscopy (2013) - normal, (5/21) - diverticulosis, otherwise neg f/u 10yrs  Prostate     ROS:  Review of Systems   Constitutional: Positive for fatigue. Negative for appetite change, chills, fever and unexpected weight change. HENT: Negative for congestion, rhinorrhea and sore throat. Eyes: Negative for pain and visual disturbance. Respiratory: Positive for shortness of breath. Negative for cough and chest tightness. Cardiovascular: Negative for chest pain and palpitations. Gastrointestinal: Negative for abdominal pain, blood in stool, constipation, diarrhea, nausea and vomiting. Genitourinary: Negative for difficulty urinating, dysuria, hematuria, scrotal swelling, testicular pain and urgency. Musculoskeletal: Negative for arthralgias and gait problem. Skin: Negative for rash. Neurological: Positive for dizziness. Negative for syncope, weakness, light-headedness and headaches. Hematological: Negative for adenopathy. Does not bruise/bleed easily. heart failure), NYHA class I, chronic, systolic ([facility]) 2[address]    [facility]-- EF 39%    Chronic frontoethmoidal sinusitis 2018    Chronic maxillary sinusitis 2018    COPD (chronic obstructive pulmonary disease) ([address] 2018    GARSIA (dyspnea on exertion)     follows with Dr Aidan Chapman Gallbladder disease     History of non-ST elevation myocardial infarction (NSTEMI)     [facility]    Hyperlipidemia     Hypertension     Ischemic cardiomyopathy     EF 40% after NSTEMI, 11-15 at [facility]    Obesity     Osteoarthritis     Skin cancer of eyelid     Sleep apnea     uses CPAP    Smokeless tobacco use     Stented coronary artery 2006    x1       Past Surgical History:   Procedure Laterality Date    CARDIAC CATHETERIZATION Left 2015    done [facility] WITH IMPLANT Bilateral     CHOLECYSTECTOMY, LAPAROSCOPIC N/A 2019    LAPAROSCOPIC ROBOTIC XI ASSISTED CHOLECYSTECTOMY performed by Marika Iraheta MD at [facility]      CORONARY ANGIOPLASTY  06    DIAGNOSTIC CARDIAC CATH LAB PROCEDURE  06    ERCP N/A 2021    ERCP STONE REMOVAL performed by Andre Clement MD at [address] ERCP N/A 2021    ERCP STENT REMOVAL performed by Andre Clement MD at [address] Left 2015    ROTATOR CUFF REPAIR      bilaterally    TONSILLECTOMY AND ADENOIDECTOMY      UPPER GASTROINTESTINAL ENDOSCOPY      UPPER GASTROINTESTINAL ENDOSCOPY N/A 2021    EGD ESOPHAGOGASTRODUODENOSCOPY WITH PANCREATIC STENT REMOVAL performed by Andre Clement MD at [facility] ENDOSCOPY       Family History   Problem Relation Age of Onset    Heart Disease Mother          79 Y/O    Diabetes Mother     Liver Disease Father          66 Y/O       Social History     Socioeconomic History    Marital status:      Spouse name: Not on file    Number of children: Not on file    Years of education: Not on file    Highest education level: Not on file   Occupational History    Occupation: retired-     Tobacco Use    Smoking status: Former Smoker     Packs/day: 1.00     Years: 15.00     Pack years: 15.00     Types: Cigarettes     Start date: 1957     Quit date: 1975     Years since quittin.8    Smokeless tobacco: Current User     Types: Snuff   Vaping Use    Vaping Use: Never used   Substance and Sexual Activity    Alcohol use: Yes     Comment: occassionally    Drug use: No    Sexual activity: Not on file   Other Topics Concern    Not on file   Social History Narrative    Not on file     Social Determinants of Health     Financial Resource Strain: Low Risk     Difficulty of Paying Living Expenses: Not hard at all   Food Insecurity: No Food Insecurity    Worried About [address] in the Last Year: Never true    [address] in the Last Year: Never true   Transportation Needs:     Lack of Transportation (Medical):  Lack of Transportation (Non-Medical):    Physical Activity:     Days of Exercise per Week:     Minutes of Exercise per Session:    Stress:     Feeling of Stress :    Social Connections:     Frequency of Communication with Friends and Family:     Frequency of Social Gatherings with Friends and Family:     Attends Denominational Services:     Active Member of Clubs or Organizations:     Attends Club or Organization Meetings:     Marital Status:    Intimate Partner Violence:     Fear of Current or Ex-Partner:     Emotionally Abused:     Physically Abused:     Sexually Abused:        Vitals:    10/27/21 1258 10/27/21 1304 10/27/21 1310   BP: (!) 88/54 (!) 82/54 94/60   Site: Left Upper Arm Right Upper Arm Left Upper Arm   Position: Sitting Sitting Sitting   Cuff Size: Large Adult Large Adult Medium Adult   Pulse: 83     Temp: 97.7 °F (36.5 °C)     SpO2: 99%     Weight: 201 lb (91.2 kg)     Height: 6' 1\" (1.854 m)         Exam:  Physical Exam  Vitals reviewed.    Constitutional: Appearance: He is well-developed. HENT:      Head: Normocephalic and atraumatic. Right Ear: External ear normal. There is impacted cerumen. Left Ear: External ear normal. There is impacted cerumen. Eyes:      Pupils: Pupils are equal, round, and reactive to light. Neck:      Thyroid: No thyromegaly. Cardiovascular:      Rate and Rhythm: Normal rate and regular rhythm. Heart sounds: Normal heart sounds. No murmur heard. Pulmonary:      Effort: Pulmonary effort is normal.      Breath sounds: Normal breath sounds. No wheezing or rales. Abdominal:      General: Bowel sounds are normal.      Palpations: Abdomen is soft. Tenderness: There is no abdominal tenderness. There is no guarding or rebound. Musculoskeletal:         General: Normal range of motion. Cervical back: Normal range of motion and neck supple. Comments: Abnormal gait. Lymphadenopathy:      Cervical: No cervical adenopathy. Skin:     General: Skin is warm and dry. Neurological:      Mental Status: He is alert and oriented to person, place, and time. Cranial Nerves: No cranial nerve deficit.       Comments: Resting tremor    Psychiatric:         Judgment: Judgment normal.         Assessment and Plan:    Diagnoses and all orders for this visit:    Essential hypertension  - watch diet   - monitor blood pressure at home   - on losartn   - on metoprolol   - on aspirin and plavix   - blood pressure is low - will decrease metoprolol to 25mg (10/2021)     Parkinson disease ([facility]  - following with neurology at [facility]   - on rasagiline   - on carbidopa and levodopa tid increased to 1 tablet tid (8/2021)   - stable      Moderate episode of recurrent major depressive disorder [facility])  - son was on call during office (12/2020)   - now on remeron to help with anxiety and depression   - stable per patient     Coronary artery disease involving native coronary artery of native heart without angina pectoris  - following with cardiology   - on losartan   - on metoprolol - will decrease metoprolol to 25mg (10/2021)   - on atorvastatin   - on aspirin and Plavix   - stable     Mixed hyperlipidemia  - watch diet   - on atorvastatin   - follow labs   - last lab (7/2021) - stable     Ischemic cardiomyopathy  - following with cardiology   - on losartn   - on metoprolol - will decrease metoprolol to 25mg (10/2021)   - on atrovastatin   - on aspirin and plavix     CHF (congestive heart failure), NYHA class I, chronic, systolic ([facility])  - following with cardiology   - on losartn   - on metoprolol - will decrease metoprolol to 25mg (10/2021)   - on atrovastatin   - on aspirin and plavix   - Stable     Chronic obstructive pulmonary disease, unspecified COPD type ([facility]  - not currently following with pulmonary   - did not see benefit with breo   - stable per patient     Impaired fasting glucose  - continue present treatment  - watch diet   - follow A1c - last A1c (7/2021) 5.4    Obstructive sleep apnea syndrome  - Continue present treatment   - on CPAP  - wearing nightly 6-8 hours   - helping symptoms   - stable     TIA (transient ischemic attack)  - on atrovastatin   - on aspirin and plavix   - stable     Prostate enlargement  - continue present treatment   - has seen urology   - Has flomax - takes as needed  - stable     Vitamin D def  - On otc supplement   - follow labs     Other constipation  - discussed colace 100mg daily and miralax as needed  - taking mutamucil     Bilateral impacted cerumen  - attempted removal with curette - some removed but cerumen remained, no noted truama   - attempt lavage     Cholangitis  - hospital stay (6/2020) and (4/2021)   - s/p ERCP and papillotomy and stent placement   - s/p ERCP with stent removal (6/2021)    - improved     Return in about 3 months (around 1/27/2022) for check up and review and labs.     Orders Placed This Encounter   Procedures    Comprehensive Metabolic Panel     Standing Status:   Future Standing Expiration Date:   10/27/2022    Magnesium     Standing Status:   Future     Standing Expiration Date:   10/27/2022    Lipid, Fasting     Standing Status:   Future     Standing Expiration Date:   10/27/2022    Hemoglobin A1C     Standing Status:   Future     Standing Expiration Date:   10/27/2022    Vitamin D 25 Hydroxy     Standing Status:   Future     Standing Expiration Date:   10/27/2022    TSH without Reflex     Standing Status:   Future     Standing Expiration Date:   1/27/2022    Urinalysis     Standing Status:   Future     Standing Expiration Date:   10/27/2022    CBC Auto Differential     Standing Status:   Future     Standing Expiration Date:   10/27/2022    Vitamin B12 & Folate     Standing Status:   Future     Standing Expiration Date:   10/27/2022    PSA screening     Standing Status:   Future     Standing Expiration Date:   10/27/2022    NV REMOVAL IMPACTED CERUMEN IRRIGATION/LVG UNILAT     Requested Prescriptions     Signed Prescriptions Disp Refills    metoprolol succinate (TOPROL XL) 25 MG extended release tablet 90 tablet 1     Sig: Take 1 tablet by mouth daily     Jayna Atkins MD  10/27/2021  1:43 PM

## 2021-11-03 ENCOUNTER — NURSE ONLY (OUTPATIENT)
Dept: FAMILY MEDICINE CLINIC | Age: 81
End: 2021-11-03
Payer: MEDICARE

## 2021-11-03 DIAGNOSIS — Z23 NEED FOR INFLUENZA VACCINATION: Primary | ICD-10-CM

## 2021-11-03 PROCEDURE — 90694 VACC AIIV4 NO PRSRV 0.5ML IM: CPT | Performed by: INTERNAL MEDICINE

## 2021-11-03 PROCEDURE — G0008 ADMIN INFLUENZA VIRUS VAC: HCPCS | Performed by: INTERNAL MEDICINE

## 2021-11-15 ENCOUNTER — TELEPHONE (OUTPATIENT)
Dept: PHARMACY | Facility: CLINIC | Age: 81
End: 2021-11-15

## 2021-11-15 NOTE — TELEPHONE ENCOUNTER
POPULATION HEALTH CLINICAL PHARMACY REVIEW: ADHERENCE REVIEW  Identified care gap per [employer]; fills at [address]: ACE/ARB and Statin adherence    Last Visit: 10/27/21    Patient not found in Outcomes MTM    ASSESSMENT  ACE/ARB ADHERENCE    Per [facility]24/21 [facility] YTD [facility] = Filled only once; Potential Fail Date: 100):   LOSARTAN POT TAB 25MG last filled on 727/21 for 90 day supply. Next refill due: 10/25/21    Per Reconciled Dispense Report:  LOSARTAN POT TAB 25MG last filled on 7/27/21 for 90 day supply. Per [employer] Records  LOSARTAN POT TAB 25MG last filled on 7/27/21 for 90 days with [facility]    Per [address]:   LOSARTAN POT TAB 25MG last picked up on 7/27/21 for 90 day supply. 1 refills remaining. Billed through [facility], they will not process fill due to patient having an unpaid balance of $31.81 Patient to call and get that taken care of in order to process refill      BP Readings from Last 3 Encounters:   10/27/21 94/60   07/27/21 102/60   07/20/21 128/80     Estimated Creatinine Clearance: 65 mL/min (based on SCr of 1 mg/dL). [address] [facility]    Per Insurance Records through 10/24/21 (2020 [facility] [place] = 99%; YTD PDC = 10/16/21%; Failed in 2021):   ATORVASTATIN TAB 80MG last filled on 7/27/21 for 90 day supply. Next refill due: 10/25/21    Per Reconciled Dispense Report:  ATORVASTATIN TAB 80MG last filled on 7/27/21 for 90 day supply. Per [facility]:   ATORVASTATIN TAB 80MG  last picked up on 7/27/21 for 90 day supply. 1 refills remaining.  Billed through [facility], they will not process fill due to patient having an unpaid balance of $31.81 Patient to call and get that taken care of in order to process refill      Lab Results   Component Value Date    CHOL 87 03/16/2019    TRIG 46 03/16/2019    HDL 52 10/19/2021    LDLCALC 80 10/19/2021     ALT   Date Value Ref Range Status   10/19/2021 9 0 - 40 U/L Final     AST Date Value Ref Range Status   10/19/2021 13 0 - 39 U/L Final     The ASCVD Risk score (Stacey Steve., et al., 2013) failed to calculate for the following reasons: The 2013 ASCVD risk score is only valid for ages 36 to 78     PLAN  The following are interventions that have been identified:   - Patient overdue refilling Losartan and Atorvastatin and active on home medication list.     attempted to reach patient in regards to medication refills. Per [facility] unable to process fill due to outstanding balance. They advised that patient call to take care of balance so they could send refills to him. Told patient to return call    Spoke with patient. He said he will call [facility] to get everything squared away. 21    For Allen Alcazar in place:  No   Recommendation Provided To: Patient/Caregiver: 2 via Telephone and Pharmacy: 1   Intervention Detail: Adherence Monitorin and Refill(s) Provided   Gap Closed?: No    Intervention Accepted By: Patient/Caregiver: 1   Time Spent (min): 15        Future Appointments   Date Time Provider Peter Martines   2022  1:30 PM Nicola Fregoso MD [address] [place].    [facility] free: [phone]

## 2022-02-01 ENCOUNTER — TELEPHONE (OUTPATIENT)
Dept: FAMILY MEDICINE CLINIC | Age: 82
End: 2022-02-01

## 2022-02-01 ENCOUNTER — OFFICE VISIT (OUTPATIENT)
Dept: FAMILY MEDICINE CLINIC | Age: 82
End: 2022-02-01
Payer: MEDICARE

## 2022-02-01 VITALS
TEMPERATURE: 97.5 F | WEIGHT: 191 LBS | SYSTOLIC BLOOD PRESSURE: 110 MMHG | OXYGEN SATURATION: 95 % | HEIGHT: 73 IN | BODY MASS INDEX: 25.31 KG/M2 | HEART RATE: 81 BPM | DIASTOLIC BLOOD PRESSURE: 80 MMHG

## 2022-02-01 DIAGNOSIS — G47.33 OBSTRUCTIVE SLEEP APNEA SYNDROME: ICD-10-CM

## 2022-02-01 DIAGNOSIS — J44.9 CHRONIC OBSTRUCTIVE PULMONARY DISEASE, UNSPECIFIED COPD TYPE (HCC): ICD-10-CM

## 2022-02-01 DIAGNOSIS — E55.9 VITAMIN D DEFICIENCY: ICD-10-CM

## 2022-02-01 DIAGNOSIS — I10 ESSENTIAL HYPERTENSION: ICD-10-CM

## 2022-02-01 DIAGNOSIS — F33.1 MODERATE EPISODE OF RECURRENT MAJOR DEPRESSIVE DISORDER (HCC): ICD-10-CM

## 2022-02-01 DIAGNOSIS — R73.01 IMPAIRED FASTING GLUCOSE: ICD-10-CM

## 2022-02-01 DIAGNOSIS — E78.2 MIXED HYPERLIPIDEMIA: ICD-10-CM

## 2022-02-01 DIAGNOSIS — I25.5 ISCHEMIC CARDIOMYOPATHY: ICD-10-CM

## 2022-02-01 DIAGNOSIS — Z79.899 LONG TERM CURRENT USE OF THERAPEUTIC DRUG: ICD-10-CM

## 2022-02-01 DIAGNOSIS — G20 PARKINSON DISEASE (HCC): ICD-10-CM

## 2022-02-01 DIAGNOSIS — I50.22 CHF (CONGESTIVE HEART FAILURE), NYHA CLASS I, CHRONIC, SYSTOLIC (HCC): ICD-10-CM

## 2022-02-01 DIAGNOSIS — G45.0 VERTEBROBASILAR ARTERY SYNDROME: ICD-10-CM

## 2022-02-01 DIAGNOSIS — Z12.5 SCREENING FOR MALIGNANT NEOPLASM OF PROSTATE: ICD-10-CM

## 2022-02-01 DIAGNOSIS — I25.10 CORONARY ARTERY DISEASE INVOLVING NATIVE CORONARY ARTERY OF NATIVE HEART WITHOUT ANGINA PECTORIS: ICD-10-CM

## 2022-02-01 DIAGNOSIS — J06.9 ACUTE UPPER RESPIRATORY INFECTION: Primary | ICD-10-CM

## 2022-02-01 LAB
ALBUMIN SERPL-MCNC: 4.2 G/DL (ref 3.5–5.2)
ALP BLD-CCNC: 58 U/L (ref 40–129)
ALT SERPL-CCNC: 11 U/L (ref 0–40)
ANION GAP SERPL CALCULATED.3IONS-SCNC: 14 MMOL/L (ref 7–16)
AST SERPL-CCNC: 14 U/L (ref 0–39)
BASOPHILS ABSOLUTE: 0.08 E9/L (ref 0–0.2)
BASOPHILS RELATIVE PERCENT: 0.9 % (ref 0–2)
BILIRUB SERPL-MCNC: 0.5 MG/DL (ref 0–1.2)
BILIRUBIN URINE: NEGATIVE
BLOOD, URINE: NEGATIVE
BUN BLDV-MCNC: 20 MG/DL (ref 6–23)
CALCIUM SERPL-MCNC: 9.5 MG/DL (ref 8.6–10.2)
CHLORIDE BLD-SCNC: 104 MMOL/L (ref 98–107)
CHOLESTEROL, FASTING: 179 MG/DL (ref 0–199)
CLARITY: CLEAR
CO2: 24 MMOL/L (ref 22–29)
COLOR: YELLOW
CREAT SERPL-MCNC: 0.8 MG/DL (ref 0.7–1.2)
EOSINOPHILS ABSOLUTE: 0.46 E9/L (ref 0.05–0.5)
EOSINOPHILS RELATIVE PERCENT: 5 % (ref 0–6)
FOLATE: 14.4 NG/ML (ref 4.8–24.2)
GFR AFRICAN AMERICAN: >60
GFR NON-AFRICAN AMERICAN: >60 ML/MIN/1.73
GLUCOSE BLD-MCNC: 104 MG/DL (ref 74–99)
GLUCOSE URINE: NEGATIVE MG/DL
HBA1C MFR BLD: 5.6 % (ref 4–5.6)
HCT VFR BLD CALC: 47.1 % (ref 37–54)
HDLC SERPL-MCNC: 52 MG/DL
HEMOGLOBIN: 14.6 G/DL (ref 12.5–16.5)
IMMATURE GRANULOCYTES #: 0.03 E9/L
IMMATURE GRANULOCYTES %: 0.3 % (ref 0–5)
KETONES, URINE: NEGATIVE MG/DL
LDL CHOLESTEROL CALCULATED: 92 MG/DL (ref 0–99)
LEUKOCYTE ESTERASE, URINE: NEGATIVE
LYMPHOCYTES ABSOLUTE: 3.31 E9/L (ref 1.5–4)
LYMPHOCYTES RELATIVE PERCENT: 36.2 % (ref 20–42)
Lab: NORMAL
MAGNESIUM: 2.2 MG/DL (ref 1.6–2.6)
MCH RBC QN AUTO: 27.7 PG (ref 26–35)
MCHC RBC AUTO-ENTMCNC: 31 % (ref 32–34.5)
MCV RBC AUTO: 89.2 FL (ref 80–99.9)
MONOCYTES ABSOLUTE: 0.76 E9/L (ref 0.1–0.95)
MONOCYTES RELATIVE PERCENT: 8.3 % (ref 2–12)
NEUTROPHILS ABSOLUTE: 4.5 E9/L (ref 1.8–7.3)
NEUTROPHILS RELATIVE PERCENT: 49.3 % (ref 43–80)
NITRITE, URINE: NEGATIVE
PDW BLD-RTO: 15.5 FL (ref 11.5–15)
PERFORMING INSTRUMENT: NORMAL
PH UA: 6 (ref 5–9)
PLATELET # BLD: 227 E9/L (ref 130–450)
PMV BLD AUTO: 11.2 FL (ref 7–12)
POTASSIUM SERPL-SCNC: 3.9 MMOL/L (ref 3.5–5)
PROSTATE SPECIFIC ANTIGEN: 3.91 NG/ML (ref 0–4)
PROTEIN UA: NEGATIVE MG/DL
QC PASS/FAIL: NORMAL
RBC # BLD: 5.28 E12/L (ref 3.8–5.8)
SARS-COV-2, POC: NORMAL
SODIUM BLD-SCNC: 142 MMOL/L (ref 132–146)
SPECIFIC GRAVITY UA: >=1.03 (ref 1–1.03)
TOTAL PROTEIN: 7.2 G/DL (ref 6.4–8.3)
TRIGLYCERIDE, FASTING: 173 MG/DL (ref 0–149)
UROBILINOGEN, URINE: 0.2 E.U./DL
VITAMIN B-12: 444 PG/ML (ref 211–946)
VITAMIN D 25-HYDROXY: 32 NG/ML (ref 30–100)
VLDLC SERPL CALC-MCNC: 35 MG/DL
WBC # BLD: 9.1 E9/L (ref 4.5–11.5)

## 2022-02-01 PROCEDURE — 87426 SARSCOV CORONAVIRUS AG IA: CPT | Performed by: INTERNAL MEDICINE

## 2022-02-01 PROCEDURE — 99214 OFFICE O/P EST MOD 30 MIN: CPT | Performed by: INTERNAL MEDICINE

## 2022-02-01 RX ORDER — ATORVASTATIN CALCIUM 80 MG/1
80 TABLET, FILM COATED ORAL DAILY
Qty: 90 TABLET | Refills: 1 | Status: SHIPPED
Start: 2022-02-01 | End: 2022-10-05 | Stop reason: SDUPTHER

## 2022-02-01 RX ORDER — CLOPIDOGREL BISULFATE 75 MG/1
75 TABLET ORAL DAILY
Qty: 90 TABLET | Refills: 1 | Status: SHIPPED
Start: 2022-02-01 | End: 2022-08-29 | Stop reason: SDUPTHER

## 2022-02-01 RX ORDER — MIRTAZAPINE 15 MG/1
15 TABLET, FILM COATED ORAL NIGHTLY
Qty: 90 TABLET | Refills: 1 | Status: SHIPPED
Start: 2022-02-01 | End: 2022-08-29 | Stop reason: SDUPTHER

## 2022-02-01 RX ORDER — CEFDINIR 300 MG/1
300 CAPSULE ORAL 2 TIMES DAILY
Qty: 14 CAPSULE | Refills: 0 | Status: SHIPPED | OUTPATIENT
Start: 2022-02-01 | End: 2022-02-08

## 2022-02-01 RX ORDER — LOSARTAN POTASSIUM 25 MG/1
25 TABLET ORAL DAILY
Qty: 90 TABLET | Refills: 1 | Status: SHIPPED
Start: 2022-02-01 | End: 2022-08-29 | Stop reason: SDUPTHER

## 2022-02-01 RX ORDER — RASAGILINE 1 MG/1
TABLET ORAL
Qty: 90 TABLET | Refills: 1 | Status: SHIPPED
Start: 2022-02-01 | End: 2022-08-29 | Stop reason: SDUPTHER

## 2022-02-01 ASSESSMENT — ENCOUNTER SYMPTOMS
RHINORRHEA: 1
SORE THROAT: 0
NAUSEA: 0
BLOOD IN STOOL: 0
DIARRHEA: 0
VOMITING: 0
SHORTNESS OF BREATH: 1
CONSTIPATION: 0
ABDOMINAL PAIN: 0
CHEST TIGHTNESS: 0
COUGH: 0
EYE PAIN: 0

## 2022-02-01 NOTE — PROGRESS NOTES
[facility]) Physicians   Internal Medicine     2022  Milli Luna : 1940 Sex: male  Age:81 y.o. Chief Complaint   Patient presents with    3 Month Follow-Up    Head Congestion     Runny nose & head congestion x 2 days 22    Hypertension    COPD    Tremors     parkinson        HPI:   Patient presents to office for evaluation of the following medical concerns. -  has been having cold symptoms. States has been present for last 3-4 days. States rhinorrhea. States nasal congestion. No sore throat. No ear pain or pressure. No cough. No fever. States chills. No loss of taste or smell that he is aware of. o chest pain or SOB. No nausea or vomiting. States being tested fr covid     States has high blood pressure. States occasionally checks blood pressure at home. On metoprolol, on losartan. Some lightheaded and dizzy if gets up to quickly. No loc/syncope. No headaches or vision changes. No chest pain. Blood pressure is low today (10/2021)     -  has parkinson. [facility] has established [facility] new neurologist at [facility].  was started on caribodopa/levodopa increased to 1 tablet three times a day. On Rasagiline. Added remeron. Concern over memory. Neurology office visit reviewed from care everywhere from (2022) - continue sinemet to 1 tablet 3x per day, continue rasagline and remeron, for repeat memory testing with [facility] in 6 months.  has joined [employer] to help with parkinsons symptoms. neuropsych ()  - dx vascular dementia. To see [facility]. States having anxiety and depression. States not moving around a lot. States restless. Started on remeron. Improved. States having constipation. Taking fiber supplements. States has CAD. Follows with cardiology locally and at [facility]. States s/p stents. States ischemic cardiomyopathy. On metoprolol, losartan, atorvastatin, aspirin and plavix.  Last visit with cardio per reviewed note () - sugg stop asa, isch cardiomyopathy. Follow up in 1 year. States has COPD. States breathing is fair. Has seen pulmonary once, not following. States placed on breo - stopped. Stable. States has high cholesterol. States trying to watch diet. On atorvastatin, no reported side effects. Last lab (7/2021) stable. - last showed elevated A1c. LAst was 5.7 (10/2021)      States has sleep apnea. States on cpap. States wears 6-8 hours per night, helps symptoms. States has had TIA. States had dizziness and difficulty walking. On antiplatelets (plavix and aspirin)      States has enlarged prostate. Has seen urology. States has urinary frequency. States flomax taking infrequently 1-2 times per week. Concern for dizzy and lightheaded    Vitamin D def. States on otc supplement. - H/o cholangitis. Had MRCP that suggested stricture and inflammation at the head of the pancreas. f/u ERCP (6/21) - Severe stricture at distal most portion of CBD, CBD stones  ERCP with stent removal.     - labs from 10/19/2021 reviewed with patient 2/1/2022    Health Maintenance   - immunizations:   Influenza Vaccination - (2021)   Pneumonia Vaccination - (9/20219) - prevnar 13, (7/2021) - pneumococcal 23   Zoster/Shingles Vaccine - (2016) - zostavax   Tetanus Vaccination  covid (1/2021) #1, (2/12/2021) #2, (11/21/2021) # 3 - pfizer     - Screenings:   Colonoscopy (2013) - normal, (5/21) - diverticulosis, otherwise neg f/u 10yrs  Prostate     ROS:  Review of Systems   Constitutional: Positive for chills and fatigue. Negative for appetite change, fever and unexpected weight change. HENT: Positive for congestion and rhinorrhea. Negative for sore throat. Eyes: Negative for pain and visual disturbance. Respiratory: Positive for shortness of breath. Negative for cough and chest tightness. Cardiovascular: Negative for chest pain and palpitations.    Gastrointestinal: Negative for abdominal pain, blood in stool, constipation, diarrhea, nausea and vomiting. Genitourinary: Negative for difficulty urinating, dysuria, hematuria, scrotal swelling, testicular pain and urgency. Musculoskeletal: Negative for arthralgias and gait problem. Skin: Negative for rash. Neurological: Positive for dizziness. Negative for syncope, weakness, light-headedness and headaches. Hematological: Negative for adenopathy. Does not bruise/bleed easily. Psychiatric/Behavioral: Negative for suicidal ideas. The patient is not nervous/anxious. Current Outpatient Medications:     rasagiline mesylate 1 MG TABS, take 1 tablet by mouth once daily, Disp: 90 tablet, Rfl: 1    mirtazapine (REMERON) 15 MG tablet, Take 1 tablet by mouth nightly, Disp: 90 tablet, Rfl: 1    losartan (COZAAR) 25 MG tablet, Take 1 tablet by mouth daily, Disp: 90 tablet, Rfl: 1    clopidogrel (PLAVIX) 75 MG tablet, Take 1 tablet by mouth daily, Disp: 90 tablet, Rfl: 1    atorvastatin (LIPITOR) 80 MG tablet, Take 1 tablet by mouth daily, Disp: 90 tablet, Rfl: 1    cefdinir (OMNICEF) 300 MG capsule, Take 1 capsule by mouth 2 times daily for 7 days, Disp: 14 capsule, Rfl: 0    metoprolol succinate (TOPROL XL) 25 MG extended release tablet, Take 1 tablet by mouth daily, Disp: 90 tablet, Rfl: 1    carbidopa-levodopa (SINEMET)  MG per tablet, Take 1 tablet by mouth 3 times daily New prescription, Disp: , Rfl:     ketoconazole (NIZORAL) 2 % cream, Apply topically daily. , Disp: 30 g, Rfl: 1    nitroGLYCERIN (NITROSTAT) 0.4 MG SL tablet, Place 1 tablet under the tongue every 5 minutes as needed for Chest pain, Disp: 25 tablet, Rfl: 1    Multiple Vitamins-Minerals (THERAPEUTIC MULTIVITAMIN-MINERALS) tablet, Take 1 tablet by mouth daily, Disp: , Rfl:     CPAP Machine MISC, daily at bedtime. , Disp: , Rfl:     Cholecalciferol (VITAMIN D) 2000 UNIT CAPS capsule, Take 1 capsule by mouth daily Last taken 05/13, Disp: , Rfl:     tamsulosin (FLOMAX) 0.4 MG capsule, Take 1 capsule by mouth daily (Patient not taking: Reported on 2/1/2022), Disp: 90 capsule, Rfl: 1    Allergies   Allergen Reactions    Lisinopril Itching       Past Medical History:   Diagnosis Date    CAD (coronary artery disease) 2006    follows with Dr Gertrude Butt    Cerebral artery occlusion with cerebral infarction [facility])     Cerebrovascular disease     CHF (congestive heart failure), NYHA class I, chronic, systolic ([facility]    [facility]-- EF 39%    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/2018    Chronic maxillary sinusitis 04/2018    COPD (chronic obstructive pulmonary disease) ([facility] 04/2018    GARSIA (dyspnea on exertion)     follows with Dr Basilio Patrick Gallbladder disease     History of non-ST elevation myocardial infarction (NSTEMI) 2015    [facility]    Hyperlipidemia     Hypertension     Ischemic cardiomyopathy     EF 40% after NSTEMI, 11-15 at [facility]    Obesity     Osteoarthritis     Skin cancer of eyelid     Sleep apnea     uses CPAP    Smokeless tobacco use     Stented coronary artery 2006    x1       Past Surgical History:   Procedure Laterality Date    CARDIAC CATHETERIZATION Left 11/30/2015    done [facility] WITH IMPLANT Bilateral     CHOLECYSTECTOMY, LAPAROSCOPIC N/A 5/17/2019    LAPAROSCOPIC ROBOTIC XI ASSISTED CHOLECYSTECTOMY performed by Vijay Briggs MD at [facility]  2014    CORONARY ANGIOPLASTY  1/23/06    DIAGNOSTIC CARDIAC CATH LAB PROCEDURE  1/23/06    ERCP N/A 4/26/2021    ERCP STONE REMOVAL performed by Sacha Dewitt MD at [id number] [facility] ERCP N/A 6/16/2021    ERCP STENT REMOVAL performed by Sacha Dewitt MD at [facility] Left 02/2015    ROTATOR CUFF REPAIR      bilaterally   [address] ENDOSCOPY  2015    UPPER GASTROINTESTINAL ENDOSCOPY N/A 4/28/2021    EGD ESOPHAGOGASTRODUODENOSCOPY WITH PANCREATIC STENT REMOVAL performed by Sacha Dewitt MD at [facility] ENDOSCOPY       Family History Problem Relation Age of Onset    Heart Disease Mother          81 Y/O    Diabetes Mother     Liver Disease Father          68 Y/O       Social History     Socioeconomic History    Marital status:      Spouse name: Not on file    Number of children: Not on file    Years of education: Not on file    Highest education level: Not on file   Occupational History    Occupation: retired-     Tobacco Use    Smoking status: Former Smoker     Packs/day: 1.00     Years: 15.00     Pack years: 15.00     Types: Cigarettes     Start date: 1957     Quit date: 1975     Years since quittin.3    Smokeless tobacco: Current User     Types: Snuff   Vaping Use    Vaping Use: Never used   Substance and Sexual Activity    Alcohol use: Yes     Comment: occassionally    Drug use: No    Sexual activity: Not on file   Other Topics Concern    Not on file   Social History Narrative    Not on file     Social Determinants of Health     Financial Resource Strain: Low Risk     Difficulty of Paying Living Expenses: Not hard at all   Food Insecurity: No Food Insecurity    Worried About [address] in the Last Year: Never true    [address] [employer] in the Last Year: Never true   Transportation Needs:     Lack of Transportation (Medical): Not on file    Lack of Transportation (Non-Medical):  Not on file   Physical Activity:     Days of Exercise per Week: Not on file    Minutes of Exercise per Session: Not on file   Stress:     Feeling of Stress : Not on file   Social Connections:     Frequency of Communication with Friends and Family: Not on file    Frequency of Social Gatherings with Friends and Family: Not on file    Attends [employer]ism Services: Not on file    Active Member of Clubs or Organizations: Not on file    Attends Club or Organization Meetings: Not on file    Marital Status: Not on file   Intimate Partner Violence:     Fear of Current or Ex-Partner: Not on file   Avery Hannah Emotionally Abused: Not on file    Physically Abused: Not on file    Sexually Abused: Not on file   Housing Stability:     Unable to Pay for Housing in the Last Year: Not on file    Number of Places Lived in the Last Year: Not on file    Unstable Housing in the Last Year: Not on file       Vitals:    02/01/22 0845   BP: 110/80   Pulse: 81   Temp: 97.5 °F (36.4 °C)   SpO2: 95%   Weight: 191 lb (86.6 kg)   Height: 6' 1\" (1.854 m)       Exam:  Physical Exam  Vitals reviewed. Constitutional:       Appearance: He is well-developed. HENT:      Head: Normocephalic and atraumatic. Right Ear: External ear normal. There is impacted cerumen. Left Ear: External ear normal. There is impacted cerumen. Eyes:      Pupils: Pupils are equal, round, and reactive to light. Neck:      Thyroid: No thyromegaly. Cardiovascular:      Rate and Rhythm: Normal rate and regular rhythm. Heart sounds: Normal heart sounds. No murmur heard. Pulmonary:      Effort: Pulmonary effort is normal.      Breath sounds: Normal breath sounds. No wheezing or rales. Abdominal:      General: Bowel sounds are normal.      Palpations: Abdomen is soft. Tenderness: There is no abdominal tenderness. There is no guarding or rebound. Musculoskeletal:         General: Normal range of motion. Cervical back: Normal range of motion and neck supple. Comments: Abnormal gait. Lymphadenopathy:      Cervical: No cervical adenopathy. Skin:     General: Skin is warm and dry. Neurological:      Mental Status: He is alert and oriented to person, place, and time. Cranial Nerves: No cranial nerve deficit.       Comments: Resting tremor    Psychiatric:         Judgment: Judgment normal.         Assessment and Plan:    Diagnoses and all orders for this visit:    Acute upper respiratory infection  - trial of antihistamine   - trial of steroid nasal spray   - covid is negative on rapid testing   - add cefdinir   - call if not improving or worsening     Essential hypertension  - watch diet   - monitor blood pressure at home   - on losartn   - on metoprolol - changed to metoprolol to 25mg (10/2021)  - on plavix   - off aspirin   - stable 2/1/2022    Parkinson disease ([facility]  - following with neurology at [facility]   - on rasagiline   - on carbidopa and levodopa tid increased to 1 tablet tid (8/2021)   - stable      Moderate episode of recurrent major depressive disorder [facility])  - son was on call during office (12/2020)   - now on remeron to help with anxiety and depression   - stable per patient   - last PHQ socre of 0 (7/2021)     Coronary artery disease involving native coronary artery of native heart without angina pectoris  - following with cardiology   - on losartan   - on metoprolol - will decrease metoprolol to 25mg (10/2021)   - on atorvastatin   - on Plavix   - off aspirin   - stable     Mixed hyperlipidemia  - watch diet   - on atorvastatin   - follow labs   - last lab (10/2021) - stable     Ischemic cardiomyopathy  - following with cardiology   - on losartn   - on metoprolol - will decrease metoprolol to 25mg (10/2021)   - on atrovastatin   - on plavix   - off aspirin     CHF (congestive heart failure), NYHA class I, chronic, systolic (HCC)  - following with cardiology   - on losartn   - on metoprolol - will decrease metoprolol to 25mg (10/2021)   - on atrovastatin   - on plavix   - off aspirin   - Stable     Chronic obstructive pulmonary disease, unspecified COPD type ([facility]  - not currently following with pulmonary   - did not see benefit with breo   - stable per patient     Impaired fasting glucose  - continue present treatment  - watch diet   - follow A1c - last A1c (7/2021) 5.4    Obstructive sleep apnea syndrome  - Continue present treatment   - on CPAP  - wearing nightly 6-8 hours   - helping symptoms   - stable     TIA (transient ischemic attack)  - on atrovastatin   - on plavix   - off aspirin   - stable     Prostate enlargement  - continue present treatment   - has seen urology   - Has flomax - takes as needed  - stable     Vitamin D def  - On otc supplement   - follow labs     Other constipation  - discussed colace 100mg daily and miralax as needed  - taking mutamucil     Bilateral impacted cerumen  - attempted removal with curette - some removed but cerumen remained, no noted truama   - attempt lavage     Cholangitis  - hospital stay (6/2020) and (4/2021)   - s/p ERCP and papillotomy and stent placement   - s/p ERCP with stent removal (6/2021)    - improved     Return in about 3 months (around 5/1/2022) for check up and review. Orders Placed This Encounter   Procedures    POCT COVID-19, Antigen     Order Specific Question:   Is this test for diagnosis or screening? Answer:   Diagnosis of ill patient     Order Specific Question:   Symptomatic for COVID-19 as defined by CDC? Answer:   Yes     Order Specific Question:   Date of Symptom Onset     Answer:   1/30/2022     Order Specific Question:   Hospitalized for COVID-19? Answer:   No     Order Specific Question:   Admitted to ICU for COVID-19? Answer:   No     Order Specific Question:   Employed in healthcare setting? Answer:   No     Order Specific Question:   Resident in a congregate (group) care setting? Answer:   No     Order Specific Question:   Pregnant: Answer:   No     Order Specific Question:   Previously tested for COVID-19?      Answer:   Yes     Requested Prescriptions     Signed Prescriptions Disp Refills    rasagiline mesylate 1 MG TABS 90 tablet 1     Sig: take 1 tablet by mouth once daily    mirtazapine (REMERON) 15 MG tablet 90 tablet 1     Sig: Take 1 tablet by mouth nightly    losartan (COZAAR) 25 MG tablet 90 tablet 1     Sig: Take 1 tablet by mouth daily    clopidogrel (PLAVIX) 75 MG tablet 90 tablet 1     Sig: Take 1 tablet by mouth daily    atorvastatin (LIPITOR) 80 MG tablet 90 tablet 1     Sig: Take 1 tablet by mouth daily    cefdinir (OMNICEF) 300 MG capsule 14 capsule 0     Sig: Take 1 capsule by mouth 2 times daily for 7 days     Miryam Norwood MD  2/1/2022  9:23 AM

## 2022-02-01 NOTE — TELEPHONE ENCOUNTER
Please let the patient know that blood work results showed    PSA for prostate was still in the normal range but increased when compared to previous. Would recommend referral back to urology    Vitamin B12 level was in the normal range but on the low end of normal would recommend vitamin B12 supplement if not already taking    Folic acid level was normal    Sugar was mildly elevated. Hemoglobin A1c is a measure 3-month sugar control was stable at 5.6    Cholesterol levels were fair but slightly elevated when compared to previous. Triglyceride levels were also slightly elevated.   Recommend to continue present medications    Vitamin D level was normal but on the low end of normal and would recommend to continue supplement    Urine analysis was normal    Blood counts were normal    Electrolytes, liver functions, and kidney function values are normal    Thanks

## 2022-02-21 ENCOUNTER — TELEPHONE (OUTPATIENT)
Dept: FAMILY MEDICINE CLINIC | Age: 82
End: 2022-02-21

## 2022-02-21 NOTE — TELEPHONE ENCOUNTER
Pharmacy clarification request:    Rasagiline with Mirtazapine is not recommended due to increased risk of serotonin syndrome. Should pt remain on medication?

## 2022-05-24 ENCOUNTER — OFFICE VISIT (OUTPATIENT)
Dept: FAMILY MEDICINE CLINIC | Age: 82
End: 2022-05-24
Payer: MEDICARE

## 2022-05-24 VITALS
TEMPERATURE: 98.2 F | BODY MASS INDEX: 25.18 KG/M2 | RESPIRATION RATE: 16 BRPM | HEART RATE: 79 BPM | HEIGHT: 73 IN | WEIGHT: 190 LBS | OXYGEN SATURATION: 100 % | DIASTOLIC BLOOD PRESSURE: 80 MMHG | SYSTOLIC BLOOD PRESSURE: 150 MMHG

## 2022-05-24 DIAGNOSIS — E78.2 MIXED HYPERLIPIDEMIA: ICD-10-CM

## 2022-05-24 DIAGNOSIS — I10 ESSENTIAL HYPERTENSION: Primary | ICD-10-CM

## 2022-05-24 DIAGNOSIS — J44.9 CHRONIC OBSTRUCTIVE PULMONARY DISEASE, UNSPECIFIED COPD TYPE (HCC): ICD-10-CM

## 2022-05-24 DIAGNOSIS — R23.8 OTHER SKIN CHANGES: ICD-10-CM

## 2022-05-24 DIAGNOSIS — G20 PARKINSON DISEASE (HCC): ICD-10-CM

## 2022-05-24 DIAGNOSIS — R53.83 OTHER FATIGUE: ICD-10-CM

## 2022-05-24 DIAGNOSIS — R73.01 IMPAIRED FASTING GLUCOSE: ICD-10-CM

## 2022-05-24 DIAGNOSIS — E55.9 VITAMIN D DEFICIENCY: ICD-10-CM

## 2022-05-24 DIAGNOSIS — I50.22 CHF (CONGESTIVE HEART FAILURE), NYHA CLASS I, CHRONIC, SYSTOLIC (HCC): ICD-10-CM

## 2022-05-24 DIAGNOSIS — N40.0 PROSTATE ENLARGEMENT: ICD-10-CM

## 2022-05-24 DIAGNOSIS — R97.20 INCREASED PROSTATE SPECIFIC ANTIGEN (PSA) VELOCITY: ICD-10-CM

## 2022-05-24 DIAGNOSIS — Z79.899 LONG TERM CURRENT USE OF THERAPEUTIC DRUG: ICD-10-CM

## 2022-05-24 DIAGNOSIS — I25.5 ISCHEMIC CARDIOMYOPATHY: ICD-10-CM

## 2022-05-24 DIAGNOSIS — I25.10 CORONARY ARTERY DISEASE INVOLVING NATIVE CORONARY ARTERY OF NATIVE HEART WITHOUT ANGINA PECTORIS: ICD-10-CM

## 2022-05-24 DIAGNOSIS — F33.1 MODERATE EPISODE OF RECURRENT MAJOR DEPRESSIVE DISORDER (HCC): ICD-10-CM

## 2022-05-24 DIAGNOSIS — K59.09 OTHER CONSTIPATION: ICD-10-CM

## 2022-05-24 DIAGNOSIS — G47.33 OBSTRUCTIVE SLEEP APNEA SYNDROME: ICD-10-CM

## 2022-05-24 PROCEDURE — 99214 OFFICE O/P EST MOD 30 MIN: CPT | Performed by: INTERNAL MEDICINE

## 2022-05-24 PROCEDURE — 1123F ACP DISCUSS/DSCN MKR DOCD: CPT | Performed by: INTERNAL MEDICINE

## 2022-05-24 RX ORDER — KETOCONAZOLE 20 MG/ML
SHAMPOO TOPICAL
Qty: 100 ML | Refills: 2 | Status: SHIPPED | OUTPATIENT
Start: 2022-05-24

## 2022-05-24 ASSESSMENT — ENCOUNTER SYMPTOMS
EYE PAIN: 0
CHEST TIGHTNESS: 0
SHORTNESS OF BREATH: 1
COUGH: 0
VOMITING: 0
DIARRHEA: 0
CONSTIPATION: 0
RHINORRHEA: 1
BLOOD IN STOOL: 0
ABDOMINAL PAIN: 0
NAUSEA: 0
SORE THROAT: 0

## 2022-05-24 NOTE — PROGRESS NOTES
[facility]) Physicians   Internal Medicine     2022  Alma Ross : 1940 Sex: male  Age:81 y.o. Chief Complaint   Patient presents with    Follow-up    Cholesterol Problem    Hypertension        HPI:   Patient presents to office for evaluation of the following medical concerns. - States has lesion and rash to hair and scalp. States has high blood pressure. States occasionally checks blood pressure at home. On metoprolol, on losartan. Some lightheaded and dizzy if gets up to quickly. No loc/syncope. No headaches or vision changes. No chest pain. - States has parkinson. States following with neurologist at [facility]. States was started on caribodopa/levodopa increased to 1 tablet three times a day. On Rasagiline. Added remeron. Concern over memory. neuropsych ()  - dx vascular dementia. Last Neurology office visit reviewed from care everywhere from (2022) - continue sinemet to 1 tablet 3x per day, continue rasagline and remeron, for repeat memory testing with [facility] in 6 months.  has joined [employer] to help with parkinsons symptoms. Feels stable, not improving. States having anxiety and depression. States not moving around a lot. States restless. Started on remeron. Improved. States having constipation. Taking fiber supplements. States has CAD. Follows with cardiology locally and at [facility]. States s/p stents. States ischemic cardiomyopathy. On metoprolol, losartan, atorvastatin, aspirin and plavix. Last visit with cardio per reviewed note () - sugg stop asa, isch cardiomyopathy. Follow up in 1 year. States has COPD. States breathing is fair. Has seen pulmonary once, not following. States placed on breo - stopped. Stable. States has high cholesterol. States trying to watch diet. On atorvastatin, no reported side effects. Last lab (2021) stable. - last showed elevated A1c. LAst was 5.7 (10/2021)      States has sleep apnea.  States on cpap. States wears 6-8 hours per night, helps symptoms. States has had TIA. States had dizziness and difficulty walking. On antiplatelets (plavix)      States has enlarged prostate. Has seen urology. States has urinary frequency. States flomax taking infrequently 1-2 times per week. Concern for dizzy and lightheaded    Vitamin D def. States on otc supplement. - H/o cholangitis. Had MRCP that suggested stricture and inflammation at the head of the pancreas. f/u ERCP (6/21) - Severe stricture at distal most portion of CBD, CBD stones  ERCP with stent removal.     Health Maintenance   - immunizations:   Influenza Vaccination - (2021)   Pneumonia Vaccination - (9/20219) - prevnar 13, (7/2021) - pneumococcal 23   Zoster/Shingles Vaccine - (2016) - zostavax   Tetanus Vaccination  covid (1/2021) #1, (2/12/2021) #2, (11/21/2021) # 3 - pfizer     - Screenings:   Colonoscopy (2013) - normal, (5/21) - diverticulosis, otherwise neg f/u 10yrs  Prostate     ROS:  Review of Systems   Constitutional: Positive for chills and fatigue. Negative for appetite change, fever and unexpected weight change. HENT: Positive for congestion and rhinorrhea. Negative for sore throat. Eyes: Negative for pain and visual disturbance. Respiratory: Positive for shortness of breath. Negative for cough and chest tightness. Cardiovascular: Negative for chest pain and palpitations. Gastrointestinal: Negative for abdominal pain, blood in stool, constipation, diarrhea, nausea and vomiting. Genitourinary: Negative for difficulty urinating, dysuria, hematuria, scrotal swelling, testicular pain and urgency. Musculoskeletal: Negative for arthralgias and gait problem. Skin: Negative for rash. Neurological: Positive for dizziness. Negative for syncope, weakness, light-headedness and headaches. Hematological: Negative for adenopathy. Does not bruise/bleed easily. Psychiatric/Behavioral: Negative for suicidal ideas.  The patient is not nervous/anxious. Current Outpatient Medications:     ketoconazole (NIZORAL) 2 % shampoo, Apply topically daily as needed. , Disp: 100 mL, Rfl: 2    rasagiline mesylate 1 MG TABS, take 1 tablet by mouth once daily, Disp: 90 tablet, Rfl: 1    mirtazapine (REMERON) 15 MG tablet, Take 1 tablet by mouth nightly, Disp: 90 tablet, Rfl: 1    losartan (COZAAR) 25 MG tablet, Take 1 tablet by mouth daily, Disp: 90 tablet, Rfl: 1    clopidogrel (PLAVIX) 75 MG tablet, Take 1 tablet by mouth daily, Disp: 90 tablet, Rfl: 1    atorvastatin (LIPITOR) 80 MG tablet, Take 1 tablet by mouth daily, Disp: 90 tablet, Rfl: 1    metoprolol succinate (TOPROL XL) 25 MG extended release tablet, Take 1 tablet by mouth daily, Disp: 90 tablet, Rfl: 1    carbidopa-levodopa (SINEMET)  MG per tablet, Take 1 tablet by mouth 3 times daily New prescription, Disp: , Rfl:     ketoconazole (NIZORAL) 2 % cream, Apply topically daily. , Disp: 30 g, Rfl: 1    nitroGLYCERIN (NITROSTAT) 0.4 MG SL tablet, Place 1 tablet under the tongue every 5 minutes as needed for Chest pain, Disp: 25 tablet, Rfl: 1    Multiple Vitamins-Minerals (THERAPEUTIC MULTIVITAMIN-MINERALS) tablet, Take 1 tablet by mouth daily, Disp: , Rfl:     CPAP Machine MISC, daily at bedtime. , Disp: , Rfl:     Cholecalciferol (VITAMIN D) 2000 UNIT CAPS capsule, Take 1 capsule by mouth daily Last taken 05/13, Disp: , Rfl:     Allergies   Allergen Reactions    Lisinopril Itching       Past Medical History:   Diagnosis Date    CAD (coronary artery disease) 2006    follows with Dr Genesis Toney    Cerebral artery occlusion with cerebral infarction [facility])     Cerebrovascular disease     CHF (congestive heart failure), NYHA class I, chronic, systolic ([facility] [address] 5[facility]    [facility]-- EF 39%    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/2018    Chronic maxillary sinusitis 04/2018    COPD (chronic obstructive pulmonary disease) ([facility] [address] 04/2018    GARSIA (dyspnea on exertion)     follows with Dr Amira Lopez Maureene Dolphin Gallbladder disease     History of non-ST elevation myocardial infarction (NSTEMI)     [facility]    Hyperlipidemia     Hypertension     Ischemic cardiomyopathy     EF 40% after NSTEMI, 11-15 at [facility]    Obesity     Osteoarthritis     Skin cancer of eyelid     Sleep apnea     uses CPAP    Smokeless tobacco use     Stented coronary artery 2006    x1       Past Surgical History:   Procedure Laterality Date    CARDIAC CATHETERIZATION Left 2015    done [facility] WITH IMPLANT Bilateral     CHOLECYSTECTOMY, LAPAROSCOPIC N/A 2019    LAPAROSCOPIC ROBOTIC XI ASSISTED CHOLECYSTECTOMY performed by Dorcas Randolph MD at [facility]  2014    CORONARY ANGIOPLASTY  06    DIAGNOSTIC CARDIAC CATH LAB PROCEDURE  06    ERCP N/A 2021    ERCP STONE REMOVAL performed by Tosin Brandon MD at [address] ERCP N/A 2021    ERCP STENT REMOVAL performed by Tosin Brandon MD at [address] [place] Left 2015    ROTATOR CUFF REPAIR      bilaterally    TONSILLECTOMY AND ADENOIDECTOMY      UPPER GASTROINTESTINAL ENDOSCOPY      UPPER GASTROINTESTINAL ENDOSCOPY N/A 2021    EGD ESOPHAGOGASTRODUODENOSCOPY WITH PANCREATIC STENT REMOVAL performed by Tosin Brandon MD at [facility] ENDOSCOPY       Family History   Problem Relation Age of Onset    Heart Disease Mother          79 Y/O    Diabetes Mother     Liver Disease Father          66 Y/O       Social History     Socioeconomic History    Marital status:      Spouse name: Not on file    Number of children: Not on file    Years of education: Not on file    Highest education level: Not on file   Occupational History    Occupation: retired-     Tobacco Use    Smoking status: Former Smoker     Packs/day: 1.00     Years: 15.00     Pack years: 15.00     Types: Cigarettes     Start date: 1957     Quit date: 1975     Years since quittin.4    Smokeless tobacco: Current User     Types: Snuff   Vaping Use    Vaping Use: Never used   Substance and Sexual Activity    Alcohol use: Yes     Comment: occassionally    Drug use: No    Sexual activity: Not on file   Other Topics Concern    Not on file   Social History Narrative    Not on file     Social Determinants of Health     Financial Resource Strain: Low Risk     Difficulty of Paying Living Expenses: Not hard at all   Food Insecurity: No Food Insecurity    Worried About Running Out of Food in the Last Year: Never true    [address] in the Last Year: Never true   Transportation Needs:     Lack of Transportation (Medical): Not on file    Lack of Transportation (Non-Medical): Not on file   Physical Activity:     Days of Exercise per Week: Not on file    Minutes of Exercise per Session: Not on file   Stress:     Feeling of Stress : Not on file   Social Connections:     Frequency of Communication with Friends and Family: Not on file    Frequency of Social Gatherings with Friends and Family: Not on file    Attends [employer] Services: Not on file    Active Member of [address] [place] [address] or Organizations: Not on file    Attends Club or Organization Meetings: Not on file    Marital Status: Not on file   Intimate Partner Violence:     Fear of Current or Ex-Partner: Not on file    Emotionally Abused: Not on file    Physically Abused: Not on file    Sexually Abused: Not on file   Housing Stability:     Unable to Pay for Housing in the Last Year: Not on file    Number of [place] in the Last Year: Not on file    Unstable Housing in the Last Year: Not on file       Vitals:    22 1521   BP: (!) 150/80   Pulse: 79   Resp: 16   Temp: 98.2 °F (36.8 °C)   TempSrc: Temporal   SpO2: 100%   Weight: 190 lb (86.2 kg)   Height: 6' 1\" (1.854 m)       Exam:  Physical Exam  Vitals reviewed. Constitutional:       Appearance: He is well-developed.    HENT:      Head: Normocephalic and atraumatic. Right Ear: External ear normal. There is impacted cerumen. Left Ear: External ear normal. There is impacted cerumen. Eyes:      Pupils: Pupils are equal, round, and reactive to light. Neck:      Thyroid: No thyromegaly. Cardiovascular:      Rate and Rhythm: Normal rate and regular rhythm. Heart sounds: Normal heart sounds. No murmur heard. Pulmonary:      Effort: Pulmonary effort is normal.      Breath sounds: Normal breath sounds. No wheezing or rales. Abdominal:      General: Bowel sounds are normal.      Palpations: Abdomen is soft. Tenderness: There is no abdominal tenderness. There is no guarding or rebound. Musculoskeletal:         General: Normal range of motion. Cervical back: Normal range of motion and neck supple. Comments: Abnormal gait. Lymphadenopathy:      Cervical: No cervical adenopathy. Skin:     General: Skin is warm and dry. Neurological:      Mental Status: He is alert and oriented to person, place, and time. Cranial Nerves: No cranial nerve deficit.       Comments: Resting tremor    Psychiatric:         Judgment: Judgment normal.         Assessment and Plan:    Diagnoses and all orders for this visit:    Other skin changes  - poss tinea of scalp   - order ketoconazole shampoos   - if not better derm     Essential hypertension  - watch diet   - monitor blood pressure at home   - on losartn   - on metoprolol - changed to metoprolol to 25mg (10/2021)  - on plavix   - off aspirin   - Elevated today 5/24/2022    Parkinson disease ([facility] [address] [facility]  - following with neurology at [facility]   - on rasagiline   - on carbidopa and levodopa tid increased to 1 tablet tid (8/2021)   - stable      Moderate episode of recurrent major depressive disorder ([address]  - now on remeron to help with anxiety and depression   - stable per patient   - last PHQ socre of 0 (7/2021)     Coronary artery disease involving native coronary artery of native heart without angina pectoris  - following with cardiology   - on losartan   - on metoprolol - will decrease metoprolol to 25mg (10/2021)   - on atorvastatin   - on Plavix   - off aspirin   - stable     Mixed hyperlipidemia  - watch diet   - on atorvastatin   - follow labs   - last lab (2/2022) - stable     Ischemic cardiomyopathy  - following with cardiology   - on losartn   - on metoprolol - will decrease metoprolol to 25mg (10/2021)   - on atrovastatin   - on plavix   - off aspirin     CHF (congestive heart failure), NYHA class I, chronic, systolic (HCC)  - following with cardiology   - on losartn   - on metoprolol - will decrease metoprolol to 25mg (10/2021)   - on atrovastatin   - on plavix   - off aspirin   - Stable     Chronic obstructive pulmonary disease, unspecified COPD type ([facility] [address]  - not currently following with pulmonary   - did not see benefit with breo   - stable per patient     Impaired fasting glucose  - continue present treatment  - watch diet   - follow A1c - last A1c (7/2021) 5.4    Obstructive sleep apnea syndrome  - Continue present treatment   - on CPAP  - wearing nightly 6-8 hours   - helping symptoms   - stable     TIA (transient ischemic attack)  - on atrovastatin   - on plavix   - off aspirin   - stable     Prostate enlargement  - has seen urology   - Has flomax - takes as needed  - last PSA (2/2022) - increased and borderline     Vitamin D def  - On otc supplement   - follow labs     Other constipation  - discussed colace 100mg daily and miralax as needed  - taking mutamucil     Cholangitis  - hospital stay (6/2020) and (4/2021)   - s/p ERCP and papillotomy and stent placement   - s/p ERCP with stent removal (6/2021)    - improved     Return in about 3 months (around 8/24/2022) for check up and review and labs.     Orders Placed This Encounter   Procedures    Comprehensive Metabolic Panel     Standing Status:   Future     Standing Expiration Date:   5/24/2023    Magnesium     Standing Status:   Future Standing Expiration Date:   5/24/2023    Lipid, Fasting     Standing Status:   Future     Standing Expiration Date:   5/24/2023    Hemoglobin A1C     Standing Status:   Future     Standing Expiration Date:   5/24/2023    Vitamin D 25 Hydroxy     Standing Status:   Future     Standing Expiration Date:   5/24/2023    TSH     Standing Status:   Future     Standing Expiration Date:   8/24/2022    Urinalysis     Standing Status:   Future     Standing Expiration Date:   5/24/2023    Microalbumin, Ur     Standing Status:   Future     Standing Expiration Date:   5/24/2023    CBC with Auto Differential     Standing Status:   Future     Standing Expiration Date:   5/24/2023    Vitamin B12 & Folate     Standing Status:   Future     Standing Expiration Date:   5/24/2023    PSA, DIAGNOSTIC     Standing Status:   Future     Standing Expiration Date:   5/24/2023     Requested Prescriptions     Signed Prescriptions Disp Refills    ketoconazole (NIZORAL) 2 % shampoo 100 mL 2     Sig: Apply topically daily as needed.      Daniele Don MD  5/24/2022  3:42 PM

## 2022-06-01 ENCOUNTER — TELEPHONE (OUTPATIENT)
Dept: PHARMACY | Facility: CLINIC | Age: 82
End: 2022-06-01

## 2022-06-01 NOTE — LETTER
[place]  [address] [address]           06/03/22     Dear Chris Sutton,    We tried to reach you recently regarding your losartan 25mg daily, but were unable to reach you on the telephone. If you are no longer taking or taking differently, please call us at the number below so that we can discuss this and update your medication profile. It appears that this medication has not been filled at proper times. We are worried you might be missing doses or not taking it as directed. It is important that you take your medications regularly and try not to miss a single dose.     Some ways to help you remember to take and refill your medications are to:  · Use a pill box, set an alarm, and/or keep your medication near something that you do every day  · Ask your pharmacy if they participate in [facility]", a program where you can  all of your medications on the same day  · Ask your pharmacy if you can be set up with automatic refill, where they will automatically refill your prescription when it is due and let you know it's ready to     Sincerely,   Peña Herbert, PharmD, [facility]  Department, toll free: [phone], option 1

## 2022-06-01 NOTE — TELEPHONE ENCOUNTER
[facility] CLINICAL PHARMACY: ADHERENCE REVIEW  Identified care gap per [employer]: fills at [facility]: ACE/ARB adherence    Last Visit: 5/24/22    Patient also appears to be prescribed: statin     Patient not found in [facility]    [address] [place] [address] Records claims through 5/15/22 (Prior Year Zeus Eastra = 56%; YTD [facility] = Filled only once; Potential Fail Date: 7/20/22): Losartan 25mg last filled on 2/18/22 for 90 day supply. Next refill due: 5/19/22  *per review, appears low PDC/refill hx in 2021 was at least in part d/t unpaid balance with [facility],so refills wouldn't be shipped? Per chart, should have refill remaining    BP Readings from Last 3 Encounters:   05/24/22 (!) 150/80   02/01/22 110/80   10/27/21 94/60     Estimated Creatinine Clearance: 80 mL/min (based on SCr of 0.8 mg/dL). [address] Records claims through 5/15/22 (Prior Year [facility] [place] = 56%; YTD [place] = 100%; Potential Fail Date: 10/13/22): Atorvastatin 80mg last filled on 4/12/22 for 90 day supply. Next refill due: 8/11/22  *per review, appears low PDC/refill hx in 2021 was at least in part d/t [facility] with [facility],so refills wouldn't be shipped? Per chart, likely 0 refills remain    Lab Results   Component Value Date    CHOL 87 03/16/2019    TRIG 46 03/16/2019    HDL 52 02/01/2022    LDLCALC 92 02/01/2022     ALT   Date Value Ref Range Status   02/01/2022 11 0 - 40 U/L Final     AST   Date Value Ref Range Status   02/01/2022 14 0 - 39 U/L Final     The ASCVD Risk score (Edgardo Amos, et al., 2013) failed to calculate for the following reasons:     The 2013 ASCVD risk score is only valid for ages 36 to 78     PLAN  The following are interventions that have been identified:   - Patient overdue refilling losartan 25mg daily (refill was due @5/19 -should have refill remaining) and active on home medication list.   · Failed adherence 2021 (appears d/t unpaid balance with [facility], but Frank shipped/filled losartan in Feb and atorvastatin in Feb and April this year?)  - Patient needs refills for atorvastatin (when due to refill @8/11, prior to next PCP appt)    Attempting to reach patient to review.  Left message asking for return call.     Future Appointments   Date Time Provider Peter Martines   8/23/2022  1:30 PM Cadence Ignacio MD [address] [place] [address] Just, PharmD, [facility]  Department, toll free: [phone], option 1

## 2022-06-03 NOTE — TELEPHONE ENCOUNTER
Patient's wife called back. She said she is pretty sure they have a full bottle of most of his meds. She was planning to call mail order over the weekend/Monday for what they needed. She said she would call Eloina Manning back on Monday to let her know and ask Eloina Manning to refill what Petal Litter does need.    I let her know the Losartan is definitely in need, and Atorvastatin looks to be ok til August.    Spencer Sims, PharmD, [address] [facility]   Department, toll free: [phone] Option 2

## 2022-06-03 NOTE — TELEPHONE ENCOUNTER
Attempting again to reach patient. Left another message and will send letter.      Will not pend atorvastatin refill request at this time, since not due til Aug and if patient would need directed to a different pharmacy.    =======================================================   For Pharmacy Admin Tracking Only     Gap Closed?: No    Time Spent (min): 15

## 2022-06-06 NOTE — TELEPHONE ENCOUNTER
Patient's wife returning call. States she has plenty of atorvastatin for him and has been cutting it in half - attempted to inquire why/when it went to half tablet daily. She replies \"I'm not completely sure, and I'll check with the doctor, so let's leave it be\". Reviewed that the medication list still shows the 80mg,whole tablet daily. Asked about the losartan, which looks to be last filled in Feb for a 3-month supply - wife states she still has at least 1 full bottle.  Attempted to inquire if patient may miss doses; she states he's taking daily and they do not need any rxs or refills at this time.    =======================================================   For Pharmacy [id number] [place] [address] Road in place:  No   Recommendation Provided To: Patient/Caregiver: 1 via Telephone   Intervention Detail: Adherence Monitorin   Gap Closed?: No    Intervention Accepted By: Patient/Caregiver: 0   Time Spent (min): 25

## 2022-06-28 ENCOUNTER — TELEPHONE (OUTPATIENT)
Dept: FAMILY MEDICINE CLINIC | Age: 82
End: 2022-06-28

## 2022-06-28 NOTE — TELEPHONE ENCOUNTER
Left message for patient to call the office back. He is currently scheduled for 8/23/22 and Dr. Davin Smith will not be here that day. Patient need rescheduled. Thanks.

## 2022-08-22 DIAGNOSIS — R97.20 INCREASED PROSTATE SPECIFIC ANTIGEN (PSA) VELOCITY: ICD-10-CM

## 2022-08-22 DIAGNOSIS — R53.83 OTHER FATIGUE: ICD-10-CM

## 2022-08-22 DIAGNOSIS — E78.2 MIXED HYPERLIPIDEMIA: ICD-10-CM

## 2022-08-22 DIAGNOSIS — Z79.899 LONG TERM CURRENT USE OF THERAPEUTIC DRUG: ICD-10-CM

## 2022-08-22 DIAGNOSIS — E55.9 VITAMIN D DEFICIENCY: ICD-10-CM

## 2022-08-22 DIAGNOSIS — R73.01 IMPAIRED FASTING GLUCOSE: ICD-10-CM

## 2022-08-22 LAB
ALBUMIN SERPL-MCNC: 4 G/DL (ref 3.5–5.2)
ALP BLD-CCNC: 60 U/L (ref 40–129)
ALT SERPL-CCNC: <5 U/L (ref 0–40)
AMORPHOUS: ABNORMAL
ANION GAP SERPL CALCULATED.3IONS-SCNC: 11 MMOL/L (ref 7–16)
AST SERPL-CCNC: 13 U/L (ref 0–39)
BACTERIA: ABNORMAL /HPF
BASOPHILS ABSOLUTE: 0.1 E9/L (ref 0–0.2)
BASOPHILS RELATIVE PERCENT: 1.1 % (ref 0–2)
BILIRUB SERPL-MCNC: 0.6 MG/DL (ref 0–1.2)
BILIRUBIN URINE: NEGATIVE
BLOOD, URINE: NEGATIVE
BUN BLDV-MCNC: 17 MG/DL (ref 6–23)
CALCIUM SERPL-MCNC: 9.2 MG/DL (ref 8.6–10.2)
CHLORIDE BLD-SCNC: 112 MMOL/L (ref 98–107)
CHOLESTEROL, FASTING: 126 MG/DL (ref 0–199)
CLARITY: NORMAL
CO2: 25 MMOL/L (ref 22–29)
COLOR: YELLOW
CREAT SERPL-MCNC: 0.9 MG/DL (ref 0.7–1.2)
CRYSTALS, UA: ABNORMAL /HPF
EOSINOPHILS ABSOLUTE: 0.58 E9/L (ref 0.05–0.5)
EOSINOPHILS RELATIVE PERCENT: 6.7 % (ref 0–6)
FOLATE: 16 NG/ML (ref 4.8–24.2)
GFR AFRICAN AMERICAN: >60
GFR NON-AFRICAN AMERICAN: >60 ML/MIN/1.73
GLUCOSE BLD-MCNC: 92 MG/DL (ref 74–99)
GLUCOSE URINE: NEGATIVE MG/DL
HBA1C MFR BLD: 5.9 % (ref 4–5.6)
HCT VFR BLD CALC: 44.9 % (ref 37–54)
HDLC SERPL-MCNC: 47 MG/DL
HEMOGLOBIN: 13.9 G/DL (ref 12.5–16.5)
IMMATURE GRANULOCYTES #: 0.02 E9/L
IMMATURE GRANULOCYTES %: 0.2 % (ref 0–5)
KETONES, URINE: NEGATIVE MG/DL
LDL CHOLESTEROL CALCULATED: 59 MG/DL (ref 0–99)
LEUKOCYTE ESTERASE, URINE: NEGATIVE
LYMPHOCYTES ABSOLUTE: 3.44 E9/L (ref 1.5–4)
LYMPHOCYTES RELATIVE PERCENT: 39.4 % (ref 20–42)
MAGNESIUM: 2.1 MG/DL (ref 1.6–2.6)
MCH RBC QN AUTO: 27.7 PG (ref 26–35)
MCHC RBC AUTO-ENTMCNC: 31 % (ref 32–34.5)
MCV RBC AUTO: 89.4 FL (ref 80–99.9)
MICROALBUMIN UR-MCNC: 34.8 MG/L
MONOCYTES ABSOLUTE: 0.73 E9/L (ref 0.1–0.95)
MONOCYTES RELATIVE PERCENT: 8.4 % (ref 2–12)
NEUTROPHILS ABSOLUTE: 3.85 E9/L (ref 1.8–7.3)
NEUTROPHILS RELATIVE PERCENT: 44.2 % (ref 43–80)
NITRITE, URINE: NEGATIVE
PDW BLD-RTO: 15.3 FL (ref 11.5–15)
PH UA: 6 (ref 5–9)
PLATELET # BLD: 227 E9/L (ref 130–450)
PMV BLD AUTO: 11 FL (ref 7–12)
POTASSIUM SERPL-SCNC: 4.1 MMOL/L (ref 3.5–5)
PROSTATE SPECIFIC ANTIGEN: 4.16 NG/ML (ref 0–4)
PROTEIN UA: NEGATIVE MG/DL
RBC # BLD: 5.02 E12/L (ref 3.8–5.8)
RBC UA: ABNORMAL /HPF (ref 0–2)
SODIUM BLD-SCNC: 148 MMOL/L (ref 132–146)
SPECIFIC GRAVITY UA: >=1.03 (ref 1–1.03)
TOTAL PROTEIN: 6.7 G/DL (ref 6.4–8.3)
TRIGLYCERIDE, FASTING: 101 MG/DL (ref 0–149)
TSH SERPL DL<=0.05 MIU/L-ACNC: 1.61 UIU/ML (ref 0.27–4.2)
UROBILINOGEN, URINE: 0.2 E.U./DL
VITAMIN B-12: 515 PG/ML (ref 211–946)
VITAMIN D 25-HYDROXY: 41 NG/ML (ref 30–100)
VLDLC SERPL CALC-MCNC: 20 MG/DL
WBC # BLD: 8.7 E9/L (ref 4.5–11.5)
WBC UA: ABNORMAL /HPF (ref 0–5)

## 2022-08-29 ENCOUNTER — OFFICE VISIT (OUTPATIENT)
Dept: FAMILY MEDICINE CLINIC | Age: 82
End: 2022-08-29
Payer: MEDICARE

## 2022-08-29 VITALS
OXYGEN SATURATION: 97 % | BODY MASS INDEX: 24.65 KG/M2 | SYSTOLIC BLOOD PRESSURE: 128 MMHG | HEIGHT: 73 IN | WEIGHT: 186 LBS | RESPIRATION RATE: 16 BRPM | TEMPERATURE: 98 F | HEART RATE: 80 BPM | DIASTOLIC BLOOD PRESSURE: 72 MMHG

## 2022-08-29 VITALS
BODY MASS INDEX: 24.65 KG/M2 | DIASTOLIC BLOOD PRESSURE: 70 MMHG | WEIGHT: 186 LBS | HEIGHT: 73 IN | RESPIRATION RATE: 16 BRPM | TEMPERATURE: 98 F | OXYGEN SATURATION: 97 % | HEART RATE: 80 BPM | SYSTOLIC BLOOD PRESSURE: 128 MMHG

## 2022-08-29 DIAGNOSIS — G47.33 OBSTRUCTIVE SLEEP APNEA SYNDROME: ICD-10-CM

## 2022-08-29 DIAGNOSIS — E78.2 MIXED HYPERLIPIDEMIA: ICD-10-CM

## 2022-08-29 DIAGNOSIS — J44.9 CHRONIC OBSTRUCTIVE PULMONARY DISEASE, UNSPECIFIED COPD TYPE (HCC): ICD-10-CM

## 2022-08-29 DIAGNOSIS — I25.5 ISCHEMIC CARDIOMYOPATHY: ICD-10-CM

## 2022-08-29 DIAGNOSIS — E55.9 VITAMIN D DEFICIENCY: ICD-10-CM

## 2022-08-29 DIAGNOSIS — I10 ESSENTIAL HYPERTENSION: ICD-10-CM

## 2022-08-29 DIAGNOSIS — G20 PARKINSON DISEASE (HCC): ICD-10-CM

## 2022-08-29 DIAGNOSIS — N40.0 PROSTATE ENLARGEMENT: ICD-10-CM

## 2022-08-29 DIAGNOSIS — I25.10 CORONARY ARTERY DISEASE INVOLVING NATIVE CORONARY ARTERY OF NATIVE HEART WITHOUT ANGINA PECTORIS: ICD-10-CM

## 2022-08-29 DIAGNOSIS — R73.01 IMPAIRED FASTING GLUCOSE: ICD-10-CM

## 2022-08-29 DIAGNOSIS — Z00.00 MEDICARE ANNUAL WELLNESS VISIT, SUBSEQUENT: Primary | ICD-10-CM

## 2022-08-29 DIAGNOSIS — F33.1 MODERATE EPISODE OF RECURRENT MAJOR DEPRESSIVE DISORDER (HCC): ICD-10-CM

## 2022-08-29 DIAGNOSIS — R97.20 ELEVATED PSA: Primary | ICD-10-CM

## 2022-08-29 DIAGNOSIS — G45.0 VERTEBROBASILAR ARTERY SYNDROME: ICD-10-CM

## 2022-08-29 DIAGNOSIS — I50.22 CHF (CONGESTIVE HEART FAILURE), NYHA CLASS I, CHRONIC, SYSTOLIC (HCC): ICD-10-CM

## 2022-08-29 DIAGNOSIS — K59.09 OTHER CONSTIPATION: ICD-10-CM

## 2022-08-29 PROCEDURE — G0439 PPPS, SUBSEQ VISIT: HCPCS | Performed by: INTERNAL MEDICINE

## 2022-08-29 PROCEDURE — 99214 OFFICE O/P EST MOD 30 MIN: CPT | Performed by: INTERNAL MEDICINE

## 2022-08-29 PROCEDURE — 1123F ACP DISCUSS/DSCN MKR DOCD: CPT | Performed by: INTERNAL MEDICINE

## 2022-08-29 RX ORDER — ATORVASTATIN CALCIUM 80 MG/1
80 TABLET, FILM COATED ORAL DAILY
Qty: 90 TABLET | Refills: 1 | Status: CANCELLED | OUTPATIENT
Start: 2022-08-29

## 2022-08-29 RX ORDER — RASAGILINE 1 MG/1
TABLET ORAL
Qty: 90 TABLET | Refills: 1 | Status: SHIPPED | OUTPATIENT
Start: 2022-08-29

## 2022-08-29 RX ORDER — MIRTAZAPINE 15 MG/1
15 TABLET, FILM COATED ORAL NIGHTLY
Qty: 90 TABLET | Refills: 1 | Status: SHIPPED | OUTPATIENT
Start: 2022-08-29

## 2022-08-29 RX ORDER — NITROGLYCERIN 0.4 MG/1
0.4 TABLET SUBLINGUAL EVERY 5 MIN PRN
Qty: 25 TABLET | Refills: 1 | Status: SHIPPED | OUTPATIENT
Start: 2022-08-29

## 2022-08-29 RX ORDER — CLOPIDOGREL BISULFATE 75 MG/1
75 TABLET ORAL DAILY
Qty: 90 TABLET | Refills: 1 | Status: SHIPPED | OUTPATIENT
Start: 2022-08-29

## 2022-08-29 RX ORDER — LOSARTAN POTASSIUM 25 MG/1
25 TABLET ORAL DAILY
Qty: 90 TABLET | Refills: 1 | Status: SHIPPED | OUTPATIENT
Start: 2022-08-29

## 2022-08-29 SDOH — ECONOMIC STABILITY: FOOD INSECURITY: WITHIN THE PAST 12 MONTHS, THE FOOD YOU BOUGHT JUST DIDN'T LAST AND YOU DIDN'T HAVE MONEY TO GET MORE.: NEVER TRUE

## 2022-08-29 SDOH — ECONOMIC STABILITY: FOOD INSECURITY: WITHIN THE PAST 12 MONTHS, YOU WORRIED THAT YOUR FOOD WOULD RUN OUT BEFORE YOU GOT MONEY TO BUY MORE.: NEVER TRUE

## 2022-08-29 ASSESSMENT — ENCOUNTER SYMPTOMS
SORE THROAT: 0
NAUSEA: 0
EYE PAIN: 0
VOMITING: 0
CONSTIPATION: 0
RHINORRHEA: 1
ABDOMINAL PAIN: 0
BLOOD IN STOOL: 0
CHEST TIGHTNESS: 0
COUGH: 0
SHORTNESS OF BREATH: 1
DIARRHEA: 0

## 2022-08-29 ASSESSMENT — PATIENT HEALTH QUESTIONNAIRE - PHQ9
8. MOVING OR SPEAKING SO SLOWLY THAT OTHER PEOPLE COULD HAVE NOTICED. OR THE OPPOSITE, BEING SO FIGETY OR RESTLESS THAT YOU HAVE BEEN MOVING AROUND A LOT MORE THAN USUAL: 0
4. FEELING TIRED OR HAVING LITTLE ENERGY: 0
9. THOUGHTS THAT YOU WOULD BE BETTER OFF DEAD, OR OF HURTING YOURSELF: 0
1. LITTLE INTEREST OR PLEASURE IN DOING THINGS: 0
3. TROUBLE FALLING OR STAYING ASLEEP: 0
SUM OF ALL RESPONSES TO PHQ QUESTIONS 1-9: 0
5. POOR APPETITE OR OVEREATING: 0
SUM OF ALL RESPONSES TO PHQ QUESTIONS 1-9: 0
10. IF YOU CHECKED OFF ANY PROBLEMS, HOW DIFFICULT HAVE THESE PROBLEMS MADE IT FOR YOU TO DO YOUR WORK, TAKE CARE OF THINGS AT HOME, OR GET ALONG WITH OTHER PEOPLE: 0
6. FEELING BAD ABOUT YOURSELF - OR THAT YOU ARE A FAILURE OR HAVE LET YOURSELF OR YOUR FAMILY DOWN: 0
2. FEELING DOWN, DEPRESSED OR HOPELESS: 0
7. TROUBLE CONCENTRATING ON THINGS, SUCH AS READING THE NEWSPAPER OR WATCHING TELEVISION: 0
SUM OF ALL RESPONSES TO PHQ9 QUESTIONS 1 & 2: 0

## 2022-08-29 ASSESSMENT — SOCIAL DETERMINANTS OF HEALTH (SDOH): HOW HARD IS IT FOR YOU TO PAY FOR THE VERY BASICS LIKE FOOD, HOUSING, MEDICAL CARE, AND HEATING?: NOT HARD AT ALL

## 2022-08-29 ASSESSMENT — LIFESTYLE VARIABLES
HOW MANY STANDARD DRINKS CONTAINING ALCOHOL DO YOU HAVE ON A TYPICAL DAY: 1 OR 2
HOW OFTEN DO YOU HAVE A DRINK CONTAINING ALCOHOL: 2-4 TIMES A MONTH

## 2022-08-29 NOTE — PROGRESS NOTES
Medicare Annual Wellness Visit    Bethany Olivarez is here for Medicare AWV    Assessment & Plan   Medicare annual wellness visit, subsequent        Health Maintenance   - immunizations:   Influenza Vaccination - (2021)   Pneumonia Vaccination - (9/20219) - prevnar 13, (7/2021) - pneumococcal 23   Zoster/Shingles Vaccine - (2016) - zostavax   Tetanus Vaccination  covid (1/2021) #1, (2/12/2021) #2, (11/21/2021) # 3 - pfizer     - Screenings:   Colonoscopy (2013) - normal, (5/21) - diverticulosis, otherwise neg f/u 10yrs  Prostate    Recommendations for Preventive Services Due: see orders and patient instructions/AVS.  Recommended screening schedule for the next 5-10 years is provided to the patient in written form: see Patient Instructions/AVS.     Return for Medicare Annual Wellness Visit in 1 year. No orders of the defined types were placed in this encounter. Requested Prescriptions      No prescriptions requested or ordered in this encounter        Subjective       Patient's complete Health Risk Assessment and screening values have been reviewed and are found in Flowsheets. The following problems were reviewed today and where indicated follow up appointments were made and/or referrals ordered.     Positive Risk Factor Screenings with Interventions:    Fall Risk:  Do you feel unsteady or are you worried about falling? : (!) yes  2 or more falls in past year?: no  Fall with injury in past year?: no   Fall Risk Interventions:    Home safety tips provided      Tobacco Use:  Tobacco Use: High Risk    Smoking Tobacco Use: Former    Smokeless Tobacco Use: Current     E-cigarette/Vaping       Questions Responses    E-cigarette/Vaping Use Never User    Start Date     Passive Exposure     Quit Date     Counseling Given     Comments           Substance Use - Tobacco Interventions:  Stopped           Health Habits/Nutrition:  Physical Activity: Inactive    Days of Exercise per Week: 0 days    Minutes of Exercise per Session: 0 min     Have you lost any weight without trying in the past 3 months?: No  Body mass index: 24.54  Have you seen the dentist within the past year?: Yes  Health Habits/Nutrition Interventions:  Nutritional issues:  educational materials for healthy, well-balanced diet provided     Safety:  Do you have working smoke detectors?: Yes  Do you have any tripping hazards - loose or unsecured carpets or rugs?: No  Do you have any tripping hazards - clutter in doorways, halls, or stairs?: No  Do you have either shower bars, grab bars, non-slip mats or non-slip surfaces in your shower or bathtub?: Yes  Do all of your stairways have a railing or banister?: Yes  Do you always fasten your seatbelt when you are in a car?: (!) No  Safety Interventions:  Home safety tips provided           Objective   Vitals:    08/29/22 1558   BP: 128/72   Pulse: 80   Resp: 16   Temp: 98 °F (36.7 °C)   TempSrc: Temporal   SpO2: 97%   Weight: 186 lb (84.4 kg)   Height: 6' 1\" (1.854 m)      Body mass index is 24.54 kg/m². Allergies   Allergen Reactions    Lisinopril Itching     Prior to Visit Medications    Medication Sig Taking? Authorizing Provider   ketoconazole (NIZORAL) 2 % shampoo Apply topically daily as needed. Yes Rasta Key MD   atorvastatin (LIPITOR) 80 MG tablet Take 1 tablet by mouth daily Yes Rasta Key MD   metoprolol succinate (TOPROL XL) 25 MG extended release tablet Take 1 tablet by mouth daily Yes Rasta Key MD   carbidopa-levodopa (SINEMET)  MG per tablet Take 1 tablet by mouth 3 times daily New prescription Yes Historical Provider, MD   ketoconazole (NIZORAL) 2 % cream Apply topically daily. Yes Rasta Key MD   Multiple Vitamins-Minerals (THERAPEUTIC MULTIVITAMIN-MINERALS) tablet Take 1 tablet by mouth daily Yes Historical Provider, MD   CPAP Machine MISC daily at bedtime.  Yes Historical Provider, MD   Cholecalciferol (VITAMIN D) 2000 UNIT CAPS capsule Take 1 capsule by mouth daily Last taken 05/13 Yes Historical Provider, MD   clopidogrel (PLAVIX) 75 MG tablet Take 1 tablet by mouth daily  Mariusz Gonsalves MD   losartan (COZAAR) 25 MG tablet Take 1 tablet by mouth daily  Mariusz Gonsalves MD   mirtazapine (REMERON) 15 MG tablet Take 1 tablet by mouth nightly  Mariusz Gonsalves MD   nitroGLYCERIN (NITROSTAT) 0.4 MG SL tablet Place 1 tablet under the tongue every 5 minutes as needed for Chest pain  Mariusz Gonsalves MD   rasagiline mesylate 1 MG TABS take 1 tablet by mouth once daily  Mariusz Gonsalves MD       CareTe (Including outside providers/suppliers regularly involved in providing care):   Patient Care Team:  Mariusz Gonsalves MD as PCP - General (Internal Medicine)  Mariusz Gonsalves MD as PCP - [facility] Empaneled Provider  Josefa Garcia DO (Internal Medicine Cardiovascular Disease)  Harry Redman MD as Surgeon (Gastroenterology)  Brittaney Tello MD as Consulting Physician (Dermatology)  Cynthia Alvarez DO as Consulting Physician (Cardiology)     Reviewed and updated this visit:  Tobacco  Allergies  Meds  Problems  Med Hx  Surg Hx  Soc Hx  Fam Hx                Electronically signed by Mariusz Gonsalves MD on 8/29/2022 at 4:44 PM

## 2022-08-29 NOTE — PROGRESS NOTES
[facility]) Physicians   Internal Medicine     2022  Brigida Olszewski : 1940 Sex: male  Age:81 y.o. Chief Complaint   Patient presents with    Hypertension     Follow up        HPI:   Patient presents to office for evaluation of the following medical concerns. - States has lesion and rash to hair and scalp. States has high blood pressure. States occasionally checks blood pressure at home. On metoprolol, on losartan. Some lightheaded and dizzy if gets up to quickly. No loc/syncope. No headaches or vision changes. No chest pain. - States has parkinson. States following with neurologist at [facility]. States was started on caribodopa/levodopa increased to 1 tablet three times a day. On Rasagiline. Added remeron. Concern over memory. neuropsych ()  - dx vascular dementia. Last Neurology office visit reviewed from care everywhere from (2022) - continue sinemet to 1 tablet 3x per day, continue rasagline and remeron, for repeat memory testing with [facility] in 6 months. States has joined [employer] to help with parkinsons symptoms. Feels stable, not improving. States goes to [facility] 2x per week. States having anxiety and depression. States not moving around a lot. States restless. Started on remeron. Improved. States having constipation. Taking fiber supplements. Declines laxative. States has CAD. Follows with cardiology locally and at [facility]. States s/p stents. States ischemic cardiomyopathy. On metoprolol, losartan, atorvastatin, aspirin and plavix. Last visit with cardio per reviewed note () - sugg stop asa, isch cardiomyopathy. Follow up in 1 year. Follow up scheduled for 2022. States has COPD. States breathing is fair. Has seen pulmonary once, not following. States placed on breo - stopped. Stable. Last visit with pulmonary (2019). [place] poss [place].     States has high cholesterol. States trying to watch diet. On atorvastatin, no reported side effects. headaches. Hematological:  Negative for adenopathy. Does not bruise/bleed easily. Psychiatric/Behavioral:  Negative for suicidal ideas. The patient is not nervous/anxious. Current Outpatient Medications:     clopidogrel (PLAVIX) 75 MG tablet, Take 1 tablet by mouth daily, Disp: 90 tablet, Rfl: 1    losartan (COZAAR) 25 MG tablet, Take 1 tablet by mouth daily, Disp: 90 tablet, Rfl: 1    mirtazapine (REMERON) 15 MG tablet, Take 1 tablet by mouth nightly, Disp: 90 tablet, Rfl: 1    nitroGLYCERIN (NITROSTAT) 0.4 MG SL tablet, Place 1 tablet under the tongue every 5 minutes as needed for Chest pain, Disp: 25 tablet, Rfl: 1    rasagiline mesylate 1 MG TABS, take 1 tablet by mouth once daily, Disp: 90 tablet, Rfl: 1    ketoconazole (NIZORAL) 2 % shampoo, Apply topically daily as needed. , Disp: 100 mL, Rfl: 2    atorvastatin (LIPITOR) 80 MG tablet, Take 1 tablet by mouth daily, Disp: 90 tablet, Rfl: 1    metoprolol succinate (TOPROL XL) 25 MG extended release tablet, Take 1 tablet by mouth daily, Disp: 90 tablet, Rfl: 1    carbidopa-levodopa (SINEMET)  MG per tablet, Take 1 tablet by mouth 3 times daily New prescription, Disp: , Rfl:     ketoconazole (NIZORAL) 2 % cream, Apply topically daily. , Disp: 30 g, Rfl: 1    Multiple Vitamins-Minerals (THERAPEUTIC MULTIVITAMIN-MINERALS) tablet, Take 1 tablet by mouth daily, Disp: , Rfl:     CPAP Machine MISC, daily at bedtime. 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/2018    Chronic maxillary sinusitis 2018    COPD (chronic obstructive pulmonary disease) ([facility] [address] 2018    GARSIA (dyspnea on exertion)     follows with Dr Valentina Solorio    Gallbladder disease     History of non-ST elevation myocardial infarction (NSTEMI)     [facility]    Hyperlipidemia     Hypertension     Ischemic cardiomyopathy     EF 40% after NSTEMI, 11-15 at [facility]    Obesity     Osteoarthritis     Skin cancer of eyelid     Sleep apnea     uses CPAP    Smokeless tobacco use     Stented coronary artery 2006    x1       Past Surgical History:   Procedure Laterality Date    CARDIAC CATHETERIZATION Left 2015    done [facility] Bilateral     CHOLECYSTECTOMY, LAPAROSCOPIC N/A 2019    LAPAROSCOPIC ROBOTIC XI ASSISTED CHOLECYSTECTOMY performed by Laney Thornton MD at [place] [id number]      CORONARY ANGIOPLASTY  06    DIAGNOSTIC CARDIAC CATH LAB PROCEDURE  06    ERCP N/A 2021    ERCP STONE REMOVAL performed by Savana Dumont MD at [facility] ENDOSCOPY    ERCP N/A 2021    ERCP STENT REMOVAL performed by Savana Dumont MD at [place] [id number] Left 2015    ROTATOR CUFF REPAIR      bilaterally    TONSILLECTOMY AND ADENOIDECTOMY      UPPER GASTROINTESTINAL ENDOSCOPY      UPPER GASTROINTESTINAL ENDOSCOPY N/A 2021    EGD ESOPHAGOGASTRODUODENOSCOPY WITH PANCREATIC STENT REMOVAL performed by Savana Dumont MD at [facility] ENDOSCOPY       Family History   Problem Relation Age of Onset    Heart Disease Mother          79 Y/O    Diabetes Mother     Liver Disease Father          66 Y/O       Social History     Socioeconomic History    Marital status:      Spouse name: Not on file    Number of children: Not on file    Years of education: Not on file    Highest education level: Not on file   Occupational History    Occupation: retired-     Tobacco Use    Smoking status: Former     Packs/day: 1.00     Years: 15.00     Pack years: 15.00 Types: Cigarettes     Start date: 1957     Quit date: 1975     Years since quittin.6    Smokeless tobacco: Current     Types: Snuff   Vaping Use    Vaping Use: Never used   Substance and Sexual Activity    Alcohol use: Yes     Comment: occassionally    Drug use: No    Sexual activity: Not on file   Other Topics Concern    Not on file   Social History Narrative    Not on file     Social Determinants of Health     Financial Resource Strain: Low Risk     Difficulty of Paying Living Expenses: Not hard at all   Food Insecurity: No Food Insecurity    Worried About Running Out of Food in the Last Year: Never true    Ran Out of Food in the Last Year: Never true   Transportation Needs: Not on file   Physical Activity: Inactive    Days of Exercise per Week: 0 days    Minutes of Exercise per Session: 0 min   Stress: Not on file   Social Connections: Not on file   Intimate Partner Violence: Not on file   Housing Stability: Not on file       Vitals:    22 1606   BP: 128/70   Pulse: 80   Resp: 16   Temp: 98 °F (36.7 °C)   TempSrc: Temporal   SpO2: 97%   Weight: 186 lb (84.4 kg)   Height: 6' 1\" (1.854 m)       Exam:  Physical Exam  Vitals reviewed. Constitutional:       Appearance: He is well-developed. HENT:      Head: Normocephalic and atraumatic. Right Ear: External ear normal. There is impacted cerumen. Left Ear: External ear normal. There is impacted cerumen. Eyes:      Pupils: Pupils are equal, round, and reactive to light. Neck:      Thyroid: No thyromegaly. Cardiovascular:      Rate and Rhythm: Normal rate and regular rhythm. Heart sounds: Normal heart sounds. No murmur heard. Pulmonary:      Effort: Pulmonary effort is normal.      Breath sounds: Normal breath sounds. No wheezing or rales. Abdominal:      General: Bowel sounds are normal.      Palpations: Abdomen is soft. Tenderness: There is no abdominal tenderness. There is no guarding or rebound.    Musculoskeletal: General: Normal range of motion. Cervical back: Normal range of motion and neck supple. Comments: Abnormal gait. Lymphadenopathy:      Cervical: No cervical adenopathy. Skin:     General: Skin is warm and dry. Neurological:      Mental Status: He is alert and oriented to person, place, and time. Cranial Nerves: No cranial nerve deficit.       Comments: Resting tremor    Psychiatric:         Judgment: Judgment normal.       Assessment and Plan:    Diagnoses and all orders for this visit:    Elevated PSA  - uncertain etiology   - increased to 4.16 (8/2022) poss agoe or BPH related, has been trending more elevated for last few years   - has h/o bph   - recommend referral to urology for assessment     Essential hypertension  - watch diet   - monitor blood pressure at home   - on losartn   - on metoprolol - changed to metoprolol to 25mg (10/2021)  - on plavix   - off aspirin   - Elevated today 8/29/2022    Parkinson disease ([facility] [address]  - following with neurology at [facility]   - on rasagiline   - on carbidopa and levodopa tid increased to 1 tablet tid (8/2021) states does miss doses   - stable, wife feels may be worsening      Moderate episode of recurrent major depressive disorder ([facility] [address]  - now on remeron to help with anxiety and depression   - stable per patient   - last PHQ socre of 0 (8/29/2022)     Coronary artery disease involving native coronary artery of native heart without angina pectoris  - following with cardiology   - on losartan   - on metoprolol - will decrease metoprolol to 25mg (10/2021)   - on atorvastatin   - on Plavix   - off aspirin   - stable     Mixed hyperlipidemia  - watch diet   - on atorvastatin   - follow labs   - last lab (2/2022) - stable     Ischemic cardiomyopathy  - following with cardiology   - on losartn   - on metoprolol - will decrease metoprolol to 25mg (10/2021)   - on atrovastatin   - on plavix   - off aspirin     CHF (congestive heart failure), NYHA class I, chronic, systolic (HCC)  - following with cardiology   - on losartn   - on metoprolol - will decrease metoprolol to 25mg (10/2021)   - on atrovastatin   - on plavix   - off aspirin   - Stable     Chronic obstructive pulmonary disease, unspecified COPD type ([facility] [address]  - not currently following with pulmonary   - did not see benefit with breo   - stable per patient     Impaired fasting glucose  - continue present treatment  - watch diet   - follow A1c - last A1c (7/2021) 5.4    Obstructive sleep apnea syndrome  - Continue present treatment   - on CPAP  - wearing nightly 6-8 hours   - helping symptoms   - stable   - encouraged to wear anytime laying down to rest 8/29/2022    TIA (transient ischemic attack)  - on atrovastatin   - on plavix   - off aspirin   - stable     Prostate enlargement  - has seen urology   - Has flomax - takes as needed  - last PSA (2/2022) - increased and borderline     Vitamin D def  - On otc supplement   - follow labs     Other constipation  - discussed colace 100mg daily and miralax as needed  - taking mutamucil   - declines laxative (8/2022)     Other skin changes  - poss tinea of scalp   - order ketoconazole shampoos   - if not better derm     Cholangitis  - hospital stay (6/2020) and (4/2021)   - s/p ERCP and papillotomy and stent placement   - s/p ERCP with stent removal (6/2021)    - improved     Return in about 3 months (around 11/29/2022) for check up and review.     Orders Placed This Encounter   Procedures    [facility] - Zachary Mendez MD, Urology, [place]     Referral Priority:   Routine     Referral Type:   Eval and Treat     Referral Reason:   Specialty Services Required     Referred to Provider:   Lashawn Stephens MD     Requested Specialty:   Urology     Number of Visits Requested:   1     Requested Prescriptions     Signed Prescriptions Disp Refills    clopidogrel (PLAVIX) 75 MG tablet 90 tablet 1     Sig: Take 1 tablet by mouth daily    losartan (COZAAR) 25 MG tablet 90 tablet 1 Sig: Take 1 tablet by mouth daily    mirtazapine (REMERON) 15 MG tablet 90 tablet 1     Sig: Take 1 tablet by mouth nightly    nitroGLYCERIN (NITROSTAT) 0.4 MG SL tablet 25 tablet 1     Sig: Place 1 tablet under the tongue every 5 minutes as needed for Chest pain    rasagiline mesylate 1 MG TABS 90 tablet 1     Sig: take 1 tablet by mouth once daily     Juice Vora MD  8/29/2022  4:40 PM

## 2022-08-29 NOTE — PATIENT INSTRUCTIONS
Personalized Preventive Plan for Robin Aguilera - 8/29/2022  Medicare offers a range of preventive health benefits. Some of the tests and screenings are paid in full while other may be subject to a deductible, co-insurance, and/or copay. Some of these benefits include a comprehensive review of your medical history including lifestyle, illnesses that may run in your family, and various assessments and screenings as appropriate. After reviewing your medical record and screening and assessments performed today your provider may have ordered immunizations, labs, imaging, and/or referrals for you. A list of these orders (if applicable) as well as your Preventive Care list are included within your After Visit Summary for your review. Other Preventive Recommendations:    A preventive eye exam performed by an eye specialist is recommended every 1-2 years to screen for glaucoma; cataracts, macular degeneration, and other eye disorders. A preventive dental visit is recommended every 6 months. Try to get at least 150 minutes of exercise per week or 10,000 steps per day on a pedometer . Order or download the FREE \"Exercise & Physical Activity: Your Everyday Guide\" from [employer]. Call 0-343.785.1250 or search [employer] online. You need 3285-2572 mg of calcium and 2008-8909 IU of vitamin D per day. It is possible to meet your calcium requirement with diet alone, but a vitamin D supplement is usually necessary to meet this goal.  When exposed to the sun, use a sunscreen that protects against both UVA and UVB radiation with an SPF of 30 or greater. Reapply every 2 to 3 hours or after sweating, drying off with a towel, or swimming. Always wear a seat belt when traveling in a car. Always wear a helmet when riding a bicycle or motorcycle. Heart-Healthy Diet   Sodium, Fat, and Cholesterol Controlled Diet       What Is a Heart Healthy Diet?    A heart-healthy diet is one type of cholesterol actually carries cholesterol away from your arteries and may, therefore, help lower your risk of having a heart attack. You want this level to be high (ideally greater than 60). It is a risk to have a level less than 40. You can raise this good cholesterol by eating olive oil, canola oil, avocados, or nuts. Exercise raises this level, too. Fat    Fat is calorie dense and packs a lot of calories into a small amount of food. Even though fats should be limited due to their high calorie content, not all fats are bad. In fact, some fats are quite healthful. Fat can be broken down into four main types. The good-for-you fats are:   Monounsaturated fat  found in oils such as olive and canola, avocados, and nuts and natural nut butters; can decrease cholesterol levels, while keeping levels of HDL cholesterol high   Polyunsaturated fat  found in oils such as safflower, sunflower, soybean, corn, and sesame; can decrease total cholesterol and LDL cholesterol   Omega-3 fatty acids  particularly those found in fatty fish (such as salmon, trout, tuna, mackerel, herring, and sardines); can decrease risk of arrhythmias, decrease triglyceride levels, and slightly lower blood pressure   The fats that you want to limit are:   Saturated fat  found in animal products, many fast foods, and a few vegetables; increases total blood cholesterol, including LDL levels   Animal fats that are saturated include: butter, lard, whole-milk dairy products, meat fat, and poultry skin   Vegetable fats that are saturated include: hydrogenated shortening, palm oil, coconut oil, cocoa butter   Hydrogenated or trans fat  found in margarine and vegetable shortening, most shelf stable snack foods, and fried foods; increases LDL and decreases HDL     It is generally recommended that you limit your total fat for the day to less than 30% of your total calories.  If you follow an 1800-calorie heart healthy diet, for example, this would mean 60 grams of fat or less per day. Saturated fat and trans fat in your diet raises your blood cholesterol the most, much more than dietary cholesterol does. For this reason, on a heart-healthy diet, less than 7% of your calories should come from saturated fat and ideally 0% from trans fat. On an 1800-calorie diet, this translates into less than 14 grams of saturated fat per day, leaving 46 grams of fat to come from mono- and polyunsaturated fats.    Food Choices on a Heart Healthy Diet   Food Category   Foods Recommended   Foods to Avoid   Grains   Breads and rolls without salted tops Most dry and cooked cereals Unsalted crackers and breadsticks Low-sodium or homemade breadcrumbs or stuffing All rice and pastas   Breads, rolls, and crackers with salted tops High-fat baked goods (eg, muffins, donuts, pastries) Quick breads, self-rising flour, and biscuit mixes Regular bread crumbs Instant hot cereals Commercially prepared rice, pasta, or stuffing mixes   Vegetables   Most fresh, frozen, and low-sodium canned vegetables Low-sodium and salt-free vegetable juices Canned vegetables if unsalted or rinsed   Regular canned vegetables and juices, including sauerkraut and pickled vegetables Frozen vegetables with sauces Commercially prepared potato and vegetable mixes   Fruits   Most fresh, frozen, and canned fruits All fruit juices   Fruits processed with salt or sodium   Milk   Nonfat or low-fat (1%) milk Nonfat or low-fat yogurt Cottage cheese, low-fat ricotta, cheeses labeled as low-fat and low-sodium   Whole milk Reduced-fat (2%) milk Malted and chocolate milk Full fat yogurt Most cheeses (unless low-fat and low salt) Buttermilk (no more than 1 cup per week)   Meats and Beans   Lean cuts of fresh or frozen beef, veal, lamb, or pork (look for the word loin) Fresh or frozen poultry without the skin Fresh or frozen fish and some shellfish Egg whites and egg substitutes (Limit whole eggs to three per week) Tofu Nuts or seeds (unsalted, dry-roasted), low-sodium peanut butter Dried peas, beans, and lentils   Any smoked, cured, salted, or canned meat, fish, or poultry (including fleming, chipped beef, cold cuts, hot dogs, sausages, sardines, and anchovies) Poultry skins Breaded and/or fried fish or meats Canned peas, beans, and lentils Salted nuts   Fats and Oils   Olive oil and canola oil Low-sodium, low-fat salad dressings and mayonnaise   Butter, margarine, coconut and palm oils, fleming fat   Snacks, Sweets, and Condiments   Low-sodium or unsalted versions of broths, soups, soy sauce, and condiments Pepper, herbs, and spices; vinegar, lemon, or lime juice Low-fat frozen desserts (yogurt, sherbet, fruit bars) Sugar, cocoa powder, honey, syrup, jam, and preserves Low-fat, trans-fat free cookies, cakes, and pies Jeffrey and animal crackers, fig bars, gracia snaps   High-fat desserts Broth, soups, gravies, and sauces, made from instant mixes or other high-sodium ingredients Salted snack foods Canned olives Meat tenderizers, seasoning salt, and most flavored vinegars   Beverages   Low-sodium carbonated beverages Tea and coffee in moderation Soy milk   Commercially softened water   Suggestions   Make whole grains, fruits, and vegetables the base of your diet. Choose heart-healthy fats such as canola, olive, and flaxseed oil, and foods high in heart-healthy fats, such as nuts, seeds, soybeans, tofu, and fish. Eat fish at least twice per week; the fish highest in omega-3 fatty acids and lowest in mercury include salmon, herring, mackerel, sardines, and canned chunk light tuna. If you eat fish less than twice per week or have high triglycerides, talk to your doctor about taking fish oil supplements. Read food labels. For products low in fat and cholesterol, look for fat free, low-fat, cholesterol free, saturated fat free, and trans fat freeAlso scan the Nutrition Facts Label, which lists saturated fat, trans fat, and cholesterol amounts. For products low in sodium, look for sodium free, very low sodium, low sodium, no added salt, and unsalted   Skip the salt when cooking or at the table; if food needs more flavor, get creative and try out different herbs and spices. Garlic and onion also add substantial flavor to foods. Trim any visible fat off meat and poultry before cooking, and drain the fat off after david. Use cooking methods that require little or no added fat, such as grilling, boiling, baking, poaching, broiling, roasting, steaming, stir-frying, and sauting. Avoid fast food and convenience food. They tend to be high in saturated and trans fat and have a lot of added salt. Talk to a registered dietitian for individualized diet advice. Last Reviewed: March 2011 Tannre Aguilar MS, MPH, RD   Updated: 3/29/2011     Keep Your Memory [place]       Many factors can affect your ability to remembera hectic lifestyle, aging, stress, chronic disease, and certain medicines. But, there are steps you can take to sharpen your mind and help preserve your memory. Challenge Your Brain   Regularly challenging your mind may help keeps it in top shape. Good mental exercises include:   Crossword puzzlesUse a dictionary if you need it; you will learn more that way. Brainteasers Try some! Crafts, such as wood working and sewing   Hobbies, such as gardening and building model airplanes   SocializingVisit old friends or join groups to meet new ones. Reading   Learning a new language   Taking a class, whether it be art history or trice chi   TravelingExperience the food, history, and culture of your destination   Learning to use a computer   Going to museums, the theater, or thought-provoking movies   Changing things in your daily life, such as reversing your pattern in the grocery store or brushing your teeth using your nondominant hand   Use Memory Aids   There is no need to remember every detail on your own.  These memory aids can help: Calendars and day planners   Electronic organizers to store all sorts of helpful informationThese devices can \"beep\" to remind you of appointments. A book of days to record birthdays, anniversaries, and other occasions that occur on the same date every year   Detailed \"to-do\" lists and strategically placed sticky notes   Quick \"study\" sessionsBefore a gathering, review who will be there so their names will be fresh in your mind. Establish routinesFor example, keep your keys, wallet, and umbrella in the same place all the time or take medicine with your 8:00 AM glass of juice   Live a Healthy Life   Many actions that will keep your body strong will do the same for your mind. For example:   Talk to Your Doctor About Herbs and Supplements    Malnutrition and vitamin deficiencies can impair your mental function. For example, vitamin B12 deficiency can cause a range of symptoms, including confusion. But, what if your nutritional needs are being met? Can herbs and supplements still offer a benefit? Researchers have investigated a range of natural remedies, such as ginkgo , ginseng , and the supplement phosphatidylserine (PS). So far, though, the evidence is inconsistent as to whether these products can improve memory or thinking. If you are interested in taking herbs and supplements, talk to your doctor first because they may interact with other medicines that you are taking. Exercise Regularly    Among the many benefits of regular exercise are increased blood flow to the brain and decreased risk of certain diseases that can interfere with memory function. One study found that even moderate exercise has a beneficial effect. Examples of \"moderate\" exercise include:   Playing 18 holes of golf once a week, without a cart   Playing tennis twice a week   Walking one mile per day   Manage Stress    It can be tough to remember what is important when your mind is cluttered. Make time for relaxation.  Choose activities that calm you down, and make it routine. Manage Chronic Conditions    Side effects of high blood pressure , diabetes, and heart disease can interfere with mental function. Many of the lifestyle steps discussed here can help manage these conditions. Strive to eat a healthy diet, exercise regularly, get stress under control, and follow your doctor's advice for your condition. Minimize Medications    Talk to your doctor about the medicines that you take. Some may be unnecessary. Also, healthy lifestyle habits may lower the need for certain drugs. Last Reviewed: April 2010 Mikaela Lara MD   Updated: 4/13/2010     Keeping Home a [facility]       As we get older, changes in balance, gait, strength, vision, hearing, and cognition make even the most youthful senior more prone to accidents. Falls are one of the leading health risks for older people. This increased risk of falling is related to:   Aging process (eg, decreased muscle strength, slowed reflexes)   Higher incidence of chronic health problems (eg, arthritis, diabetes) that may limit mobility, agility or sensory awareness   Side effects of medicine (eg, dizziness, blurred vision)especially medicines like prescription pain medicines and drugs used to treat mental health conditions   Depending on the brittleness of your bones, the consequences of a fall can be serious and long lasting. Home Life   Research by the Association of Aging [facility]) shows that some home accidents among older adults can be prevented by making simple lifestyle changes and basic modifications and repairs to the home environment. Here are some lifestyle changes that experts recommend:   Have your hearing and vision checked regularly. Be sure to wear prescription glasses that are right for you. Speak to your doctor or pharmacist about the possible side effects of your medicines. A number of medicines can cause dizziness. If you have problems with sleep, talk to your doctor.    Limit allow you to slide safely into the tub. Raised toilet seats and toilet safety rails are helpful for those with knee or hip problems. In the [facility]    Make sure you use a nightlight and that the area around your bed is clear of potential obstacles. Be careful with electric blankets and never go to sleep with a heating pad, which can cause serious burns even if on a low setting. Use fire-resistant mattress covers and pillows, and NEVER smoke in bed. Keep a phone next to the bed that is programmed to dial 911 at the push of a button. If you have a chronic condition, you may want to sign on with an automatic call-in service. Typically the system includes a small pendant that connects directly to an emergency medical voice-response system. You should also make arrangements to stay in contact with someonefriend, neighbor, family memberon a regular schedule. Fire Prevention   According to the [employer]. (Smoke Alarms for Every) [address] [place] [address], senior citizens are one of the two highest risk groups for death and serious injuries due to residential fires. When cooking, wear short-sleeved items, never a bulky long-sleeved robe. The [facility]'s Safety Checklist for Older Consumers emphasizes the importance of checking basements, garages, workshops and storage areas for fire hazards, such as volatile liquids, piles of old rags or clothing and overloaded circuits. Never smoke in bed or when lying down on a couch or recliner chair. Small portable electric or kerosene heaters are responsible for many home fires and should be used cautiously if at all. If you do use one, be sure to keep them away from flammable materials. In case of fire, make sure you have a pre-established emergency exit plan. Have a professional check your fireplace and other fuel-burning appliances yearly.     Helping Hands   Baby boomers entering the diaz years will continue to see the development of new products to help older adults live safely and independently in spite of age-related changes. Making Life More Livable  , by [employer], lists over 1,000 products for \"living well in the mature years,\" such as bathing and mobility aids, household security devices, ergonomically designed knives and peelers, and faucet valves and knobs for temperature control. Medical supply stores and organizations are good sources of information about products that improve your quality of life and insure your safety.      Last Reviewed: November 2009 Edie Gomez MD   Updated: 3/7/2011

## 2022-09-22 ENCOUNTER — APPOINTMENT (OUTPATIENT)
Dept: CT IMAGING | Age: 82
DRG: 176 | End: 2022-09-22
Payer: MEDICARE

## 2022-09-22 ENCOUNTER — APPOINTMENT (OUTPATIENT)
Dept: GENERAL RADIOLOGY | Age: 82
DRG: 176 | End: 2022-09-22
Payer: MEDICARE

## 2022-09-22 ENCOUNTER — HOSPITAL ENCOUNTER (INPATIENT)
Age: 82
LOS: 1 days | Discharge: HOME | DRG: 176 | End: 2022-09-23
Attending: EMERGENCY MEDICINE | Admitting: INTERNAL MEDICINE
Payer: MEDICARE

## 2022-09-22 DIAGNOSIS — I26.99 ACUTE PULMONARY EMBOLISM, UNSPECIFIED PULMONARY EMBOLISM TYPE, UNSPECIFIED WHETHER ACUTE COR PULMONALE PRESENT (HCC): Primary | ICD-10-CM

## 2022-09-22 DIAGNOSIS — R06.02 SHORTNESS OF BREATH: ICD-10-CM

## 2022-09-22 LAB
ANION GAP SERPL CALCULATED.3IONS-SCNC: 10 MMOL/L (ref 7–16)
BASOPHILS ABSOLUTE: 0.07 E9/L (ref 0–0.2)
BASOPHILS RELATIVE PERCENT: 0.9 % (ref 0–2)
BUN BLDV-MCNC: 13 MG/DL (ref 6–23)
CALCIUM SERPL-MCNC: 8.9 MG/DL (ref 8.6–10.2)
CHLORIDE BLD-SCNC: 105 MMOL/L (ref 98–107)
CO2: 24 MMOL/L (ref 22–29)
CREAT SERPL-MCNC: 0.8 MG/DL (ref 0.7–1.2)
D DIMER: 501 NG/ML DDU
EKG ATRIAL RATE: 70 BPM
EKG P AXIS: 74 DEGREES
EKG P-R INTERVAL: 164 MS
EKG Q-T INTERVAL: 396 MS
EKG QRS DURATION: 84 MS
EKG QTC CALCULATION (BAZETT): 427 MS
EKG R AXIS: 28 DEGREES
EKG T AXIS: 37 DEGREES
EKG VENTRICULAR RATE: 70 BPM
EOSINOPHILS ABSOLUTE: 0.33 E9/L (ref 0.05–0.5)
EOSINOPHILS RELATIVE PERCENT: 4.4 % (ref 0–6)
GFR AFRICAN AMERICAN: >60
GFR NON-AFRICAN AMERICAN: >60 ML/MIN/1.73
GLUCOSE BLD-MCNC: 99 MG/DL (ref 74–99)
HCT VFR BLD CALC: 41.2 % (ref 37–54)
HEMOGLOBIN: 13.3 G/DL (ref 12.5–16.5)
IMMATURE GRANULOCYTES #: 0.02 E9/L
IMMATURE GRANULOCYTES %: 0.3 % (ref 0–5)
LYMPHOCYTES ABSOLUTE: 2.39 E9/L (ref 1.5–4)
LYMPHOCYTES RELATIVE PERCENT: 31.7 % (ref 20–42)
MCH RBC QN AUTO: 28.2 PG (ref 26–35)
MCHC RBC AUTO-ENTMCNC: 32.3 % (ref 32–34.5)
MCV RBC AUTO: 87.5 FL (ref 80–99.9)
MONOCYTES ABSOLUTE: 0.73 E9/L (ref 0.1–0.95)
MONOCYTES RELATIVE PERCENT: 9.7 % (ref 2–12)
NEUTROPHILS ABSOLUTE: 4 E9/L (ref 1.8–7.3)
NEUTROPHILS RELATIVE PERCENT: 53 % (ref 43–80)
PDW BLD-RTO: 14.9 FL (ref 11.5–15)
PLATELET # BLD: 219 E9/L (ref 130–450)
PMV BLD AUTO: 10.8 FL (ref 7–12)
POTASSIUM REFLEX MAGNESIUM: 3.8 MMOL/L (ref 3.5–5)
RBC # BLD: 4.71 E12/L (ref 3.8–5.8)
SODIUM BLD-SCNC: 139 MMOL/L (ref 132–146)
TROPONIN, HIGH SENSITIVITY: 8 NG/L (ref 0–11)
TROPONIN, HIGH SENSITIVITY: 8 NG/L (ref 0–11)
WBC # BLD: 7.5 E9/L (ref 4.5–11.5)

## 2022-09-22 PROCEDURE — 96372 THER/PROPH/DIAG INJ SC/IM: CPT

## 2022-09-22 PROCEDURE — 71275 CT ANGIOGRAPHY CHEST: CPT

## 2022-09-22 PROCEDURE — 85378 FIBRIN DEGRADE SEMIQUANT: CPT

## 2022-09-22 PROCEDURE — G0378 HOSPITAL OBSERVATION PER HR: HCPCS

## 2022-09-22 PROCEDURE — 96375 TX/PRO/DX INJ NEW DRUG ADDON: CPT

## 2022-09-22 PROCEDURE — 99285 EMERGENCY DEPT VISIT HI MDM: CPT

## 2022-09-22 PROCEDURE — 80048 BASIC METABOLIC PNL TOTAL CA: CPT

## 2022-09-22 PROCEDURE — 99222 1ST HOSP IP/OBS MODERATE 55: CPT | Performed by: INTERNAL MEDICINE

## 2022-09-22 PROCEDURE — 93005 ELECTROCARDIOGRAM TRACING: CPT | Performed by: EMERGENCY MEDICINE

## 2022-09-22 PROCEDURE — 96374 THER/PROPH/DIAG INJ IV PUSH: CPT

## 2022-09-22 PROCEDURE — 6360000004 HC RX CONTRAST MEDICATION: Performed by: RADIOLOGY

## 2022-09-22 PROCEDURE — 84484 ASSAY OF TROPONIN QUANT: CPT

## 2022-09-22 PROCEDURE — 6360000002 HC RX W HCPCS: Performed by: EMERGENCY MEDICINE

## 2022-09-22 PROCEDURE — 85025 COMPLETE CBC W/AUTO DIFF WBC: CPT

## 2022-09-22 PROCEDURE — 71045 X-RAY EXAM CHEST 1 VIEW: CPT

## 2022-09-22 RX ORDER — ONDANSETRON 2 MG/ML
4 INJECTION INTRAMUSCULAR; INTRAVENOUS ONCE
Status: COMPLETED | OUTPATIENT
Start: 2022-09-22 | End: 2022-09-22

## 2022-09-22 RX ORDER — ENOXAPARIN SODIUM 100 MG/ML
1 INJECTION SUBCUTANEOUS ONCE
Status: COMPLETED | OUTPATIENT
Start: 2022-09-22 | End: 2022-09-22

## 2022-09-22 RX ORDER — FENTANYL CITRATE 50 UG/ML
50 INJECTION, SOLUTION INTRAMUSCULAR; INTRAVENOUS ONCE
Status: COMPLETED | OUTPATIENT
Start: 2022-09-22 | End: 2022-09-22

## 2022-09-22 RX ADMIN — IOPAMIDOL 75 ML: 755 INJECTION, SOLUTION INTRAVENOUS at 20:03

## 2022-09-22 RX ADMIN — ONDANSETRON 4 MG: 2 INJECTION INTRAMUSCULAR; INTRAVENOUS at 19:10

## 2022-09-22 RX ADMIN — ENOXAPARIN SODIUM 80 MG: 100 INJECTION SUBCUTANEOUS at 23:16

## 2022-09-22 RX ADMIN — FENTANYL CITRATE 50 MCG: 50 INJECTION, SOLUTION INTRAMUSCULAR; INTRAVENOUS at 19:10

## 2022-09-22 ASSESSMENT — ENCOUNTER SYMPTOMS
VOMITING: 0
EYE REDNESS: 0
SHORTNESS OF BREATH: 1
ABDOMINAL PAIN: 0
NAUSEA: 0

## 2022-09-22 NOTE — ED PROVIDER NOTES
Chief complaint: Shortness of breath      HPI:  9/22/22, Time: 5:07 PM EDT    TANO Acosta is a 79 y.o. male presenting to the ED for shortness of breath. History is obtained from patient as well as the patient's medical record. Patient is presenting emergency department the chief complaint of shortness of breath. The patient reports that approximately 2 hours prior to arrival he was laying on the couch. He states he had a sudden episode of feeling very uncomfortable. He denies any particular pain. He states that after this lasted approximately half an hour he began to experience shortness of breath. This was moderate in severity. Nothing made it better. Nothing made it worse. This has since improved. The patient did not have any treatment prior to arrival.  He denies any fevers, chest pain, nausea, vomiting or abdominal pain. He is not on any home oxygen. The patient has no history of DVT or PE, is not on any hormone replacement therapy, denies any active malignancy, recent surgeries or long periods of travel sitting in a car or plane. ROS:   Review of Systems   Constitutional:  Negative for chills and fever. HENT:  Negative for congestion. Eyes:  Negative for redness. Respiratory:  Positive for shortness of breath. Cardiovascular:  Negative for chest pain. Gastrointestinal:  Negative for abdominal pain, nausea and vomiting. Genitourinary:  Negative for dysuria. Musculoskeletal:  Negative for arthralgias. Skin:  Negative for rash. Neurological:  Negative for light-headedness. Psychiatric/Behavioral:  Negative for confusion.     All other systems reviewed and are negative.    --------------------------------------------- PAST HISTORY ---------------------------------------------  Past Medical History:  has a past medical history of CAD (coronary artery disease), Cerebral artery occlusion with cerebral infarction [facility]), Cerebrovascular disease, CHF American >60     Calcium 8.9 8.6 - 10.2 mg/dL   Troponin   Result Value Ref Range    Troponin, High Sensitivity 8 0 - 11 ng/L   D-Dimer, Quantitative   Result Value Ref Range    D-Dimer, Quant 501 ng/mL DDU   Troponin   Result Value Ref Range    Troponin, High Sensitivity 8 0 - 11 ng/L   EKG 12 Lead   Result Value Ref Range    Ventricular Rate 70 BPM    Atrial Rate 70 BPM    P-R Interval 164 ms    QRS Duration 84 ms    Q-T Interval 396 ms    QTc Calculation (Bazett) 427 ms    P Axis 74 degrees    R Axis 28 degrees    T Axis 37 degrees       RADIOLOGY:  Interpreted by Radiologist.  CTA PULMONARY W CONTRAST   Final Result   1. Evidence of a small volume of right pulmonary emboli      2. No signs of right heart strain      3. Underlying signs of centrilobular emphysema. 4.  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 Moderate coronary artery calcifications         XR CHEST PORTABLE   Final Result   No acute process. EKG:  This EKG is signed and interpreted by me. Sinus rhythm rate of 70, no ST segment elevation depression, TX interval 164 MS, QRS 84 MS,  MS    Interpreted by me      ------------------------- NURSING NOTES AND VITALS REVIEWED ---------------------------   The nursing notes within the ED encounter and vital signs as below have been reviewed by myself. /74   Pulse 68   Temp 98.1 °F (36.7 °C) (Oral)   Resp 18   Ht 6' 1\" (1.854 m)   Wt 187 lb (84.8 kg)   SpO2 97%   BMI 24.67 kg/m²   Oxygen Saturation Interpretation: Normal    The patients available past medical records and past encounters were reviewed.         ------------------------------ ED COURSE/MEDICAL DECISION MAKING----------------------  Medications   iopamidol (ISOVUE-370) 76 % injection 75 mL (75 mLs IntraVENous Given 9/22/22 2003)   ondansetron (ZOFRAN) injection 4 mg (4 mg IntraVENous Given 9/22/22 1910)   fentaNYL (SUBLIMAZE) injection 50 mcg (50 mcg IntraVENous Given 0179 The patient is in the bed no acute distress. Results of today discussed. The patient will be admitted for further treatment and evaluation. [MT]   0 Spoke with Dr. Abeba Mo he will accept the patient for admission. [MT]      ED Course User Index  [MT] Vi Davis DO               This patient's ED course included: History, physical examination, reevaluation prior to disposition, labs, imaging, telemetry monitoring, EKG, IV medications      This patient has remained hemodynamically stable during their ED course. Critical care:  Critical Care: Please note that the withdrawal or failure to initiate urgent interventions for this patient would likely result in a life threatening deterioration or permanent disability. Accordingly this patient received 32 minutes of critical care time, excluding separately billable procedures. Counseling: The emergency provider has spoken with the patient and discussed todays results, in addition to providing specific details for the plan of care and counseling regarding the diagnosis and prognosis. Questions are answered at this time and they are agreeable with the plan.       --------------------------------- IMPRESSION AND DISPOSITION ---------------------------------    IMPRESSION  1. Acute pulmonary embolism, unspecified pulmonary embolism type, unspecified whether acute cor pulmonale present ([facility] [address]    2. Shortness of breath        DISPOSITION  Disposition: Admit to telemetry  Patient condition is stable        NOTE: This report was transcribed using voice recognition software.  Every effort was made to ensure accuracy; however, inadvertent computerized transcription errors may be present         Vi Davis DO  09/22/22 0237

## 2022-09-23 VITALS
BODY MASS INDEX: 24.4 KG/M2 | HEART RATE: 62 BPM | SYSTOLIC BLOOD PRESSURE: 132 MMHG | HEIGHT: 73 IN | WEIGHT: 184.1 LBS | OXYGEN SATURATION: 96 % | RESPIRATION RATE: 18 BRPM | DIASTOLIC BLOOD PRESSURE: 85 MMHG | TEMPERATURE: 97.9 F

## 2022-09-23 PROBLEM — I26.99 PULMONARY EMBOLISM WITHOUT ACUTE COR PULMONALE, UNSPECIFIED CHRONICITY, UNSPECIFIED PULMONARY EMBOLISM TYPE (HCC): Status: ACTIVE | Noted: 2022-09-23

## 2022-09-23 PROCEDURE — 6370000000 HC RX 637 (ALT 250 FOR IP): Performed by: INTERNAL MEDICINE

## 2022-09-23 PROCEDURE — 99239 HOSP IP/OBS DSCHRG MGMT >30: CPT | Performed by: INTERNAL MEDICINE

## 2022-09-23 PROCEDURE — 97165 OT EVAL LOW COMPLEX 30 MIN: CPT

## 2022-09-23 PROCEDURE — G0378 HOSPITAL OBSERVATION PER HR: HCPCS

## 2022-09-23 PROCEDURE — 2060000000 HC ICU INTERMEDIATE R&B

## 2022-09-23 RX ORDER — LOSARTAN POTASSIUM 25 MG/1
25 TABLET ORAL DAILY
Status: DISCONTINUED | OUTPATIENT
Start: 2022-09-23 | End: 2022-09-23 | Stop reason: HOSPADM

## 2022-09-23 RX ORDER — MIRTAZAPINE 15 MG/1
15 TABLET, FILM COATED ORAL NIGHTLY
Status: DISCONTINUED | OUTPATIENT
Start: 2022-09-23 | End: 2022-09-23 | Stop reason: HOSPADM

## 2022-09-23 RX ORDER — METOPROLOL SUCCINATE 25 MG/1
25 TABLET, EXTENDED RELEASE ORAL DAILY
Status: DISCONTINUED | OUTPATIENT
Start: 2022-09-23 | End: 2022-09-23 | Stop reason: HOSPADM

## 2022-09-23 RX ORDER — CLOPIDOGREL BISULFATE 75 MG/1
75 TABLET ORAL DAILY
Status: DISCONTINUED | OUTPATIENT
Start: 2022-09-23 | End: 2022-09-23 | Stop reason: HOSPADM

## 2022-09-23 RX ORDER — ATORVASTATIN CALCIUM 40 MG/1
80 TABLET, FILM COATED ORAL DAILY
Status: DISCONTINUED | OUTPATIENT
Start: 2022-09-23 | End: 2022-09-23 | Stop reason: HOSPADM

## 2022-09-23 RX ADMIN — CLOPIDOGREL BISULFATE 75 MG: 75 TABLET ORAL at 08:14

## 2022-09-23 RX ADMIN — APIXABAN 10 MG: 5 TABLET, FILM COATED ORAL at 08:14

## 2022-09-23 RX ADMIN — METOPROLOL SUCCINATE 25 MG: 25 TABLET, EXTENDED RELEASE ORAL at 08:13

## 2022-09-23 RX ADMIN — LOSARTAN POTASSIUM 25 MG: 25 TABLET, FILM COATED ORAL at 08:13

## 2022-09-23 RX ADMIN — CARBIDOPA AND LEVODOPA 1 TABLET: 10; 100 TABLET ORAL at 08:13

## 2022-09-23 NOTE — PROGRESS NOTES
P Quality Flow/Interdisciplinary Rounds Progress Note        Quality Flow Rounds held on September 23, 2022    Disciplines Attending:  Bedside Nurse and     Bonny Chan was admitted on 9/22/2022  4:34 PM    Anticipated Discharge Date:       Disposition:    Andrew Score:  Andrew Scale Score: 20    Readmission Risk              Risk of Unplanned Readmission:  10           Discussed patient goal for the day, patient clinical progression, and barriers to discharge.   The following Goal(s) of the Day/Commitment(s) have been identified:   PT/OT eval.      Darell Penny RN  September 23, 2022

## 2022-09-23 NOTE — H&P
[facility] History and Physical        Chief Complaint:  can't catch breathe  History of Present Illness   The patient is a 79 y.o. male    No hx of dvt or PE-- only on plavix, sp CAD and TIA  Today this afternoon, after shfing- sitting up position on couch- he noted he couldn't get full breathe in (NOT Sob per him, although chronic jean since heart attack years ago)  Full breathe,-- restricted but not really painful, but would be tight/catch in lower rib cage which is NEW  Trop 8, not hypoxic, not tachycardic  But CTA shows: \"Evidence of a small volume of right pulmonary emboli\"      Hx of parkisons - seen [facility] neuro on 9/1:  \"rasagline 1.0 mg 1   mirtazepine 15 mg 1   Sinemet 25/100 1 1 1\"  Hx of TIA  Concern over memory. neuropsych (8/21)  - dx vascular dementia  +CAD- seen frantz   Ischemic cardiomyopathy         Overview Note:       EF 40% after NSTEMI, 11-15 at [facility]      HTN0-. On metoprolol, on losartan. Some lightheaded and dizzy if gets up to quickly. No loc/syncope  Constipation, +BPH     copd- States placed on breo - self  stopped. Stable. Last visit with pulmonary (5/2019). Seen Willie Christina in past  Sleep apnea--States on cpap. States wears 6-8 hours per night, helps symptoms      Seen S Mitul  H/o cholangitis. Had MRCP that suggested stricture and inflammation at the head of the pancreas. f/u ERCP (6/21) - Severe stricture at distal most portion of CBD, CBD stones  ERCP with stent removal.   - hx taken from the patient  REVIEW OF SYSTEMS:  no fevers, chills,  Complete ROS performed with the patient and is otherwise negative.     Past Medical History:      Diagnosis Date    CAD (coronary artery disease) 2006    follows with Dr Galdino Webb    Cerebral artery occlusion with cerebral infarction 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    Chronic maxillary sinusitis 04/2018    COPD (chronic obstructive pulmonary disease) ([facility] [address] 04/2018    GARSIA (dyspnea on exertion)     follows with Dr Polina Dallas    Gallbladder disease     History of non-ST elevation myocardial infarction (NSTEMI) 2015    [facility]    Hyperlipidemia     Hypertension     Ischemic cardiomyopathy     EF 40% after NSTEMI, 11-15 at [facility]    Obesity     Osteoarthritis     Skin cancer of eyelid     Sleep apnea     uses CPAP    Smokeless tobacco use     Stented coronary artery 2006    x1       Past Surgical History:        Procedure Laterality Date    CARDIAC CATHETERIZATION Left 11/30/2015    done [facility] Bilateral     CHOLECYSTECTOMY, LAPAROSCOPIC N/A 5/17/2019    LAPAROSCOPIC ROBOTIC XI ASSISTED CHOLECYSTECTOMY performed by Elly Urrutia MD at [place] [address]  2014    CORONARY ANGIOPLASTY  1/23/06    DIAGNOSTIC CARDIAC CATH LAB PROCEDURE  1/23/06    ERCP N/A 4/26/2021    ERCP STONE REMOVAL performed by Chrissy Estrada MD at [facility] ENDOSCOPY    ERCP N/A 6/16/2021    ERCP STENT REMOVAL performed by Chrissy Estrada MD at [facility] Left 02/2015    ROTATOR CUFF REPAIR      bilaterally    [facility] ENDOSCOPY  2015    UPPER GASTROINTESTINAL ENDOSCOPY N/A 4/28/2021    EGD ESOPHAGOGASTRODUODENOSCOPY WITH PANCREATIC STENT REMOVAL performed by Chrissy Estrada MD at [facility] Medications:  Prior to Admission medications    Medication Sig Start Date End Date Taking?  Authorizing Provider   clopidogrel (PLAVIX) 75 MG tablet Take 1 tablet by mouth daily 8/29/22   Rasta Key MD   losartan (COZAAR) 25 MG tablet Take 1 tablet by mouth daily 8/29/22   Rasta Key MD   mirtazapine (REMERON) 15 MG tablet Take 1 tablet by mouth nightly 8/29/22   Rasta Key MD   nitroGLYCERIN (NITROSTAT) 0.4 MG SL tablet Place 1 tablet under the tongue every 5 minutes as needed for Chest pain 8/29/22 Christiano Roberts MD   rasagiline mesylate 1 MG TABS take 1 tablet by mouth once daily 22   Christiano Roberts MD   ketoconazole (NIZORAL) 2 % shampoo Apply topically daily as needed. 22   Christiano Roberts MD   atorvastatin (LIPITOR) 80 MG tablet Take 1 tablet by mouth daily 22   Christiano Roberts MD   metoprolol succinate (TOPROL XL) 25 MG extended release tablet Take 1 tablet by mouth daily 10/27/21   Christiano Roberts MD   carbidopa-levodopa (SINEMET)  MG per tablet Take 1 tablet by mouth 3 times daily New prescription 5/3/21   Historical Provider, MD   ketoconazole (NIZORAL) 2 % cream Apply topically daily. 20   Christiano Roberts MD   Multiple Vitamins-Minerals (THERAPEUTIC MULTIVITAMIN-MINERALS) tablet Take 1 tablet by mouth daily    Historical Provider, MD   CPAP Machine MISC daily at bedtime. Historical Provider, MD   Cholecalciferol (VITAMIN D) 2000 UNIT CAPS capsule Take 1 capsule by mouth daily Last taken     Historical Provider, MD       Allergies:  Lisinopril    Social History:   TOBACCO:   reports that he quit smoking about 47 years ago. His smoking use included cigarettes. He started smoking about 65 years ago. He has a 15.00 pack-year smoking history. His smokeless tobacco use includes snuff. ETOH:   reports current alcohol use. Family History:       Problem Relation Age of Onset    Heart Disease Mother          79 Y/O    Diabetes Mother     Liver Disease Father          66 Y/O      Or deferred/otherwise considered non contributory to current admission  PHYSICAL EXAM:    VS: /74   Pulse 68   Temp 98.1 °F (36.7 °C) (Oral)   Resp 18   Ht 6' 1\" (1.854 m)   Wt 187 lb (84.8 kg)   SpO2 97%   BMI 24.67 kg/m²     General Appearance:     no acute distress. Psych:  HEENT:    A.O.  As per HPI details  NC/AT, PERRL, no pallor no icterus, lips/ext mucous membrane grossly N    Neck:   Supple, trachea midline, no obvious JVD   Resp:     Dec bs  trace wheezes, No rhonchi   Chest wall:    No tenderness or deformity  Hurts to take deep breathe   Heart:    Regular rate and rhythm, S1 and S2 normal, no rub or gallop. Abdomen:     Soft, non-tender, bowel sounds active    no suspicious obvious masses/organomegaly   Genitalia & Rectal:    Deferred.    Extremities x4:   Extremities normal, atraumatic, no cyanosis, no clubbing   Musculoskeletal:      NO active synovitis or swollen b/l wrists, 2-5 MCPs examined   Skin:   Skin color, texture, turgor fairly dry   Lymph nodes:   Cervical nodes grossly normal   Neurologic:  .Grossly symmetric  strength in UEs and LEs with symmetric grossly intact to light touch sensation  +anival, from [facility]     LABS:  CBC:   Lab Results   Component Value Date/Time    WBC 7.5 09/22/2022 05:25 PM    RBC 4.71 09/22/2022 05:25 PM    HGB 13.3 09/22/2022 05:25 PM    HCT 41.2 09/22/2022 05:25 PM     09/22/2022 05:25 PM    MCV 87.5 09/22/2022 05:25 PM     BMP:    Lab Results   Component Value Date/Time     09/22/2022 05:25 PM    K 3.8 09/22/2022 05:25 PM     09/22/2022 05:25 PM    CO2 24 09/22/2022 05:25 PM    BUN 13 09/22/2022 05:25 PM    CREATININE 0.8 09/22/2022 05:25 PM    GLUCOSE 99 09/22/2022 05:25 PM    GLUCOSE 103 01/10/2012 06:24 AM    CALCIUM 8.9 09/22/2022 05:25 PM     Hepatic Function Panel:    Lab Results   Component Value Date/Time    ALKPHOS 60 08/22/2022 10:19 AM    AST 13 08/22/2022 10:19 AM    ALT <5 08/22/2022 10:19 AM    PROT 6.7 08/22/2022 10:19 AM    LABALBU 4.0 08/22/2022 10:19 AM    LABALBU 3.9 01/10/2012 06:24 AM    BILITOT 0.6 08/22/2022 10:19 AM     Magnesium:    Lab Results   Component Value Date/Time    MG 2.1 08/22/2022 10:19 AM       PT/INR:    Lab Results   Component Value Date/Time    PROTIME 12.4 04/25/2021 01:00 PM    PROTIME 11.0 01/09/2012 07:33 PM    INR 1.2 04/25/2021 01:00 PM     U/A:   Lab Results   Component Value Date/Time    LEUKOCYTESUR Negative 08/22/2022 10:19 AM    PHUR 6.0 08/22/2022 10:19 AM    WBCUA NONE 08/22/2022 10:19 AM    WBCUA 0-1 01/10/2012 02:20 AM    RBCUA NONE 08/22/2022 10:19 AM    RBCUA NONE 01/10/2012 02:20 AM    BACTERIA MODERATE 08/22/2022 10:19 AM    SPECGRAV >=1.030 08/22/2022 10:19 AM    BLOODU Negative 08/22/2022 10:19 AM    GLUCOSEU Negative 08/22/2022 10:19 AM    GLUCOSEU 100 01/10/2012 02:20 AM     ABG:  No results found for: PHART, ZKR3XMJ, PO2ART, I6FRZPRV, NRD9PDE, BEART  TSH:    Lab Results   Component Value Date/Time    TSH 1.610 08/22/2022 10:19 AM     Cardiac Enzymes:   Lab Results   Component Value Date    CKTOTAL 87 11/27/2015    CKTOTAL 32 11/19/2015    CKTOTAL 36 11/19/2015    CKMB 5.3 11/27/2015    CKMB <1.0 11/19/2015    CKMB <1.0 11/19/2015    TROPONINI <0.01 04/22/2021    TROPONINI <0.01 11/23/2020    TROPONINI <0.01 11/23/2020       Radiology: XR CHEST PORTABLE    Result Date: 9/22/2022  EXAMINATION: ONE XRAY VIEW OF THE CHEST 9/22/2022 5:40 pm COMPARISON: 11/22/2020 HISTORY: ORDERING SYSTEM PROVIDED HISTORY: chest pain TECHNOLOGIST PROVIDED HISTORY: Reason for exam:->chest pain FINDINGS: The lungs are without acute focal process. There is no effusion or pneumothorax. The cardiomediastinal silhouette is without acute process. The osseous structures are without acute process. No acute process. CTA PULMONARY W CONTRAST    Result Date: 9/22/2022  EXAMINATION: CTA OF THE CHEST 9/22/2022 8:06 pm TECHNIQUE: CTA of the chest was performed after the administration of intravenous contrast.  Multiplanar reformatted images are provided for review. MIP images are provided for review. Automated exposure control, iterative reconstruction, and/or weight based adjustment of the mA/kV was utilized to reduce the radiation dose to as low as reasonably achievable. COMPARISON: None.  HISTORY: ORDERING SYSTEM PROVIDED HISTORY: shortness of breath TECHNOLOGIST PROVIDED HISTORY: Reason for exam:->shortness of breath Decision Support Exception - unselect if not a suspected or confirmed emergency medical condition->Emergency Medical Condition (MA) FINDINGS: Pulmonary Arteries: Pulmonary arteries are adequately opacified for evaluation. There is a small zone of filling defect noted within a right posterior pulmonary artery on axial image (302: 158) compatible pulmonary emboli. The main pulmonary artery is normal in caliber. There are no signs of right heart strain Mediastinum: No evidence of mediastinal lymphadenopathy. The heart and pericardium demonstrate no acute abnormality. There is no acute abnormality of the thoracic aorta. The ascending thoracic aorta measures 3.9 cm that is felt to be at the upper limits of normal.  No dissection was identified. There are moderate coronary artery calcifications. Lungs/pleura: The lungs are without acute process. There are signs of centrilobular emphysema worse on the right. Minimal pleural thickening and fluid extends along the right costal margin. There is atelectasis within the lingula and left lower lung. No focal consolidation or pulmonary edema. No evidence of pneumothorax. Upper Abdomen: Limited images of the upper abdomen are unremarkable. The gallbladder is surgically absent pancreas spleen adrenal glands and kidneys revealed no acute abnormalities. There is a 2 mm nonobstructing calculus in the superior pole the right kidney Soft Tissues/Bones: No acute bone or soft tissue abnormality. There are sclerotic densities within the right humerus that could represent a bone infarct or enchondroma. 1.  Evidence of a small volume of right pulmonary emboli 2. No signs of right heart strain 3. Underlying signs of centrilobular emphysema. 4.  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   Moderate coronary artery calcifications       EKG:  Sinus rhythm with occasional premature ventricular complexes  Nonspecific ST and T wave abnormality   Assessment & Plan   ACTIVE hospital problems being addressed/reassessed for this admission:  Active Problems:    * No active hospital problems. *  Resolved Problems:    * No resolved hospital problems. *    Code status/DVT prophylaxis and PLAN --see orders   Note extensive time spent coordinating care between ER docs, ER and floor nurses, and transitioning care over to day providers  Plan of care/ clinical impressions/communication specifics detailed below:    79 y.o. male    No hx of dvt or PE-- only on plavix, sp CAD and TIA  Today this afternoon, after shfing- sitting up position on couch- he noted he couldn't get full breathe in (NOT Sob per him, although chronic jean since heart attack years ago)  Full breathe,-- restricted but not really painful, but would be tight/catch in lower rib cage which is NEW  Trop 8, not hypoxic, not tachycardic  But CTA shows: \"Evidence of a small volume of right pulmonary emboli\"  Opinon of pulmonary- will start eliquis  copd- States placed on breo - self  stopped. Stable. Last visit with pulmonary (5/2019). Seen Nelda Hanna in past  Sleep apnea--States on cpap. States wears 6-8 hours per night, helps symptoms      Hx of parkisons - seen [facility] neuro on 9/1:  \"rasagline 1.0 mg 1   mirtazepine 15 mg 1   Sinemet 25/100 1 1 1\"  Hx of TIA  Concern over memory. neuropsych (8/21)  - dx vascular dementia  +CAD- seen frantz   Ischemic cardiomyopathy         Overview Note:       EF 40% after NSTEMI, 11-15 at [facility]      HTN0-. On metoprolol, on losartan. Some lightheaded and dizzy if gets up to quickly. No loc/syncope  Constipation, +BPH           Seen S Mitul  H/o cholangitis. Had MRCP that suggested stricture and inflammation at the head of the pancreas.  f/u ERCP (6/21) - Severe stricture at distal most portion of CBD, CBD stones  ERCP with stent removal.     Lucy Jacobson MD   Night Officer, overnight admitting doctor at [facility] call day time doctor   for questions after 7:30am    Covering for [facility]  If Qs please call [phone]  Electronically signed by Marija Araya MD on 9/22/2022 at 11:58 PM

## 2022-09-23 NOTE — DISCHARGE SUMMARY
[facility] Physician Discharge Summary       No follow-up provider specified. Activity level: As tolerated     Dispo: Home      Condition on discharge: Stable     Patient ID:  Rosalind Spencer  [id number]  79 y.o.  1940    Admit date: 9/22/2022    Discharge date and time:  9/23/2022  12:26 PM    Admission Diagnoses: Principal Problem:    Pulmonary embolism without acute cor pulmonale, unspecified chronicity, unspecified pulmonary embolism type ([facility] [address]  Resolved Problems:    * No resolved hospital problems. *      Discharge Diagnoses: Principal Problem:    Pulmonary embolism without acute cor pulmonale, unspecified chronicity, unspecified pulmonary embolism type ([facility])  Resolved Problems:    * No resolved hospital problems. *      Consults:  IP CONSULT TO SOCIAL WORK    Procedures: None    Hospital Course:   Patient Rosalind Spencer is a 79 y.o. presented with Shortness of breath [R06.02]  Pulmonary embolism without acute cor pulmonale, unspecified chronicity, unspecified pulmonary embolism type ([facility] [address] [I26.99]  Acute pulmonary embolism, unspecified pulmonary embolism type, unspecified whether acute cor pulmonale present [facility]) []    79year old male with shortness of breath. Patient has small PE. He has been started on eliquis. No right heart strain noted. Likely etiology is sedentary lifestyle. I did discuss to obtain hematology referral from PCP. And he is to follow up with Dr Adolph Putnam cardiology on Monday to see if plavix can be discontinued.      Discharge Exam:    General Appearance: alert and oriented to person, place and time and in no acute distress  Skin: warm and dry  Head: normocephalic and atraumatic  Eyes: pupils equal, round, and reactive to light, extraocular eye movements intact, conjunctivae normal  Neck: neck supple and non tender without mass   Pulmonary/Chest: clear to auscultation bilaterally- no wheezes, rales or rhonchi, normal air movement, no respiratory distress  Cardiovascular: normal rate, normal S1 and S2 and no carotid bruits  Abdomen: soft, non-tender, non-distended, normal bowel sounds, no masses or organomegaly  Extremities: no cyanosis, no clubbing and no edema  Neurologic: no cranial nerve deficit and speech normal    I/O last 3 completed shifts:  In: -   Out: 200 [Urine:200]  No intake/output data recorded. LABS:  Recent Labs     09/22/22  1725      K 3.8      CO2 24   BUN 13   CREATININE 0.8   GLUCOSE 99   CALCIUM 8.9       Recent Labs     09/22/22  1725   WBC 7.5   RBC 4.71   HGB 13.3   HCT 41.2   MCV 87.5   MCH 28.2   MCHC 32.3   RDW 14.9      MPV 10.8       No results for input(s): POCGLU in the last 72 hours. Imaging:  XR CHEST PORTABLE    Result Date: 9/22/2022  EXAMINATION: ONE XRAY VIEW OF THE CHEST 9/22/2022 5:40 pm COMPARISON: 11/22/2020 HISTORY: ORDERING SYSTEM PROVIDED HISTORY: chest pain TECHNOLOGIST PROVIDED HISTORY: Reason for exam:->chest pain FINDINGS: The lungs are without acute focal process. There is no effusion or pneumothorax. The cardiomediastinal silhouette is without acute process. The osseous structures are without acute process. No acute process. CTA PULMONARY W CONTRAST    Result Date: 9/22/2022  EXAMINATION: CTA OF THE CHEST 9/22/2022 8:06 pm TECHNIQUE: CTA of the chest was performed after the administration of intravenous contrast.  Multiplanar reformatted images are provided for review. MIP images are provided for review. Automated exposure control, iterative reconstruction, and/or weight based adjustment of the mA/kV was utilized to reduce the radiation dose to as low as reasonably achievable. COMPARISON: None.  HISTORY: ORDERING SYSTEM PROVIDED HISTORY: shortness of breath TECHNOLOGIST PROVIDED HISTORY: Reason for exam:->shortness of breath Decision Support Exception - unselect if not a suspected or confirmed emergency medical condition->Emergency Medical Condition (MA) FINDINGS: Pulmonary Arteries: Pulmonary arteries are adequately opacified for evaluation. There is a small zone of filling defect noted within a right posterior pulmonary artery on axial image (302: 158) compatible pulmonary emboli. The main pulmonary artery is normal in caliber. There are no signs of right heart strain Mediastinum: No evidence of mediastinal lymphadenopathy. The heart and pericardium demonstrate no acute abnormality. There is no acute abnormality of the thoracic aorta. The ascending thoracic aorta measures 3.9 cm that is felt to be at the upper limits of normal.  No dissection was identified. There are moderate coronary artery calcifications. Lungs/pleura: The lungs are without acute process. There are signs of centrilobular emphysema worse on the right. Minimal pleural thickening and fluid extends along the right costal margin. There is atelectasis within the lingula and left lower lung. No focal consolidation or pulmonary edema. No evidence of pneumothorax. Upper Abdomen: Limited images of the upper abdomen are unremarkable. The gallbladder is surgically absent pancreas spleen adrenal glands and kidneys revealed no acute abnormalities. There is a 2 mm nonobstructing calculus in the superior pole the right kidney Soft Tissues/Bones: No acute bone or soft tissue abnormality. There are sclerotic densities within the right humerus that could represent a bone infarct or enchondroma. 1.  Evidence of a small volume of right pulmonary emboli 2. No signs of right heart strain 3. Underlying signs of centrilobular emphysema. 4.  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   Moderate coronary artery calcifications       Patient Instructions:      Medication List        START taking these medications      * apixaban 5 MG Tabs tablet  Commonly known as: ELIQUIS  Take 2 tablets by mouth 2 times daily for 13 doses     * apixaban 5 MG Tabs tablet  Commonly known as: ELIQUIS  Take 1 tablet by mouth 2 times daily  Start taking on: September 30, 2022           * This list has 2 medication(s) that are the same as other medications prescribed for you. Read the directions carefully, and ask your doctor or other care provider to review them with you. CONTINUE taking these medications      atorvastatin 80 MG tablet  Commonly known as: LIPITOR  Take 1 tablet by mouth daily     carbidopa-levodopa  MG per tablet  Commonly known as: SINEMET     clopidogrel 75 MG tablet  Commonly known as: PLAVIX  Take 1 tablet by mouth daily     CPAP Machine Misc     * ketoconazole 2 % cream  Commonly known as: NIZORAL  Apply topically daily. * ketoconazole 2 % shampoo  Commonly known as: Nizoral  Apply topically daily as needed. losartan 25 MG tablet  Commonly known as: COZAAR  Take 1 tablet by mouth daily     metoprolol succinate 25 MG extended release tablet  Commonly known as: TOPROL XL  Take 1 tablet by mouth daily     mirtazapine 15 MG tablet  Commonly known as: REMERON  Take 1 tablet by mouth nightly     nitroGLYCERIN 0.4 MG SL tablet  Commonly known as: Nitrostat  Place 1 tablet under the tongue every 5 minutes as needed for Chest pain     rasagiline mesylate 1 MG Tabs  take 1 tablet by mouth once daily     therapeutic multivitamin-minerals tablet     vitamin D 50 MCG (2000 UT) Caps capsule           * This list has 2 medication(s) that are the same as other medications prescribed for you. Read the directions carefully, and ask your doctor or other care provider to review them with you.                    Where to Get Your Medications        You can get these medications from any pharmacy    Bring a paper prescription for each of these medications  apixaban 5 MG Tabs tablet  apixaban 5 MG Tabs tablet           Note that 36 minutes was spent in preparing discharge papers, discussing discharge with patient, medication review, etc.    Signed:  Electronically signed by Joseluis Serrano DO on 9/23/2022 at 12:26 PM

## 2022-09-23 NOTE — CARE COORDINATION
Introduced my self and provided explanation of CM role to patient and his wife at bedside. Patient is awake, alert, and aware of current diagnosis and treatment plan including initiation of NOAC therapy. He voices he resides at home with his spouse and completes his adl's with independence. His wife verifies same. PT/OT evaluations are ordered. Patient is established with a pcp and denies any issue with retail pharmaceutical coverage. Informed by nursing staff of potential patient discharge and new script for Eliquis. Met with patient and presented them with a  30 day free supply coupon to use on first fill of drug at retail pharmacy. Patient and wife voiced understanding and was thankful for the information. Patient and wife deny home going needs and are hopeful for discharge as soon as today. Victor Hugo Catalan.  Rosa, MSN, RN  [facility]  [phone]

## 2022-09-23 NOTE — PROGRESS NOTES
CLINICAL PHARMACY NOTE: MEDS TO BEDS    Total # of Prescriptions Filled: 1   The following medications were delivered to the patient:  Eliquis 5    Additional Documentation:

## 2022-09-23 NOTE — PROGRESS NOTES
Occupational Therapy    OCCUPATIONAL THERAPY INITIAL EVALUATION     Karen Bentley [place], [place]         Date:2022                                                  Patient Name: Rosalind Spencer    MRN: [id number]    : 1940    Room: [address] [place] [address]      Evaluating OT: Venancio Malagon OTR/L   [id number]      Referring On license of [facility]    Specific Provider Orders/Date:OT eval and treat 2022      Diagnosis:  Shortness of breath [R06.02]  Pulmonary embolism without acute cor pulmonale, unspecified chronicity, unspecified pulmonary embolism type ([address] [I26.99]  Acute pulmonary embolism, unspecified pulmonary embolism type, unspecified whether acute cor pulmonale present ([address] [I26.99]     Pertinent Medical History: CVA, Parkinsons,COPD,  OA    Precautions:  Fall Risk,      Assessment of current deficits    [x] Functional mobility  [x]ADLs  [x] Strength               []Cognition    [x] Functional transfers   [x] IADLs         [x] Safety Awareness   [x]Endurance    [] Fine Coordination              [x] Balance      [] Vision/perception   []Sensation     []Gross Motor Coordination  [] ROM  [] Delirium                   [] Motor Control     OT PLAN OF CARE   OT POC based on physician orders, patient diagnosis and results of clinical assessment    Frequency/Duration  1-3 days/wk for 2 weeks PRN   Specific OT Treatment Interventions to include:   ADL retraining/adapted techniques and AE recommendations to increase functional independence within precautions                    Energy conservation techniques to improve tolerance for selfcare routine   Functional transfer/mobility training/DME recommendations for increased independence, safety and fall prevention         Patient/family education to increase safety and functional independence             Environmental modifications for safe mobility and completion of ADLs Therapeutic activity to improve functional performance during ADLs. Therapeutic exercise to improve tolerance and functional strength for ADLs    Balance retraining/tolerance tasks for facilitation of postural control with dynamic challenges during ADLs .       Positioning to improve functional independence  []  Recommended Adaptive Equipment: TBD     Home Living: Pt lives with wife, 1 story with 3 steps/rail to enter   attends Parkinsons class 2x/week  Bathroom setup: walk in shower and tub/shower   Equipment owned: walker, cane, crutches, tub seat     Prior Level of Function: assist  with lower body ADLs , assist  with IADLs; ambulated with no device       Pain Level: no pain ;   Cognition: A&O: pleasant, following commands, conversing    Memory:  fair +   Sequencing:  fair +   Problem solving:  fair   Judgement/safety:  fair      Functional Assessment:  AM-PAC Daily Activity Raw Score: 18/24   Initial Eval Status  Date: 9/23/22 Treatment Status  Date: STGs = LTGs  Time frame: 10-14 days   Feeding Independent      Grooming SBA/set-up   Supervision    UB Dressing SBA/set-up  Supervision    LB Dressing Min A  Family assists patient at home      Bathing SBA  Supervision    Toileting Independent      Bed Mobility  Up in chair upon entry   Independent    Functional Transfers SBA  Sit - stand from chair   Supervision    Functional Mobility CGA/SBA , no device   Ambulating in room and household distance in room   Supervision  with good tolerance    Balance Sitting:     Static:  Independent     Dynamic:supervision   Standing: supervision  Independent/supervision    Activity Tolerance No SOB  On room air   O2 sats >96%  Good  with ADL activity    Visual/  Perceptual Glasses: none by bedside                 Hand Dominance right   AROM (PROM) Strength Additional Info:    RUE  WFL WFl good  and wfl FMC/dexterity noted during ADL tasks       SANIYA [facility] WFL good  and wfl FMC/dexterity noted during ADL tasks       Hearing: [facility]   Sensation:  No c/o numbness or tingling   Tone: WFL   Edema: none observed     Comments: Upon arrival patient sitting in chair . At end of session, patient returned to chair  with call light and phone within reach, all lines and tubes intact. Wife and grand daughter present    Overall patient demonstrated  decreased independence and safety during completion of ADL/functional transfer/mobility tasks. Pt would benefit from continued skilled OT to increase safety and independence with completion of ADL/IADL tasks for functional independence and quality of life. Rehab Potential: good for established goals     Patient / Family Goal: return home       Patient and/or family were instructed on functional diagnosis, prognosis/goals and OT plan of care. Demonstrated good  understanding. Eval Complexity: Low    Time In: 1221  Time Out: 1245      Min Units   OT Eval Low 97165 x  1   OT Eval Medium 30311      OT Eval High 13186      OT Re-Eval [id number]       Therapeutic Ex 06262      Therapeutic Activities 55514       ADL/Self Care 55571       Orthotic Management 72848       Manual 37735     Neuro Re-Ed 70340       Non-Billable Time         Evaluation Time additionally includes thorough review of current medical information, gathering information on past medical history/social history and prior level of function, interpretation of standardized testing/informal observation of tasks, assessment of data and development of plan of care and goals.             Bautista Choi  OTR/L  OT [id number]

## 2022-09-26 ENCOUNTER — TELEPHONE (OUTPATIENT)
Dept: FAMILY MEDICINE CLINIC | Age: 82
End: 2022-09-26

## 2022-09-27 ENCOUNTER — TELEPHONE (OUTPATIENT)
Dept: FAMILY MEDICINE CLINIC | Age: 82
End: 2022-09-27

## 2022-09-27 ENCOUNTER — OFFICE VISIT (OUTPATIENT)
Dept: CARDIOLOGY CLINIC | Age: 82
End: 2022-09-27
Payer: MEDICARE

## 2022-09-27 VITALS
HEIGHT: 73 IN | SYSTOLIC BLOOD PRESSURE: 110 MMHG | HEART RATE: 70 BPM | DIASTOLIC BLOOD PRESSURE: 70 MMHG | WEIGHT: 183.6 LBS | RESPIRATION RATE: 14 BRPM | BODY MASS INDEX: 24.33 KG/M2

## 2022-09-27 DIAGNOSIS — I25.10 CORONARY ARTERY DISEASE INVOLVING NATIVE CORONARY ARTERY OF NATIVE HEART WITHOUT ANGINA PECTORIS: Primary | ICD-10-CM

## 2022-09-27 PROCEDURE — 93000 ELECTROCARDIOGRAM COMPLETE: CPT | Performed by: INTERNAL MEDICINE

## 2022-09-27 PROCEDURE — 99214 OFFICE O/P EST MOD 30 MIN: CPT | Performed by: INTERNAL MEDICINE

## 2022-09-27 PROCEDURE — 1123F ACP DISCUSS/DSCN MKR DOCD: CPT | Performed by: INTERNAL MEDICINE

## 2022-09-27 NOTE — PROGRESS NOTES
Gretchen Volodymyr  1940  Date of Service: 9/27/2022    Patient Active Problem List    Diagnosis Date Noted    Pulmonary embolism without acute cor pulmonale, unspecified chronicity, unspecified pulmonary embolism type ([facility] 09/23/2022     Priority: Medium    Abdominal pain 04/23/2021    Fever 04/23/2021    Sinus tachycardia 04/23/2021    Orthostatic hypotension     COPD exacerbation ([facility]     COVID-19 virus infection 11/22/2020    Ascending cholangitis 06/25/2020    Elevated LFTs 06/25/2020    Leukocytosis 06/25/2020    Lactic acidosis 06/25/2020    Impaired fasting glucose 03/18/2020    Bilateral impacted cerumen 06/27/2019    Other constipation 03/21/2019    Vitamin D deficiency 03/21/2019    Pigmented skin lesion 07/11/2018    Prostate enlargement 07/11/2018    Fatigue 07/11/2018    Disorder of bone 07/11/2018    TIA (transient ischemic attack) 04/11/2018    COPD (chronic obstructive pulmonary disease) ([facility] 04/01/2018    Ischemic cardiomyopathy      Overview Note:     EF 40% after NSTEMI, 11-15 at [facility]      CHF (congestive heart failure), NYHA class I, chronic, systolic ([facility] 65/87/6015     Overview Note:     [facility]      CAD (coronary artery disease)      Overview Note:     Treadmill nuclear stress test (8/15/08): Sim protocol 8 minutes, 10.1 METS, 100% MPHR, PVCs during exercise, no ischemia, small fixed defect involving inferior wall. Treadmill nuclear stress test (7/11/06): Sim protocol 7:20 minutes, negative ischemia, negative scar, EF 73%. PTCA and stenting LAD (1/23/06): 3.5 x 12 mm Taxus drug-eluting stent. Cardiac catheterization (1/23/06): Left main normal; LAD 20-30% ostial, 80% prox; Cx normal; RCA dominant, normal; EF 55%. NSTEMI 11-30-15: 4.0x12 MARTHA ISR LAD at [facility]. Dr. Mariela Su.        Hypertension     Sleep apnea     Osteoarthritis     Smokeless tobacco use     Hyperlipidemia 01/09/2012       Social History     Socioeconomic History    Marital status:    Occupational History Occupation: retired-     Tobacco Use    Smoking status: Former     Packs/day: 1.00     Years: 15.00     Pack years: 15.00     Types: Cigarettes     Start date: 1957     Quit date: 1975     Years since quittin.7    Smokeless tobacco: Current     Types: Snuff   Vaping Use    Vaping Use: Never used   Substance and Sexual Activity    Alcohol use: Yes     Comment: occassionally    Drug use: No     Social Determinants of Health     Financial Resource Strain: Low Risk     Difficulty of Paying Living Expenses: Not hard at all   Food Insecurity: No Food Insecurity    Worried About Running Out of Food in the Last Year: Never true    Ran Out of Food in the Last Year: Never true   Physical Activity: Inactive    Days of Exercise per Week: 0 days    Minutes of Exercise per Session: 0 min       Current Outpatient Medications   Medication Sig Dispense Refill    apixaban (ELIQUIS) 5 MG TABS tablet Take 2 tablets by mouth 2 times daily for 13 doses 26 tablet 0    clopidogrel (PLAVIX) 75 MG tablet Take 1 tablet by mouth daily 90 tablet 1    losartan (COZAAR) 25 MG tablet Take 1 tablet by mouth daily 90 tablet 1    mirtazapine (REMERON) 15 MG tablet Take 1 tablet by mouth nightly 90 tablet 1    rasagiline mesylate 1 MG TABS take 1 tablet by mouth once daily 90 tablet 1    ketoconazole (NIZORAL) 2 % shampoo Apply topically daily as needed. 100 mL 2    atorvastatin (LIPITOR) 80 MG tablet Take 1 tablet by mouth daily 90 tablet 1    metoprolol succinate (TOPROL XL) 25 MG extended release tablet Take 1 tablet by mouth daily 90 tablet 1    carbidopa-levodopa (SINEMET)  MG per tablet Take 1 tablet by mouth 3 times daily New prescription      ketoconazole (NIZORAL) 2 % cream Apply topically daily. 30 g 1    Multiple Vitamins-Minerals (THERAPEUTIC MULTIVITAMIN-MINERALS) tablet Take 1 tablet by mouth daily      CPAP Machine MISC daily at bedtime.       Cholecalciferol (VITAMIN D) 2000 UNIT CAPS capsule Take 1 capsule by mouth daily Last taken 05/13      [START ON 9/30/2022] apixaban (ELIQUIS) 5 MG TABS tablet Take 1 tablet by mouth 2 times daily (Patient not taking: Reported on 9/27/2022) 60 tablet 0    nitroGLYCERIN (NITROSTAT) 0.4 MG SL tablet Place 1 tablet under the tongue every 5 minutes as needed for Chest pain (Patient not taking: Reported on 9/27/2022) 25 tablet 1     No current facility-administered medications for this visit. Allergies   Allergen Reactions    Lisinopril Itching       Chief Complaint:  Ortiz Ponce is here today for follow up and management/recomendations for CAD, ICM. History of Present Illness: Ortiz Ponce is being seen today for initial hospital follow-up. He was recently admitted to the hospital with a pulmonary embolism and placed on Eliquis therapy. He was discharged 4 days ago. He has Parkinson's disease and his activities are limited. However, since his discharge, he has been ambulating from room to room, goes to the mailbox, and did physical therapy yesterday. He denies any chest discomfort or dyspnea with any of these activities. He denies orthopnea/PND. He denies any palpitations or presyncopal symptoms. He denies any lower extremity edema. He states that he has a right upper extremity tremor. REVIEW OF SYSTEMS:  As above. Patient does not complain of any fever, chills, nausea, vomiting or diarrhea. No focal, motor or neurological deficits. No changes in his/her vision, hearing, bowel or bladder habits. He is not known to have a history of thyroid problems. No recent nose bleeds. PHYSICAL EXAM:  Vitals:    09/27/22 0916   BP: 110/70   Pulse: 70   Resp: 14   Weight: 183 lb 9.6 oz (83.3 kg)   Height: 6' 1\" (1.854 m)       GENERAL:  He is alert and oriented x 3, communicates well, in no distress. NECK:  No masses, trachea is mid position. Supple, full ROM, no JVD or bruits. No palpable thyromegaly or lymphadenopathy. HEART:  Distant. Regular rate and rhythm. Normal S1 and S2. There is an S4 gallop and a I/VI (physiologic) systolic murmur. LUNGS: Mildly decreased air movement. Clear to auscultation bilaterally. No use of accessory muscles. symmetrical excursion. ABDOMEN:  Soft, non-tender. Normal bowel sounds. EXTREMITIES:  Full ROM x 4. No bilateral lower extremity edema. Good distal pulses. EYES:  Extraocular muscles intact. PERRL. Normal lids & conjunctiva. ENT:  Nares are clear & not bleeding. Moist mucosa. Normal lips formation. No external masses   NEURO: right upper extremity tremor, full ROM x 4, EOMI. SKIN:  Warm, dry and intact. Normal turgor. EKG: Sinus rhythm, 70 bpm, nl axis, nonspecific ST - T wave changes. q-waves V1-V3. Assessment:   Coronary artery disease as outlined above. No symptoms of recurring ischemia at this time. ischemic cardiomyopathy as outlined above. Clinically euvolemic and no decompensation at this time. Hypertension, well controled today. Trace aortic regurgitation. None seen on echo 4-18 by Dr. Brigette Rutledge. Hypercholesterolemia  LINETTE  Former smoker & now uses smokeless tobacco.  TIA 4-18. Has since followed with his son whom he states is a Neurologist in [facility] [employer]. COPD with chronic dyspnea on exertion. He denies dyspnea this visit. Parkinson's  Discharged 4 days ago from the hospital for a pulmonary embolism. Recommendations:  He is following the cholesterol with Dr. Alisha Ross.    It is okay from a cardiology standpoint to discontinue the aspirin. I would stay on the Plavix. I will check with neurology. I reviewed his hospital course. I discussed this with him. I also discussed that he is now on Eliquis in addition to his Plavix with his wife and him. He has not had any falls. They deny any bruising or bleeding.   With his history of coronary artery disease including in-stent restenosis I would recommend continuing antiplatelet therapy with his Eliquis if

## 2022-09-27 NOTE — TELEPHONE ENCOUNTER
[facility] Transitions Initial Follow Up Call    Outreach made within 2 business days of discharge: Yes    Patient: Shelli Nogueira Patient : 1940   MRN: [id number]  Reason for Admission: Pulmonary embolism without acute cor pulmonale, unspecified chronicity, unspecified pulmonary embolism type  Discharge Date: 22       Spoke with: [facility]    Discharge department/facility: [facility] Interactive Patient Contact:  Was patient able to fill all prescriptions: Yes  Was patient instructed to bring all medications to the follow-up visit: Yes  Is patient taking all medications as directed in the discharge summary?  Yes  Does patient understand their discharge instructions: Yes  Does patient have questions or concerns that need addressed prior to 7-14 day follow up office visit: no    Scheduled appointment with PCP within 7-14 days    Follow Up  Future Appointments   Date Time Provider Peter Martines   2022  9:00 AM Lukas Mota DO [facility]   10/5/2022 11:00 AM Gia Srinivasan MD [facility]   2022 10:00 AM MD Gil Garcia [address]., MA

## 2022-10-03 ENCOUNTER — TELEPHONE (OUTPATIENT)
Dept: PHARMACY | Facility: CLINIC | Age: 82
End: 2022-10-03

## 2022-10-03 NOTE — TELEPHONE ENCOUNTER
[facility] CLINICAL PHARMACY: [facility]  Identified care gap per [employer]: fills at [facility]: ACE/ARB and Statin adherence    Last Visit: 09/27/22    Patient also appears to be prescribed: ATORVASTATIN TAB 80MG and  LOSARTAN POT TAB 25MG    Patient not found in [employer]TM  [place] Records claims through 09/24/22 (Prior Year [facility] [place] =  0%; YTD [place] =  53%; Potential Fail Date: 10/19/22 ):   LOSARTAN POT TAB 25MG last filled on 08/30/22 for 90 day supply. Next refill due: 11/28/22    Per  [facility] Portal:  LOSARTAN POT TAB 25MG last filled on 08/30/22 for 90 day supply. Per [facility]:   Unable to reach pharacmy, busy tone      BP Readings from Last 3 Encounters:   09/27/22 110/70   09/23/22 132/85   08/29/22 128/70     Estimated Creatinine Clearance: 80 mL/min (based on SCr of 0.8 mg/dL). [id number] Records claims through 09/24/22 (Prior Year [facility] [place] =  56%; YTD [facility] =  79%; Potential Fail Date: 10/14/22 ):   ATORVASTATIN TAB 80MG last filled on 04/12/22 for 90 day supply. Next refill due: 08/11/22    Per  [facility] Portal:  ATORVASTATIN TAB 80MG last filled on 4/12/22 for 90 day supply. Per [address]:   Unable to reach pharacmy, busy tone      Lab Results   Component Value Date    CHOL 87 03/16/2019    TRIG 46 03/16/2019    HDL 47 08/22/2022    LDLCALC 59 08/22/2022     ALT   Date Value Ref Range Status   08/22/2022 <5 0 - 40 U/L Final     AST   Date Value Ref Range Status   08/22/2022 13 0 - 39 U/L Final     The ASCVD Risk score (Valarie EVANS, et al., 2019) failed to calculate for the following reasons: The 2019 ASCVD risk score is only valid for ages 36 to 78     PLAN  The following are interventions that have been identified:   - Patient overdue refilling Atorvastatin and active on home medication list.     Attempting to reach patient to review. Left message asking for return call.     Called patient to discuss adherence for atorvastatin      Future Appointments   Date Time Provider Peter Bobbi   10/5/2022 11:00 AM Richie Fernández MD [facility]   11/29/2022 10:00 AM Richie Fernández MD [facility]   [address] [place] [address] // Department, toll free [phone], Option 3

## 2022-10-03 NOTE — LETTER
[place]  [address] [address]           10/04/22     Dear Ellen Lobato,    We tried to reach you recently regarding your ATORVASTATIN TAB 80MG , but were unable to reach you on the telephone. We have on file that you are currently taking ATORVASTATIN TAB 80MG . If you are no longer taking or taking differently, please call us at the number below so that we can discuss this and update your medication profile. It appears that this medication has not been filled at proper times. We are worried you might be missing doses or not taking it as directed. It is important that you take your medications regularly and try not to miss a single dose.     Some ways to help you remember to take and refill your medications are to:  · Use a pill box, set an alarm, and/or keep your medication near something that you do every day  · Ask your pharmacy if they participate in [facility]", a program where you can  all of your medications on the same day  · Ask your pharmacy if you can be set up with automatic refill, where they will automatically refill your prescription when it is due and let you know it's ready to     Sincerely,   [address] // Department, toll free [phone], Option 3

## 2022-10-04 NOTE — TELEPHONE ENCOUNTER
2nd attempt to contact this patient regarding the previous message**    CLINICAL PHARMACY: [facility]  Patient unavailable at the time of call. Left following message on home TAD: please call back at toll-free [phone] option 1 to retrieve previous message. Letter mailed to patient.     Sincerely,   [address] [place] [address]  // Department, toll free [phone], Option 1    For Pharmacy Admin Tracking Only    CPA in place:  No  Intervention Detail: Adherence Monitorin  Gap Closed?: No   Time Spent (min): 15

## 2022-10-05 ENCOUNTER — OFFICE VISIT (OUTPATIENT)
Dept: FAMILY MEDICINE CLINIC | Age: 82
End: 2022-10-05

## 2022-10-05 VITALS
DIASTOLIC BLOOD PRESSURE: 64 MMHG | OXYGEN SATURATION: 99 % | RESPIRATION RATE: 22 BRPM | HEART RATE: 55 BPM | WEIGHT: 181 LBS | SYSTOLIC BLOOD PRESSURE: 104 MMHG | TEMPERATURE: 97.8 F | HEIGHT: 73 IN | BODY MASS INDEX: 23.99 KG/M2

## 2022-10-05 DIAGNOSIS — Z23 NEED FOR PROPHYLACTIC VACCINATION AND INOCULATION AGAINST INFLUENZA: ICD-10-CM

## 2022-10-05 DIAGNOSIS — I26.99 OTHER PULMONARY EMBOLISM WITHOUT ACUTE COR PULMONALE, UNSPECIFIED CHRONICITY (HCC): Primary | ICD-10-CM

## 2022-10-05 DIAGNOSIS — E78.2 MIXED HYPERLIPIDEMIA: ICD-10-CM

## 2022-10-05 DIAGNOSIS — Z09 HOSPITAL DISCHARGE FOLLOW-UP: ICD-10-CM

## 2022-10-05 PROCEDURE — 99495 TRANSJ CARE MGMT MOD F2F 14D: CPT | Performed by: INTERNAL MEDICINE

## 2022-10-05 PROCEDURE — G0008 ADMIN INFLUENZA VIRUS VAC: HCPCS | Performed by: INTERNAL MEDICINE

## 2022-10-05 PROCEDURE — 90694 VACC AIIV4 NO PRSRV 0.5ML IM: CPT | Performed by: INTERNAL MEDICINE

## 2022-10-05 PROCEDURE — 1111F DSCHRG MED/CURRENT MED MERGE: CPT | Performed by: INTERNAL MEDICINE

## 2022-10-05 RX ORDER — ATORVASTATIN CALCIUM 80 MG/1
80 TABLET, FILM COATED ORAL DAILY
Qty: 90 TABLET | Refills: 0 | Status: SHIPPED | OUTPATIENT
Start: 2022-10-05

## 2022-10-05 NOTE — TELEPHONE ENCOUNTER
Patient returning below call. States he takes the atorvastatin, whole tablet (vs wife's report of 1/2 tab with June outreach) daily and uses a pill box. Doesn't think he needs more yet, but is unsure and declined going to his cupboard to check at this time. Reviewed with patient that it appears it is likely out of refills, so I can pend refill request to PCP for his pharmacy to have available when he needs more - see refill encounter.

## 2022-10-05 NOTE — TELEPHONE ENCOUNTER
Sascha Moscoso MD, patient out of refills.  Rx pended for your signature/modification as appropriate    LOV: 8/29/22  Next: 11/29/22    Thank you,  Marifer Herbert, PharmD, [facility]  Department, toll free: [phone], option 1

## 2022-10-05 NOTE — PROGRESS NOTES
Post-Discharge Transitional Care  Follow Up      Ayesha Slaughter   YOB: 1940    Date of Office Visit:  10/5/2022  Date of Hospital Admission: 9/22/22  Date of Hospital Discharge: 9/23/22  Risk of hospital readmission (high >=14%. Medium >=10%) :Readmission Risk Score: 7.7      Care management risk score Rising risk (score 2-5) and Complex Care (Scores >=6): No Risk Score On File     Non face to face  following discharge, date last encounter closed (first attempt may have been earlier): 09/27/2022    Call initiated 2 business days of discharge: Yes    ASSESSMENT/PLAN:   Other pulmonary embolism without acute cor pulmonale, unspecified chronicity (HCC)  - uncertain as to cause - no overt inciting event to increase risk other than parkinsons and decreased mobility   - placed on eliquis - no bleeding or side effects  - was seen by cardiology - stopped aspirin, continued plavix   - will refer to hematology for evaluation of other cuases of clotting     Medical Decision Making: moderate complexity    Return for check up and review, as scheduled. Orders Placed This Encounter   Procedures    Influenza, FLUAD, (age 72 y+), IM, Preservative Free, 0.5 mL    Von Isabel MD, Medical Oncology, [facility]     Referral Priority:   Routine     Referral Type:   Eval and Treat     Referral Reason:   Specialty Services Required     Requested Specialty:   Oncology     Number of Visits Requested:   1       Requested Prescriptions      No prescriptions requested or ordered in this encounter          Subjective:   HPI:  Follow up of Hospital problems/diagnosis(es): pulmonary embolism. Inpatient course: Discharge summary reviewed- see chart.     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 Visit per reviewed note (9/22) - stop aspirin given on eliquis, continue plavix     - No bleeding or other issues with medications. States breathing seems to be improved. No chest pain. No palpations. Patient Active Problem List   Diagnosis    Hyperlipidemia    CAD (coronary artery disease)    Hypertension    Sleep apnea    Osteoarthritis    Smokeless tobacco use    Ischemic cardiomyopathy    TIA (transient ischemic attack)    CHF (congestive heart failure), NYHA class I, chronic, systolic (HCC)    COPD (chronic obstructive pulmonary disease) ([facility]    Pigmented skin lesion    Prostate enlargement    Fatigue    Disorder of bone    Other constipation    Vitamin D deficiency    Bilateral impacted cerumen    Impaired fasting glucose    Ascending cholangitis    Elevated LFTs    Leukocytosis    Lactic acidosis    COVID-19 virus infection    Orthostatic hypotension    COPD exacerbation (HCC)    Abdominal pain    Fever    Sinus tachycardia    Pulmonary embolism without acute cor pulmonale, unspecified chronicity, unspecified pulmonary embolism type ([facility]       Medications listed as ordered at the time of discharge from hospital     Medication List            Accurate as of October 5, 2022 11:59 AM. If you have any questions, ask your nurse or doctor. CONTINUE taking these medications      apixaban 5 MG Tabs tablet  Commonly known as: ELIQUIS  Take 1 tablet by mouth 2 times daily     atorvastatin 80 MG tablet  Commonly known as: LIPITOR  Take 1 tablet by mouth daily     carbidopa-levodopa  MG per tablet  Commonly known as: SINEMET     clopidogrel 75 MG tablet  Commonly known as: PLAVIX  Take 1 tablet by mouth daily     CPAP Machine Misc     * ketoconazole 2 % cream  Commonly known as: NIZORAL  Apply topically daily.      * ketoconazole 2 % shampoo  Commonly known as: Nizoral  Apply topically daily as needed. losartan 25 MG tablet  Commonly known as: COZAAR  Take 1 tablet by mouth daily     metoprolol succinate 25 MG extended release tablet  Commonly known as: TOPROL XL  Take 1 tablet by mouth daily     mirtazapine 15 MG tablet  Commonly known as: REMERON  Take 1 tablet by mouth nightly     nitroGLYCERIN 0.4 MG SL tablet  Commonly known as: Nitrostat  Place 1 tablet under the tongue every 5 minutes as needed for Chest pain     rasagiline mesylate 1 MG Tabs  take 1 tablet by mouth once daily     therapeutic multivitamin-minerals tablet     vitamin D 50 MCG (2000 UT) Caps capsule           * This list has 2 medication(s) that are the same as other medications prescribed for you. Read the directions carefully, and ask your doctor or other care provider to review them with you. Medications marked \"taking\" at this time  Outpatient Medications Marked as Taking for the 10/5/22 encounter (Office Visit) with Carly Cabral MD   Medication Sig Dispense Refill    apixaban (ELIQUIS) 5 MG TABS tablet Take 1 tablet by mouth 2 times daily 60 tablet 0    clopidogrel (PLAVIX) 75 MG tablet Take 1 tablet by mouth daily 90 tablet 1    losartan (COZAAR) 25 MG tablet Take 1 tablet by mouth daily 90 tablet 1    mirtazapine (REMERON) 15 MG tablet Take 1 tablet by mouth nightly 90 tablet 1    nitroGLYCERIN (NITROSTAT) 0.4 MG SL tablet Place 1 tablet under the tongue every 5 minutes as needed for Chest pain 25 tablet 1    rasagiline mesylate 1 MG TABS take 1 tablet by mouth once daily 90 tablet 1    ketoconazole (NIZORAL) 2 % shampoo Apply topically daily as needed.  100 mL 2    atorvastatin (LIPITOR) 80 MG tablet Take 1 tablet by mouth daily 90 tablet 1    metoprolol succinate (TOPROL XL) 25 MG extended release tablet Take 1 tablet by mouth daily 90 tablet 1    carbidopa-levodopa (SINEMET)  MG per tablet Take 1 tablet by mouth 3 times daily New prescription      ketoconazole (NIZORAL) 2 % cream Apply topically daily. 30 g 1    Multiple Vitamins-Minerals (THERAPEUTIC MULTIVITAMIN-MINERALS) tablet Take 1 tablet by mouth daily      CPAP Machine MISC daily at bedtime. Cholecalciferol (VITAMIN D) 2000 UNIT CAPS capsule Take 1 capsule by mouth daily Last taken 05/13          Medications patient taking as of now reconciled against medications ordered at time of hospital discharge: Yes    A comprehensive review of systems was negative except for what was noted in the HPI. Objective:    /64 (Site: Left Upper Arm, Position: Sitting, Cuff Size: Medium Adult)   Pulse 55   Temp 97.8 °F (36.6 °C) (Temporal)   Resp 22   Ht 6' 1\" (1.854 m)   Wt 181 lb (82.1 kg)   SpO2 99%   BMI 23.88 kg/m²     Physical Exam  Vitals reviewed. Constitutional:       Appearance: He is well-developed. HENT:      Head: Normocephalic and atraumatic. Right Ear: External ear normal.      Left Ear: External ear normal.   Eyes:      Pupils: Pupils are equal, round, and reactive to light. Neck:      Thyroid: No thyromegaly. Cardiovascular:      Rate and Rhythm: Normal rate and regular rhythm. Heart sounds: Normal heart sounds. No murmur heard. Pulmonary:      Effort: Pulmonary effort is normal.      Breath sounds: Normal breath sounds. No wheezing or rales. Abdominal:      General: Bowel sounds are normal.      Palpations: Abdomen is soft. Tenderness: There is no abdominal tenderness. There is no guarding or rebound. Musculoskeletal:         General: Normal range of motion. Cervical back: Normal range of motion and neck supple. Lymphadenopathy:      Cervical: No cervical adenopathy. Skin:     General: Skin is warm and dry. Neurological:      Mental Status: He is alert and oriented to person, place, and time. Cranial Nerves: No cranial nerve deficit. Motor: Tremor present.       Gait: Gait abnormal.      Comments: Masked face Psychiatric:         Judgment: Judgment normal.           An electronic signature was used to authenticate this note.   --Chandan Addison MD

## 2022-10-14 ENCOUNTER — HOSPITAL ENCOUNTER (OUTPATIENT)
Dept: INFUSION THERAPY | Age: 82
Discharge: HOME | End: 2022-10-14

## 2022-10-14 ENCOUNTER — OFFICE VISIT (OUTPATIENT)
Dept: ONCOLOGY | Age: 82
End: 2022-10-14
Payer: MEDICARE

## 2022-10-14 VITALS
BODY MASS INDEX: 24.04 KG/M2 | SYSTOLIC BLOOD PRESSURE: 131 MMHG | WEIGHT: 181.4 LBS | HEIGHT: 73 IN | DIASTOLIC BLOOD PRESSURE: 82 MMHG | OXYGEN SATURATION: 100 % | TEMPERATURE: 98.4 F | HEART RATE: 82 BPM

## 2022-10-14 DIAGNOSIS — I26.99 ACUTE PULMONARY EMBOLISM, UNSPECIFIED PULMONARY EMBOLISM TYPE, UNSPECIFIED WHETHER ACUTE COR PULMONALE PRESENT (HCC): Primary | ICD-10-CM

## 2022-10-14 DIAGNOSIS — I26.99 ACUTE PULMONARY EMBOLISM, UNSPECIFIED PULMONARY EMBOLISM TYPE, UNSPECIFIED WHETHER ACUTE COR PULMONALE PRESENT (HCC): ICD-10-CM

## 2022-10-14 DIAGNOSIS — M79.89 LOCALIZED SWELLING OF LOWER EXTREMITY: ICD-10-CM

## 2022-10-14 LAB
ALBUMIN SERPL-MCNC: 4 G/DL (ref 3.5–5.2)
ALP BLD-CCNC: 62 U/L (ref 40–129)
ALT SERPL-CCNC: 11 U/L (ref 0–40)
ANION GAP SERPL CALCULATED.3IONS-SCNC: 14 MMOL/L (ref 7–16)
APTT: 35.4 SEC (ref 24.5–35.1)
AST SERPL-CCNC: 21 U/L (ref 0–39)
BASOPHILS ABSOLUTE: 0.06 E9/L (ref 0–0.2)
BASOPHILS RELATIVE PERCENT: 0.8 % (ref 0–2)
BILIRUB SERPL-MCNC: 0.6 MG/DL (ref 0–1.2)
BUN BLDV-MCNC: 16 MG/DL (ref 6–23)
CALCIUM SERPL-MCNC: 9.2 MG/DL (ref 8.6–10.2)
CHLORIDE BLD-SCNC: 105 MMOL/L (ref 98–107)
CO2: 22 MMOL/L (ref 22–29)
CREAT SERPL-MCNC: 0.8 MG/DL (ref 0.7–1.2)
EOSINOPHILS ABSOLUTE: 0.32 E9/L (ref 0.05–0.5)
EOSINOPHILS RELATIVE PERCENT: 4.4 % (ref 0–6)
GFR AFRICAN AMERICAN: >60
GFR SERPL CREATININE-BSD FRML MDRD: 92 ML/MIN/1.73
GLUCOSE BLD-MCNC: 95 MG/DL (ref 74–99)
HCT VFR BLD CALC: 42.7 % (ref 37–54)
HEMOGLOBIN: 13.4 G/DL (ref 12.5–16.5)
HOMOCYSTEINE: 11.2 UMOL/L (ref 0–15)
IMMATURE GRANULOCYTES #: 0.01 E9/L
IMMATURE GRANULOCYTES %: 0.1 % (ref 0–5)
INR BLD: 1.2
LYMPHOCYTES ABSOLUTE: 2.09 E9/L (ref 1.5–4)
LYMPHOCYTES RELATIVE PERCENT: 28.6 % (ref 20–42)
MCH RBC QN AUTO: 28 PG (ref 26–35)
MCHC RBC AUTO-ENTMCNC: 31.4 % (ref 32–34.5)
MCV RBC AUTO: 89.3 FL (ref 80–99.9)
MONOCYTES ABSOLUTE: 0.62 E9/L (ref 0.1–0.95)
MONOCYTES RELATIVE PERCENT: 8.5 % (ref 2–12)
NEUTROPHILS ABSOLUTE: 4.22 E9/L (ref 1.8–7.3)
NEUTROPHILS RELATIVE PERCENT: 57.6 % (ref 43–80)
PDW BLD-RTO: 15.4 FL (ref 11.5–15)
PLATELET # BLD: 215 E9/L (ref 130–450)
PMV BLD AUTO: 11.5 FL (ref 7–12)
POTASSIUM SERPL-SCNC: 4.3 MMOL/L (ref 3.5–5)
PROTHROMBIN TIME: 13.3 SEC (ref 9.3–12.4)
RBC # BLD: 4.78 E12/L (ref 3.8–5.8)
SODIUM BLD-SCNC: 141 MMOL/L (ref 132–146)
TOTAL PROTEIN: 6.8 G/DL (ref 6.4–8.3)
WBC # BLD: 7.3 E9/L (ref 4.5–11.5)

## 2022-10-14 PROCEDURE — 99214 OFFICE O/P EST MOD 30 MIN: CPT

## 2022-10-14 PROCEDURE — 99204 OFFICE O/P NEW MOD 45 MIN: CPT | Performed by: INTERNAL MEDICINE

## 2022-10-14 NOTE — PROGRESS NOTES
Department of [facility] Oncology  Attending Consult Note    Reason for Visit: Consultation on a patient with PE    Referring Physician:  Sascha Moscoso MD    PCP:  Sascha Moscoso MD    History of Present Illness:  21-year-old male with history of CAD, CHF, COPD, hyperlipidemia, obstructive sleep apnea, parkinson's disease who was complaining of shortness of breath. CTA chest 9/22/2022 evidence of a small volume of right pulmonary emboli. No signs of right heart strain. Underlying signs of centrilobular emphysema. 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 Moderate coronary artery calcifications. D-Dimer 501 on 09/22/2022  Likely etiology is sedentary lifestyle. No prior DVT/PE. No FHx of DVT/PE  Currently on Eliquis 5 mg twice daily with fair tolerance so far    Review of Systems;  CONSTITUTIONAL: No fever, chills. Fair appetite and energy level. ENMT: Eyes: No diplopia; Nose: No epistaxis. Mouth: No sore throat. RESPIRATORY: No hemoptysis, shortness of breath, cough. CARDIOVASCULAR: No chest pain, palpitations. GASTROINTESTINAL: No nausea/vomiting, abdominal pain  GENITOURINARY: No dysuria, urinary frequency, hematuria.   NEURO: Parkinson's disease  Remainder: ROS NEGATIVE    Past Medical History:      Diagnosis Date    CAD (coronary artery disease) 2006    follows with Dr June See    Cerebral artery occlusion with cerebral infarction [facility])     Cerebrovascular disease     CHF (congestive heart failure), NYHA class I, chronic, systolic ([facility] 7689    [facility]-- EF 39%    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/2018    Chronic maxillary sinusitis 04/2018    COP[facility] obstructive [facility] 04/2018    GARSIA (dyspnea on exertion)     follows with Dr Relda Bence    Gallbladder disease     History of non-ST elevation myocardial infarction (NSTEMI) 2015    [facility]    Hyperlipidemia     Hypertension     Ischemic cardiomyopathy     EF 40% after [facility]STEMI, 11[facility]15 [facility]    Obesity Osteoarthritis     Skin cancer of eyelid     Sleep apnea     uses CPAP    Smokeless tobacco use     Stented coronary artery 2006    x1     Past Surgical History:      Procedure Laterality Date    CARDIAC CATHETERIZATION Left 2015    done [facility] Bilateral     CHOLECYSTECTOMY, LAPAROSCOPIC N/A 2019    LAPAROSCOPIC ROBOTIC XI ASSISTED CHOLECYSTECTOMY performed by John Barker MD at [address] [place] [address] Aure  2014    CORONARY ANGIOPLASTY  06    DIAGNOSTIC CARDIAC CATH LAB PROCEDURE  06    ERCP N/A 2021    ERCP STONE REMOVAL performed by Magy Mireles MD at [facility] ENDOSCOPY    ERCP N/A 2021    ERCP STENT REMOVAL performed by Magy Mireles MD at  [address] Left 2015    ROTATOR CUFF REPAIR      bilaterally    TONSILLECTOMY AND ADENOIDECTOMY      UPPER GASTROINTESTINAL ENDOSCOPY      UPPER GASTROINTESTINAL ENDOSCOPY N/A 2021    EGD ESOPHAGOGASTRODUODENOSCOPY WITH PANCREATIC STENT REMOVAL performed by Magy Mireles MD at [facility] ENDOSCOPY     Family History:  Family History   Problem Relation Age of Onset    Heart Disease Mother          81 Y/O    Diabetes Mother     Liver Disease Father          68 Y/O     Medications:  Reviewed and reconciled. Social History:  Social History     Socioeconomic History    Marital status:      Spouse name: Not on file    Number of children: Not on file    Years of education: Not on file    Highest education level: Not on file   Occupational History    Occupation: retired-     Tobacco Use    Smoking status: Former     Packs/day: 1.00     Years: 15.00     Pack years: 15.00     Types: Cigarettes     Start date: 1957     Quit date: 1975     Years since quittin.8    Smokeless tobacco: Current     Types: Snuff   Vaping Use    Vaping Use: Never used   Substance and Sexual Activity    Alcohol use: Yes     Comment: occassionally    Drug use:  No Sexual activity: Not on file   Other Topics Concern    Not on file   Social History Narrative    Not on file     Social Determinants of Health     Financial Resource Strain: Low Risk     Difficulty of Paying Living Expenses: Not hard at all   Food Insecurity: No Food Insecurity    Worried About Running Out of Food in the Last Year: Never true    Ran Out of Food in the Last Year: Never true   Transportation Needs: Not on file   Physical Activity: Inactive    Days of Exercise per Week: 0 days    Minutes of Exercise per Session: 0 min   Stress: Not on file   Social Connections: Not on file   Intimate Partner Violence: Not on file   Housing Stability: Not on file     Allergies: Allergies   Allergen Reactions    Lisinopril Itching     Physical Exam:  /82   Pulse 82   Temp 98.4 °F (36.9 °C)   Ht 6' 1\" (1.854 m)   Wt 181 lb 6.4 oz (82.3 kg)   SpO2 100%   BMI 23.93 kg/m²   GENERAL: Alert, oriented x 3, not in acute distress. HEENT: PERRLA; EOMI. Oropharynx clear. NECK: Supple. Without lymphadenopathy. LUNGS: Good air entry bilaterally. No wheezing, crackles or ronchi. CARDIOVASCULAR: Regular rate. No murmurs, rubs or gallops. ABDOMEN: Soft. Non-tender, non-distended. EXTREMITIES: Without clubbing, cyanosis, or edema. NEUROLOGIC: No focal deficits. Impression/Plan:  48-year-old male with history of CAD, CHF, COPD, hyperlipidemia, obstructive sleep apnea, parkinson's disease who was complaining of shortness of breath. CTA chest 9/22/2022 evidence of a small volume of right pulmonary emboli. No signs of right heart strain. Underlying signs of centrilobular emphysema. 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 Moderate coronary artery calcifications. D-Dimer 501 on 09/22/2022  Likely etiology is sedentary lifestyle. No prior DVT/PE.  No FHx of DVT/PE  Currently on Eliquis 5 mg twice daily with fair tolerance so far  Hyper-coag work-up ordered  Bilateral lower extremity ultrasound ordered to rule out DVT  RTC 2 weeks to review test results.   Continue Eliquis in the interim    Thank you for allowing us to participate in the care of Mr. Kayla Edouard MD   10/14/2022

## 2022-10-14 NOTE — PROGRESS NOTES
Carolyn Hay  1940 79 y.o. PCP: Lisset Mccrary MD    Vitals:    10/14/22 1101   BP: 131/82   Pulse: 82   Temp: 98.4 °F (36.9 °C)   SpO2: 100%        Wt Readings from Last 3 Encounters:   10/14/22 181 lb 6.4 oz (82.3 kg)   10/05/22 181 lb (82.1 kg)   22 183 lb 9.6 oz (83.3 kg)        Body mass index is 23.93 kg/m². Chief Complaint:   Chief Complaint   Patient presents with    New Patient     PULMONARY EMBOLISM W/OUT ACUTE COR PULMONALE/           LMP: N/A    Age at first Menses: N/A    : N/A    Para: N/A          Current Outpatient Medications:     apixaban (ELIQUIS) 5 MG TABS tablet, Take 1 tablet by mouth 2 times daily, Disp: 60 tablet, Rfl: 0    atorvastatin (LIPITOR) 80 MG tablet, Take 1 tablet by mouth daily (Patient taking differently: Take 40 mg by mouth daily), Disp: 90 tablet, Rfl: 0    clopidogrel (PLAVIX) 75 MG tablet, Take 1 tablet by mouth daily, Disp: 90 tablet, Rfl: 1    losartan (COZAAR) 25 MG tablet, Take 1 tablet by mouth daily, Disp: 90 tablet, Rfl: 1    mirtazapine (REMERON) 15 MG tablet, Take 1 tablet by mouth nightly, Disp: 90 tablet, Rfl: 1    nitroGLYCERIN (NITROSTAT) 0.4 MG SL tablet, Place 1 tablet under the tongue every 5 minutes as needed for Chest pain, Disp: 25 tablet, Rfl: 1    rasagiline mesylate 1 MG TABS, take 1 tablet by mouth once daily, Disp: 90 tablet, Rfl: 1    ketoconazole (NIZORAL) 2 % shampoo, Apply topically daily as needed. , Disp: 100 mL, Rfl: 2    metoprolol succinate (TOPROL XL) 25 MG extended release tablet, Take 1 tablet by mouth daily, Disp: 90 tablet, Rfl: 1    carbidopa-levodopa (SINEMET)  MG per tablet, Take 1 tablet by mouth 3 times daily New prescription, Disp: , Rfl:     ketoconazole (NIZORAL) 2 % cream, Apply topically daily. , Disp: 30 g, Rfl: 1    Multiple Vitamins-Minerals (THERAPEUTIC MULTIVITAMIN-MINERALS) tablet, Take 1 tablet by mouth daily, Disp: , Rfl:     CPAP Machine MISC, daily at bedtime. , Disp: , Rfl: Cholecalciferol (VITAMIN D) 2000 UNIT CAPS capsule, Take 1 capsule by mouth daily Last taken 05/13, Disp: , Rfl:        Past Medical History:   Diagnosis Date    CAD (coronary artery disease) 2006    follows with Dr Rl Dubose    Cerebral artery occlusion with cerebral infarction [facility])     Cerebrovascular disease     CHF (congestive heart failure), NYHA class I, chronic, systolic ([facility] 1385    [facility]-- EF 39%    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/2018    Chronic maxillary sinusitis 04/2018    [facility] 04/2018    GARSIA (dyspnea on exertion)     follows with Dr Norma Vaughn    Gallbladder disease     History of non-ST elevation myocardial infarction (NSTEMI) 2015    [facility]    Hyperlipidemia     Hypertension     Ischemic cardiomyopathy     EF 40% after NSTEMI, 11-15 at [facility]    Obesity     Osteoarthritis     Skin cancer of eyelid     Sleep apnea     uses CPAP    Smokeless tobacco use     Stented coronary artery 2006    x1       Past Surgical History:   Procedure Laterality Date    CARDIAC CATHETERIZATION Left 11/30/2015    done [facility] Bilateral     CHOLECYSTECTOMY, LAPAROSCOPIC N/A 5/17/2019    LAPAROSCOPIC ROBOTIC XI ASSISTED CHOLECYSTECTOMY performed by Filemon Kumar MD at [address] [place] [address]  2014    CORONARY ANGIOPLASTY  1/23/06    DIAGNOSTIC CARDIAC CATH LAB PROCEDURE  1/23/06    ERCP N/A 4/26/2021    ERCP STONE REMOVAL performed by Ricco Avelar MD at [facility] ENDOSCOPY    ERCP N/A 6/16/2021    ERCP STENT REMOVAL performed by Ricco Avelar MD at [facility] Left 02/2015    ROTATOR CUFF REPAIR      bilaterally    TONSILLECTOMY AND ADENOIDECTOMY  1951    UPPER GASTROINTESTINAL ENDOSCOPY  2015    UPPER GASTROINTESTINAL ENDOSCOPY N/A 4/28/2021    EGD ESOPHAGOGASTRODUODENOSCOPY WITH PANCREATIC STENT REMOVAL performed by Ricco Avelar MD at [facility] ENDOSCOPY       Family History   Problem Relation Age of Onset    Heart Disease Mother          79 Y/O    Diabetes Mother     Liver Disease Father          68 Y/O       Social History     Socioeconomic History    Marital status:      Spouse name: Not on file    Number of children: Not on file    Years of education: Not on file    Highest education level: Not on file   Occupational History    Occupation: retired-     Tobacco Use    Smoking status: Former     Packs/day: 1.00     Years: 15.00     Pack years: 15.00     Types: Cigarettes     Start date: 1957     Quit date: 1975     Years since quittin.8    Smokeless tobacco: Current     Types: Snuff   Vaping Use    Vaping Use: Never used   Substance and Sexual Activity    Alcohol use: Yes     Comment: occassionally    Drug use: No    Sexual activity: Not on file   Other Topics Concern    Not on file   Social History Narrative    Not on file     Social Determinants of Health     Financial Resource Strain: Low Risk     Difficulty of Paying Living Expenses: Not hard at all   Food Insecurity: No Food Insecurity    Worried About Running Out of Food in the Last Year: Never true    [address] in the Last Year: Never true   Transportation Needs: Not on file   Physical Activity: Inactive    Days of Exercise per Week: 0 days    Minutes of Exercise per Session: 0 min   Stress: Not on file   Social Connections: Not on file   Intimate Partner Violence: Not on file   Housing Stability: Not on file           Occupation: [address] [place] [address]  Retired:  YES: Patient is retired from . REVIEW OF SYSTEMS:     Pacemaker/Defibulator/ICD:  No    Mediport: No           FALLS RISK SCREENING ASSESSMENT    Instructions:  Assess the patient and [place] the appropriate indicators that are present for fall risk identification. Total the numbers circled and assign a fall risk score from Table 2.  Reassess patient at a minimum every 12 weeks or with status change. Assessment   Date  10/14/2022     1.   Mental Ability: confusion/cognitively impaired Yes - 3       2. Elimination Issues: incontinence, frequency No - 0       3. Ambulatory: use of assistive devices (walker, cane, off-loading devices), attached to equipment (IV pole, oxygen) No - 0     4. Sensory Limitations: dizziness, vertigo, impaired vision No - 0       5. Age 72 years or greater - 1       10. Medication: diuretics, strong analgesics, hypnotics, sedatives, antihypertensive agents   Yes - 3   7. Falls:  recent history of falls within the last 3 months (not to include slipping or tripping)   No - 0   TOTAL 7    If score of 4 or greater was education given? Yes       TABLE 2   Risk Score Risk Level Plan of Care   0-3 Little or  No Risk 1. Provide assistance as indicated for ambulation activities  2. Reorient confused/cognitively impaired patient  3. Call-light/bell within patient's reach  4. Chair/bed in low position, stretcher/bed with siderails up except when performing patient care activities  5. Educate patient/family/caregiver on falls prevention  6.  Reassess in 12 weeks or with any noted change in patient condition which places them at a risk for a fall   4-6 Moderate Risk 1. Provide assistance as indicated for ambulation activities  2. Reorient confused/cognitively impaired patient  3. Call-light/bell within patient's reach  4. Chair/bed in low position, stretcher/bed with siderails up except when performing patient care activities  5. Educate patient/family/caregiver on falls prevention  6. Falls risk precaution (Yellow sticker Level II) placed on patient chart   7 or   Higher High Risk 1. Place patient in easily observable treatment room  2. Patient attended at all times by family member or staff  3. Provide assistance as indicated for ambulation activities  4. Reorient confused/cognitively impaired patient  5. Call-light/bell within patient's reach  6.   Chair/bed in low position, stretcher/bed with siderails up except when

## 2022-10-17 LAB
ANTI-NUCLEAR ANTIBODY (ANA): NEGATIVE
ANTICARDIOLIPIN IGA ANTIBODY: <10 APL
ANTICARDIOLIPIN IGG ANTIBODY: <10 GPL
BETA-2 GLYCOPROTEIN 1 IGG ANTIBODY: <10 SGU
BETA-2 GLYCOPROTEIN 1 IGM ANTIBODY: <10 SMU
CARDIOLIPIN AB IGM: <10 MPL

## 2022-10-19 LAB
FACTOR V LEIDEN: NEGATIVE
MTHFR BY PCR SPECIMEN: NORMAL
MTHFR INTERPRETATION: NORMAL
MTHFR MUTATION A1298C: NEGATIVE
MTHFR MUTATION C677T: NORMAL
PROTEIN S ANTIGEN, FREE: 92 % (ref 74–147)
SPECIMEN: NORMAL

## 2022-10-21 LAB
PLASMINOGEN ACT INHIBITOR-1: ABNORMAL
PLASMINOGEN ACT. INHIBITOR-1 (PAI-1) SPECIMEN: ABNORMAL
PROTHROMBIN G20210A MUTATION: NEGATIVE
PT PCR SPECIMEN: NORMAL

## 2022-10-28 ENCOUNTER — HOSPITAL ENCOUNTER (OUTPATIENT)
Dept: INFUSION THERAPY | Age: 82
Discharge: HOME | End: 2022-10-28

## 2022-10-28 ENCOUNTER — OFFICE VISIT (OUTPATIENT)
Dept: ONCOLOGY | Age: 82
End: 2022-10-28
Payer: MEDICARE

## 2022-10-28 VITALS
BODY MASS INDEX: 23.94 KG/M2 | WEIGHT: 180.6 LBS | HEIGHT: 73 IN | SYSTOLIC BLOOD PRESSURE: 121 MMHG | HEART RATE: 88 BPM | OXYGEN SATURATION: 99 % | DIASTOLIC BLOOD PRESSURE: 76 MMHG | TEMPERATURE: 98.7 F

## 2022-10-28 DIAGNOSIS — I26.99 ACUTE PULMONARY EMBOLISM, UNSPECIFIED PULMONARY EMBOLISM TYPE, UNSPECIFIED WHETHER ACUTE COR PULMONALE PRESENT (HCC): Primary | ICD-10-CM

## 2022-10-28 PROCEDURE — 99213 OFFICE O/P EST LOW 20 MIN: CPT

## 2022-10-28 PROCEDURE — 3078F DIAST BP <80 MM HG: CPT | Performed by: INTERNAL MEDICINE

## 2022-10-28 PROCEDURE — 3074F SYST BP LT 130 MM HG: CPT | Performed by: INTERNAL MEDICINE

## 2022-10-28 PROCEDURE — 99214 OFFICE O/P EST MOD 30 MIN: CPT | Performed by: INTERNAL MEDICINE

## 2022-10-28 PROCEDURE — 1123F ACP DISCUSS/DSCN MKR DOCD: CPT | Performed by: INTERNAL MEDICINE

## 2022-10-28 NOTE — PROGRESS NOTES
Department of [facility] Oncology  Attending Clinic Note    Reason for Visit: Follow-up on a patient with PE    PCP:  Anders Nowak MD    History of Present Illness:  79-year-old male with history of CAD, CHF, COPD, hyperlipidemia, obstructive sleep apnea, parkinson's disease who was complaining of shortness of breath. CTA chest 9/22/2022 evidence of a small volume of right pulmonary emboli. No signs of right heart strain. Underlying signs of centrilobular emphysema. 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 Moderate coronary artery calcifications. D-Dimer 501 on 09/22/2022  Likely etiology is sedentary lifestyle. No prior DVT/PE. No FHx of DVT/PE  Currently on Eliquis 5 mg twice daily with fair tolerance so far  Today 10/28/2022. No fever, chills. Fair appetite and energy level. Tolerating Eliquis well. Review of Systems;  CONSTITUTIONAL: No fever, chills. Fair appetite and energy level. ENMT: Eyes: No diplopia; Nose: No epistaxis. Mouth: No sore throat. RESPIRATORY: No hemoptysis, shortness of breath, cough. CARDIOVASCULAR: No chest pain, palpitations. GASTROINTESTINAL: No nausea/vomiting, abdominal pain  GENITOURINARY: No dysuria, urinary frequency, hematuria.   NEURO: Parkinson's disease  Remainder: ROS NEGATIVE    Past Medical History:      Diagnosis Date    CAD (coronary artery disease) 2006    follows with Dr Rashida Quiles    Cerebral artery occlusion with cerebral infarction [facility])     Cerebrovascular disease     CHF (congestive heart failure), NYHA class I, chronic, systolic ([facility] [address] 2524    [facility]-- EF 39%    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/2018    Chronic maxillary sinusitis 04/2018    COPD (chronic obstructive pulmonary disease) ([facility] [address] 04/2018    GARSIA (dyspnea on exertion)     follows with Dr Ana Jose    Gallbladder disease     History of non-ST elevation myocardial infarction (NSTEMI) 2015    [facility]    Hyperlipidemia     Hypertension     Ischemic cardiomyopathy     EF 40% after NSTEMI, 11-15 at [facility]    Obesity     Osteoarthritis     Skin cancer of eyelid     Sleep apnea     uses CPAP    Smokeless tobacco use     Stented coronary artery 2006    x1     Medications:  Reviewed and reconciled. Allergies: Allergies   Allergen Reactions    Lisinopril Itching     Physical Exam:  /76   Pulse 88   Temp 98.7 °F (37.1 °C)   Ht 6' 1\" (1.854 m)   Wt 180 lb 9.6 oz (81.9 kg)   SpO2 99%   BMI 23.83 kg/m²   GENERAL: Alert, oriented x 3, not in acute distress. HEENT: PERRLA; EOMI. Oropharynx clear. NECK: Supple. Without lymphadenopathy. LUNGS: Good air entry bilaterally. No wheezing, crackles or ronchi. CARDIOVASCULAR: Regular rate. No murmurs, rubs or gallops. ABDOMEN: Soft. Non-tender, non-distended. EXTREMITIES: Without clubbing, cyanosis, or edema. NEUROLOGIC: No focal deficits. Impression/Plan:  79-year-old male with history of CAD, CHF, COPD, hyperlipidemia, obstructive sleep apnea, parkinson's disease who was complaining of shortness of breath. CTA chest 9/22/2022 evidence of a small volume of right pulmonary emboli. No signs of right heart strain. Underlying signs of centrilobular emphysema. 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 Moderate coronary artery calcifications. D-Dimer 501 on 09/22/2022  Likely etiology is sedentary lifestyle. No prior DVT/PE. No FHx of DVT/PE  Currently on Eliquis 5 mg twice daily with fair tolerance so far    Hyper-coag work-up 10/14/2022:  Hb 13.4  Hct 42.7  MCV 89.3    WBC 7.3    CMP WNL    PT 13.3  (9.3-12. 4)  INR 1.2  PTT 35.4 (24.5-35. 1)    Homocysteine 11.2    SHERICE Negative  DRVVT pending  Anticardiolipin IgG<10  Cardiolipin Ab IgM <10  Anticardiolipin IgA <10    Beta-2 Glyco 1 IgG <10  Beta-2 Glyco 1 IgM <10    Factor V Leiden: Negative  MTHFR Mutation C677T Homozygous    MTHFR Mutation M8162L Negative    Prothrombin mutation: Negative  NATALIE-1 4G/4G  Factor XIII Heterozygous  Genetic counseling recommended and ordered     Protein C pending  Protein S Ag, Free 92%   Anti-Thrombin III pending    Mike LE U/S on 10/21/2022 No evidence of DVT in either lower extremity  Imaging reviewed. Labs reviewed.    Continue Eliquis (total of 6 months)  RTC 6 weeks    Evangeline Goldmann, MD   10/28/2022

## 2022-10-31 ENCOUNTER — TELEPHONE (OUTPATIENT)
Dept: FAMILY MEDICINE CLINIC | Age: 82
End: 2022-10-31

## 2022-10-31 DIAGNOSIS — G47.33 OBSTRUCTIVE SLEEP APNEA SYNDROME: Primary | ICD-10-CM

## 2022-10-31 NOTE — TELEPHONE ENCOUNTER
Can I get some more specific information about his CPAP    With his pressure for the CPAP  Does he use heated humidification or any type of humidification  What type of tubing -regular tubing or heated elements or other  What type of mask fullface nasal or other    Thanks

## 2022-10-31 NOTE — TELEPHONE ENCOUNTER
We will for the updated information    Orders Placed This Encounter   Procedures    DME Order for CPAP as OP     CPAP/bipap 12/7 cm H2O    Heated Humidifier    Tubing 1 per 3 months    Nasal Mask 1 per 3 months and Cushions/Seals 2 per month    Headgear 1 per 6 months, Chin Strap 1 per 6 months, Disposable Filters 2 per month, Non-disposable Filters 1 per 6 months, Chambers 1 per 6 months and Other Related Supplies. Replace supplies and accessories as needed. Patient may choose another interface for compliance and comfort.   Comments: LINETTE  Diagnosis: linette   Length of need: 12 Months     CPAP order placed

## 2022-10-31 NOTE — TELEPHONE ENCOUNTER
Monika Monsalve uses a Machine with built in heat and humidification, so include that. He uses a Nasal mask and standard tubing.   His settings are 12/7

## 2022-10-31 NOTE — TELEPHONE ENCOUNTER
Genny Whittaker is in need of a new C-pap machine. He needs an order sent to [facility] in [place].         Phone - [phone]         Fax - [phone]

## 2022-11-01 DIAGNOSIS — I26.99 PULMONARY EMBOLISM WITHOUT ACUTE COR PULMONALE, UNSPECIFIED CHRONICITY, UNSPECIFIED PULMONARY EMBOLISM TYPE (HCC): Primary | ICD-10-CM

## 2022-11-02 DIAGNOSIS — I26.99 ACUTE PULMONARY EMBOLISM, UNSPECIFIED PULMONARY EMBOLISM TYPE, UNSPECIFIED WHETHER ACUTE COR PULMONALE PRESENT (HCC): ICD-10-CM

## 2022-11-02 LAB — D DIMER: <200 NG/ML DDU

## 2022-11-03 LAB
AT-III ACTIVITY: 100 % ACTIVITY (ref 83–121)
LUPUS ANTICOAG DVVT: NEGATIVE
PROTEIN C ACTIVITY: 95 % ACTIVITY (ref 68–165)

## 2022-11-07 NOTE — TELEPHONE ENCOUNTER
Darell's wife called and said that [facility] in [place] has yet to receive the order. Can you please re-fax?

## 2022-11-29 ENCOUNTER — OFFICE VISIT (OUTPATIENT)
Dept: FAMILY MEDICINE CLINIC | Age: 82
End: 2022-11-29
Payer: MEDICARE

## 2022-11-29 VITALS
SYSTOLIC BLOOD PRESSURE: 100 MMHG | HEART RATE: 71 BPM | OXYGEN SATURATION: 97 % | DIASTOLIC BLOOD PRESSURE: 70 MMHG | HEIGHT: 73 IN | TEMPERATURE: 98 F | BODY MASS INDEX: 24.39 KG/M2 | RESPIRATION RATE: 20 BRPM | WEIGHT: 184 LBS

## 2022-11-29 DIAGNOSIS — K59.09 OTHER CONSTIPATION: ICD-10-CM

## 2022-11-29 DIAGNOSIS — E78.2 MIXED HYPERLIPIDEMIA: ICD-10-CM

## 2022-11-29 DIAGNOSIS — J44.9 CHRONIC OBSTRUCTIVE PULMONARY DISEASE, UNSPECIFIED COPD TYPE (HCC): ICD-10-CM

## 2022-11-29 DIAGNOSIS — I50.22 CHF (CONGESTIVE HEART FAILURE), NYHA CLASS I, CHRONIC, SYSTOLIC (HCC): ICD-10-CM

## 2022-11-29 DIAGNOSIS — R68.89 CHANGE IN WEIGHT: ICD-10-CM

## 2022-11-29 DIAGNOSIS — I26.99 OTHER PULMONARY EMBOLISM WITHOUT ACUTE COR PULMONALE, UNSPECIFIED CHRONICITY (HCC): Primary | ICD-10-CM

## 2022-11-29 DIAGNOSIS — R73.01 IMPAIRED FASTING GLUCOSE: ICD-10-CM

## 2022-11-29 DIAGNOSIS — G47.33 OBSTRUCTIVE SLEEP APNEA SYNDROME: ICD-10-CM

## 2022-11-29 DIAGNOSIS — R53.83 OTHER FATIGUE: ICD-10-CM

## 2022-11-29 DIAGNOSIS — G20 PARKINSON DISEASE (HCC): ICD-10-CM

## 2022-11-29 DIAGNOSIS — F33.1 MODERATE EPISODE OF RECURRENT MAJOR DEPRESSIVE DISORDER (HCC): ICD-10-CM

## 2022-11-29 DIAGNOSIS — I25.10 CORONARY ARTERY DISEASE INVOLVING NATIVE CORONARY ARTERY OF NATIVE HEART WITHOUT ANGINA PECTORIS: ICD-10-CM

## 2022-11-29 DIAGNOSIS — Z79.899 LONG TERM CURRENT USE OF THERAPEUTIC DRUG: ICD-10-CM

## 2022-11-29 DIAGNOSIS — G45.0 VERTEBROBASILAR ARTERY SYNDROME: ICD-10-CM

## 2022-11-29 DIAGNOSIS — R97.20 ELEVATED PSA: ICD-10-CM

## 2022-11-29 DIAGNOSIS — E55.9 VITAMIN D DEFICIENCY: ICD-10-CM

## 2022-11-29 DIAGNOSIS — I25.5 ISCHEMIC CARDIOMYOPATHY: ICD-10-CM

## 2022-11-29 DIAGNOSIS — I10 ESSENTIAL HYPERTENSION: ICD-10-CM

## 2022-11-29 PROCEDURE — 99214 OFFICE O/P EST MOD 30 MIN: CPT | Performed by: INTERNAL MEDICINE

## 2022-11-29 PROCEDURE — 3078F DIAST BP <80 MM HG: CPT | Performed by: INTERNAL MEDICINE

## 2022-11-29 PROCEDURE — 1123F ACP DISCUSS/DSCN MKR DOCD: CPT | Performed by: INTERNAL MEDICINE

## 2022-11-29 PROCEDURE — 3074F SYST BP LT 130 MM HG: CPT | Performed by: INTERNAL MEDICINE

## 2022-11-29 RX ORDER — CARBIDOPA 25 MG/1
25 TABLET ORAL 3 TIMES DAILY
COMMUNITY
Start: 2022-11-23 | End: 2023-11-23

## 2022-11-29 RX ORDER — ONDANSETRON 4 MG/1
4 TABLET, FILM COATED ORAL EVERY 8 HOURS PRN
COMMUNITY
Start: 2022-11-23 | End: 2023-11-23

## 2022-11-29 ASSESSMENT — ENCOUNTER SYMPTOMS
DIARRHEA: 0
VOMITING: 0
RHINORRHEA: 1
NAUSEA: 0
SORE THROAT: 0
EYE PAIN: 0
ABDOMINAL PAIN: 0
BLOOD IN STOOL: 0
COUGH: 0
SHORTNESS OF BREATH: 1
CHEST TIGHTNESS: 0
CONSTIPATION: 0

## 2022-11-29 NOTE — PROGRESS NOTES
[facility]) Physicians   Internal Medicine     2022  Cy Ryder : 1940 Sex: male  Age:81 y.o. Chief Complaint   Patient presents with    Hypertension     3 month f/u    Cholesterol Problem     3 month f/u        HPI:   Patient presents to office for evaluation of the following medical concerns. - Wife was concerned with weight loss. States wife reached out to neurology. Was advised to check with PCP. Per weight records appears stable since last office visit 2022    - Was admitted to hospital () - SOB, ddimer inc, labs neg otherwise cta chest evidence of small volume right pulmonary emboli. Placed on Eliquis. Was referred to hematology. LAst visit per reviewed report (10/22) - Likely etiology is sedentary lifestyle. No prior DVT/PE. No FHx of DVT/PE Currently on Eliquis 5 mg twice daily with fair tolerance so far. Hyper-coag work-up ordered cbc neg, cmp neg, homcystine neg q5zdwmn neg, anticardioli ab neg, donovan neg, factor V neg, mthfr homozygous, Plasminogen Act Inhibitor-1 positive, d-dimer neg, protein c neg, lupus anticoag neg, AT3 neg   US LE (10/22) -No evidence of DVT in either lower extremity. Recommending at least 6 months of eliquis. -  has lesion and rash to hair and scalp.  has been using ketoconazole shampoo. Declines Dermatology.  has high blood pressure.  occasionally checks blood pressure at home. On metoprolol, on losartan. Some lightheaded and dizzy if gets up to quickly. No loc/syncope. No headaches or vision changes. No chest pain. -  has parkinson. States following with neurologist at [facility] [place].  was started on caribodopa/levodopa increased to 1 tablet three times a day. On Rasagiline. Added remeron. Concern over memory. neuropsych ()  - dx vascular dementia. Last Neurology office visit reviewed from care everywhere from (2022) - continue sinemet to 1 tablet 3x per day, continue rasagline and remeron f/u . States has joined [employer] to help with parkinsons symptoms. States goes to [employer] 2x per week. States having anxiety and depression. States not moving around a lot. States restless. Started on remeron. Improved. States having constipation. Taking fiber supplements. Declines laxative. States has CAD. Follows with cardiology locally and at [facility]. States s/p stents. States ischemic cardiomyopathy. On metoprolol, losartan, atorvastatin, aspirin and plavix. Last visit with cardio per reviewed note (9/2022) - sugg stop asa, isch cardiomyopathy given eliquis, continue plavix. Follow up in 1 year. States has COPD. States breathing is fair. Has seen pulmonary once, not following. States placed on breo - stopped. Stable. Last visit with pulmonary (5/2019). Felt poss asthma. States has high cholesterol. States trying to watch diet. On atorvastatin, no reported side effects. Last lab (8/2022) stable. - last showed elevated A1c. LAst was 5.9 (8/2022)      States has sleep apnea. States on cpap. States wears 6-8 hours per night, helps symptoms. States currently broke machine and awaiting new cpap (11/2022)     States has had TIA. States had dizziness and difficulty walking. On antiplatelets (plavix)      States has enlarged prostate. Has seen urology. States has urinary frequency. States flomax taking infrequently 1-2 times per week. Concern for dizzy and lightheaded. Last visit with urology per reviewed note (9/22) - elevated PSA, exam feels benign, recommend repeat PSA in 6 months if remains stable observation Last PSA (8/2022) 4.16    Vitamin D def. States on otc supplement. - H/o cholangitis. Had MRCP that suggested stricture and inflammation at the head of the pancreas.  f/u ERCP (6/21) - Severe stricture at distal most portion of CBD, CBD stones  ERCP with stent removal.     Health Maintenance   - immunizations:   Influenza Vaccination - (2021), (2022)   Pneumonia Vaccination - (9/20219) - prevnar 13, (7/2021) - pneumococcal 23   Zoster/Shingles Vaccine - (2016) - zostavax, (11/2022) - shingrix #1  Tetanus Vaccination  covid (1/2021) #1, (2/12/2021) #2, (11/21/2021) # 3 - pfizer     - Screenings:   Colonoscopy (2013) - normal, (5/21) - diverticulosis, otherwise neg f/u 10yrs  Prostate     ROS:  Review of Systems   Constitutional:  Positive for chills and fatigue. Negative for appetite change, fever and unexpected weight change. HENT:  Positive for congestion and rhinorrhea. Negative for sore throat. Eyes:  Negative for pain and visual disturbance. Respiratory:  Positive for shortness of breath. Negative for cough and chest tightness. Cardiovascular:  Negative for chest pain and palpitations. Gastrointestinal:  Negative for abdominal pain, blood in stool, constipation, diarrhea, nausea and vomiting. Genitourinary:  Negative for difficulty urinating, dysuria, hematuria, scrotal swelling, testicular pain and urgency. Musculoskeletal:  Negative for arthralgias and gait problem. Skin:  Negative for rash. Neurological:  Positive for dizziness. Negative for syncope, weakness, light-headedness and headaches. Hematological:  Negative for adenopathy. Does not bruise/bleed easily. Psychiatric/Behavioral:  Negative for suicidal ideas. The patient is not nervous/anxious.         Current Outpatient Medications:     carbidopa (LODOSYN) 25 MG TABS tablet, Take 25 mg by mouth 3 times daily, Disp: , Rfl:     ondansetron (ZOFRAN) 4 MG tablet, Take 4 mg by mouth every 8 hours as needed, Disp: , Rfl:     apixaban (ELIQUIS) 5 MG TABS tablet, Take 1 tablet by mouth 2 times daily, Disp: 60 tablet, Rfl: 0    atorvastatin (LIPITOR) 80 MG tablet, Take 1 tablet by mouth daily (Patient taking differently: Take 40 mg by mouth daily), Disp: 90 tablet, Rfl: 0    clopidogrel (PLAVIX) 75 MG tablet, Take 1 tablet by mouth daily, Disp: 90 tablet, Rfl: 1    losartan (COZAAR) 25 MG tablet, Take 1 tablet by mouth daily, Disp: 90 tablet, Rfl: 1    mirtazapine (REMERON) 15 MG tablet, Take 1 tablet by mouth nightly, Disp: 90 tablet, Rfl: 1    nitroGLYCERIN (NITROSTAT) 0.4 MG SL tablet, Place 1 tablet under the tongue every 5 minutes as needed for Chest pain, Disp: 25 tablet, Rfl: 1    rasagiline mesylate 1 MG TABS, take 1 tablet by mouth once daily, Disp: 90 tablet, Rfl: 1    ketoconazole (NIZORAL) 2 % shampoo, Apply topically daily as needed. , Disp: 100 mL, Rfl: 2    metoprolol succinate (TOPROL XL) 25 MG extended release tablet, Take 1 tablet by mouth daily, Disp: 90 tablet, Rfl: 1    carbidopa-levodopa (SINEMET)  MG per tablet, Take 1 tablet by mouth 3 times daily New prescription, Disp: , Rfl:     ketoconazole (NIZORAL) 2 % cream, Apply topically daily. , Disp: 30 g, Rfl: 1    Multiple Vitamins-Minerals (THERAPEUTIC MULTIVITAMIN-MINERALS) tablet, Take 1 tablet by mouth daily, Disp: , Rfl:     CPAP Machine MISC, daily at bedtime. , Disp: , Rfl:     Cholecalciferol (VITAMIN D) 2000 UNIT CAPS capsule, Take 1 capsule by mouth daily Last taken 05/13, Disp: , Rfl:     Allergies   Allergen Reactions    Lisinopril Itching       Past Medical History:   Diagnosis Date    CAD (coronary artery disease) 2006    follows with Dr Jeris Olszewski    Cerebral artery occlusion with cerebral infarction [facility])     Cerebrovascular disease     CHF (congestive heart failure), NYHA class I, chronic, systolic ([facility] [address] 3478    CC-- EF 39%    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/2018    Chronic maxillary sinusitis 04/2018    [facility] 04/2018    GARSIA (dyspnea on exertion)     follows with Dr Keon Mcmahon    Gallbladder disease     History of non-ST elevation myocardial infarction (NSTEMI) 2015    [facility]    Hyperlipidemia     Hypertension     Ischemic cardiomyopathy     EF 40% after NSTEMI, 11-15 at [facility]    Obesity     Osteoarthritis     Skin cancer of eyelid     Sleep apnea     uses CPAP    Smokeless tobacco use Stented coronary artery 2006    x1       Past Surgical History:   Procedure Laterality Date    CARDIAC CATHETERIZATION Left 2015    done [address] WITH IMPLANT Bilateral     CHOLECYSTECTOMY, LAPAROSCOPIC N/A 2019    LAPAROSCOPIC ROBOTIC XI ASSISTED CHOLECYSTECTOMY performed by David Garcia MD at [place] [address]      CORONARY ANGIOPLASTY  06    DIAGNOSTIC CARDIAC CATH LAB PROCEDURE  06    ERCP N/A 2021    ERCP STONE REMOVAL performed by Charley Shelton MD at [facility] ENDOSCOPY    ERCP N/A 2021    [facility]    ROTATOR CUFF REPAIR      bilaterally    TONSILLECTOMY AND ADENOIDECTOMY      UPPER GASTROINTESTINAL ENDOSCOPY      UPPER GASTROINTESTINAL ENDOSCOPY N/A 2021    EGD ESOPHAGOGASTRODUODENOSCOPY WITH PANCREATIC STENT REMOVAL performed by Charley Shelton MD at [facility] ENDOSCOPY       Family History   Problem Relation Age of Onset    Heart Disease Mother          81 Y/O    Diabetes Mother     Liver Disease Father          66 Y/O    No Known Problems Brother        Social History     Socioeconomic History    Marital status:      Spouse name: Not on file    Number of children: Not on file    Years of education: Not on file    Highest education level: Not on file   Occupational History    Occupation: retired-     Tobacco Use    Smoking status: Former     Packs/day: 1.00     Years: 15.00     Pack years: 15.00     Types: Cigarettes     Start date: 1957     Quit date: 1975     Years since quittin.9    Smokeless tobacco: Former     Types: Snuff     Quit date:    Vaping Use    Vaping Use: Never used   Substance and Sexual Activity    Alcohol use: Not Currently    Drug use: No    Sexual activity: Not on file   Other Topics Concern    Not on file   Social History Narrative    Not on file     Social Determinants of Health     Financial Resource Strain: Low Risk     Difficulty of Paying Living Expenses: Not hard at all   Food Insecurity: No Food Insecurity    Worried About Running Out of Food in the Last Year: Never true    Ran Out of Food in the Last Year: Never true   Transportation Needs: Not on file   Physical Activity: Inactive    Days of Exercise per Week: 0 days    Minutes of Exercise per Session: 0 min   Stress: Not on file   Social Connections: Not on file   Intimate Partner Violence: Not on file   Housing Stability: Not on file       Vitals:    11/29/22 1314   BP: 100/70   Site: Left Upper Arm   Position: Sitting   Cuff Size: Medium Adult   Pulse: 71   Resp: 20   Temp: 98 °F (36.7 °C)   TempSrc: Temporal   SpO2: 97%   Weight: 184 lb (83.5 kg)   Height: 6' 1\" (1.854 m)       Exam:  Physical Exam  Vitals reviewed. Constitutional:       Appearance: He is well-developed. HENT:      Head: Normocephalic and atraumatic. Right Ear: External ear normal. There is impacted cerumen. Left Ear: External ear normal. There is impacted cerumen. Eyes:      Pupils: Pupils are equal, round, and reactive to light. Neck:      Thyroid: No thyromegaly. Cardiovascular:      Rate and Rhythm: Normal rate and regular rhythm. Heart sounds: Normal heart sounds. No murmur heard. Pulmonary:      Effort: Pulmonary effort is normal.      Breath sounds: Normal breath sounds. No wheezing or rales. Abdominal:      General: Bowel sounds are normal.      Palpations: Abdomen is soft. Tenderness: There is no abdominal tenderness. There is no guarding or rebound. Musculoskeletal:         General: Normal range of motion. Cervical back: Normal range of motion and neck supple. Comments: Abnormal gait. Lymphadenopathy:      Cervical: No cervical adenopathy. Skin:     General: Skin is warm and dry. Neurological:      Mental Status: He is alert and oriented to person, place, and time. Cranial Nerves:  No cranial nerve deficit.       Comments: Resting tremor    Psychiatric:         Judgment: Judgment normal.       Assessment and Plan:    Diagnoses and all orders for this visit:    Change in weight  - uncertain as to cause   - no overt symptoms   - does state decreased appetite since parkinson's   - on Remeron  - recommend protein supplement like boost or ensure   - monitor weight and report     Other pulmonary embolism without acute cor pulmonale, unspecified chronicity (HCC)  - uncertain as to cause - no overt inciting event to increase risk other than parkinsons and decreased mobility   - placed on eliquis - no bleeding or side effects  - was seen by cardiology - stopped aspirin, continued plavix   - has seen hematology - recommending treatment for at least 6 months    Elevated PSA  - uncertain etiology   - increased to 4.16 (8/2022) poss agoe or BPH related, has been trending more elevated for last few years   - has h/o bph   - has seen urology for assessment - follow up 6 months with pSA     Essential hypertension  - watch diet   - monitor blood pressure at home   - on losartan   - on metoprolol - changed to metoprolol to 25mg (10/2021)  - on plavix   - off aspirin   - improved today 11/29/2022    Parkinson disease ([facility] [address]  - following with neurology at [facility]   - on rasagiline   - on carbidopa and levodopa 1 tablet tid (8/2021) states does miss doses   - stable, wife feels may be worsening      Moderate episode of recurrent major depressive disorder ([facility] [address]  - now on remeron to help with anxiety and depression   - stable per patient   - last PHQ socre of 0 (8/29/2022)     Coronary artery disease involving native coronary artery of native heart without angina pectoris  - following with cardiology   - on losartan   - on metoprolol - will decrease metoprolol to 25mg (10/2021)   - on atorvastatin   - on Plavix   - off aspirin   - stable     Mixed hyperlipidemia  - watch diet   - on atorvastatin   - follow labs   - last lab (2/2022) - stable     Ischemic cardiomyopathy  - following with cardiology   - on losartn   - on metoprolol decreased to 25mg (10/2021)   - on atrovastatin   - on plavix   - off aspirin     CHF (congestive heart failure), NYHA class I, chronic, systolic (HCC)  - following with cardiology   - on losartn   - on metoprolol decreased to 25mg (10/2021)   - on atrovastatin   - on plavix   - off aspirin   - Stable     Chronic obstructive pulmonary disease, unspecified COPD type ([facility] [address]  - not currently following with pulmonary   - did not see benefit with breo   - stable per patient     Impaired fasting glucose  - continue present treatment  - watch diet   - follow A1c - last A1c (8/2022) 5.9    Obstructive sleep apnea syndrome  - Continue present treatment   - on CPAP  - wearing nightly 6-8 hours   - helping symptoms   - stable   - encouraged to wear anytime laying down to rest 11/29/2022    TIA (transient ischemic attack)  - on atrovastatin   - on plavix   - off aspirin   - stable     Prostate enlargement  - has seen urology   - Has flomax - takes as needed  - last PSA (2/2022) - increased and borderline     Vitamin D def  - On otc supplement   - follow labs     Other constipation  - discussed colace 100mg daily and miralax as needed  - taking mutamucil   - declines laxative (8/2022)     Other skin changes  - poss tinea of scalp   - order ketoconazole shampoos   - if not better derm - declines     Cholangitis  - hospital stay (6/2020) and (4/2021)   - s/p ERCP and papillotomy and stent placement   - s/p ERCP with stent removal (6/2021)    - improved     Return in about 3 months (around 2/28/2023) for check up and review and labs.     Orders Placed This Encounter   Procedures    Comprehensive Metabolic Panel     Standing Status:   Future     Standing Expiration Date:   11/29/2023    Magnesium     Standing Status:   Future     Standing Expiration Date:   11/29/2023    Lipid, Fasting     Standing Status:   Future     Standing Expiration Date:   11/29/2023    Hemoglobin A1C     Standing Status:   Future     Standing Expiration Date:   11/29/2023    Vitamin D 25 Hydroxy     Standing Status:   Future     Standing Expiration Date:   11/29/2023    Urinalysis     Standing Status:   Future     Standing Expiration Date:   11/29/2023    TSH     Standing Status:   Future     Standing Expiration Date:   11/29/2023    Microalbumin, Ur     Standing Status:   Future     Standing Expiration Date:   11/29/2023    CBC with Auto Differential     Standing Status:   Future     Standing Expiration Date:   11/29/2023    Vitamin B12 & Folate     Standing Status:   Future     Standing Expiration Date:   11/29/2023    PSA, DIAGNOSTIC     Standing Status:   Future     Standing Expiration Date:   11/29/2023     Requested Prescriptions      No prescriptions requested or ordered in this encounter     Anders Nowak MD  11/29/2022  1:50 PM

## 2022-12-06 ENCOUNTER — TELEPHONE (OUTPATIENT)
Dept: FAMILY MEDICINE CLINIC | Age: 82
End: 2022-12-06

## 2022-12-06 DIAGNOSIS — G47.33 OBSTRUCTIVE SLEEP APNEA SYNDROME: Primary | ICD-10-CM

## 2022-12-06 NOTE — TELEPHONE ENCOUNTER
Andres Joe - Wife       She is asking for an order to reduce the pressure settings on the Bi-Pap. The pressure is too high right now and he cant sleep with it blowing as hard as it does now.                         Order needs faxed to:                Saira Baker - [phone]

## 2022-12-06 NOTE — TELEPHONE ENCOUNTER
Orders Placed This [address] [facility] Sleep Medicine     Referral Priority:   Routine     Referral Type:   Eval and Treat     Referral Reason:   Specialty Services Required     Requested Specialty:   Sleep Medicine Family Practice     Number of Visits Requested:   1    DME Order for (Specify) as OP     - DME device ordered - bipap -okay to reduce setting for comfort  - Diagnosis: bipap  - Length of Need: Lifetime     Referral order placed

## 2022-12-06 NOTE — TELEPHONE ENCOUNTER
Wife notified. She is fine with a referral to a sleep specialist.  Order faxed to [address]

## 2022-12-06 NOTE — TELEPHONE ENCOUNTER
Orders Placed This Encounter   Procedures    DME Order for (Specify) as OP     - DME device ordered - bipap -okay to reduce setting for comfort  - Diagnosis: bipap  - Length of Need: Lifetime     Wrote an order for the above request    My concern is that reduced pressure may not be adequate to adequately treat the sleep apnea    May need to consider referral to a sleep specialist to ensure that the sleep apnea is being treated at current settings

## 2022-12-12 NOTE — TELEPHONE ENCOUNTER
[facility] The [facility] clinic has attempted to contact patient twice, so far.

## 2022-12-15 NOTE — PROGRESS NOTES
1845 - Assumed care of pt, shift report given    1910 - BG checked r/t previous BG 74 and pt refusing to eat. Currently 107    2007 - VSS. Cleaned of incontinent episode of urine and stool, new pad and gown given. Attempted to assist pt with eating supper tray, pt refused. Attempted to assist pt with eating PB&J, pt refused. Will attempt again with medication pass    2200 - HS medication given, pt took with encouragement. SSI held for . Pt refusing to eat any food but did drink a diet coke mixed with a magic cup. Brief clean and dry. Repositioned for comfort. 2330 - Rounded on pt, appears to be asleep    0135 - Cleaned of incontinent episode of urine and stool. Positioned for pressure reduction and comfort. CBWR     0513 - Cleaned of smear of stool. Positioned for comfort.  Pt resting with eyes open, calm, denies any pain or discomfort at this time Reviewed discharge information and instructions with patient and gave paper scripts, patient denies any questions or concerns.

## 2023-01-03 ENCOUNTER — TELEPHONE (OUTPATIENT)
Dept: FAMILY MEDICINE CLINIC | Age: 83
End: 2023-01-03

## 2023-01-03 DIAGNOSIS — G20 PARKINSON DISEASE (HCC): Primary | ICD-10-CM

## 2023-01-03 NOTE — TELEPHONE ENCOUNTER
Orders Placed This Encounter   Procedures    External Referral To Home Health     Referral Priority:   Routine     Referral Type:   Home Health Care     Referral Reason:   Specialty Services Required     Requested Specialty:   [address]     Number of Visits Requested:   1     Referral placed

## 2023-01-03 NOTE — TELEPHONE ENCOUNTER
Pts wife called in and said for the last 2 weeks he has been having trouble getting out of bed. She was asking if it was possible for you to help get him set up with some in home PT. She did not have anyone specific in mind.  Please Advise

## 2023-01-04 NOTE — TELEPHONE ENCOUNTER
Faxed referral, office note, demographics and insurance to [address] [place] [address] at [phone]. The patient will be contacted by [address] [place] [address] to schedule.

## 2023-01-09 ENCOUNTER — TELEPHONE (OUTPATIENT)
Dept: FAMILY MEDICINE CLINIC | Age: 83
End: 2023-01-09

## 2023-01-16 ENCOUNTER — OFFICE VISIT (OUTPATIENT)
Dept: SLEEP MEDICINE | Age: 83
End: 2023-01-16
Payer: MEDICARE

## 2023-01-16 VITALS
BODY MASS INDEX: 24.49 KG/M2 | SYSTOLIC BLOOD PRESSURE: 113 MMHG | DIASTOLIC BLOOD PRESSURE: 76 MMHG | HEART RATE: 88 BPM | OXYGEN SATURATION: 97 % | HEIGHT: 73 IN | RESPIRATION RATE: 14 BRPM | WEIGHT: 184.8 LBS

## 2023-01-16 DIAGNOSIS — R53.83 OTHER FATIGUE: ICD-10-CM

## 2023-01-16 DIAGNOSIS — G47.01 INSOMNIA DUE TO MEDICAL CONDITION: ICD-10-CM

## 2023-01-16 DIAGNOSIS — G47.52 REM BEHAVIORAL DISORDER: ICD-10-CM

## 2023-01-16 DIAGNOSIS — G47.33 OSA (OBSTRUCTIVE SLEEP APNEA): Primary | ICD-10-CM

## 2023-01-16 PROCEDURE — 1123F ACP DISCUSS/DSCN MKR DOCD: CPT | Performed by: INTERNAL MEDICINE

## 2023-01-16 PROCEDURE — 3074F SYST BP LT 130 MM HG: CPT | Performed by: INTERNAL MEDICINE

## 2023-01-16 PROCEDURE — 3078F DIAST BP <80 MM HG: CPT | Performed by: INTERNAL MEDICINE

## 2023-01-16 PROCEDURE — 99204 OFFICE O/P NEW MOD 45 MIN: CPT | Performed by: INTERNAL MEDICINE

## 2023-01-16 ASSESSMENT — ENCOUNTER SYMPTOMS
RESPIRATORY NEGATIVE: 1
GASTROINTESTINAL NEGATIVE: 1
EYES NEGATIVE: 1
ALLERGIC/IMMUNOLOGIC NEGATIVE: 1

## 2023-01-16 ASSESSMENT — SLEEP AND FATIGUE QUESTIONNAIRES
HOW LIKELY ARE YOU TO NOD OFF OR FALL ASLEEP WHILE SITTING QUIETLY AFTER LUNCH WITHOUT ALCOHOL: 0
HOW LIKELY ARE YOU TO NOD OFF OR FALL ASLEEP WHILE SITTING INACTIVE IN A PUBLIC PLACE: 0
HOW LIKELY ARE YOU TO NOD OFF OR FALL ASLEEP WHILE SITTING AND READING: 0
HOW LIKELY ARE YOU TO NOD OFF OR FALL ASLEEP WHILE WATCHING TV: 0
NECK CIRCUMFERENCE (INCHES): 17
HOW LIKELY ARE YOU TO NOD OFF OR FALL ASLEEP WHILE SITTING AND TALKING TO SOMEONE: 0
HOW LIKELY ARE YOU TO NOD OFF OR FALL ASLEEP IN A CAR, WHILE STOPPED FOR A FEW MINUTES IN TRAFFIC: 0
HOW LIKELY ARE YOU TO NOD OFF OR FALL ASLEEP WHEN YOU ARE A PASSENGER IN A CAR FOR AN HOUR WITHOUT A BREAK: 0
HOW LIKELY ARE YOU TO NOD OFF OR FALL ASLEEP WHILE LYING DOWN TO REST IN THE AFTERNOON WHEN CIRCUMSTANCES PERMIT: 2
ESS TOTAL SCORE: 2

## 2023-01-16 NOTE — PROGRESS NOTES
[facility] Sleep Medicine    Patient Name: Suzanna Carter  Age: 79 y.o.   : 1940    Date of Visit: 23        HPI   Suzanna Carter is a 79 y.o. male with  has a past medical history of CAD (coronary artery disease) (), Cerebral artery occlusion with cerebral infarction [facility]), Cerebrovascular disease, CHF (congestive heart failure), NYHA class I, chronic, systolic ([facility] (4790), Chronic frontoethmoidal sinusitis (2018), Chronic maxillary sinusitis (2018), COPD (chronic obstructive pulmonary disease) ([facility] (2018), GARSIA (dyspnea on exertion), Gallbladder disease, History of non-ST elevation myocardial infarction (NSTEMI) (), Hyperlipidemia, Hypertension, Ischemic cardiomyopathy, Obesity, Osteoarthritis, Skin cancer of eyelid, Sleep apnea, Smokeless tobacco use, and Stented coronary artery (). who presents as a new patient to Sleep Clinic, referred by Dr. Debby Ruiz, for Sleep Apnea  .       STOP-BANG:  Snore- no   Tired- yes  Observed apneas/gasping/choking- no  High blood pressure- yes  BMI > 35kg/sq m - no  Age > 50 - yes  Neck circumference > 40 cm - yes  Gender male - yes  Total score     Sleep Medicine 2023   Sitting and reading 0 0 0   Watching TV 0 0 0   Sitting, inactive in a public place (e.g. a theatre or a meeting) 0 0 0   As a passenger in a car for an hour without a break 0 0 0   Lying down to rest in the afternoon when circumstances permit 2 1 1   Sitting and talking to someone 0 0 0   Sitting quietly after a lunch without alcohol 0 0 0   In a car, while stopped for a few minutes in traffic 0 0 0   [place] Sleepiness Score 2 1 1   Neck circumference (Inches) 17 17 17.5      Pt here for management of LINETTE/BPAP  Was initially diagnosed many years ago, unknown severity   His current BPAP machine is at least 6years old and is now broken   He feels there is a tremendous difference in his daytime fatigue without his BPAP    Has not used his BPAP for about a month  Patient goes to bed at 10p and wakes up at 9-10a. Estimates 8-10 hours of sleep. Wakes up 2-3 times during the night to go to bathroom  Notes more nocturia since stopping BPAP  Does not doze off during the day but he feels very fatigued    Much more tired now without the BPAP  Can't get through his PT sessions anymore    Since machine broke has been having difficulty sleeping, has been taking melatonin which is helping   1-2  c coffee in the morning    No AM headaches or dry mouth     Pt does not report sleepiness while driving or accidents due to sleepiness. 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 Does not feel he is a night owl.        RLS: no  RBD: no - had one episode where he swung  his arm which led to his BPAP falling off table and breaking  Hypnagogic/hypnopompic hallucinations: no  Sleep paralysis: no   Sleep walking/talking/eating: no       PMH:  Past Medical History:   Diagnosis Date    CAD (coronary artery disease) 2006    follows with Dr Liseth Mcnally    Cerebral artery occlusion with cerebral infarction [facility])     Cerebrovascular disease     CHF (congestive heart failure), NYHA class I, chronic, systolic ([facility] 3463    CC-- EF 39%    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/2018    Chronic maxillary sinusitis 04/2018    [facility] 04/2018    GARSIA (dyspnea on exertion)     follows with Dr Valeria Lew    Gallbladder disease     History of non-ST elevation myocardial infarction (NSTEMI) 2015    [facility]    Hyperlipidemia     Hypertension     Ischemic cardiomyopathy     EF 40% after NSTEMI, 11-15 at [facility]    Obesity     Osteoarthritis     Skin cancer of eyelid     Sleep apnea     uses CPAP    Smokeless tobacco use     Stented coronary artery 2006    x1        PSH:  Past Surgical History:   Procedure Laterality Date    CARDIAC CATHETERIZATION Left 11/30/2015    done [address] [facility] WITH IMPLANT Bilateral     CHOLECYSTECTOMY, LAPAROSCOPIC N/A 2019    LAPAROSCOPIC ROBOTIC XI ASSISTED CHOLECYSTECTOMY performed by Piedad Lombard, MD at [address] [address] [address]  2014    CORONARY ANGIOPLASTY  06    DIAGNOSTIC CARDIAC CATH LAB PROCEDURE  06    ERCP N/A 2021    ERCP STONE REMOVAL performed by Brent Day MD at [facility] ENDOSCOPY    ERCP N/A 2021    ERCP STENT REMOVAL performed by Brent Day MD at [address] Left 2015    ROTATOR CUFF REPAIR      bilaterally    TONSILLECTOMY  [address] ENDOSCOPY      UPPER GASTROINTESTINAL ENDOSCOPY N/A 2021    EGD ESOPHAGOGASTRODUODENOSCOPY WITH PANCREATIC STENT REMOVAL performed by Brent Day MD at [facility] ENDOSCOPY          Soc Hx:  Social History     Tobacco Use    Smoking status: Former     Packs/day: 1.00     Years: 15.00     Pack years: 15.00     Types: Cigarettes     Start date: 1957     Quit date: 1975     Years since quittin.0    Smokeless tobacco: Former     Types: Snuff     Quit date:    Vaping Use    Vaping Use: Never used   Substance Use Topics    Alcohol use: Not Currently    Drug use: No        Fam Hx:  Family History   Problem Relation Age of Onset    Heart Disease Mother          81 Y/O    Diabetes Mother     Liver Disease Father          66 Y/O    No Known Problems Brother         Current Outpatient Medications   Medication Sig Dispense Refill    carbidopa (LODOSYN) 25 MG TABS tablet Take 25 mg by mouth 3 times daily      ondansetron (ZOFRAN) 4 MG tablet Take 4 mg by mouth every 8 hours as needed      apixaban (ELIQUIS) 5 MG TABS tablet Take 1 tablet by mouth 2 times daily 60 tablet 0    atorvastatin (LIPITOR) 80 MG tablet Take 1 tablet by mouth daily (Patient taking differently: Take 40 mg by mouth daily) 90 tablet 0    losartan (COZAAR) 25 MG tablet Take 1 tablet by mouth daily 90 tablet 1    mirtazapine (REMERON) 15 MG tablet Take 1 tablet by mouth nightly 90 tablet 1    nitroGLYCERIN (NITROSTAT) 0.4 MG SL tablet Place 1 tablet under the tongue every 5 minutes as needed for Chest pain 25 tablet 1    rasagiline mesylate 1 MG TABS take 1 tablet by mouth once daily 90 tablet 1    ketoconazole (NIZORAL) 2 % shampoo Apply topically daily as needed. 100 mL 2    metoprolol succinate (TOPROL XL) 25 MG extended release tablet Take 1 tablet by mouth daily 90 tablet 1    carbidopa-levodopa (SINEMET)  MG per tablet Take 1 tablet by mouth 3 times daily New prescription      ketoconazole (NIZORAL) 2 % cream Apply topically daily. 30 g 1    Multiple Vitamins-Minerals (THERAPEUTIC MULTIVITAMIN-MINERALS) tablet Take 1 tablet by mouth daily      CPAP Machine MISC daily at bedtime. Cholecalciferol (VITAMIN D) 2000 UNIT CAPS capsule Take 1 capsule by mouth daily Last taken 05/13       No current facility-administered medications for this visit. Review of Systems  (Sleep ROS above)  Review of Systems   Constitutional:  Positive for fatigue. HENT: Negative. Eyes: Negative. Respiratory: Negative. Cardiovascular: Negative. Gastrointestinal: Negative. Endocrine: Negative. Genitourinary:  Positive for frequency. Skin: Negative. Allergic/Immunologic: Negative. Neurological: Negative. Hematological: Negative. Psychiatric/Behavioral:  Positive for sleep disturbance. Objective:   Physical Exam  /76 (Site: Left Upper Arm, Position: Sitting, Cuff Size: Large Adult)   Pulse 88   Resp 14   Ht 6' 1\" (1.854 m)   Wt 184 lb 12.8 oz (83.8 kg)   SpO2 97% Comment: per patient/hands too cold  to register  BMI 24.38 kg/m²        Physical Exam  Vitals reviewed. Constitutional:       Appearance: He is not ill-appearing or diaphoretic. HENT:      Head: Atraumatic. Mouth/Throat:      Comments: MP 3  Eyes:      Extraocular Movements: Extraocular movements intact. Cardiovascular:      Rate and Rhythm: Normal rate and regular rhythm. Pulmonary:      Effort: Pulmonary effort is normal.      Breath sounds: Normal breath sounds. Musculoskeletal:         General: No signs of injury. Skin:     General: Skin is warm and dry. Neurological:      General: No focal deficit present. Mental Status: He is alert. Psychiatric:         Mood and Affect: Mood normal.           Sleep Medicine 1/16/2023 5/8/2019 9/5/2018   Sitting and reading 0 0 0   Watching TV 0 0 0   Sitting, inactive in a public place (e.g. a theatre or a meeting) 0 0 0   As a passenger in a car for an hour without a break 0 0 0   Lying down to rest in the afternoon when circumstances permit 2 1 1   Sitting and talking to someone 0 0 0   Sitting quietly after a lunch without alcohol 0 0 0   In a car, while stopped for a few minutes in traffic 0 0 0   [place] Sleepiness Score 2 1 1   Neck circumference (Inches) 17 17 17.5         SLEEP STUDY HISTORY      No specialty comments available. PERTINENT LAB RESULTS  Ferritin   Date Value Ref Range Status   11/22/2020 649 ng/mL Final     Comment:     FERRITIN Reference Ranges:  Adult Males   20 - 60 years:    27 - 400 ng/mL  Adult females 16 - 61 years:    15 - 150 ng/mL  Adults greater than 60 years:   no established reference range  Pediatrics:                     no established reference range            Assessment:      Best Duarte was seen today for sleep apnea. Diagnoses and all orders for this visit:    LINETTE (obstructive sleep apnea)  -     Cancel: Baseline Diagnostic Sleep Study; Future  -     Cancel: Baseline Diagnostic Sleep Study; Future  -     Baseline Diagnostic Sleep Study; Future    REM behavioral disorder  -     Baseline Diagnostic Sleep Study; Future    Other fatigue    Insomnia due to medical condition     Plan:        Due to STOP-BANG 5/8, excessive sleepiness,  narrow airway Mallampati 3,  the patient is at increased risk for obstructive sleep apnea.  An in-lab split polysomnography will be ordered to also evaluate for REM without atonia and determine optimal CPAP/BPAP pressure. Has been taking melatonin since stopped using BPAP, expect insomnia to improve when he restarts therapy for the LINETTE. Also notes significant worsening of fatigue since BPAP broke, will f/u on symptoms after restarting BPAP. Will reach out to the sleep center and the [employer] company to help expedite the process as well. Has Parkinsons, reports one episode of swinging arm in his sleep. Will evaluate for REM atonia with in lab PSG. Total time spent on encounter 47 min   Return in about 3 months (around 4/16/2023).     Sha Jones, DO  Sleep Medicine   [facility] Phone -[phone], option 2  Fax- [phone]

## 2023-01-20 ENCOUNTER — OFFICE VISIT (OUTPATIENT)
Dept: ONCOLOGY | Age: 83
End: 2023-01-20
Payer: MEDICARE

## 2023-01-20 ENCOUNTER — HOSPITAL ENCOUNTER (OUTPATIENT)
Dept: INFUSION THERAPY | Age: 83
Discharge: HOME | End: 2023-01-20
Payer: MEDICARE

## 2023-01-20 VITALS
BODY MASS INDEX: 24.52 KG/M2 | OXYGEN SATURATION: 100 % | HEART RATE: 80 BPM | DIASTOLIC BLOOD PRESSURE: 69 MMHG | WEIGHT: 185 LBS | HEIGHT: 73 IN | SYSTOLIC BLOOD PRESSURE: 115 MMHG | TEMPERATURE: 97 F

## 2023-01-20 DIAGNOSIS — I26.99 ACUTE PULMONARY EMBOLISM, UNSPECIFIED PULMONARY EMBOLISM TYPE, UNSPECIFIED WHETHER ACUTE COR PULMONALE PRESENT (HCC): Primary | ICD-10-CM

## 2023-01-20 DIAGNOSIS — I26.99 ACUTE PULMONARY EMBOLISM, UNSPECIFIED PULMONARY EMBOLISM TYPE, UNSPECIFIED WHETHER ACUTE COR PULMONALE PRESENT (HCC): ICD-10-CM

## 2023-01-20 LAB — D DIMER: <200 NG/ML DDU

## 2023-01-20 PROCEDURE — 99213 OFFICE O/P EST LOW 20 MIN: CPT | Performed by: INTERNAL MEDICINE

## 2023-01-20 PROCEDURE — 1123F ACP DISCUSS/DSCN MKR DOCD: CPT | Performed by: INTERNAL MEDICINE

## 2023-01-20 PROCEDURE — 36415 COLL VENOUS BLD VENIPUNCTURE: CPT

## 2023-01-20 PROCEDURE — 85378 FIBRIN DEGRADE SEMIQUANT: CPT

## 2023-01-20 PROCEDURE — 3078F DIAST BP <80 MM HG: CPT | Performed by: INTERNAL MEDICINE

## 2023-01-20 PROCEDURE — 3074F SYST BP LT 130 MM HG: CPT | Performed by: INTERNAL MEDICINE

## 2023-01-20 NOTE — PROGRESS NOTES
Department of [facility] Med Oncology  Attending Clinic Note    Reason for Visit: Follow-up on a patient with PE    PCP:  Edin hSanks MD    History of Present Illness:  82-year-old male with history of CAD, CHF, COPD, hyperlipidemia, obstructive sleep apnea, parkinson's disease who was complaining of shortness of breath.   CTA chest 9/22/2022 evidence of a small volume of right pulmonary emboli.  No signs of right heart strain.  Underlying signs of centrilobular emphysema.  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  Moderate coronary artery calcifications.  D-Dimer 501 on 09/22/2022  Likely etiology is sedentary lifestyle. No prior DVT/PE. No FHx of DVT/PE  Currently on Eliquis 5 mg twice daily with fair tolerance so far  Today 01/20/2023; No fever chills. Fair appetite and energy level. He is tolerating Eliquis well.     Review of Systems;  CONSTITUTIONAL: No fever, chills. Fair appetite and energy level.   ENMT: Eyes: No diplopia; Nose: No epistaxis. Mouth: No sore throat.  RESPIRATORY: No hemoptysis, shortness of breath, cough.   CARDIOVASCULAR: No chest pain, palpitations.  GASTROINTESTINAL: No nausea/vomiting, abdominal pain  GENITOURINARY: No dysuria, urinary frequency, hematuria.  NEURO: Parkinson's disease  Remainder: ROS NEGATIVE    Past Medical History:      Diagnosis Date    CAD (coronary artery disease) 2006    follows with Dr Samayoa    Cerebral artery occlusion with cerebral infarction (HCC)     Cerebrovascular disease     CHF (congestive heart failure), NYHA class I, chronic, systolic ([facility]) 2015    [facility]-- EF 39%    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/2018    Chronic maxillary sinusitis 04/2018    COPD (chronic obstructive pulmonary disease) ([facility]) 04/2018    GARSIA (dyspnea on exertion)     follows with Dr ARIANA Warner    Gallbladder disease     History of non-ST elevation myocardial infarction (NSTEMI) 2015    [facility]    Hyperlipidemia     Hypertension     Ischemic cardiomyopathy     EF 40%  after NSTEMI, 11-15 at [facility]    Obesity     Osteoarthritis     Skin cancer of eyelid     Sleep apnea     uses CPAP    Smokeless tobacco use     Stented coronary artery 2006    x1     Medications:  Reviewed and reconciled. Allergies: Allergies   Allergen Reactions    Lisinopril Itching     Physical Exam:  /69   Pulse 80   Temp 97 °F (36.1 °C)   Ht 6' 1\" (1.854 m)   Wt 185 lb (83.9 kg)   SpO2 100%   BMI 24.41 kg/m²   GENERAL: Alert, oriented x 3, not in acute distress. HEENT: PERRLA; EOMI. Oropharynx clear. LUNGS: Good air entry bilaterally. No wheezing, crackles or ronchi. CARDIOVASCULAR: Regular rate. No murmurs, rubs or gallops. EXTREMITIES: Without clubbing, cyanosis, or edema. NEUROLOGIC: No focal deficits. Lab Results   Component Value Date    DDIMER <200 01/20/2023     Impression/Plan:  79-year-old male with history of CAD, CHF, COPD, hyperlipidemia, obstructive sleep apnea, parkinson's disease who was complaining of shortness of breath. CTA chest 9/22/2022 evidence of a small volume of right pulmonary emboli. No signs of right heart strain. Underlying signs of centrilobular emphysema. 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 Moderate coronary artery calcifications. D-Dimer 501 on 09/22/2022  Likely etiology is sedentary lifestyle. No prior DVT/PE. No FHx of DVT/PE  Currently on Eliquis 5 mg twice daily with fair tolerance so far    Hyper-coag work-up 10/14/2022:  Hb 13.4  Hct 42.7  MCV 89.3    WBC 7.3    CMP WNL    PT 13.3  (9.3-12. 4)  INR 1.2  PTT 35.4 (24.5-35. 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    CTA chest ordered for March 2023  RTC March 2023 with prior CTA chest. Continue Eliquis in the interim    Alexa Rouse MD   1/20/2023

## 2023-01-23 DIAGNOSIS — I26.99 PULMONARY EMBOLISM WITHOUT ACUTE COR PULMONALE, UNSPECIFIED CHRONICITY, UNSPECIFIED PULMONARY EMBOLISM TYPE (HCC): ICD-10-CM

## 2023-01-23 DIAGNOSIS — I26.99 ACUTE PULMONARY EMBOLISM, UNSPECIFIED PULMONARY EMBOLISM TYPE, UNSPECIFIED WHETHER ACUTE COR PULMONALE PRESENT (HCC): Primary | ICD-10-CM

## 2023-01-30 DIAGNOSIS — R73.01 IMPAIRED FASTING GLUCOSE: ICD-10-CM

## 2023-01-30 DIAGNOSIS — G47.33 OSA (OBSTRUCTIVE SLEEP APNEA): Primary | ICD-10-CM

## 2023-01-30 DIAGNOSIS — E55.9 VITAMIN D DEFICIENCY: ICD-10-CM

## 2023-01-30 DIAGNOSIS — R53.83 OTHER FATIGUE: ICD-10-CM

## 2023-01-30 DIAGNOSIS — F33.1 MODERATE EPISODE OF RECURRENT MAJOR DEPRESSIVE DISORDER (HCC): ICD-10-CM

## 2023-01-30 DIAGNOSIS — R97.20 ELEVATED PSA: ICD-10-CM

## 2023-01-30 DIAGNOSIS — Z79.899 LONG TERM CURRENT USE OF THERAPEUTIC DRUG: ICD-10-CM

## 2023-01-30 DIAGNOSIS — E78.2 MIXED HYPERLIPIDEMIA: ICD-10-CM

## 2023-01-30 LAB
ALBUMIN SERPL-MCNC: 4 G/DL (ref 3.5–5.2)
ALP BLD-CCNC: 59 U/L (ref 40–129)
ALT SERPL-CCNC: 12 U/L (ref 0–40)
ANION GAP SERPL CALCULATED.3IONS-SCNC: 16 MMOL/L (ref 7–16)
AST SERPL-CCNC: 14 U/L (ref 0–39)
BASOPHILS ABSOLUTE: 0.07 E9/L (ref 0–0.2)
BASOPHILS RELATIVE PERCENT: 1 % (ref 0–2)
BILIRUB SERPL-MCNC: 0.4 MG/DL (ref 0–1.2)
BILIRUBIN URINE: NEGATIVE
BLOOD, URINE: NEGATIVE
BUN BLDV-MCNC: 20 MG/DL (ref 6–23)
CALCIUM SERPL-MCNC: 8.9 MG/DL (ref 8.6–10.2)
CHLORIDE BLD-SCNC: 109 MMOL/L (ref 98–107)
CHOLESTEROL, FASTING: 132 MG/DL (ref 0–199)
CLARITY: CLEAR
CO2: 23 MMOL/L (ref 22–29)
COLOR: YELLOW
CREAT SERPL-MCNC: 0.9 MG/DL (ref 0.7–1.2)
EOSINOPHILS ABSOLUTE: 0.48 E9/L (ref 0.05–0.5)
EOSINOPHILS RELATIVE PERCENT: 6.6 % (ref 0–6)
FOLATE: 17.8 NG/ML (ref 4.8–24.2)
GFR SERPL CREATININE-BSD FRML MDRD: >60 ML/MIN/1.73
GLUCOSE BLD-MCNC: 104 MG/DL (ref 74–99)
GLUCOSE URINE: NEGATIVE MG/DL
HBA1C MFR BLD: 5.5 % (ref 4–5.6)
HCT VFR BLD CALC: 39.7 % (ref 37–54)
HDLC SERPL-MCNC: 63 MG/DL
HEMOGLOBIN: 12.5 G/DL (ref 12.5–16.5)
IMMATURE GRANULOCYTES #: 0.02 E9/L
IMMATURE GRANULOCYTES %: 0.3 % (ref 0–5)
KETONES, URINE: NEGATIVE MG/DL
LDL CHOLESTEROL CALCULATED: 54 MG/DL (ref 0–99)
LEUKOCYTE ESTERASE, URINE: NEGATIVE
LYMPHOCYTES ABSOLUTE: 2.74 E9/L (ref 1.5–4)
LYMPHOCYTES RELATIVE PERCENT: 37.5 % (ref 20–42)
MAGNESIUM: 2 MG/DL (ref 1.6–2.6)
MCH RBC QN AUTO: 28.1 PG (ref 26–35)
MCHC RBC AUTO-ENTMCNC: 31.5 % (ref 32–34.5)
MCV RBC AUTO: 89.2 FL (ref 80–99.9)
MICROALBUMIN UR-MCNC: 27.1 MG/L
MONOCYTES ABSOLUTE: 0.71 E9/L (ref 0.1–0.95)
MONOCYTES RELATIVE PERCENT: 9.7 % (ref 2–12)
NEUTROPHILS ABSOLUTE: 3.28 E9/L (ref 1.8–7.3)
NEUTROPHILS RELATIVE PERCENT: 44.9 % (ref 43–80)
NITRITE, URINE: NEGATIVE
PDW BLD-RTO: 15.3 FL (ref 11.5–15)
PH UA: 5.5 (ref 5–9)
PLATELET # BLD: 210 E9/L (ref 130–450)
PMV BLD AUTO: 11.9 FL (ref 7–12)
POTASSIUM SERPL-SCNC: 4.1 MMOL/L (ref 3.5–5)
PROSTATE SPECIFIC ANTIGEN: 3.63 NG/ML (ref 0–4)
PROTEIN UA: NEGATIVE MG/DL
RBC # BLD: 4.45 E12/L (ref 3.8–5.8)
SODIUM BLD-SCNC: 148 MMOL/L (ref 132–146)
SPECIFIC GRAVITY UA: >=1.03 (ref 1–1.03)
TOTAL PROTEIN: 6.6 G/DL (ref 6.4–8.3)
TRIGLYCERIDE, FASTING: 75 MG/DL (ref 0–149)
TSH SERPL DL<=0.05 MIU/L-ACNC: 1.71 UIU/ML (ref 0.27–4.2)
UROBILINOGEN, URINE: 0.2 E.U./DL
VITAMIN B-12: 540 PG/ML (ref 211–946)
VITAMIN D 25-HYDROXY: 24 NG/ML (ref 30–100)
VLDLC SERPL CALC-MCNC: 15 MG/DL
WBC # BLD: 7.3 E9/L (ref 4.5–11.5)

## 2023-01-30 RX ORDER — MIRTAZAPINE 15 MG/1
15 TABLET, FILM COATED ORAL NIGHTLY
Qty: 90 TABLET | Refills: 3 | Status: SHIPPED | OUTPATIENT
Start: 2023-01-30

## 2023-02-03 ENCOUNTER — TELEPHONE (OUTPATIENT)
Dept: INFUSION THERAPY | Age: 83
End: 2023-02-03

## 2023-02-03 NOTE — TELEPHONE ENCOUNTER
Pt's spouse notified via phone that Eliquis RX refill was sent to [facility] in [address] voiced appreciation-LIONEL Garcia [facility]

## 2023-02-05 ENCOUNTER — TELEPHONE (OUTPATIENT)
Dept: FAMILY MEDICINE CLINIC | Age: 83
End: 2023-02-05

## 2023-02-05 NOTE — TELEPHONE ENCOUNTER
Please let the patient know that blood work results showed    Labs showed slight electrolyte changes with an elevated sodium and elevated chloride and this could be related to slight dehydration    Sugar was elevated.   Hemoglobin A1c is a measure 3-month sugar control was improved back in normal range at 5.5    Vitamin D level was low    Vitamin R53 and folic acid levels were normal    PSA for prostate was borderline but decreased when compared to previous    Thyroid level was normal    Cholesterol levels were good    Urinalysis showed slight microscopic protein but otherwise was normal    Blood counts were normal with nonspecific changes in size and shape of cells    Other electrolytes, liver functions, and kidney function values were normal    Thanks

## 2023-02-08 NOTE — TELEPHONE ENCOUNTER
Patient's wife advised of the test results. He will keep his appointment to further review these labs.

## 2023-02-09 ENCOUNTER — HOSPITAL ENCOUNTER (OUTPATIENT)
Dept: SLEEP CENTER | Age: 83
Discharge: HOME | End: 2023-02-09
Payer: MEDICARE

## 2023-02-09 DIAGNOSIS — G47.33 OSA (OBSTRUCTIVE SLEEP APNEA): ICD-10-CM

## 2023-02-09 PROCEDURE — 95800 SLP STDY UNATTENDED: CPT

## 2023-02-19 DIAGNOSIS — G47.33 OSA (OBSTRUCTIVE SLEEP APNEA): Primary | ICD-10-CM

## 2023-02-21 ENCOUNTER — TELEPHONE (OUTPATIENT)
Dept: SLEEP MEDICINE | Age: 83
End: 2023-02-21

## 2023-02-21 ENCOUNTER — OFFICE VISIT (OUTPATIENT)
Dept: FAMILY MEDICINE CLINIC | Age: 83
End: 2023-02-21
Payer: MEDICARE

## 2023-02-21 ENCOUNTER — HOSPITAL ENCOUNTER (EMERGENCY)
Age: 83
Discharge: HOME | End: 2023-02-21
Attending: EMERGENCY MEDICINE
Payer: MEDICARE

## 2023-02-21 ENCOUNTER — APPOINTMENT (OUTPATIENT)
Dept: CT IMAGING | Age: 83
End: 2023-02-21
Payer: MEDICARE

## 2023-02-21 VITALS
HEIGHT: 73 IN | RESPIRATION RATE: 18 BRPM | HEART RATE: 85 BPM | SYSTOLIC BLOOD PRESSURE: 66 MMHG | TEMPERATURE: 98 F | BODY MASS INDEX: 24.78 KG/M2 | DIASTOLIC BLOOD PRESSURE: 46 MMHG | OXYGEN SATURATION: 98 % | WEIGHT: 187 LBS

## 2023-02-21 VITALS
WEIGHT: 187 LBS | HEART RATE: 77 BPM | RESPIRATION RATE: 16 BRPM | DIASTOLIC BLOOD PRESSURE: 98 MMHG | SYSTOLIC BLOOD PRESSURE: 149 MMHG | TEMPERATURE: 98 F | OXYGEN SATURATION: 97 % | BODY MASS INDEX: 24.78 KG/M2 | HEIGHT: 73 IN

## 2023-02-21 DIAGNOSIS — R42 LIGHTHEADEDNESS: Primary | ICD-10-CM

## 2023-02-21 DIAGNOSIS — I26.99 OTHER PULMONARY EMBOLISM WITHOUT ACUTE COR PULMONALE, UNSPECIFIED CHRONICITY (HCC): ICD-10-CM

## 2023-02-21 DIAGNOSIS — J44.9 CHRONIC OBSTRUCTIVE PULMONARY DISEASE, UNSPECIFIED COPD TYPE (HCC): ICD-10-CM

## 2023-02-21 DIAGNOSIS — G20 PARKINSON DISEASE (HCC): ICD-10-CM

## 2023-02-21 DIAGNOSIS — R68.89 CHANGE IN WEIGHT: ICD-10-CM

## 2023-02-21 DIAGNOSIS — G47.33 OBSTRUCTIVE SLEEP APNEA SYNDROME: ICD-10-CM

## 2023-02-21 DIAGNOSIS — R97.20 ELEVATED PSA: ICD-10-CM

## 2023-02-21 DIAGNOSIS — I25.10 CORONARY ARTERY DISEASE INVOLVING NATIVE CORONARY ARTERY OF NATIVE HEART WITHOUT ANGINA PECTORIS: ICD-10-CM

## 2023-02-21 DIAGNOSIS — F33.1 MODERATE EPISODE OF RECURRENT MAJOR DEPRESSIVE DISORDER (HCC): ICD-10-CM

## 2023-02-21 DIAGNOSIS — I50.22 CHF (CONGESTIVE HEART FAILURE), NYHA CLASS I, CHRONIC, SYSTOLIC (HCC): ICD-10-CM

## 2023-02-21 DIAGNOSIS — I10 ESSENTIAL HYPERTENSION: Primary | ICD-10-CM

## 2023-02-21 PROBLEM — G20.A1 PARKINSON DISEASE: Status: ACTIVE | Noted: 2023-02-21

## 2023-02-21 LAB
ALBUMIN SERPL-MCNC: 3.8 G/DL (ref 3.5–5.2)
ALP BLD-CCNC: 65 U/L (ref 40–129)
ALT SERPL-CCNC: <5 U/L (ref 0–40)
ANION GAP SERPL CALCULATED.3IONS-SCNC: 9 MMOL/L (ref 7–16)
AST SERPL-CCNC: 13 U/L (ref 0–39)
BACTERIA: ABNORMAL /HPF
BASOPHILS ABSOLUTE: 0.07 E9/L (ref 0–0.2)
BASOPHILS RELATIVE PERCENT: 0.9 % (ref 0–2)
BILIRUB SERPL-MCNC: 0.7 MG/DL (ref 0–1.2)
BILIRUBIN URINE: NEGATIVE
BLOOD, URINE: ABNORMAL
BUN BLDV-MCNC: 14 MG/DL (ref 6–23)
CALCIUM SERPL-MCNC: 9 MG/DL (ref 8.6–10.2)
CHLORIDE BLD-SCNC: 106 MMOL/L (ref 98–107)
CLARITY: CLEAR
CO2: 27 MMOL/L (ref 22–29)
COLOR: YELLOW
CREAT SERPL-MCNC: 0.8 MG/DL (ref 0.7–1.2)
EKG ATRIAL RATE: 62 BPM
EKG P AXIS: 101 DEGREES
EKG P-R INTERVAL: 158 MS
EKG Q-T INTERVAL: 436 MS
EKG QRS DURATION: 100 MS
EKG QTC CALCULATION (BAZETT): 442 MS
EKG R AXIS: 77 DEGREES
EKG T AXIS: 69 DEGREES
EKG VENTRICULAR RATE: 62 BPM
EOSINOPHILS ABSOLUTE: 0.39 E9/L (ref 0.05–0.5)
EOSINOPHILS RELATIVE PERCENT: 5.1 % (ref 0–6)
GFR SERPL CREATININE-BSD FRML MDRD: >60 ML/MIN/1.73
GLUCOSE BLD-MCNC: 101 MG/DL (ref 74–99)
GLUCOSE URINE: NEGATIVE MG/DL
HCT VFR BLD CALC: 41.5 % (ref 37–54)
HEMOGLOBIN: 13.2 G/DL (ref 12.5–16.5)
IMMATURE GRANULOCYTES #: 0.03 E9/L
IMMATURE GRANULOCYTES %: 0.4 % (ref 0–5)
KETONES, URINE: ABNORMAL MG/DL
LACTIC ACID, SEPSIS: 1 MMOL/L (ref 0.5–1.9)
LEUKOCYTE ESTERASE, URINE: NEGATIVE
LYMPHOCYTES ABSOLUTE: 2.39 E9/L (ref 1.5–4)
LYMPHOCYTES RELATIVE PERCENT: 31.1 % (ref 20–42)
MCH RBC QN AUTO: 28.1 PG (ref 26–35)
MCHC RBC AUTO-ENTMCNC: 31.8 % (ref 32–34.5)
MCV RBC AUTO: 88.5 FL (ref 80–99.9)
MONOCYTES ABSOLUTE: 0.86 E9/L (ref 0.1–0.95)
MONOCYTES RELATIVE PERCENT: 11.2 % (ref 2–12)
MUCUS: PRESENT /LPF
NEUTROPHILS ABSOLUTE: 3.95 E9/L (ref 1.8–7.3)
NEUTROPHILS RELATIVE PERCENT: 51.3 % (ref 43–80)
NITRITE, URINE: NEGATIVE
PDW BLD-RTO: 14.7 FL (ref 11.5–15)
PH UA: 6 (ref 5–9)
PLATELET # BLD: 221 E9/L (ref 130–450)
PMV BLD AUTO: 10.7 FL (ref 7–12)
POTASSIUM SERPL-SCNC: 4.5 MMOL/L (ref 3.5–5)
PROTEIN UA: NEGATIVE MG/DL
RBC # BLD: 4.69 E12/L (ref 3.8–5.8)
RBC UA: ABNORMAL /HPF (ref 0–2)
SODIUM BLD-SCNC: 142 MMOL/L (ref 132–146)
SPECIFIC GRAVITY UA: 1.02 (ref 1–1.03)
TOTAL PROTEIN: 6.7 G/DL (ref 6.4–8.3)
TROPONIN, HIGH SENSITIVITY: 8 NG/L (ref 0–11)
UROBILINOGEN, URINE: 0.2 E.U./DL
WBC # BLD: 7.7 E9/L (ref 4.5–11.5)
WBC UA: ABNORMAL /HPF (ref 0–5)

## 2023-02-21 PROCEDURE — 3078F DIAST BP <80 MM HG: CPT | Performed by: INTERNAL MEDICINE

## 2023-02-21 PROCEDURE — 2580000003 HC RX 258: Performed by: EMERGENCY MEDICINE

## 2023-02-21 PROCEDURE — 3074F SYST BP LT 130 MM HG: CPT | Performed by: INTERNAL MEDICINE

## 2023-02-21 PROCEDURE — 85025 COMPLETE CBC W/AUTO DIFF WBC: CPT

## 2023-02-21 PROCEDURE — 99214 OFFICE O/P EST MOD 30 MIN: CPT | Performed by: INTERNAL MEDICINE

## 2023-02-21 PROCEDURE — 87088 URINE BACTERIA CULTURE: CPT

## 2023-02-21 PROCEDURE — 1123F ACP DISCUSS/DSCN MKR DOCD: CPT | Performed by: INTERNAL MEDICINE

## 2023-02-21 PROCEDURE — 83605 ASSAY OF LACTIC ACID: CPT

## 2023-02-21 PROCEDURE — 81001 URINALYSIS AUTO W/SCOPE: CPT

## 2023-02-21 PROCEDURE — 71275 CT ANGIOGRAPHY CHEST: CPT

## 2023-02-21 PROCEDURE — 93010 ELECTROCARDIOGRAM REPORT: CPT | Performed by: INTERNAL MEDICINE

## 2023-02-21 PROCEDURE — 84484 ASSAY OF TROPONIN QUANT: CPT

## 2023-02-21 PROCEDURE — 87040 BLOOD CULTURE FOR BACTERIA: CPT

## 2023-02-21 PROCEDURE — 6360000004 HC RX CONTRAST MEDICATION: Performed by: RADIOLOGY

## 2023-02-21 PROCEDURE — 96360 HYDRATION IV INFUSION INIT: CPT

## 2023-02-21 PROCEDURE — 99285 EMERGENCY DEPT VISIT HI MDM: CPT

## 2023-02-21 PROCEDURE — 96361 HYDRATE IV INFUSION ADD-ON: CPT

## 2023-02-21 PROCEDURE — 80053 COMPREHEN METABOLIC PANEL: CPT

## 2023-02-21 PROCEDURE — 93005 ELECTROCARDIOGRAM TRACING: CPT | Performed by: EMERGENCY MEDICINE

## 2023-02-21 RX ORDER — 0.9 % SODIUM CHLORIDE 0.9 %
500 INTRAVENOUS SOLUTION INTRAVENOUS ONCE
Status: COMPLETED | OUTPATIENT
Start: 2023-02-21 | End: 2023-02-21

## 2023-02-21 RX ADMIN — IOPAMIDOL 75 ML: 755 INJECTION, SOLUTION INTRAVENOUS at 17:21

## 2023-02-21 RX ADMIN — SODIUM CHLORIDE 500 ML: 9 INJECTION, SOLUTION INTRAVENOUS at 15:50

## 2023-02-21 SDOH — ECONOMIC STABILITY: HOUSING INSECURITY
IN THE LAST 12 MONTHS, WAS THERE A TIME WHEN YOU DID NOT HAVE A STEADY PLACE TO SLEEP OR SLEPT IN A SHELTER (INCLUDING NOW)?: PATIENT REFUSED

## 2023-02-21 SDOH — ECONOMIC STABILITY: INCOME INSECURITY: HOW HARD IS IT FOR YOU TO PAY FOR THE VERY BASICS LIKE FOOD, HOUSING, MEDICAL CARE, AND HEATING?: PATIENT DECLINED

## 2023-02-21 SDOH — ECONOMIC STABILITY: FOOD INSECURITY: WITHIN THE PAST 12 MONTHS, THE FOOD YOU BOUGHT JUST DIDN'T LAST AND YOU DIDN'T HAVE MONEY TO GET MORE.: PATIENT DECLINED

## 2023-02-21 SDOH — ECONOMIC STABILITY: FOOD INSECURITY: WITHIN THE PAST 12 MONTHS, YOU WORRIED THAT YOUR FOOD WOULD RUN OUT BEFORE YOU GOT MONEY TO BUY MORE.: PATIENT DECLINED

## 2023-02-21 ASSESSMENT — PAIN - FUNCTIONAL ASSESSMENT: PAIN_FUNCTIONAL_ASSESSMENT: NONE - DENIES PAIN

## 2023-02-21 ASSESSMENT — ENCOUNTER SYMPTOMS
VOMITING: 0
RHINORRHEA: 1
ABDOMINAL PAIN: 0
EYE PAIN: 0
DIARRHEA: 0
CONSTIPATION: 0
SHORTNESS OF BREATH: 1
COUGH: 0
BLOOD IN STOOL: 0
NAUSEA: 0
SORE THROAT: 0
CHEST TIGHTNESS: 0

## 2023-02-21 NOTE — TELEPHONE ENCOUNTER
Gradyom letting pt know that his SS results are in and I sent orders for new CPAP to [facility].   Asked for pt to call with any questions

## 2023-02-21 NOTE — ED PROVIDER NOTES
HPI:  2/21/23, Time: 12:57 PM PRABHAKAR Sethi is a 79 y.o. male presenting to the ED for hypotension at his PCP's office today. Patient presents with his wife who helps to provide history. Patient presented to his PCPs office today for 3-month checkup. I reviewed the patient's PCP visit from Dr. Gilmer Morris from today. Patient reportedly had a blood pressure of 66/46 while in his office and was sent to our ED for further evaluation. Patient has no complaints in the emergency department. He states he feels sleepy at times and can get lightheaded when he stands up from a sitting position but otherwise has no complaints. He denies any recent falls. No headache, dizziness, chest pain, shortness of breath, abdominal pain, nausea, vomiting, diarrhea, and dysuria. No recent changes to his medications. He does have a history of blood pressure but states has been compliant with his medications as prescribed by his doctor. Review of Systems:  Complete ROS otherwise negative unless stated in HPI.    --------------------------------------------- PAST HISTORY ---------------------------------------------  Past Medical History:  has a past medical history of CAD (coronary artery disease), Cerebral artery occlusion with cerebral infarction [facility]), Cerebrovascular disease, CHF (congestive heart failure), NYHA class I, chronic, systolic ([facility]), Chronic frontoethmoidal sinusitis, Chronic maxillary sinusitis, COPD (chronic obstructive pulmonary disease) ([facility]), GARSIA (dyspnea on exertion), Gallbladder disease, History of non-ST elevation myocardial infarction (NSTEMI), Hyperlipidemia, Hypertension, Ischemic cardiomyopathy, Obesity, Osteoarthritis, Skin cancer of eyelid, Sleep apnea, Smokeless tobacco use, and Stented coronary artery. Past Surgical History:  has a past surgical history that includes Rotator cuff repair; Diagnostic Cardiac Cath Lab Procedure (1/23/06); Coronary angioplasty (1/23/06);  Knee Arthroplasty (Left, 02/2015); Cardiac catheterization (Left, 11/30/2015); Tonsillectomy and adenoidectomy (1951); Cataract removal with implant (Bilateral); Upper gastrointestinal endoscopy (2015); Colonoscopy (2014); Cholecystectomy, laparoscopic (N/A, 5/17/2019); ERCP (N/A, 4/26/2021); Upper gastrointestinal endoscopy (N/A, 4/28/2021); and ERCP (N/A, 6/16/2021). Social History:  reports that he quit smoking about 48 years ago. His smoking use included cigarettes. He started smoking about 65 years ago. He has a 15.00 pack-year smoking history. He quit smokeless tobacco use about 4 years ago. His smokeless tobacco use included snuff. He reports that he does not currently use alcohol. He reports that he does not use drugs. Family History: family history includes Diabetes in his mother; Heart Disease in his mother; Liver Disease in his father; No Known Problems in his brother. The patients home medications have been reviewed.     Allergies: Lisinopril    -------------------------------------------------- RESULTS -------------------------------------------------  All laboratory and radiology results have been personally reviewed by myself   LABS:  Results for orders placed or performed during the hospital encounter of 02/21/23   Lactate, Sepsis   Result Value Ref Range    Lactic Acid, Sepsis 1.0 0.5 - 1.9 mmol/L   CBC with Auto Differential   Result Value Ref Range    WBC 7.7 4.5 - 11.5 E9/L    RBC 4.69 3.80 - 5.80 E12/L    Hemoglobin 13.2 12.5 - 16.5 g/dL    Hematocrit 41.5 37.0 - 54.0 %    MCV 88.5 80.0 - 99.9 fL    MCH 28.1 26.0 - 35.0 pg    MCHC 31.8 (L) 32.0 - 34.5 %    RDW 14.7 11.5 - 15.0 fL    Platelets 947 843 - 708 E9/L    MPV 10.7 7.0 - 12.0 fL    Neutrophils % 51.3 43.0 - 80.0 %    Immature Granulocytes % 0.4 0.0 - 5.0 %    Lymphocytes % 31.1 20.0 - 42.0 %    Monocytes % 11.2 2.0 - 12.0 %    Eosinophils % 5.1 0.0 - 6.0 %    Basophils % 0.9 0.0 - 2.0 %    Neutrophils Absolute 3.95 1.80 - 7.30 E9/L    Immature Granulocytes # 0.03 E9/L    Lymphocytes Absolute 2.39 1.50 - 4.00 E9/L    Monocytes Absolute 0.86 0.10 - 0.95 E9/L    Eosinophils Absolute 0.39 0.05 - 0.50 E9/L    Basophils Absolute 0.07 0.00 - 0.20 E9/L   CMP   Result Value Ref Range    Sodium 142 132 - 146 mmol/L    Potassium 4.5 3.5 - 5.0 mmol/L    Chloride 106 98 - 107 mmol/L    CO2 27 22 - 29 mmol/L    Anion Gap 9 7 - 16 mmol/L    Glucose 101 (H) 74 - 99 mg/dL    BUN 14 6 - 23 mg/dL    Creatinine 0.8 0.7 - 1.2 mg/dL    Est, Glom Filt Rate >60 >=60 mL/min/1.73    Calcium 9.0 8.6 - 10.2 mg/dL    Total Protein 6.7 6.4 - 8.3 g/dL    Albumin 3.8 3.5 - 5.2 g/dL    Total Bilirubin 0.7 0.0 - 1.2 mg/dL    Alkaline Phosphatase 65 40 - 129 U/L    ALT <5 0 - 40 U/L    AST 13 0 - 39 U/L   Troponin   Result Value Ref Range    Troponin, High Sensitivity 8 0 - 11 ng/L   Urinalysis with Microscopic   Result Value Ref Range    Color, UA Yellow Straw/Yellow    Clarity, UA Clear Clear    Glucose, Ur Negative Negative mg/dL    Bilirubin Urine Negative Negative    Ketones, Urine TRACE (A) Negative mg/dL    Specific Gravity, UA 1.020 1.005 - 1.030    Blood, Urine MODERATE (A) Negative    pH, UA 6.0 5.0 - 9.0    Protein, UA Negative Negative mg/dL    Urobilinogen, Urine 0.2 <2.0 E.U./dL    Nitrite, Urine Negative Negative    Leukocyte Esterase, Urine Negative Negative    Mucus, UA Present (A) None Seen /LPF    WBC, UA NONE 0 - 5 /HPF    RBC, UA 5-10 (A) 0 - 2 /HPF    Bacteria, UA FEW (A) None Seen /HPF   EKG 12 Lead   Result Value Ref Range    Ventricular Rate 62 BPM    Atrial Rate 62 BPM    P-R Interval 158 ms    QRS Duration 100 ms    Q-T Interval 436 ms    QTc Calculation (Bazett) 442 ms    P Axis 101 degrees    R Axis 77 degrees    T Axis 69 degrees       RADIOLOGY:  Interpreted by Radiologist.  CTA CHEST W CONTRAST   Final Result   No evidence of pulmonary embolism or acute pulmonary abnormality. Punctate nonobstructing right renal calculi.       RECOMMENDATIONS: Unavailable             ------------------------- NURSING NOTES AND VITALS REVIEWED ---------------------------   The nursing notes within the ED encounter and vital signs as below have been reviewed. BP (!) 149/98   Pulse 77   Temp 98 °F (36.7 °C) (Oral)   Resp 16   Ht 6' 1\" (1.854 m)   Wt 187 lb (84.8 kg)   SpO2 97%   BMI 24.67 kg/m²   Oxygen Saturation Interpretation: Normal      ---------------------------------------------------PHYSICAL EXAM--------------------------------------      Constitutional/General: Alert and oriented x3, well appearing, non toxic in NAD  Head: Normocephalic and atraumatic  Eyes: EOMI  Mouth: Oropharynx clear, handling secretions, no trismus  Neck: Supple, full ROM,   Pulmonary: Lungs clear to auscultation bilaterally, no wheezes, rales, or rhonchi. Not in respiratory distress  Cardiovascular:  Regular rate and rhythm, no murmurs, gallops, or rubs. 2+ distal pulses  Abdomen: Soft, non tender, non distended,   Extremities: Moves all extremities x 4. Warm and well perfused. Mild pedal edema, chronic and unchanged per wife  Skin: warm and dry without rash  Neurologic: GCS 15, no focal motor or sensory deficits, speech clear, facial symmetry    Psych: Normal Affect. Behavior normal.      ------------------------------ ED COURSE/MEDICAL DECISION MAKING----------------------  Medications   0.9 % sodium chloride bolus (0 mLs IntraVENous Stopped 2/21/23 1818)   iopamidol (ISOVUE-370) 76 % injection 75 mL (75 mLs IntraVENous Given 2/21/23 1721)       Medical Decision Making/ED COURSE:    History From: patient, spouse     Patient is a 79 y.o. male presenting to the ED for acute onset hypotension from PCP's office, moderate in severity. In the ED, patient was normotensive with a blood pressure of 129/61 without intervention. On exam, patient was awake with a normal neurologic exam and a reassuring clinical exam. Patient was placed on the cardiac monitor. I interpreted findings.  Rhythm - sinus.  Labs and CTA chest obtained.  Patient had presented with an outpatient order for a CTA chest to evaluate his pulmonary emboli.  Differential diagnosis includes sepsis, dehydration, electrolyte abnormalities, PE.  Patient was mildly orthostatic upon standing.  Patient administered 500 cc of IV fluids.    I reviewed and interpreted labs.  CBC was within normal limits.  No leukocytosis or anemia. CMP was within normal limits with normal electrolytes and normal kidney function.  Urinalysis showed few bacteria no white cells.  He has no symptoms of UTI.  Consider treatment for UTI though that does not appear to be indicated at this time.  Urine culture has been sent.  Lactic acid is normal.  No evidence of infection or sepsis.  High-sensitivity troponin is undetectable.  Patient has no chest pain or shortness of breath, so delta troponin is not indicated.  Considered acute coronary syndrome though that is not suspected at this time.    CTA chest showed no evidence of pulmonary emboli.  Discussed incidental findings with patient and wife at bedside.    Patient observed in the emergency department for greater than 7 hours, and he had no episodes of hypotension.  He is appropriate for discharge home with outpatient PCP follow-up.  Supportive care measures and strict ED return precautions discussed      CONSULTS: (Who and What was discussed)  None    Social Determinants of Health : None    Chronic Conditions affecting care:    has a past medical history of CAD (coronary artery disease) (2006), Cerebral artery occlusion with cerebral infarction ([facility]), Cerebrovascular disease, CHF (congestive heart failure), NYHA class I, chronic, systolic ([facility]) (2015), Chronic frontoethmoidal sinusitis (04/2018), Chronic maxillary sinusitis (04/2018), COPD (chronic obstructive pulmonary disease) ([facility]) (04/2018), GARSIA (dyspnea on exertion), Gallbladder disease, History of non-ST elevation myocardial infarction (NSTEMI) (2015),  Hyperlipidemia, Hypertension, Ischemic cardiomyopathy, Obesity, Osteoarthritis, Skin cancer of eyelid, Sleep apnea, Smokeless tobacco use, and Stented coronary artery (2006). Records Reviewed( Source)   I reviewed PCP visit from earlier today. See findings in HPI    CC/HPI Summary, DDx, ED Course, and Reassessment:   Patient asymptomatic on reevaluation. ED Course as of 02/21/23 2024 Tue Feb 21, 2023   1327 EKG: This EKG is signed and interpreted by me, Dr. Greyson Herman MD    Rate: 62  Rhythm: Sinus  Interpretation: Normal sinus rhythm, normal PA interval, normal QRS, normal axis, normal QT interval, no acute ST or T wave changes  Comparison: no previous EKG available   [JA]   1538 Orthostatics mildly positive. 500 cc IV fluids ordered. [JA]   4794 Patient remained asymptomatic throughout ED course on reevaluation. Discussed findings. Patient would like to be discharged home [JA]      ED Course User Index  [JA] Sherine Richey MD       Patient remained hemodynamically stable throughout ED course. Discharge Medication List as of 2/21/2023  6:56 PM          Counseling: The emergency provider has spoken with the patient and spouse/SO and discussed todays results, in addition to providing specific details for the plan of care and counseling regarding the diagnosis and prognosis. Questions are answered at this time and they are agreeable with the plan.      --------------------------------- IMPRESSION AND DISPOSITION ---------------------------------    IMPRESSION  1. Lightheadedness        DISPOSITION  Disposition: Discharge to home  Patient condition is stable      NOTE: This report was transcribed using voice recognition software.  Every effort was made to ensure accuracy; however, inadvertent computerized transcription errors may be present    I, Sherine Richey MD, am the primary provider of this record        Sherine Richey MD  02/21/23 2024

## 2023-02-21 NOTE — PROGRESS NOTES
[facility]) Physicians   Internal Medicine     2023  Lisandra Overall : 1940 Sex: male  Age:82 y.o. Chief Complaint   Patient presents with    Hypertension     3 month f/u    Cholesterol Problem     3 month f/u    Gastroesophageal Reflux     3 month f/u        HPI:   Patient presents to office for evaluation of the following medical concerns. - Wife was concerned with weight loss. States wife reached out to neurology. Was advised to check with PCP. Per weight records appears stable since 2023    - Was admitted to hospital () - SOB, ddimer inc, labs neg otherwise cta chest evidence of small volume right pulmonary emboli. Placed on Eliquis. Was referred to hematology. Hyper-coag work-up ordered cbc neg, cmp neg, homcystine neg [id number] neg, anticardioli ab neg, donovan neg, factor V neg, mthfr homozygous, Plasminogen Act Inhibitor-1 positive, d-dimer neg, protein c neg, lupus anticoag neg, AT3 neg   US LE (10/22) -No evidence of DVT in either lower extremity. Recommending at least 6 months of eliquis. Last visit per reviewed report  () -pulmonary embolism possibly due to sedentary lifestyle follow-up D-dimer less than 200, ordering CT chest for 2023 continue Eliquis    -  has lesion and rash to hair and scalp.  has been using ketoconazole shampoo. Declines Dermatology.  has high blood pressure.  occasionally checks blood pressure at home. On metoprolol, on losartan. Some lightheaded and dizzy if gets up to quickly. No loc/syncope. No headaches or vision changes. No chest pain. Blood pressure low in office today 2023    -  has parkinson. States following with neurologist at [facility] [place].  was started on caribodopa/levodopa increased to 1 tablet three times a day. On Rasagiline. Added remeron. Concern over memory. neuropsych ()  - dx vascular dementia.  Last Neurology office visit reviewed from care everywhere from (2023) - not compliant with sinemet continue sinemet to 1 tablet 3x per day, continue rasagline and remeron f/u [facility]. States has joined [employer] to help with parkinsons symptoms. States goes to [place] 3x per [place] ([place] Added zofran as needed. States having anxiety and depression. States not moving around a lot. States restless. Started on remeron. Improved. States having constipation. Taking fiber supplements. Declines laxative. Recommend colace daily and miralax as needed. States has CAD. Follows with cardiology locally and at [facility] [place]. States s/p stents. States ischemic cardiomyopathy. On metoprolol, losartan, atorvastatin, aspirin and plavix. Last visit with cardio per reviewed note (9/2022) - sugg stop asa, isch cardiomyopathy given eliquis, continue plavix. Follow up in 1 year. States has COPD. States breathing is fair. Has seen pulmonary once, not following. States placed on breo - stopped. Stable. Last visit with pulmonary (5/2019). Felt poss asthma. States has high cholesterol. States trying to watch diet. On atorvastatin, no reported side effects. Last lab (1/2023) stable. - last showed elevated A1c. LAst was 5.5 (1/2023)      States has sleep apnea. States on cpap. States wears 6-8 hours per night, helps symptoms. States currently broke machine and awaiting new cpap (11/2022). Had home sleep study (2/2023) recommending auto cpap with heated humidification, remote modem, setting 5-15cm of water with EPR of 3, nasal mask size to be fitted  Has not been able to get new device and is symptomatic. States has had TIA. States had dizziness and difficulty walking. On antiplatelets (plavix)      States has enlarged prostate. Has seen urology. States has urinary frequency. States flomax taking infrequently 1-2 times per week. Concern for dizzy and lightheaded.  Last visit with urology per reviewed note (9/22) - elevated PSA, exam feels benign, recommend repeat PSA in 6 months if remains stable observation Last PSA (1/30/2023) 3.63    Vitamin D def. States on otc supplement. - H/o cholangitis. Had MRCP that suggested stricture and inflammation at the head of the pancreas. f/u ERCP (6/21) - Severe stricture at distal most portion of CBD, CBD stones  ERCP with stent removal.     Health Maintenance   - immunizations:   Influenza Vaccination - (2021), (2022)   Pneumonia Vaccination - (9/20219) - prevnar 13, (7/2021) - pneumococcal 23   Zoster/Shingles Vaccine - (2016) - zostavax, (11/2022) - shingrix #1  Tetanus Vaccination  covid (1/2021) #1, (2/12/2021) #2, (11/21/2021) # 3 - pfizer     - Screenings:   Colonoscopy (2013) - normal, (5/21) - diverticulosis, otherwise neg f/u 10yrs  Prostate     ROS:  Review of Systems   Constitutional:  Positive for chills and fatigue. Negative for appetite change, fever and unexpected weight change. HENT:  Positive for congestion and rhinorrhea. Negative for sore throat. Eyes:  Negative for pain and visual disturbance. Respiratory:  Positive for shortness of breath. Negative for cough and chest tightness. Cardiovascular:  Negative for chest pain and palpitations. Gastrointestinal:  Negative for abdominal pain, blood in stool, constipation, diarrhea, nausea and vomiting. Genitourinary:  Negative for difficulty urinating, dysuria, hematuria, scrotal swelling, testicular pain and urgency. Musculoskeletal:  Negative for arthralgias and gait problem. Skin:  Negative for rash. Neurological:  Positive for dizziness. Negative for syncope, weakness, light-headedness and headaches. Hematological:  Negative for adenopathy. Does not bruise/bleed easily. Psychiatric/Behavioral:  Negative for suicidal ideas. The patient is not nervous/anxious.         Current Outpatient Medications:     apixaban (ELIQUIS) 5 MG TABS tablet, Take 1 tablet by mouth 2 times daily, Disp: 60 tablet, Rfl: 0    mirtazapine (REMERON) 15 MG tablet, Take 1 tablet by mouth nightly, Disp: 90 tablet, Rfl: 3    carbidopa (LODOSYN) 25 MG TABS tablet, Take 25 mg by mouth 3 times daily, Disp: , Rfl:     ondansetron (ZOFRAN) 4 MG tablet, Take 4 mg by mouth every 8 hours as needed, Disp: , Rfl:     atorvastatin (LIPITOR) 80 MG tablet, Take 1 tablet by mouth daily (Patient taking differently: Take 40 mg by mouth daily), Disp: 90 tablet, Rfl: 0    nitroGLYCERIN (NITROSTAT) 0.4 MG SL tablet, Place 1 tablet under the tongue every 5 minutes as needed for Chest pain, Disp: 25 tablet, Rfl: 1    rasagiline mesylate 1 MG TABS, take 1 tablet by mouth once daily, Disp: 90 tablet, Rfl: 1    ketoconazole (NIZORAL) 2 % shampoo, Apply topically daily as needed. , Disp: 100 mL, Rfl: 2    carbidopa-levodopa (SINEMET)  MG per tablet, Take 1 tablet by mouth 3 times daily New prescription, Disp: , Rfl:     ketoconazole (NIZORAL) 2 % cream, Apply topically daily. , Disp: 30 g, Rfl: 1    Multiple Vitamins-Minerals (THERAPEUTIC MULTIVITAMIN-MINERALS) tablet, Take 1 tablet by mouth daily, Disp: , Rfl:     CPAP Machine MISC, daily at bedtime. , Disp: , Rfl:     Cholecalciferol (VITAMIN D) 2000 UNIT CAPS capsule, Take 1 capsule by mouth daily Last taken 05/13, Disp: , Rfl:     Allergies   Allergen Reactions    Lisinopril Itching       Past Medical History:   Diagnosis Date    CAD (coronary artery disease) 2006    follows with Dr Devan Forde    Cerebral artery occlusion with cerebral infarction [facility])     Cerebrovascular disease     CHF (congestive heart failure), NYHA class I, chronic, systolic ([facility] [address] 1809    [facility]-- EF 39%    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/2018    Chronic maxillary sinusitis 04/2018    COPD (chronic obstructive pulmonary disease) ([facility] [address] 04/2018    GARSIA (dyspnea on exertion)     follows with Dr Chasity Potter    Gallbladder disease     History of non-ST elevation myocardial infarction (NSTEMI) 2015    [facility]    Hyperlipidemia     Hypertension     Ischemic cardiomyopathy     EF 40% after NSTEMI, 11-15 at [facility] [place]    Obesity Osteoarthritis     Skin cancer of eyelid     Sleep apnea     uses CPAP    Smokeless tobacco use     Stented coronary artery 2006    x1       Past Surgical History:   Procedure Laterality Date    CARDIAC CATHETERIZATION Left 2015    done [address] WITH IMPLANT Bilateral     CHOLECYSTECTOMY, LAPAROSCOPIC N/A 2019    LAPAROSCOPIC ROBOTIC XI ASSISTED CHOLECYSTECTOMY performed by John Barker MD at [address] [place] [address]  2014    CORONARY ANGIOPLASTY  06    DIAGNOSTIC CARDIAC CATH LAB PROCEDURE  06    ERCP N/A 2021    ERCP STONE REMOVAL performed by Magy Mireles MD at [facility] ENDOSCOPY    ERCP N/A 2021    ERCP STENT REMOVAL performed by Magy Mireles MD at  [address] Left 2015    ROTATOR CUFF REPAIR      bilaterally    TONSILLECTOMY AND ADENOIDECTOMY      UPPER GASTROINTESTINAL ENDOSCOPY      UPPER GASTROINTESTINAL ENDOSCOPY N/A 2021    EGD ESOPHAGOGASTRODUODENOSCOPY WITH PANCREATIC STENT REMOVAL performed by Magy Mireles MD at [facility] ENDOSCOPY       Family History   Problem Relation Age of Onset    Heart Disease Mother          81 Y/O    Diabetes Mother     Liver Disease Father          68 Y/O    No Known Problems Brother        Social History     Socioeconomic History    Marital status:      Spouse name: Not on file    Number of children: Not on file    Years of education: Not on file    Highest education level: Not on file   Occupational History    Occupation: retired-     Tobacco Use    Smoking status: Former     Packs/day: 1.00     Years: 15.00     Pack years: 15.00     Types: Cigarettes     Start date: 1957     Quit date: 1975     Years since quittin.1    Smokeless tobacco: Former     Types: Snuff     Quit date:    Vaping Use    Vaping Use: Never used   Substance and Sexual Activity    Alcohol use: Not Currently    Drug use: No    Sexual activity: Not on file Other Topics Concern    Not on file   Social History Narrative    Not on file     Social Determinants of Health     Financial Resource Strain: Unknown    Difficulty of Paying Living Expenses: Patient refused   Food Insecurity: Unknown    Worried About Running Out of Food in the Last Year: Patient refused    [address] in the Last Year: Patient refused   Transportation Needs: Unknown    Lack of Transportation (Medical): Not on file    Lack of Transportation (Non-Medical): Patient refused   Physical Activity: Inactive    Days of Exercise per Week: 0 days    Minutes of Exercise per Session: 0 min   Stress: Not on file   Social Connections: Not on file   Intimate Partner Violence: Not on file   Housing Stability: Unknown    Unable to Pay for Housing in the Last Year: Not on file    Number of [place] in the Last Year: Not on file    Unstable Housing in the Last Year: Patient refused       Vitals:    02/21/23 0943   BP: (!) 66/46   Site: Left Upper Arm   Position: Sitting   Cuff Size: Large Adult   Pulse: 85   Resp: 18   Temp: 98 °F (36.7 °C)   TempSrc: Temporal   SpO2: 98%   Weight: 187 lb (84.8 kg)   Height: 6' 1\" (1.854 m)       Exam:  Physical Exam  Vitals reviewed. Constitutional:       Appearance: He is well-developed. HENT:      Head: Normocephalic and atraumatic. Right Ear: External ear normal. There is impacted cerumen. Left Ear: External ear normal. There is impacted cerumen. Eyes:      Pupils: Pupils are equal, round, and reactive to light. Neck:      Thyroid: No thyromegaly. Cardiovascular:      Rate and Rhythm: Normal rate and regular rhythm. Heart sounds: Normal heart sounds. No murmur heard. Pulmonary:      Effort: Pulmonary effort is normal.      Breath sounds: Normal breath sounds. No wheezing or rales. Abdominal:      General: Bowel sounds are normal.      Palpations: Abdomen is soft. Tenderness: There is no abdominal tenderness.  There is no guarding or rebound. Musculoskeletal:         General: Normal range of motion. Cervical back: Normal range of motion and neck supple. Comments: Abnormal gait. Lymphadenopathy:      Cervical: No cervical adenopathy. Skin:     General: Skin is warm and dry. Neurological:      Mental Status: He is alert and oriented to person, place, and time. Cranial Nerves: No cranial nerve deficit. Comments: Resting tremor    Psychiatric:         Judgment: Judgment normal.       Assessment and Plan:    Diagnoses and all orders for this visit:    Change in weight  - uncertain as to cause   - no overt symptoms   - does state decreased appetite since parkinson's   - on Remeron  - recommend protein supplement like boost or ensure   - monitor weight and report   - stable.      Other pulmonary embolism without acute cor pulmonale, unspecified chronicity (HCC)  - uncertain as to cause - no overt inciting event to increase risk other than parkinsons and decreased mobility   - placed on eliquis - no bleeding or side effects  - was seen by cardiology - stopped aspirin, continued plavix   - has seen hematology - recommending treatment for at least 6 months    Elevated PSA  - uncertain etiology   - increased to 4.16 (8/2022) poss agoe or BPH related, has been trending more elevated for last few years   - has h/o bph   - has seen urology for assessment - follow up 6 months with pSA   - last PSA (1/2023) - 3.6    Essential hypertension  - watch diet   - monitor blood pressure at home   - on losartan   - on metoprolol - changed to metoprolol to 25mg (10/2021)  - on plavix   - off aspirin   - blood pressure very low today 2/21/2023  - discussed referral to ER   - will stop losartan   - will stop metoprolol to 12.5mg daily hold if SBP, 100     Parkinson disease ([facility] [address]  - following with neurology at [facility] [place]   - on rasagiline   - on carbidopa and levodopa 1 tablet tid (8/2021) states does miss doses   - stable, wife feels may be worsening Moderate episode of recurrent major depressive disorder (HCC)  - now on remeron to help with anxiety and depression   - stable per patient   - last PHQ socre of 0 (8/29/2022)     Coronary artery disease involving native coronary artery of native heart without angina pectoris  - following with cardiology   - on losartan   - on metoprolol - will decrease metoprolol to 25mg (10/2021)   - on atorvastatin   - on Plavix   - off aspirin   - stable     Mixed hyperlipidemia  - watch diet   - on atorvastatin   - follow labs   - last lab (2/2022) - stable     Ischemic cardiomyopathy  - following with cardiology   - on losartn   - on metoprolol decreased to 25mg (10/2021)   - on atrovastatin   - on plavix   - off aspirin     CHF (congestive heart failure), NYHA class I, chronic, systolic ([facility] [address]  - following with cardiology   - on losartn   - on metoprolol decreased to 25mg (10/2021)   - on atrovastatin   - on plavix   - off aspirin   - Stable     Chronic obstructive pulmonary disease, [facility]  - not currently following with pulmonary   - did not see benefit with breo   - stable per patient     Impaired fasting glucose  - continue present treatment  - watch diet   - follow A1c - last A1c (8/2022) 5.9    Obstructive sleep apnea syndrome  - Continue present treatment   - on CPAP  - wearing nightly 6-8 hours   - helping symptoms   - stable   - encouraged to wear anytime laying down to rest 2/21/2023    TIA (transient ischemic attack)  - on atrovastatin   - on plavix   - off aspirin   - stable     Prostate enlargement  - has seen urology   - Has flomax - takes as needed  - last PSA (2/2022) - increased and borderline     Vitamin D def  - On otc supplement   - follow labs     Other constipation  - discussed colace 100mg daily and miralax as needed  - taking mutamucil   - declines laxative (8/2022)     Other skin changes  - poss tinea of scalp   - order ketoconazole shampoos   - if not better derm - declines Cholangitis  - hospital stay (6/2020) and (4/2021)   - s/p ERCP and papillotomy and stent placement   - s/p ERCP with stent removal (6/2021)    - improved     Return in about 2 weeks (around 3/7/2023) for check up and review. No orders of the defined types were placed in this encounter.     Requested Prescriptions      No prescriptions requested or ordered in this encounter     Reva Plummer MD  2/21/2023  10:38 AM

## 2023-02-22 ENCOUNTER — CARE COORDINATION (OUTPATIENT)
Dept: CARE COORDINATION | Age: 83
End: 2023-02-22

## 2023-02-22 NOTE — LETTER
2/23/2023    [facility] [address] [id number]    Dear José Miguel Drake,    I enjoyed speaking with you and wanted to send some additional information. Ade Anderson MD and I will work together to ensure your needs are met and help you achieve your health goals. We are committed to walk with you on this journey and look forward to working with you. Please feel free to contact me with any questions or concerns. I am available by phone. You can reach me at [phone] .       In good health,     Dylan Massey RN         CC:  CHF zone tool, COPD zone tool, blood pressure education, blood pressure log and fall prevention education

## 2023-02-22 NOTE — TELEPHONE ENCOUNTER
Pts wife ret call (EC). Let her know that orders for new CPAP were sent to [facility]. Dr did ask for orders to be expedited d/t pt having severe symptoms of LINETTE. Pts wife was agreeable to using [facility]. Will ask [facility] again to please expedite orders.

## 2023-02-22 NOTE — PROGRESS NOTES
Blood sugar range is  in am at 15 units. Patient advised to continue 15 units daily. Spoke with dr. Bala Hart at [facility] for discharge, meds are reconciled. Spoke with Chon via phone, ok for discharge, follow ups updated. Spoke with Dr. Judge Parekh - will discharge depending on diet toleration.

## 2023-02-23 ENCOUNTER — CARE COORDINATION (OUTPATIENT)
Dept: CARE COORDINATION | Age: 83
End: 2023-02-23

## 2023-02-23 DIAGNOSIS — I10 PRIMARY HYPERTENSION: Primary | ICD-10-CM

## 2023-02-23 DIAGNOSIS — I50.22 CHF (CONGESTIVE HEART FAILURE), NYHA CLASS I, CHRONIC, SYSTOLIC (HCC): ICD-10-CM

## 2023-02-23 DIAGNOSIS — J44.9 CHRONIC OBSTRUCTIVE PULMONARY DISEASE, UNSPECIFIED COPD TYPE (HCC): ICD-10-CM

## 2023-02-23 NOTE — PROGRESS NOTES
2/23/23 12:29 PM       Remote Patient Monitoring Treatment Plan    Received request from ACM/JEAN Rasheed RN to order remote patient monitoring for in home monitoring of CHF, COPD, and HTN and order completed. Patient will be monitoring blood pressure   pulse ox   weight  survey questions. Patient will engage in Remote Patient Monitoring each day to develop the skills necessary for self management. RPM Care Team Responsibilities:   Alerts will be reviewed daily and addressed within 2-4 hours during operational hours (Monday -Friday 9 am-4 pm)  Alert response and intervention documented in patient medical record  Alert response escalated to PCP per protocol and documented in patient medical record  Patient monitored over approximately  days  Discharge from program based on self-management readiness    See care coordination encounters for additional details.      Coleen Metz DNP, FNP-C, Remote Patient Monitoring NP, (Ph) [phone]

## 2023-02-23 NOTE — CARE COORDINATION
Remote Patient Kit Ordering Note      Date/Time:  2/23/2023 1:56 PM      [x] [facility] confirmed patient shipping address  [x] Patient will receive package over the next 2-4 business days. Someone 21 years or older must be present to sign for UPS delivery. [x] Patient to contact virtual installation-specific phone number listed in the patient instructions. [x] If the patient does not contact HRS within 24 hours, an [employer] [facility] will call the patient directly: If the patient does not answer, HRS will follow up with the clinical team notifying them about the unsuccessful attempt to contact the patient. HRS will make three call attempts to the patient. [x] LPN will contact patient once equipment is active to welcome them to the program.                                                         [x] Hours of RPM monitoring - Monday-Friday [id number]                     All questions answered at this time. ACM made aware the RPM kit has been ordered. CCSS notified patient of RPM equipment order.

## 2023-02-23 NOTE — CARE COORDINATION
Ambulatory Care Coordination Note  2023    Patient Current Location:  Home: [place] [id number]    ACM contacted the patient by telephone. Verified name and  with patient as identifiers. Provided introduction to self, and explanation of the ACM role. ACM: Lizeth Spain RN    Challenges to be reviewed by the provider   Additional needs identified to be addressed with provider: no  none               Method of communication with provider: phone. ACM received referral from Dr. Aurelia Norris. Call placed to patient and ACM spoke with patient's wife Sarahi Dacosta). Pamela Muñoz is agreeable to enrollment in care coordination for Kristopher Grimes. Patient lives in a private residence with his wife. Pamela Muñoz has a business outside of the home and Kristopher Grimes is home alone at times. Pamela Muñoz has a camera that she uses to monitor Kristopher Grimes when she is not home and family call him throughout the day to check on him. Pamela Muñoz states she is comfortable leaving Kristopher Grimes home alone because of the camera and she is five minutes away. He has not experienced any falls. Javid Carlisle knows if he experiences an emergency he is to call 911 first.  Kristopher Grimes does not have a life alert system and Pamela Muñoz is interested in obtaining one. She is also interested in services to assist in the home as taking care of Kristopher Grimes has begun to be challenging as his health has declined. She is agreeable to speaking with the . Kristopher Grimes attends therapy at [facility] four times weekly for Parkinson's. He also attends boxing classes twice weekly. Boxing classes are also therapy for Parkinson's. Pamela Muñoz drives Kristopher Grimes to all his appointments as he has not driven in approximately 4 months. Javid Marieehamzahatiya Orestes has a poor appetite. She estimates he has lost about 15 pounds over the course of the year. She prepares breakfast before she leaves the house and makes a lunch plate for him. She is home at dinner time.   Kristopher Grimes is drinking protein shakes as a supplement. Daniel Martinez states the weight loss has caused Jennifer Barksdale to not be able to wear his CPAP. He has not been using it for the past three months. She has contacted Mehdi Jain at [facility] ([phone]) to obtain new equipment. She was told the new equipment would be available in about a week. Jennifer Barksdale has a recent ED visit following a hypotensive episode at Dr. Taz Davey office. He was no longer hypotensive when he arrived at ED. [facility] offered enrollment in remote patient monitoring and Daniel Martinez is agreeable. Offered patient enrollment in the Remote Patient Monitoring (RPM) program for in-home monitoring: Yes, patient enrolled:     Remote Patient Monitoring Enrollment Note      Date/Time: 2/23/2023 11:35 AM    Offered patient enrollment in the [facility] Remote Patient Monitoring (RPM) program for in home monitoring for CHF, COPD, and HTN. Patient accepted RPM services. Patient will be monitoring the following daily:  blood pressure heart rate, pulse ox heart rate, and weight    [facility] reviewed the information below with [employer]:    Emergency Contact (name and contact number): Daniel Martinez  [phone]    [x] A member from the care coordination team will reach out to notify the patient once the RPM kit is ordered. [x] Once the kit is delivered, the [facility] team will contact the patient after [employer] deliver to assist with set up. [x] Determined BP cuff size: regular (9.05\"-15.74\")      [x] Determined weight scale: regular (<330lbs)                                                [x] Hours of ACM monitoring - Monday-Friday [id number]                     All questions about RPM program answered at this time.   .    Plan  Send welcome letter, CHF zone tool, COPD zone tool, blood pressure education, blood pressure log and fall prevention education via mail  Social work referral  Remote patient monitoring referral  Follow for Care Coordination    Ambulatory Care Coordination Assessment    Care Coordination Protocol  Referral from Primary Care Provider: No  Week 1 - Initial Assessment     Do you have all of your prescriptions and are they filled?: Yes  Barriers to medication adherence: None  Are you able to afford your medications?: Yes  How often do you have trouble taking your medications the way you have been told to take them?: I always take them as prescribed. Do you have Home O2 Therapy?: No      Ability to seek help/take action for Emergent Urgent situations i.e. fire, crime, inclement weather or health crisis.: Needs Assistance  Ability to ambulate to restroom: Independent  Ability handle personal hygeine needs (bathing/dressing/grooming):  Independent  Ability to manage Medications: Dependent  Ability to prepare Food Preparation: Needs Assistance  Ability to maintain home (clean home, laundry): Dependent  Ability to drive and/or has transportation: Dependent  Ability to do shopping: Dependent  Ability to manage finances: Needs Assistance  Is patient able to live independently?: No     Current Housing: Private Residence        Per the Fall Risk Screening, did the patient have 2 or more falls or 1 fall with injury in the past year?: No     Frequent urination at night?: Yes     Are you experiencing loss of meaning?: No  Are you experiencing loss of hope and peace?: No     Suggested Interventions and Community Resources  Fall Risk Prevention: Completed Disease Specific Clinic: Completed   Physical Therapy: Completed   Registered Dietician: Not Started   Social Work: Completed   Other Services: Completed         Set up/Review an Education Plan, Set up/Review Goals              Future Appointments   Date Time Provider Peter Bobbi   3/7/2023  1:45 PM Emily Dale  [address]   3/31/2023  2:30 PM Rand Ríos MD [address] [place] [address]   3/31/2023  2:45 PM [facility] FAST TRACK 2 [facility]   4/24/2023 11:40 AM DO MOHSEN Lipscomb SLEEP [facility]

## 2023-02-24 ENCOUNTER — CARE COORDINATION (OUTPATIENT)
Dept: CARE COORDINATION | Age: 83
End: 2023-02-24

## 2023-02-24 LAB — URINE CULTURE, ROUTINE: NORMAL

## 2023-02-24 NOTE — CARE COORDINATION
Initial Contact Social Work Note - Ambulatory  2/24/2023      Date of referral: 2/23/2023  Referral received from: AC  Reason for referral: aide services and life alert button    Previous SW referral: No  If yes, brief summary of outcome: N/A    Two Identifiers Verified: Yes    Insurance Provider: [employer]    Support System:  Spouse/Partner, sons, daughter, grandchildren     Status: Nora, not connected with the [employer] Providers:  exercise    ADL Assistance Needed: N/A    Housing/Living Concerns or Home Modification Needs: No     Transportation Concern: No    Medication Cost Concern: No    Medication Adherence Concern: wife manages meds    Financial Concern(s): No    Income (only if applicable): Yes    Ability to Read/Write: Yes    Advance Care Plan:  N/A    Other: N/A    Identified Needs:  Home delivered meals  Life alert button      Social Work Plan:  Emailed Irineotsekou contact Kenia WATKINS to see if pt has life alert button benefit  Made multiple 3way calls to [facility] to check pt's benefit for life alert button  Left message for [facility] regarding meals  Next Steps: [facility] to f/u    Method of Communication With Provider (if appropriate): Chart Routing       Goals Addressed    None      SW[facility] made call to pt to initiate SW referral, unable to reach, left message requesting call back. Made call to pt's alternate phone number spoke with pt and explained purpose of call. Pt encouraged this [facility] to call his wife Kevin Young. Made call to pt's wife Kevin Young who requested call back after 2pm today. Made call back to wife Kevin Young as requested, completed SW assessment. Kevin Hector reports pt is independent of his ADLS needs assistance with IADLs which she assists with. Kevin Hector is not interested in  services for pt as she completes housekeeping. Kevin Hector is interested in getting pt a life alert button for pt to use when she is not home. Kevin Young reports she still works.  Kevin Young is also interested in meals for pt. [facility] to call [facility]. Made 3way call to pt's Member Service phone number on back of [employer] card, was told PCP would have to write order for life alert button to fill at pharmacy? [facility] and wife did not feel this is the case and called another [employer] Member [employer] phone number, was on hold for long wait time, wife bre wished to end call. [facility] to email [facility] regarding if pt has life alert button benefit. [facility] to f/u. Emailed Teresa WATKINS to check if pt has life alert button benefit. Left message for Apurva at [facility] regarding home delivered meals.     Doug LIMA, [facility]   Care Coordinator  [phone]        Doug LIMA, [facility]   Care Coordinator  [phone]

## 2023-02-26 LAB
BLOOD CULTURE, ROUTINE: NORMAL
CULTURE, BLOOD 2: NORMAL

## 2023-02-27 ENCOUNTER — TELEPHONE (OUTPATIENT)
Dept: ONCOLOGY | Age: 83
End: 2023-02-27

## 2023-02-27 ENCOUNTER — TELEPHONE (OUTPATIENT)
Dept: ADMINISTRATIVE | Age: 83
End: 2023-02-27

## 2023-02-27 ENCOUNTER — OFFICE VISIT (OUTPATIENT)
Dept: FAMILY MEDICINE CLINIC | Age: 83
End: 2023-02-27
Payer: MEDICARE

## 2023-02-27 VITALS
BODY MASS INDEX: 24.78 KG/M2 | OXYGEN SATURATION: 100 % | RESPIRATION RATE: 18 BRPM | DIASTOLIC BLOOD PRESSURE: 50 MMHG | HEIGHT: 73 IN | WEIGHT: 187 LBS | HEART RATE: 61 BPM | TEMPERATURE: 98.2 F | SYSTOLIC BLOOD PRESSURE: 70 MMHG

## 2023-02-27 DIAGNOSIS — Z09 HOSPITAL DISCHARGE FOLLOW-UP: ICD-10-CM

## 2023-02-27 DIAGNOSIS — I95.9 HYPOTENSION, UNSPECIFIED HYPOTENSION TYPE: Primary | ICD-10-CM

## 2023-02-27 PROCEDURE — 3074F SYST BP LT 130 MM HG: CPT | Performed by: INTERNAL MEDICINE

## 2023-02-27 PROCEDURE — 1123F ACP DISCUSS/DSCN MKR DOCD: CPT | Performed by: INTERNAL MEDICINE

## 2023-02-27 PROCEDURE — 1111F DSCHRG MED/CURRENT MED MERGE: CPT | Performed by: INTERNAL MEDICINE

## 2023-02-27 PROCEDURE — 99214 OFFICE O/P EST MOD 30 MIN: CPT | Performed by: INTERNAL MEDICINE

## 2023-02-27 PROCEDURE — 3078F DIAST BP <80 MM HG: CPT | Performed by: INTERNAL MEDICINE

## 2023-02-27 RX ORDER — METOPROLOL SUCCINATE 25 MG/1
12.5 TABLET, EXTENDED RELEASE ORAL DAILY
Qty: 45 TABLET | Refills: 1
Start: 2023-02-27 | End: 2023-03-03 | Stop reason: SDUPTHER

## 2023-02-27 ASSESSMENT — ENCOUNTER SYMPTOMS
RHINORRHEA: 1
CHEST TIGHTNESS: 0
EYE PAIN: 0
ABDOMINAL PAIN: 0
NAUSEA: 0
COUGH: 0
BLOOD IN STOOL: 0
CONSTIPATION: 0
SORE THROAT: 0
VOMITING: 0
SHORTNESS OF BREATH: 1
DIARRHEA: 0

## 2023-02-27 NOTE — TELEPHONE ENCOUNTER
There is a referral in the workque on Dr. Nelda Lloyd pt per Dr. Jessica Romano dx: Hypotension. Last seen in OV by [facility] on: 9/27/22. Scheduling advise please.

## 2023-02-27 NOTE — PROGRESS NOTES
[facility]) Physicians   Internal Medicine     2023  Danisha Jang : 1940 Sex: male  Age:82 y.o. Chief Complaint   Patient presents with    Follow-Up from Hospital     Low bp         HPI:   Patient presents to office for evaluation of the following medical concerns. -  has been having lower blood pressure.  has occasional dizziness. ER () -hypotension, blood pressure normal in the ER, CTA chest no evidence for pulmonary embolism no acute pulmonary abnormality noted right renal stones, CBC negative, CMP glucose elevated, troponin negative x2 lactic acid negative blood cultures negative at 8 urinalysis ketones hematuria blood and urine culture were negative. - Wife was concerned with weight loss.  wife reached out to neurology. Was advised to check with PCP. Per weight records appears stable since 2023    - Was admitted to hospital () - SOB, ddimer inc, labs neg otherwise cta chest evidence of small volume right pulmonary emboli. Placed on Eliquis. Was referred to hematology. Hyper-coag work-up ordered cbc neg, cmp neg, homcystine neg [id number] neg, anticardioli ab neg, donovan neg, factor V neg, mthfr homozygous, Plasminogen Act Inhibitor-1 positive, d-dimer neg, protein c neg, lupus anticoag neg, AT3 neg   US LE (10/22) -No evidence of DVT in either lower extremity. Recommending at least 6 months of eliquis. Last visit per reviewed report  () -pulmonary embolism possibly due to sedentary lifestyle follow-up D-dimer less than 200, ordering CT chest for 2023 continue Eliquis    -  has lesion and rash to hair and scalp.  has been using ketoconazole shampoo. Declines Dermatology.  has high blood pressure.  occasionally checks blood pressure at home. On metoprolol, on losartan. Some lightheaded and dizzy if gets up to quickly. No loc/syncope. No headaches or vision changes. No chest pain.  Blood pressure low in office today 2/27/2023    - States has parkinson. States following with neurologist at [facility]. States was started on caribodopa/levodopa increased to 1 tablet three times a day. On Rasagiline. Added remeron. Concern over memory. neuropsych (8/21)  - dx vascular dementia. Last Neurology office visit reviewed from care everywhere from (1/2023) - not compliant with sinemet continue sinemet to 1 tablet 3x per day, continue rasagline and remeron f/u [employer]. States has joined [employer] to help with parkinsons symptoms. States goes to [facility] 3x per week ([place] Added zofran as needed. States having anxiety and depression. States not moving around a lot. States restless. Started on remeron. Improved. States having constipation. Taking fiber supplements. Declines laxative. Recommend colace daily and miralax as needed. States has CAD. Follows with cardiology locally and at [facility]. States s/p stents. States ischemic cardiomyopathy. On metoprolol, losartan, atorvastatin, aspirin and plavix. Last visit with cardio per reviewed note (9/2022) - sugg stop asa, isch cardiomyopathy given eliquis, continue plavix. Follow up in 1 year. States has COPD. States breathing is fair. Has seen pulmonary once, not following. States placed on breo - stopped. Stable. Last visit with pulmonary (5/2019). Felt poss asthma. States has high cholesterol. States trying to watch diet. On atorvastatin, no reported side effects. Last lab (1/2023) stable. - last showed elevated A1c. LAst was 5.5 (1/2023)      States has sleep apnea. States on cpap. States wears 6-8 hours per night, helps symptoms. States currently broke machine and awaiting new cpap (11/2022). Had home sleep study (2/2023) recommending auto cpap with heated humidification, remote modem, setting 5-15cm of water with EPR of 3, nasal mask size to be fitted  Has not been able to get new device and is symptomatic. States has had TIA.  States had dizziness and difficulty walking. On antiplatelets (plavix)      States has enlarged prostate. Has seen urology. States has urinary frequency. States flomax taking infrequently 1-2 times per week. Concern for dizzy and lightheaded. Last visit with urology per reviewed note (9/22) - elevated PSA, exam feels benign, recommend repeat PSA in 6 months if remains stable observation Last PSA (1/30/2023) 3.63    Vitamin D def. States on otc supplement.     - H/o cholangitis. Had MRCP that suggested stricture and inflammation at the head of the pancreas. f/u ERCP (6/21) - Severe stricture at distal most portion of CBD, CBD stones  ERCP with stent removal.     Health Maintenance   - immunizations:   Influenza Vaccination - (2021), (2022)   Pneumonia Vaccination - (9/20219) - prevnar 13, (7/2021) - pneumococcal 23   Zoster/Shingles Vaccine - (2016) - zostavax, (11/2022) - shingrix #1  Tetanus Vaccination  covid (1/2021) #1, (2/12/2021) #2, (11/21/2021) # 3 - pfizer     - Screenings:   Colonoscopy (2013) - normal, (5/21) - diverticulosis, otherwise neg f/u 10yrs  Prostate     ROS:  Review of Systems   Constitutional:  Positive for chills and fatigue. Negative for appetite change, fever and unexpected weight change.   HENT:  Positive for congestion and rhinorrhea. Negative for sore throat.    Eyes:  Negative for pain and visual disturbance.   Respiratory:  Positive for shortness of breath. Negative for cough and chest tightness.    Cardiovascular:  Negative for chest pain and palpitations.   Gastrointestinal:  Negative for abdominal pain, blood in stool, constipation, diarrhea, nausea and vomiting.   Genitourinary:  Negative for difficulty urinating, dysuria, hematuria, scrotal swelling, testicular pain and urgency.   Musculoskeletal:  Negative for arthralgias and gait problem.   Skin:  Negative for rash.   Neurological:  Positive for dizziness. Negative for syncope, weakness, light-headedness and headaches.   Hematological:  Negative for adenopathy.  Does not bruise/bleed easily. Psychiatric/Behavioral:  Negative for suicidal ideas. The patient is not nervous/anxious. Current Outpatient Medications:     metoprolol succinate (TOPROL XL) 25 MG extended release tablet, Take 0.5 tablets by mouth daily, Disp: 45 tablet, Rfl: 1    apixaban (ELIQUIS) 5 MG TABS tablet, Take 1 tablet by mouth 2 times daily, Disp: 60 tablet, Rfl: 0    mirtazapine (REMERON) 15 MG tablet, Take 1 tablet by mouth nightly, Disp: 90 tablet, Rfl: 3    carbidopa (LODOSYN) 25 MG TABS tablet, Take 25 mg by mouth 3 times daily, Disp: , Rfl:     ondansetron (ZOFRAN) 4 MG tablet, Take 4 mg by mouth every 8 hours as needed, Disp: , Rfl:     atorvastatin (LIPITOR) 80 MG tablet, Take 1 tablet by mouth daily (Patient taking differently: Take 40 mg by mouth daily), Disp: 90 tablet, Rfl: 0    nitroGLYCERIN (NITROSTAT) 0.4 MG SL tablet, Place 1 tablet under the tongue every 5 minutes as needed for Chest pain, Disp: 25 tablet, Rfl: 1    rasagiline mesylate 1 MG TABS, take 1 tablet by mouth once daily, Disp: 90 tablet, Rfl: 1    ketoconazole (NIZORAL) 2 % shampoo, Apply topically daily as needed. , Disp: 100 mL, Rfl: 2    carbidopa-levodopa (SINEMET)  MG per tablet, Take 1 tablet by mouth 3 times daily New prescription, Disp: , Rfl:     ketoconazole (NIZORAL) 2 % cream, Apply topically daily. , Disp: 30 g, Rfl: 1    Multiple Vitamins-Minerals (THERAPEUTIC MULTIVITAMIN-MINERALS) tablet, Take 1 tablet by mouth daily, Disp: , Rfl:     CPAP Machine MISC, , Disp: , Rfl:     Cholecalciferol (VITAMIN D) 2000 UNIT CAPS capsule, Take 1 capsule by mouth daily Last taken 05/13, Disp: , Rfl:     Allergies   Allergen Reactions    Lisinopril Itching       Past Medical History:   Diagnosis Date    CAD (coronary artery disease) 2006    follows with Dr Jone Lemsu    Cerebral artery occlusion with cerebral infarction [facility])     Cerebrovascular disease     CHF (congestive heart failure), NYHA class I, chronic, systolic ([facility]     CC-- EF 39%    Chronic frontoethmoidal sinusitis 2018    Chronic maxillary sinusitis 2018    COPD (chronic obstructive pulmonary disease) ([facility] 2018    GARSIA (dyspnea on exertion)     follows with Dr Dejesus Killer    Gallbladder disease     History of non-ST elevation myocardial infarction (NSTEMI)     [facility]    Hyperlipidemia     Hypertension     Ischemic cardiomyopathy     EF 40% after NSTEMI, 11-15 at [facility]    Obesity     Osteoarthritis     Skin cancer of eyelid     Sleep apnea     uses CPAP    Smokeless tobacco use     Stented coronary artery 2006    x1       Past Surgical History:   Procedure Laterality Date    CARDIAC CATHETERIZATION Left 2015    done [facility] Bilateral     CHOLECYSTECTOMY, LAPAROSCOPIC N/A 2019    LAPAROSCOPIC ROBOTIC XI ASSISTED CHOLECYSTECTOMY performed by Katja Sims MD at [address] [place] [address]  2014    CORONARY ANGIOPLASTY  06    DIAGNOSTIC CARDIAC CATH LAB PROCEDURE  06    ERCP N/A 2021    ERCP STONE REMOVAL performed by John Fuller MD at [facility]    ERCP N/A 2021    ERCP STENT REMOVAL performed by John Fuller MD at  [address] Left 2015    ROTATOR CUFF REPAIR      bilaterally    TONSILLECTOMY AND ADENOIDECTOMY      UPPER GASTROINTESTINAL ENDOSCOPY      UPPER GASTROINTESTINAL ENDOSCOPY N/A 2021    EGD ESOPHAGOGASTRODUODENOSCOPY WITH PANCREATIC STENT REMOVAL performed by John Fuller MD at [facility] ENDOSCOPY       Family History   Problem Relation Age of Onset    Heart Disease Mother          81 Y/O    Diabetes Mother     Liver Disease Father          68 Y/O    No Known Problems Brother        Social History     Socioeconomic History    Marital status:      Spouse name: Not on file    Number of children: Not on file    Years of education: Not on file    Highest education level: Not on file   Occupational History    Occupation: retired-     Tobacco Use    Smoking status: Former     Packs/day: 1.00     Years: 15.00     Pack years: 15.00     Types: Cigarettes     Start date: 1957     Quit date: 1975     Years since quittin.1    Smokeless tobacco: Former     Types: Snuff     Quit date:    Vaping Use    Vaping Use: Never used   Substance and Sexual Activity    Alcohol use: Not Currently    Drug use: No    Sexual activity: Not on file   Other Topics Concern    Not on file   Social History Narrative    Not on file     Social Determinants of Health     Financial Resource Strain: Unknown    Difficulty of Paying Living Expenses: Patient refused   Food Insecurity: Unknown    Worried About [address] in the Last Year: Patient refused    [address] [place] in the Last Year: Patient refused   Transportation Needs: Unknown    Lack of Transportation (Medical): Not on file    Lack of Transportation (Non-Medical): Patient refused   Physical Activity: Inactive    Days of Exercise per Week: 0 days    Minutes of Exercise per Session: 0 min   Stress: Not on file   Social Connections: Not on file   Intimate Partner Violence: Not on file   Housing Stability: Unknown    Unable to Pay for Housing in the Last Year: Not on file    Number of [place] in the Last Year: Not on file    Unstable Housing in the Last Year: Patient refused       Vitals:    23 1130   BP: (!) 70/50   Site: Left Upper Arm   Position: Sitting   Cuff Size: Medium Adult   Pulse: 61   Resp: 18   Temp: 98.2 °F (36.8 °C)   TempSrc: Temporal   SpO2: 100%   Weight: 187 lb (84.8 kg)   Height: 6' 1\" (1.854 m)       Exam:  Physical Exam  Vitals reviewed. Constitutional:       Appearance: He is well-developed. HENT:      Head: Normocephalic and atraumatic. Right Ear: External ear normal. There is impacted cerumen. Left Ear: External ear normal. There is impacted cerumen. Eyes:      Pupils: Pupils are equal, round, and reactive to light.    Neck: Thyroid: No thyromegaly. Cardiovascular:      Rate and Rhythm: Normal rate and regular rhythm. Heart sounds: Normal heart sounds. No murmur heard. Pulmonary:      Effort: Pulmonary effort is normal.      Breath sounds: Normal breath sounds. No wheezing or rales. Abdominal:      General: Bowel sounds are normal.      Palpations: Abdomen is soft. Tenderness: There is no abdominal tenderness. There is no guarding or rebound. Musculoskeletal:         General: Normal range of motion. Cervical back: Normal range of motion and neck supple. Comments: Abnormal gait. Lymphadenopathy:      Cervical: No cervical adenopathy. Skin:     General: Skin is warm and dry. Neurological:      Mental Status: He is alert and oriented to person, place, and time. Cranial Nerves: No cranial nerve deficit. Comments: Resting tremor    Psychiatric:         Judgment: Judgment normal.       Assessment and Plan:    Diagnoses and all orders for this visit:    Hypotension, unspecified hypotension type  - uncertain as to exact cause   - previously stopped losartan   - was to have stopped metoprolol but has continued   - will decrease metoprolol to 12.5mg daily   - recommend to increase fluids   - recommend compression stockings   - orthostatic precautions - handouts given   - slightly increase salt in diet   - follow up with cardio and neuro     Change in weight  - uncertain as to cause   - no overt symptoms   - does state decreased appetite since parkinson's   - on Remeron  - recommend protein supplement like boost or ensure   - monitor weight and report   - stable.      Other pulmonary embolism without acute cor pulmonale, unspecified chronicity (HCC)  - uncertain as to cause - no overt inciting event to increase risk other than parkinsons and decreased mobility   - placed on eliquis - no bleeding or side effects  - was seen by cardiology - stopped aspirin, continued plavix   - has seen hematology - recommending treatment for at least 6 months    Elevated PSA  - uncertain etiology   - increased to 4.16 (8/2022) poss agoe or BPH related, has been trending more elevated for last few years   - has h/o bph   - has seen urology for assessment - follow up 6 months with pSA   - last PSA (1/2023) - 3.6    Essential hypertension  - watch diet   - monitor blood pressure at home   - on losartan   - on metoprolol - changed to metoprolol to 25mg (10/2021)  - on plavix   - off aspirin   - blood pressure very low today 2/27/2023  - discussed referral to ER   - previously stopped losartan   - was to have stopped metoprolol but has continued   - will decrease metoprolol to 12.5mg miriam    Parkinson disease ([address]  - following with neurology at [facility] [place]   - on rasagiline   - on carbidopa and levodopa 1 tablet tid (8/2021) states does miss doses   - stable, wife feels may be worsening      Moderate episode of recurrent major depressive disorder ([address]  - now on remeron to help with anxiety and depression   - stable per patient   - last PHQ socre of 0 (8/29/2022)     Coronary artery disease involving native coronary artery of native heart without angina pectoris  - following with cardiology   - on losartan   - on metoprolol - will decrease metoprolol to 25mg (10/2021)   - on atorvastatin   - on Plavix   - off aspirin   - stable     Mixed hyperlipidemia  - watch diet   - on atorvastatin   - follow labs   - last lab (2/2022) - stable     Ischemic cardiomyopathy  - following with cardiology   - off losartn   - on metoprolol decreased to 25mg (10/2021)   - on atrovastatin   - on plavix   - off aspirin     CHF (congestive heart failure), NYHA class I, chronic, systolic ([address]  - following with cardiology   - on losartn   - on metoprolol decreased to 25mg (10/2021)   - on atrovastatin   - on plavix   - off aspirin   - Stable     Chronic obstructive pulmonary disease, unspecified COPD type ([address]  - not currently following with pulmonary   - did not see benefit with breo   - stable per patient     Impaired fasting glucose  - continue present treatment  - watch diet   - follow A1c - last A1c (8/2022) 5.9    Obstructive sleep apnea syndrome  - Continue present treatment   - on CPAP  - wearing nightly 6-8 hours   - helping symptoms   - stable   - encouraged to wear anytime laying down to rest 2/27/2023    TIA (transient ischemic attack)  - on atrovastatin   - on plavix   - off aspirin   - stable     Prostate enlargement  - has seen urology   - Has flomax - takes as needed  - last PSA (2/2022) - increased and borderline     Vitamin D def  - On otc supplement   - follow labs     Other constipation  - discussed colace 100mg daily and miralax as needed  - taking mutamucil   - declines laxative (8/2022)     Other skin changes  - poss tinea of scalp   - order ketoconazole shampoos   - if not better derm - declines     Cholangitis  - hospital stay (6/2020) and (4/2021)   - s/p ERCP and papillotomy and stent placement   - s/p ERCP with stent removal (6/2021)    - improved     Return for as scheduled, check up and review.     Orders Placed This Encounter   Procedures    [address] [place] [address] DO [place], Cardiology, [place]     Referral Priority:   Routine     Referral Type:   Eval and Treat     Referral Reason:   Specialty Services Required     Referred to Provider:   Inocencia Duncan DO     Requested Specialty:   Cardiology     Number of Visits Requested:   1    SD DISCHARGE MEDS RECONCILED W/ CURRENT OUTPATIENT MED LIST    DME Order for (Specify) as OP     - DME device ordered - 10-20mmhg compression stockings    - Diagnosis: orthostatic hypotension   - Length of Need: 12 Months       Requested Prescriptions     Signed Prescriptions Disp Refills    metoprolol succinate (TOPROL XL) 25 MG extended release tablet 45 tablet 1     Sig: Take 0.5 tablets by mouth daily     Azam Beckwith MD  2/27/2023  12:12 PM

## 2023-02-27 NOTE — TELEPHONE ENCOUNTER
Called patient to verify if CTA Pulmonary W/Contrast was done, because of a NS status showing in the work queue. Spoke w/Spouse who stated Celestina Stokes had the test done. Per system online pt had a CT Chest on 2/21/23. Per Shraddha Guillen W/RN, CTA Pulmonary w/Contrast, and CTA Chest w/Contrast are the same. Advised the spouse test is completed.  hesham

## 2023-03-03 ENCOUNTER — OFFICE VISIT (OUTPATIENT)
Dept: CARDIOLOGY CLINIC | Age: 83
End: 2023-03-03
Payer: MEDICARE

## 2023-03-03 VITALS
DIASTOLIC BLOOD PRESSURE: 50 MMHG | HEIGHT: 73 IN | BODY MASS INDEX: 24.65 KG/M2 | SYSTOLIC BLOOD PRESSURE: 90 MMHG | HEART RATE: 84 BPM | RESPIRATION RATE: 12 BRPM | WEIGHT: 186 LBS

## 2023-03-03 DIAGNOSIS — I95.9 HYPOTENSION, UNSPECIFIED HYPOTENSION TYPE: ICD-10-CM

## 2023-03-03 DIAGNOSIS — I25.10 CORONARY ARTERY DISEASE INVOLVING NATIVE CORONARY ARTERY OF NATIVE HEART WITHOUT ANGINA PECTORIS: Primary | ICD-10-CM

## 2023-03-03 PROCEDURE — 99214 OFFICE O/P EST MOD 30 MIN: CPT | Performed by: INTERNAL MEDICINE

## 2023-03-03 PROCEDURE — 3074F SYST BP LT 130 MM HG: CPT | Performed by: INTERNAL MEDICINE

## 2023-03-03 PROCEDURE — 3078F DIAST BP <80 MM HG: CPT | Performed by: INTERNAL MEDICINE

## 2023-03-03 PROCEDURE — 93000 ELECTROCARDIOGRAM COMPLETE: CPT | Performed by: INTERNAL MEDICINE

## 2023-03-03 PROCEDURE — 1123F ACP DISCUSS/DSCN MKR DOCD: CPT | Performed by: INTERNAL MEDICINE

## 2023-03-03 RX ORDER — CLOPIDOGREL BISULFATE 75 MG/1
75 TABLET ORAL DAILY
COMMUNITY

## 2023-03-03 RX ORDER — MIDODRINE HYDROCHLORIDE 5 MG/1
5 TABLET ORAL 3 TIMES DAILY
Qty: 270 TABLET | Refills: 3 | Status: SHIPPED | OUTPATIENT
Start: 2023-03-03

## 2023-03-03 RX ORDER — METOPROLOL SUCCINATE 50 MG/1
50 TABLET, EXTENDED RELEASE ORAL DAILY
Qty: 90 TABLET | Refills: 3 | Status: SHIPPED | OUTPATIENT
Start: 2023-03-03

## 2023-03-03 NOTE — PROGRESS NOTES
Kayden Kidd  1940  Date of Service: 3/3/2023    Patient Active Problem List    Diagnosis Date Noted    Parkinson disease ([facility].) 02/21/2023     Priority: Medium    Pulmonary embolism without acute cor pulmonale, unspecified chronicity, unspecified pulmonary embolism type ([email or web address] 09/23/2022     Priority: Medium    Abdominal pain 04/23/2021    Fever 04/23/2021    Sinus tachycardia 04/23/2021    Orthostatic hypotension     COPD exacerbation ([email or web address]     COVID-19 virus infection 11/22/2020    Ascending cholangitis 06/25/2020    Elevated LFTs 06/25/2020    Leukocytosis 06/25/2020    Lactic acidosis 06/25/2020    Impaired fasting glucose 03/18/2020    Bilateral impacted cerumen 06/27/2019    Other constipation 03/21/2019    Vitamin D deficiency 03/21/2019    Pigmented skin lesion 07/11/2018    Prostate enlargement 07/11/2018    Fatigue 07/11/2018    Disorder of bone 07/11/2018    TIA (transient ischemic attack) 04/11/2018    COPD (chronic obstructive pulmonary disease) ([email or web address] 04/01/2018    Ischemic cardiomyopathy      Overview Note:     EF 40% after NSTEMI, 11-15 at [facility]      CHF (congestive heart failure), NYHA class I, chronic, systolic ([email or web address] 20/43/9234     Overview Note:     [facility]      CAD (coronary artery disease)      Overview Note:     Treadmill nuclear stress test (8/15/08): Sim protocol 8 minutes, 10.1 METS, 100% MPHR, PVCs during exercise, no ischemia, small fixed defect involving inferior wall. Treadmill nuclear stress test (7/11/06): Sim protocol 7:20 minutes, negative ischemia, negative scar, EF 73%. PTCA and stenting LAD (1/23/06): 3.5 x 12 mm Taxus drug-eluting stent. Cardiac catheterization (1/23/06): Left main normal; LAD 20-30% ostial, 80% prox; Cx normal; RCA dominant, normal; EF 55%. NSTEMI 11-30-15: 4.0x12 MARTHA ISR LAD at [facility]. Dr. Andrea Melvin.        Hypertension     Sleep apnea     Osteoarthritis     Smokeless tobacco use     Hyperlipidemia 01/09/2012       Social History     Socioeconomic History    Marital status:    Occupational History    Occupation: retired-     Tobacco Use    Smoking status: Former     Packs/day: 1.00     Years: 15.00     Pack years: 15.00     Types: Cigarettes     Start date: 1957     Quit date: 1975     Years since quittin.2    Smokeless tobacco: Former     Types: Snuff     Quit date:    Vaping Use    Vaping Use: Never used   Substance and Sexual Activity    Alcohol use: Not Currently    Drug use: No     Social Determinants of Health     Financial Resource Strain: Unknown    Difficulty of Paying Living Expenses: Patient refused   Food Insecurity: Unknown    Worried About Running Out of Food in the Last Year: Patient refused    Ran Out of Food in the Last Year: Patient refused   Transportation Needs: Unknown    Lack of Transportation (Non-Medical): Patient refused   Physical Activity: Inactive    Days of Exercise per Week: 0 days    Minutes of Exercise per Session: 0 min   Housing Stability: Unknown    Unstable Housing in the Last Year: Patient refused       Current Outpatient Medications   Medication Sig Dispense Refill    clopidogrel (PLAVIX) 75 MG tablet Take 75 mg by mouth daily      metoprolol succinate (TOPROL XL) 25 MG extended release tablet Take 0.5 tablets by mouth daily (Patient taking differently: Take 50 mg by mouth daily) 45 tablet 1    apixaban (ELIQUIS) 5 MG TABS tablet Take 1 tablet by mouth 2 times daily 60 tablet 0    mirtazapine (REMERON) 15 MG tablet Take 1 tablet by mouth nightly 90 tablet 3    carbidopa (LODOSYN) 25 MG TABS tablet Take 25 mg by mouth 3 times daily      ondansetron (ZOFRAN) 4 MG tablet Take 4 mg by mouth every 8 hours as needed      atorvastatin (LIPITOR) 80 MG tablet Take 1 tablet by mouth daily (Patient taking differently: Take 40 mg by mouth daily) 90 tablet 0    nitroGLYCERIN (NITROSTAT) 0.4 MG SL tablet Place 1 tablet under the tongue every 5 minutes as needed for Chest pain 25 tablet 1 rasagiline mesylate 1 MG TABS take 1 tablet by mouth once daily 90 tablet 1    carbidopa-levodopa (SINEMET)  MG per tablet Take 1 tablet by mouth 3 times daily New prescription      Multiple Vitamins-Minerals (THERAPEUTIC MULTIVITAMIN-MINERALS) tablet Take 1 tablet by mouth daily      CPAP Machine MISC       Cholecalciferol (VITAMIN D) 2000 UNIT CAPS capsule Take 1 capsule by mouth daily Last taken 05/13      ketoconazole (NIZORAL) 2 % shampoo Apply topically daily as needed. 100 mL 2    ketoconazole (NIZORAL) 2 % cream Apply topically daily. 30 g 1     No current facility-administered medications for this visit. Allergies   Allergen Reactions    Lisinopril Itching       Chief Complaint:  Patti Drew is here today for follow up and management/recomendations for CAD, ICM. History of Present Illness: Patti Drew has Parkinson's disease and his activities are limited. He does light household activities only. He denies any chest discomfort or dyspnea with any of these activities. He denies orthopnea/PND. He denies any palpitations. He denies any lower extremity edema. He states that he has a right upper extremity tremor. He and his wife state that he has had significantly low blood pressures and occasional episodes of lightheadedness when he stands up over the past 2 weeks. He had to go to the emergency 1 week ago for this and no changes were made. REVIEW OF SYSTEMS:  As above. Patient does not complain of any fever, chills, nausea, vomiting or diarrhea. No focal, motor or neurological deficits. No changes in his/her vision, hearing, bowel or bladder habits. He is not known to have a history of thyroid problems. No recent nose bleeds. PHYSICAL EXAM:  Vitals:    03/03/23 1314 03/03/23 1315   BP: 80/60 (!) 90/50   Pulse: 84    Resp: 12    Weight: 186 lb (84.4 kg)    Height: 6' 1\" (1.854 m)        GENERAL:  He is alert and oriented x 3, communicates well, in no distress. NECK:  No masses, trachea is mid position. Supple, full ROM, no JVD or bruits. No palpable thyromegaly or lymphadenopathy. HEART:  Distant. Regular rate and rhythm. Normal S1 and S2. There is an S4 gallop and a I/VI (physiologic) systolic murmur. LUNGS: Decreased air movement. Clear to auscultation bilaterally. No use of accessory muscles. symmetrical excursion. ABDOMEN:  Soft, non-tender. Normal bowel sounds. EXTREMITIES:  Full ROM x 4. No bilateral lower extremity edema on exam.  Good distal pulses. EYES:  Extraocular muscles intact. PERRL. Normal lids & conjunctiva. ENT:  Nares are clear & not bleeding. Moist mucosa. Normal lips formation. No external masses   NEURO: right upper extremity tremor is present, full ROM x 4, EOMI. SKIN:  Warm, dry and intact. Normal turgor. EKG: Sinus rhythm, 84 bpm, nl axis, nonspecific ST - T wave changes. q-waves V1-V3. Assessment:   Coronary artery disease as outlined above. No symptoms of recurring ischemia at this time. ischemic cardiomyopathy as outlined above. Clinically euvolemic and no decompensation at this time. Hypertension, now too low. Trace aortic regurgitation. None seen on echo 4-18 by Dr. Roosevelt Richmond. Hypercholesterolemia  LINETTE  Former smoker & now uses smokeless tobacco.  TIA 4-18. Has since followed with his son whom he states is a Neurologist in [place]. COPD with chronic dyspnea on exertion. He denies dyspnea this visit. Parkinson's  He now has hypotension and orthostatic hypotension symptoms. Recommendations:  He is following the cholesterol with Dr. Adithya Iraheta.    They state that his losartan was discontinued by Dr. Yuridia Rogers recently. However, he continues to run low blood pressures and have orthostatic symptoms. The Sinemet can exacerbate orthostatic hypotension. I discussed this with them but I will defer this to his neurologist.  They will follow-up with neurology.   Parkinson's can also cause orthostatic hypotension. I will defer that to neurology as well. With his coronary artery disease and baseline elevated heart rate he will need to maintain beta-blocker therapy. This usually does not affect and orthostatic hypotension. I will add midodrine today. Thank you for allowing me to participate in your patient's care.       [id number], [address] [facility]  Interventional Cardiology

## 2023-03-06 ENCOUNTER — CARE COORDINATION (OUTPATIENT)
Dept: CARE COORDINATION | Age: 83
End: 2023-03-06

## 2023-03-07 ENCOUNTER — OFFICE VISIT (OUTPATIENT)
Dept: FAMILY MEDICINE CLINIC | Age: 83
End: 2023-03-07
Payer: MEDICARE

## 2023-03-07 VITALS
SYSTOLIC BLOOD PRESSURE: 78 MMHG | DIASTOLIC BLOOD PRESSURE: 54 MMHG | OXYGEN SATURATION: 90 % | HEART RATE: 82 BPM | BODY MASS INDEX: 24.92 KG/M2 | TEMPERATURE: 97.6 F | RESPIRATION RATE: 18 BRPM | WEIGHT: 188 LBS | HEIGHT: 73 IN

## 2023-03-07 DIAGNOSIS — I25.10 CORONARY ARTERY DISEASE INVOLVING NATIVE CORONARY ARTERY OF NATIVE HEART WITHOUT ANGINA PECTORIS: ICD-10-CM

## 2023-03-07 DIAGNOSIS — R35.0 URINARY FREQUENCY: Primary | ICD-10-CM

## 2023-03-07 DIAGNOSIS — R35.0 URINARY FREQUENCY: ICD-10-CM

## 2023-03-07 DIAGNOSIS — I26.99 OTHER PULMONARY EMBOLISM WITHOUT ACUTE COR PULMONALE, UNSPECIFIED CHRONICITY (HCC): ICD-10-CM

## 2023-03-07 DIAGNOSIS — R35.0 URINE FREQUENCY: ICD-10-CM

## 2023-03-07 DIAGNOSIS — R68.89 CHANGE IN WEIGHT: ICD-10-CM

## 2023-03-07 DIAGNOSIS — I95.9 HYPOTENSION, UNSPECIFIED HYPOTENSION TYPE: ICD-10-CM

## 2023-03-07 LAB
BILIRUBIN, POC: NORMAL
BLOOD URINE, POC: NORMAL
CLARITY, POC: CLEAR
COLOR, POC: YELLOW
GLUCOSE URINE, POC: NORMAL
KETONES, POC: NORMAL
LEUKOCYTE EST, POC: NORMAL
NITRITE, POC: NORMAL
PH, POC: 5.5
PROTEIN, POC: NORMAL
SPECIFIC GRAVITY, POC: >=1.03
UROBILINOGEN, POC: 0.2

## 2023-03-07 PROCEDURE — 99213 OFFICE O/P EST LOW 20 MIN: CPT | Performed by: INTERNAL MEDICINE

## 2023-03-07 PROCEDURE — 81002 URINALYSIS NONAUTO W/O SCOPE: CPT | Performed by: INTERNAL MEDICINE

## 2023-03-07 PROCEDURE — 3078F DIAST BP <80 MM HG: CPT | Performed by: INTERNAL MEDICINE

## 2023-03-07 PROCEDURE — 3074F SYST BP LT 130 MM HG: CPT | Performed by: INTERNAL MEDICINE

## 2023-03-07 PROCEDURE — 1123F ACP DISCUSS/DSCN MKR DOCD: CPT | Performed by: INTERNAL MEDICINE

## 2023-03-07 ASSESSMENT — ENCOUNTER SYMPTOMS
DIARRHEA: 0
VOMITING: 0
SORE THROAT: 0
NAUSEA: 0
EYE PAIN: 0
COUGH: 0
SHORTNESS OF BREATH: 1
CHEST TIGHTNESS: 0
ABDOMINAL PAIN: 0
RHINORRHEA: 1
BLOOD IN STOOL: 0
CONSTIPATION: 0

## 2023-03-07 NOTE — PROGRESS NOTES
[facility]) Physicians   Internal Medicine     3/7/2023  Rico Mora : 1940 Sex: male  Age:82 y.o. Chief Complaint   Patient presents with    Hypotension        HPI:   Patient presents to office for evaluation of the following medical concerns. -  has been having lower blood pressure.  has occasional dizziness. ER () -hypotension, blood pressure normal in the ER, CTA chest no evidence for pulmonary embolism no acute pulmonary abnormality noted right renal stones, CBC negative, CMP glucose elevated, troponin negative x2 lactic acid negative blood cultures negative at 8 urinalysis ketones hematuria blood and urine culture were negative. Last visit with cardio per reviewed note cardio (3/23) -stop aspirin continue Plavix continue Eliquis, added midodrine. Blood pressure low again today. - Wife was concerned with weight loss.  wife reached out to neurology. Was advised to check with PCP. Per weight records appears stable since 3/7/2023    - Was admitted to hospital () - SOB, ddimer inc, labs neg otherwise cta chest evidence of small volume right pulmonary emboli. Placed on Eliquis. Was referred to hematology. Hyper-coag work-up ordered cbc neg, cmp neg, homcystine neg [id number] neg, anticardioli ab neg, donovan neg, factor V neg, mthfr homozygous, Plasminogen Act Inhibitor-1 positive, d-dimer neg, protein c neg, lupus anticoag neg, AT3 neg   US LE (10/22) -No evidence of DVT in either lower extremity. Recommending at least 6 months of eliquis. Last visit per reviewed report  () -pulmonary embolism possibly due to sedentary lifestyle follow-up D-dimer less than 200, ordering CT chest for 2023 continue Eliquis    -  has lesion and rash to hair and scalp.  has been using ketoconazole shampoo. Declines Dermatology.  has high blood pressure.  occasionally checks blood pressure at home. On metoprolol, on losartan.  Some lightheaded and dizzy if gets up to quickly. No loc/syncope. No headaches or vision changes. No chest pain. Cardiology had suggested to add midodrine. Blood pressure low in office today 3/7/2023    - States has parkinson. States following with neurologist at [facility]. States was started on caribodopa/levodopa increased to 1 tablet three times a day. On Rasagiline. Added remeron. Concern over memory. neuropsych (8/21)  - dx vascular dementia. Last Neurology office visit reviewed from care everywhere from (1/2023) - not compliant with sinemet continue sinemet to 1 tablet 3x per day, continue rasagline and remeron f/u 3m. States has joined [employer] to help with parkinsons symptoms. States goes to [place] 3x per [place] ([place] Added zofran as needed. States having anxiety and depression. States not moving around a lot. States restless. Started on remeron. Improved. States having constipation. Taking fiber supplements. Declines laxative. Recommend colace daily and miralax as needed. States has CAD. Follows with cardiology locally and at [facility]. States s/p stents. States ischemic cardiomyopathy. On metoprolol, losartan, atorvastatin, aspirin and plavix. Last visit with cardio per reviewed note  cardio (3/23) -stop aspirin continue Plavix continue Eliquis, added midodrine. States has COPD. States breathing is fair. Has seen pulmonary once, not following. States placed on breo - stopped. Stable. Last visit with pulmonary (5/2019). Felt poss asthma. States has high cholesterol. States trying to watch diet. On atorvastatin, no reported side effects. Last lab (1/2023) stable. - last showed elevated A1c. LAst was 5.5 (1/2023)      States has sleep apnea. States on cpap. States wears 6-8 hours per night, helps symptoms. States currently broke machine and awaiting new cpap (11/2022).  Had home sleep study (2/2023) recommending auto cpap with heated humidification, remote modem, setting 5-15cm of water with EPR of 3, nasal mask size to be fitted. Last visit with sleep medicine per (2/23) moderate obstructive sleep apnea. Of note, the severity of this patient's sleep disordered breathing was likely underestimated as home sleep studies are inherently less sensitive. States seems to be doing well with cpap     States has had TIA. States had dizziness and difficulty walking. On antiplatelets (plavix)      States has enlarged prostate. Has seen urology. States has urinary frequency. States flomax taking infrequently 1-2 times per week. Concern for dizzy and lightheaded. Last visit with urology per reviewed note (9/22) - elevated PSA, exam feels benign, recommend repeat PSA in 6 months if remains stable observation Last PSA (1/30/2023) 3.63    Vitamin D def. States on otc supplement. - H/o cholangitis. Had MRCP that suggested stricture and inflammation at the head of the pancreas. f/u ERCP (6/21) - Severe stricture at distal most portion of CBD, CBD stones  ERCP with stent removal.     Health Maintenance   - immunizations:   Influenza Vaccination - (2021), (2022)   Pneumonia Vaccination - (9/20219) - prevnar 13, (7/2021) - pneumococcal 23   Zoster/Shingles Vaccine - (2016) - zostavax, (11/2022) - shingrix #1  Tetanus Vaccination  covid (1/2021) #1, (2/12/2021) #2, (11/21/2021) # 3 - pfizer     - Screenings:   Colonoscopy (2013) - normal, (5/21) - diverticulosis, otherwise neg f/u 10yrs  Prostate     ROS:  Review of Systems   Constitutional:  Positive for chills and fatigue. Negative for appetite change, fever and unexpected weight change. HENT:  Positive for congestion and rhinorrhea. Negative for sore throat. Eyes:  Negative for pain and visual disturbance. Respiratory:  Positive for shortness of breath. Negative for cough and chest tightness. Cardiovascular:  Negative for chest pain and palpitations. Gastrointestinal:  Negative for abdominal pain, blood in stool, constipation, diarrhea, nausea and vomiting. Genitourinary:  Negative for difficulty urinating, dysuria, hematuria, scrotal swelling, testicular pain and urgency. Musculoskeletal:  Negative for arthralgias and gait problem. Skin:  Negative for rash. Neurological:  Positive for dizziness. Negative for syncope, weakness, light-headedness and headaches. Hematological:  Negative for adenopathy. Does not bruise/bleed easily. Psychiatric/Behavioral:  Negative for suicidal ideas. The patient is not nervous/anxious.         Current Outpatient Medications:     clopidogrel (PLAVIX) 75 MG tablet, Take 75 mg by mouth daily, Disp: , Rfl:     midodrine (PROAMATINE) 5 MG tablet, Take 1 tablet by mouth 3 times daily, Disp: 270 tablet, Rfl: 3    metoprolol succinate (TOPROL XL) 50 MG extended release tablet, Take 1 tablet by mouth daily (Patient taking differently: Take 25 mg by mouth daily), Disp: 90 tablet, Rfl: 3    apixaban (ELIQUIS) 5 MG TABS tablet, Take 1 tablet by mouth 2 times daily, Disp: 60 tablet, Rfl: 0    mirtazapine (REMERON) 15 MG tablet, Take 1 tablet by mouth nightly, Disp: 90 tablet, Rfl: 3    carbidopa (LODOSYN) 25 MG TABS tablet, Take 25 mg by mouth 3 times daily, Disp: , Rfl:     ondansetron (ZOFRAN) 4 MG tablet, Take 4 mg by mouth every 8 hours as needed, Disp: , Rfl:     atorvastatin (LIPITOR) 80 MG tablet, Take 1 tablet by mouth daily (Patient taking differently: Take 40 mg by mouth daily), Disp: 90 tablet, Rfl: 0    nitroGLYCERIN (NITROSTAT) 0.4 MG SL tablet, Place 1 tablet under the tongue every 5 minutes as needed for Chest pain, Disp: 25 tablet, Rfl: 1    rasagiline mesylate 1 MG TABS, take 1 tablet by mouth once daily, Disp: 90 tablet, Rfl: 1    carbidopa-levodopa (SINEMET)  MG per tablet, Take 1 tablet by mouth 3 times daily New prescription, Disp: , Rfl:     Multiple Vitamins-Minerals (THERAPEUTIC MULTIVITAMIN-MINERALS) tablet, Take 1 tablet by mouth daily, Disp: , Rfl:     CPAP Machine MISC, , Disp: , Rfl:     Cholecalciferol (VITAMIN D) 2000 UNIT CAPS capsule, Take 1 capsule by mouth daily Last taken 05/13, Disp: , Rfl:     Allergies   Allergen Reactions    Lisinopril Itching       Past Medical History:   Diagnosis Date    CAD (coronary artery disease) 2006    follows with Dr Galdino Webb    Cerebral artery occlusion with cerebral infarction [facility])     Cerebrovascular disease     CHF (congestive heart failure), NYHA class I, chronic, systolic ([facility] 8823    [facility]-- EF 39%    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/2018    Chronic maxillary sinusitis 04/2018    COPD (chronic obstructive pulmonary disease) ([facility] 04/2018    GARSIA (dyspnea on exertion)     follows with Dr Belkis Sharp    Gallbladder disease     History of non-ST elevation myocardial infarction (NSTEMI) 2015    [facility]    Hyperlipidemia     Hypertension     Ischemic cardiomyopathy     EF 40% after NSTEMI, 11-15 at [facility]    Obesity     Osteoarthritis     Skin cancer of eyelid     Sleep apnea     uses CPAP    Smokeless tobacco use     Stented coronary artery 2006    x1       Past Surgical History:   Procedure Laterality Date    CARDIAC CATHETERIZATION Left 11/30/2015    done [address] [place] [address] Bilateral     CHOLECYSTECTOMY, LAPAROSCOPIC N/A 5/17/2019    LAPAROSCOPIC ROBOTIC XI ASSISTED CHOLECYSTECTOMY performed by Asha Leiva MD at [address] [place] [address]  2014    CORONARY ANGIOPLASTY  1/23/06    DIAGNOSTIC CARDIAC CATH LAB PROCEDURE  1/23/06    ERCP N/A 4/26/2021    ERCP STONE REMOVAL performed by Juice Martin MD at [facility] ENDOSCOPY    ERCP N/A 6/16/2021    ERCP STENT REMOVAL performed by Juice Martin MD at [facility] Left 02/2015    ROTATOR CUFF REPAIR      bilaterally    [facility] ENDOSCOPY  2015    UPPER GASTROINTESTINAL ENDOSCOPY N/A 4/28/2021    EGD ESOPHAGOGASTRODUODENOSCOPY WITH PANCREATIC STENT REMOVAL performed by Juice Martin MD at [facility] ENDOSCOPY       Family History Problem Relation Age of Onset    Heart Disease Mother          81 Y/O    Diabetes Mother     Liver Disease Father          66 Y/O    No Known Problems Brother        Social History     Socioeconomic History    Marital status:      Spouse name: Not on file    Number of children: Not on file    Years of education: Not on file    Highest education level: Not on file   Occupational History    Occupation: retired-     Tobacco Use    Smoking status: Former     Packs/day: 1.00     Years: 15.00     Pack years: 15.00     Types: Cigarettes     Start date: 1957     Quit date: 1975     Years since quittin.2    Smokeless tobacco: Former     Types: Snuff     Quit date:    Vaping Use    Vaping Use: Never used   Substance and Sexual Activity    Alcohol use: Not Currently    Drug use: No    Sexual activity: Not on file   Other Topics Concern    Not on file   Social History Narrative    Not on file     Social Determinants of Health     Financial Resource Strain: Unknown    Difficulty of Paying Living Expenses: Patient refused   Food Insecurity: Unknown    Worried About [address] in the Last Year: Patient refused    [address] in the Last Year: Patient refused   Transportation Needs: Unknown    Lack of Transportation (Medical):  Not on file    Lack of Transportation (Non-Medical): Patient refused   Physical Activity: Inactive    Days of Exercise per Week: 0 days    Minutes of Exercise per Session: 0 min   Stress: Not on file   Social Connections: Not on file   Intimate Partner Violence: Not on file   Housing Stability: Unknown    Unable to Pay for Housing in the Last Year: Not on file    Number of Places Lived in the Last Year: Not on file    Unstable Housing in the Last Year: Patient refused       Vitals:    23 1402   BP: (!) 78/54   Site: Left Upper Arm   Position: Sitting   Cuff Size: Medium Adult   Pulse: 82   Resp: 18   Temp: 97.6 °F (36.4 °C)   TempSrc: Temporal SpO2: 90%   Weight: 188 lb (85.3 kg)   Height: 6' 1\" (1.854 m)       Exam:  Physical Exam  Vitals reviewed. Constitutional:       Appearance: He is well-developed. HENT:      Head: Normocephalic and atraumatic. Right Ear: External ear normal. There is impacted cerumen. Left Ear: External ear normal. There is impacted cerumen. Eyes:      Pupils: Pupils are equal, round, and reactive to light. Neck:      Thyroid: No thyromegaly. Cardiovascular:      Rate and Rhythm: Normal rate and regular rhythm. Heart sounds: Normal heart sounds. No murmur heard. Pulmonary:      Effort: Pulmonary effort is normal.      Breath sounds: Normal breath sounds. No wheezing or rales. Abdominal:      General: Bowel sounds are normal.      Palpations: Abdomen is soft. Tenderness: There is no abdominal tenderness. There is no guarding or rebound. Musculoskeletal:         General: Normal range of motion. Cervical back: Normal range of motion and neck supple. Comments: Abnormal gait. Lymphadenopathy:      Cervical: No cervical adenopathy. Skin:     General: Skin is warm and dry. Neurological:      Mental Status: He is alert and oriented to person, place, and time. Cranial Nerves: No cranial nerve deficit.       Comments: Resting tremor    Psychiatric:         Judgment: Judgment normal.       Assessment and Plan:    Diagnoses and all orders for this visit:    Urine frequency  - uncertain as to cause  - ua only trace protein   - send for culture     Hypotension, unspecified hypotension type  - uncertain as to exact cause   - previously stopped losartan   - was to have stopped metoprolol but has continued   - will decrease metoprolol to 12.5mg daily   - recommend to increase fluids   - recommend compression stockings   - orthostatic precautions - handouts given   - slightly increase salt in diet   - follow up with cardio and neuro   - last visit with cardio (3/23) added midodrine - has not been able to start as of 3/7/2023    Change in weight  - uncertain as to cause   - no overt symptoms   - does state decreased appetite since parkinson's   - on Remeron  - recommend protein supplement like boost or ensure   - monitor weight and report   - stable.     Other pulmonary embolism without acute cor pulmonale, unspecified chronicity (HCC)  - uncertain as to cause - no overt inciting event to increase risk other than parkinsons and decreased mobility   - placed on eliquis - no bleeding or side effects  - was seen by cardiology - stopped aspirin, continued plavix   - has seen hematology - recommending treatment for at least 6 months    Elevated PSA  - uncertain etiology   - increased to 4.16 (8/2022) poss agoe or BPH related, has been trending more elevated for last few years   - has h/o bph   - has seen urology for assessment - follow up 6 months with pSA   - last PSA (1/2023) - 3.6    Essential hypertension  - watch diet   - monitor blood pressure at home   - on losartan   - on metoprolol - changed to metoprolol to 25mg (10/2021)  - on plavix   - off aspirin   - blood pressure very low today 3/7/2023  - discussed referral to ER   - previously stopped losartan     Parkinson disease (HCC)  - following with neurology at [facility]   - on rasagiline   - on carbidopa and levodopa 1 tablet tid (8/2021) states does miss doses   - stable, wife feels may be worsening      Moderate episode of recurrent major depressive disorder (HCC)  - now on remeron to help with anxiety and depression   - stable per patient   - last PHQ socre of 0 (8/29/2022)     Coronary artery disease involving native coronary artery of native heart without angina pectoris  - following with cardiology   - on losartan   - on metoprolol - will decrease metoprolol to 25mg (10/2021)   - on atorvastatin   - on Plavix   - off aspirin   - stable     Mixed hyperlipidemia  - watch diet   - on atorvastatin   - follow labs   - last lab (2/2022) - stable  Ischemic cardiomyopathy  - following with cardiology   - off losartn   - on metoprolol decreased to 25mg (10/2021)   - on atrovastatin   - on plavix   - off aspirin     CHF (congestive heart failure), NYHA class I, chronic, systolic (HCC)  - following with cardiology   - on losartn   - on metoprolol decreased to 25mg (10/2021)   - on atrovastatin   - on plavix   - off aspirin   - Stable     Chronic obstructive pulmonary disease, unspecified COPD type ([facility]  - not currently following with pulmonary   - did not see benefit with breo   - stable per patient     Impaired fasting glucose  - continue present treatment  - watch diet   - follow A1c - last A1c (8/2022) 5.9    Obstructive sleep apnea syndrome  - Continue present treatment   - on CPAP  - wearing nightly 6-8 hours   - helping symptoms   - stable   - encouraged to wear anytime laying down to rest 3/7/2023    TIA (transient ischemic attack)  - on atrovastatin   - on plavix   - off aspirin   - stable     Prostate enlargement  - has seen urology   - Has flomax - takes as needed  - last PSA (2/2022) - increased and borderline     Vitamin D def  - On otc supplement   - follow labs     Other constipation  - discussed colace 100mg daily and miralax as needed  - taking mutamucil   - declines laxative (8/2022)     Other skin changes  - poss tinea of scalp   - order ketoconazole shampoos   - if not better derm - declines     Cholangitis  - hospital stay (6/2020) and (4/2021)   - s/p ERCP and papillotomy and stent placement   - s/p ERCP with stent removal (6/2021)    - improved     Return for check up and review. Orders Placed This Encounter   Procedures    Culture, Urine     Standing Status:   Future     Standing Expiration Date:   3/7/2024     Order Specific Question:   Specify (ex-cath, midstream, cysto, etc)?      Answer:   urine voided    POCT Urinalysis no Micro     Requested Prescriptions      No prescriptions requested or ordered in this encounter     Scottie Davalos Fidelina Lamb MD  3/7/2023  2:34 PM

## 2023-03-07 NOTE — PATIENT INSTRUCTIONS
Midodrine administration instructions:   Doses may be given in approximately 3- to 4-hour intervals (eg, shortly before or upon rising in the morning, at midday, in the late afternoon not later than 6 PM).  Avoid dosing after the evening meal or within 4 hours of bedtime to prevent supine hypertension

## 2023-03-08 ENCOUNTER — CARE COORDINATION (OUTPATIENT)
Dept: CARE COORDINATION | Age: 83
End: 2023-03-08

## 2023-03-08 NOTE — CARE COORDINATION
[facility] made f/u call to pt, wife Daniele Mars, who reports she has not heard from [facility] regarding meals. [facility] to look into this. [facility] also informed Daniele Mars that Isrrael Garcia reports pt does not have life alert button benefit but offered to make referral to CM, Daniele Mars declined CM referral. No other questions reported. [facility] to f/u. Made call to [facility] at [facility], left message requesting call to pt's wife Daniele Mars to see if pt qualifies for meals.     Marlene LIMA, [place]   Care Coordinator  [phone]

## 2023-03-10 ENCOUNTER — TELEPHONE (OUTPATIENT)
Dept: PRIMARY CARE CLINIC | Age: 83
End: 2023-03-10

## 2023-03-10 LAB — URINE CULTURE, ROUTINE: NORMAL

## 2023-03-10 NOTE — TELEPHONE ENCOUNTER
Please let the patient know that urine culture did not grow any particular bacteria.   It did grow a small amount of bacteria but not specified and typically not associated with overt infection    Would continue to monitor symptoms and call or notify if any changes or new concerns    Thanks

## 2023-03-13 ENCOUNTER — CARE COORDINATION (OUTPATIENT)
Dept: CARE COORDINATION | Age: 83
End: 2023-03-13

## 2023-03-13 NOTE — CARE COORDINATION
Received missed call and message back from [facility] on 3/10 when this [facility] was off work. Apurva informed in message she spoke with pt's wife and pt will begin receiving home delivered meals in April.     Do LIMA, [facility]   Care Coordinator  [phone]

## 2023-03-16 ENCOUNTER — CARE COORDINATION (OUTPATIENT)
Dept: CARE COORDINATION | Age: 83
End: 2023-03-16

## 2023-03-21 ENCOUNTER — CARE COORDINATION (OUTPATIENT)
Dept: CARE COORDINATION | Age: 83
End: 2023-03-21

## 2023-03-21 NOTE — CARE COORDINATION
lack of education  Plan for overcoming my barriers: care coordination  Confidence: 9/10  Anticipated Goal Completion Date: 5/22/23         Self Monitoring   No change     Daily Weights - I will weight myself as directed - Daily and write down weights  Blood Pressure - I will take my blood pressure as directed - Daily    Patient Reported Blood Pressure No flowsheet data found. Patient Reported Weight No flowsheet data found.     Barriers: lack of education  Plan for overcoming my barriers: remote patient monitoring and care coordination  Confidence: 9/10  Anticipated Goal Completion Date: 5/22/2023                Future Appointments   Date Time Provider Peter Martines   3/31/2023  2:30 PM Ibrahima Zelaya MD [facility]   3/31/2023  2:45 PM [facility] MED ONC FAST TRACK 2 [facility] Med Onc [facility]   4/24/2023 11:40 AM DO MOHSEN Rivera [facility]   5/18/2023 10:15 AM Lorne Wilson  [address]

## 2023-03-22 ENCOUNTER — CARE COORDINATION (OUTPATIENT)
Dept: CARE COORDINATION | Age: 83
End: 2023-03-22

## 2023-03-22 NOTE — CARE COORDINATION
[facility] made f/u call to pt's wife Carmon Caller, unable to reach, left message requesting call back.     Jing LIMA, [place]   Care Coordinator  [phone]

## 2023-03-28 ENCOUNTER — CARE COORDINATION (OUTPATIENT)
Dept: CARE COORDINATION | Age: 83
End: 2023-03-28

## 2023-03-28 NOTE — CARE COORDINATION
ACM attempted to contact patient for assigned ACM Zoë Luu RN. Voice message left on patient's phone with contact information requesting a return call so that ACM can follow up on their status. PLAN  -If no return call, inform assigned ACM to continue to attempt to contact patient.

## 2023-03-29 ENCOUNTER — CARE COORDINATION (OUTPATIENT)
Dept: CARE COORDINATION | Age: 83
End: 2023-03-29

## 2023-03-29 NOTE — CARE COORDINATION
[facility] made f/u call to pt's wife Rony Huddleston, unable to reach, left message requesting call back. If no return call, [facility] to sign off.     Ruben LIMA, [place]   Care Coordinator  [phone]

## 2023-03-30 ENCOUNTER — CARE COORDINATION (OUTPATIENT)
Dept: CARE COORDINATION | Age: 83
End: 2023-03-30

## 2023-03-30 NOTE — CARE COORDINATION
Voice message left on patient's phone with contact information requesting a return call so that ACM can follow up on his status. PLAN  -If no return call, inform assigned ACM to continue to attempt to contact patient.

## 2023-03-31 ENCOUNTER — OFFICE VISIT (OUTPATIENT)
Dept: ONCOLOGY | Age: 83
End: 2023-03-31
Payer: MEDICARE

## 2023-03-31 ENCOUNTER — HOSPITAL ENCOUNTER (OUTPATIENT)
Dept: INFUSION THERAPY | Age: 83
Discharge: HOME | End: 2023-03-31
Payer: MEDICARE

## 2023-03-31 VITALS
TEMPERATURE: 97.4 F | HEART RATE: 88 BPM | OXYGEN SATURATION: 97 % | SYSTOLIC BLOOD PRESSURE: 102 MMHG | DIASTOLIC BLOOD PRESSURE: 68 MMHG | HEIGHT: 73 IN | WEIGHT: 185.8 LBS | BODY MASS INDEX: 24.63 KG/M2

## 2023-03-31 DIAGNOSIS — I26.99 PULMONARY EMBOLISM WITHOUT ACUTE COR PULMONALE, UNSPECIFIED CHRONICITY, UNSPECIFIED PULMONARY EMBOLISM TYPE (HCC): ICD-10-CM

## 2023-03-31 DIAGNOSIS — I26.99 ACUTE PULMONARY EMBOLISM, UNSPECIFIED PULMONARY EMBOLISM TYPE, UNSPECIFIED WHETHER ACUTE COR PULMONALE PRESENT (HCC): Primary | ICD-10-CM

## 2023-03-31 LAB — D DIMER: <200 NG/ML DDU

## 2023-03-31 PROCEDURE — 3078F DIAST BP <80 MM HG: CPT | Performed by: INTERNAL MEDICINE

## 2023-03-31 PROCEDURE — 36415 COLL VENOUS BLD VENIPUNCTURE: CPT

## 2023-03-31 PROCEDURE — 3074F SYST BP LT 130 MM HG: CPT | Performed by: INTERNAL MEDICINE

## 2023-03-31 PROCEDURE — 99214 OFFICE O/P EST MOD 30 MIN: CPT | Performed by: INTERNAL MEDICINE

## 2023-03-31 PROCEDURE — 85378 FIBRIN DEGRADE SEMIQUANT: CPT

## 2023-03-31 PROCEDURE — 1123F ACP DISCUSS/DSCN MKR DOCD: CPT | Performed by: INTERNAL MEDICINE

## 2023-03-31 NOTE — PROGRESS NOTES
Patient provided with discharge instructions. All questions answered. Patient understands follow up plan of care.
<200  Mike LE U/S on 10/21/2022 No evidence of DVT in either lower extremity  D-Dimer <200 on 01/20/2023  CTA chest 02/21/2023: No evidence of PE or acute pulmonary abnormality. Imaging reviewed. D-Dimer <200 on 03/31/2023. Labs reviewed. D/C Eliquis.  Continue Plavix  RTC 3 months with labs     Daljit Garcia MD   3/31/2023

## 2023-04-03 ENCOUNTER — CARE COORDINATION (OUTPATIENT)
Dept: CARE COORDINATION | Age: 83
End: 2023-04-03

## 2023-04-03 DIAGNOSIS — I50.22 CHF (CONGESTIVE HEART FAILURE), NYHA CLASS I, CHRONIC, SYSTOLIC (HCC): ICD-10-CM

## 2023-04-03 DIAGNOSIS — I10 PRIMARY HYPERTENSION: Primary | ICD-10-CM

## 2023-04-03 DIAGNOSIS — J44.9 CHRONIC OBSTRUCTIVE PULMONARY DISEASE, UNSPECIFIED COPD TYPE (HCC): ICD-10-CM

## 2023-04-03 NOTE — CARE COORDINATION
Ambulatory Care Coordination Note  4/3/2023    Patient Current Location:  Home: [place] [id number]     ACM contacted the family by telephone. Verified name and  with family as identifiers. Provided introduction to self, and explanation of the ACM role. Challenges to be reviewed by the provider   Additional needs identified to be addressed with provider: No  none               Method of communication with provider: none. ACM: Sobia Gandhi RN    ACM contacted patient's wife Tammy Mclaughlin). She states they have decided not to participate in RPM(Remote Patient Monitoring). She further states she is not home on a regular basis to assist [address] with the readings and he is unable to do them by himself. She denies that [address] has experienced any episodes of symptoms of hypotension. Kevin Young states she has not received the new mask from Sleep medicine. She states [address] is wearing his CPAP nightly and the mask fits appropriately. Felice Brown patient enrollment in the Remote Patient Monitoring (RPM) program for in-home monitoring: Patient declined.     Lab Results       None            Care Coordination Interventions    Referral from Primary Care Provider: Yes  Suggested Interventions and Community Resources  Fall Risk Prevention: Completed  Disease Specific Clinic: Completed  Physical Therapy: Completed  Registered Dietician: Not Started  Social Work: Completed (Comment: life alert)  Other Services: Completed (Comment: remote patient monitoring)          Goals Addressed    None         Future Appointments   Date Time Provider Peter Martines   2023 11:40 AM DO MOHSEN Rivers SLEEP [facility]   2023 10:15 AM MD KARLY [facility]   2023  2:30 PM Cassy Rodriguez MD [facility] MED ONC [facility]   2023  2:45 PM [facility] MED ONC FAST TRACK 2 [facility] Med Onc Jes Lerma

## 2023-04-04 ENCOUNTER — CARE COORDINATION (OUTPATIENT)
Dept: CARE COORDINATION | Age: 83
End: 2023-04-04

## 2023-04-04 NOTE — CARE COORDINATION
CCSS placed call to patient to arrange RPM kit  through [facility]. Reviewed with patient how to pack equipment in original packing. Verified patient's availability to schedule UPS  time. UPS  time requested. Anticipated  date range 2 to 4 days.

## 2023-04-18 ENCOUNTER — CARE COORDINATION (OUTPATIENT)
Dept: CARE COORDINATION | Age: 83
End: 2023-04-18

## 2023-04-18 NOTE — CARE COORDINATION
Attempted to reach family by telephone. Phone was picked up and disconnected. Will attempt to reach patient/family again.

## 2023-04-24 ENCOUNTER — OFFICE VISIT (OUTPATIENT)
Dept: SLEEP MEDICINE | Age: 83
End: 2023-04-24
Payer: MEDICARE

## 2023-04-24 VITALS
BODY MASS INDEX: 24.7 KG/M2 | WEIGHT: 186.4 LBS | HEART RATE: 77 BPM | SYSTOLIC BLOOD PRESSURE: 83 MMHG | DIASTOLIC BLOOD PRESSURE: 55 MMHG | RESPIRATION RATE: 14 BRPM | HEIGHT: 73 IN

## 2023-04-24 DIAGNOSIS — G47.10 HYPERSOMNOLENCE: ICD-10-CM

## 2023-04-24 DIAGNOSIS — G47.33 OSA (OBSTRUCTIVE SLEEP APNEA): Primary | ICD-10-CM

## 2023-04-24 PROCEDURE — 99214 OFFICE O/P EST MOD 30 MIN: CPT | Performed by: INTERNAL MEDICINE

## 2023-04-24 PROCEDURE — 3078F DIAST BP <80 MM HG: CPT | Performed by: INTERNAL MEDICINE

## 2023-04-24 PROCEDURE — 3074F SYST BP LT 130 MM HG: CPT | Performed by: INTERNAL MEDICINE

## 2023-04-24 PROCEDURE — 1123F ACP DISCUSS/DSCN MKR DOCD: CPT | Performed by: INTERNAL MEDICINE

## 2023-04-24 ASSESSMENT — ENCOUNTER SYMPTOMS
EYES NEGATIVE: 1
GASTROINTESTINAL NEGATIVE: 1
ALLERGIC/IMMUNOLOGIC NEGATIVE: 1
RESPIRATORY NEGATIVE: 1

## 2023-04-24 ASSESSMENT — SLEEP AND FATIGUE QUESTIONNAIRES
HOW LIKELY ARE YOU TO NOD OFF OR FALL ASLEEP WHILE SITTING INACTIVE IN A PUBLIC PLACE: 0
ESS TOTAL SCORE: 4
HOW LIKELY ARE YOU TO NOD OFF OR FALL ASLEEP WHILE WATCHING TV: 1
HOW LIKELY ARE YOU TO NOD OFF OR FALL ASLEEP WHILE SITTING AND READING: 1
HOW LIKELY ARE YOU TO NOD OFF OR FALL ASLEEP WHILE SITTING QUIETLY AFTER LUNCH WITHOUT ALCOHOL: 0
HOW LIKELY ARE YOU TO NOD OFF OR FALL ASLEEP WHILE SITTING AND TALKING TO SOMEONE: 0
HOW LIKELY ARE YOU TO NOD OFF OR FALL ASLEEP WHILE LYING DOWN TO REST IN THE AFTERNOON WHEN CIRCUMSTANCES PERMIT: 2
HOW LIKELY ARE YOU TO NOD OFF OR FALL ASLEEP IN A CAR, WHILE STOPPED FOR A FEW MINUTES IN TRAFFIC: 0
HOW LIKELY ARE YOU TO NOD OFF OR FALL ASLEEP WHEN YOU ARE A PASSENGER IN A CAR FOR AN HOUR WITHOUT A BREAK: 0

## 2023-04-24 NOTE — PROGRESS NOTES
[facility] Sleep Medicine    Patient Name: Ellen Lobato  Age: 79 y.o.   : 1940    Date of Visit: 23        HPI   Ellen Lobato is a 79 y.o. male with  has a past medical history of CAD (coronary artery disease) (), Cerebral artery occlusion with cerebral infarction [facility]), Cerebrovascular disease, CHF (congestive heart failure), NYHA class I, chronic, systolic ([address] (509), Chronic frontoethmoidal sinusitis (2018), Chronic maxillary sinusitis (2018), COPD (chronic obstructive pulmonary disease) ([address] (2018), GARSIA (dyspnea on exertion), Gallbladder disease, History of non-ST elevation myocardial infarction (NSTEMI) (), Hyperlipidemia, Hypertension, Ischemic cardiomyopathy, Obesity, Osteoarthritis, Skin cancer of eyelid, Sleep apnea, Smokeless tobacco use, and Stented coronary artery (). who presents as a new patient to Sleep Clinic, referred by Dr. Bhatti ref. provider found, for Sleep Apnea  .       Pt here with wife for f/u LINETTE  He had a home sleep test in February with moderate LINETTE  He received his new CPAP and has been doing well  No problems with therapy  He does have hypersomnolence some days  Wife notes he dozes off during the day time, will be up at night  She still works during the day so pt may be at home alone, if bored/not engaged in activities will doze off  He is involved in two different activity programs for the [employer]              Hpi:   STOP-BANG:  Snore- no   Tired- yes  Observed apneas/gasping/choking- no  High blood pressure- yes  BMI > 35kg/sq m - no  Age > 50 - yes  Neck circumference > 40 cm - yes  Gender male - yes  Total score     Sleep Medicine 2023   Sitting and reading 1 0 0 0   Watching TV 1 0 0 0   Sitting, inactive in a public place (e.g. a theatre or a meeting) 0 0 0 0   As a passenger in a car for an hour without a break 0 0 0 0   Lying down to rest in the afternoon when circumstances permit 2 2 1 1   Sitting

## 2023-05-02 ENCOUNTER — CARE COORDINATION (OUTPATIENT)
Dept: CARE COORDINATION | Age: 83
End: 2023-05-02

## 2023-05-02 NOTE — CARE COORDINATION
Attempted to reach family by telephone. Unable to leave message, phone not answered. Will attempt to reach patient again.

## 2023-05-15 ENCOUNTER — CARE COORDINATION (OUTPATIENT)
Dept: CARE COORDINATION | Age: 83
End: 2023-05-15

## 2023-05-15 NOTE — CARE COORDINATION
Ambulatory Care Coordination Note  5/15/2023    Patient Current Location:  Home: [address]  [facility] [id number]     ACM contacted the family by telephone. Verified name and  with family as identifiers. Provided introduction to self, and explanation of the ACM role. Challenges to be reviewed by the provider   Additional needs identified to be addressed with provider: No  none               Method of communication with provider: none. ACM: jT Briggs RN    ACM contacted patient's wife Day Bustamante). She states he is \"slowing down\". She adds he is not sleeping well and is restless. He is waking up in the middle of the night to use the restroom and can't get to sleep after. He typically goes to the couch and is thus not wearing his CPAP the complete night. He is also sleeping throughout the day without the CPAP. Sebastien Jacobsen continues to attend physical therapy twice weekly. He is not doing his exercises at home. Lai Ely notes mild ankle edema bilaterally. She denies Sebastien Jacobsen is experiencing shortness of breath, cough, or chest pain. Sebastien Jacobsen is scheduled to see  this week and Lai Ely will transport him to the appointment. Plan    Check patient status   Continue to follow for care coordination      Offered patient enrollment in the Remote Patient Monitoring (RPM) program for in-home monitoring: Patient declined. Lab Results       None            Care Coordination Interventions    Referral from Primary Care Provider: Yes  Suggested Interventions and Community Resources  Fall Risk Prevention: Completed  Disease Specific Clinic: Completed  Physical Therapy: Completed  Registered Dietician: Not Started  Social Work: Completed (Comment: life alert)  Other Services: Completed (Comment: remote patient monitoring)          Goals Addressed                   This Visit's Progress     Conditions and Symptoms   On track     I will schedule office visits, as directed by my provider.   I will keep

## 2023-05-18 ENCOUNTER — OFFICE VISIT (OUTPATIENT)
Dept: FAMILY MEDICINE CLINIC | Age: 83
End: 2023-05-18

## 2023-05-18 VITALS
OXYGEN SATURATION: 99 % | TEMPERATURE: 98.8 F | SYSTOLIC BLOOD PRESSURE: 126 MMHG | RESPIRATION RATE: 18 BRPM | DIASTOLIC BLOOD PRESSURE: 78 MMHG | HEIGHT: 73 IN | BODY MASS INDEX: 24.92 KG/M2 | WEIGHT: 188 LBS | HEART RATE: 76 BPM

## 2023-05-18 DIAGNOSIS — J44.9 CHRONIC OBSTRUCTIVE PULMONARY DISEASE, UNSPECIFIED COPD TYPE (HCC): ICD-10-CM

## 2023-05-18 DIAGNOSIS — H61.23 BILATERAL IMPACTED CERUMEN: ICD-10-CM

## 2023-05-18 DIAGNOSIS — I95.9 HYPOTENSION, UNSPECIFIED HYPOTENSION TYPE: Primary | ICD-10-CM

## 2023-05-18 DIAGNOSIS — G45.0 VERTEBROBASILAR ARTERY SYNDROME: ICD-10-CM

## 2023-05-18 DIAGNOSIS — R73.01 IMPAIRED FASTING GLUCOSE: ICD-10-CM

## 2023-05-18 DIAGNOSIS — I10 ESSENTIAL HYPERTENSION: ICD-10-CM

## 2023-05-18 DIAGNOSIS — R97.20 ELEVATED PSA: ICD-10-CM

## 2023-05-18 DIAGNOSIS — Z79.899 LONG TERM CURRENT USE OF THERAPEUTIC DRUG: ICD-10-CM

## 2023-05-18 DIAGNOSIS — I25.10 CORONARY ARTERY DISEASE INVOLVING NATIVE CORONARY ARTERY OF NATIVE HEART WITHOUT ANGINA PECTORIS: ICD-10-CM

## 2023-05-18 DIAGNOSIS — F33.1 MODERATE EPISODE OF RECURRENT MAJOR DEPRESSIVE DISORDER (HCC): ICD-10-CM

## 2023-05-18 DIAGNOSIS — E55.9 VITAMIN D DEFICIENCY: ICD-10-CM

## 2023-05-18 DIAGNOSIS — R68.89 CHANGE IN WEIGHT: ICD-10-CM

## 2023-05-18 DIAGNOSIS — G47.33 OBSTRUCTIVE SLEEP APNEA SYNDROME: ICD-10-CM

## 2023-05-18 DIAGNOSIS — G20 PARKINSON DISEASE (HCC): ICD-10-CM

## 2023-05-18 DIAGNOSIS — I25.5 ISCHEMIC CARDIOMYOPATHY: ICD-10-CM

## 2023-05-18 DIAGNOSIS — R53.83 OTHER FATIGUE: ICD-10-CM

## 2023-05-18 DIAGNOSIS — K59.09 OTHER CONSTIPATION: ICD-10-CM

## 2023-05-18 DIAGNOSIS — I26.99 OTHER PULMONARY EMBOLISM WITHOUT ACUTE COR PULMONALE, UNSPECIFIED CHRONICITY (HCC): ICD-10-CM

## 2023-05-18 DIAGNOSIS — E78.2 MIXED HYPERLIPIDEMIA: ICD-10-CM

## 2023-05-18 DIAGNOSIS — I50.22 CHF (CONGESTIVE HEART FAILURE), NYHA CLASS I, CHRONIC, SYSTOLIC (HCC): ICD-10-CM

## 2023-05-18 RX ORDER — KETOCONAZOLE 20 MG/ML
SHAMPOO TOPICAL
Qty: 120 ML | Refills: 1 | Status: SHIPPED | OUTPATIENT
Start: 2023-05-18

## 2023-05-18 ASSESSMENT — ENCOUNTER SYMPTOMS
EYE PAIN: 0
SORE THROAT: 0
BLOOD IN STOOL: 0
NAUSEA: 0
SHORTNESS OF BREATH: 1
COUGH: 0
RHINORRHEA: 1
ABDOMINAL PAIN: 0
VOMITING: 0
CONSTIPATION: 0
DIARRHEA: 0
CHEST TIGHTNESS: 0

## 2023-05-18 ASSESSMENT — PATIENT HEALTH QUESTIONNAIRE - PHQ9: DEPRESSION UNABLE TO ASSESS: FUNCTIONAL CAPACITY MOTIVATION LIMITS ACCURACY

## 2023-05-18 NOTE — PROGRESS NOTES
[facility]) Physicians   Internal Medicine     2023  Cordelia Lea : 1940 Sex: male  Age:82 y.o. Chief Complaint   Patient presents with    Hypertension     2 month f/u    Cholesterol Problem     2 month f/u         HPI:   Patient presents to office for evaluation of the following medical concerns. -  has been having issus sleeping in the bed.  feels has if cannot get comfortable in the bed. Stats when gets up to use bathroom cannot get back in the bed.  then will sleeps on the couch.  has not been wearing cpap due to these issues. -  has been having lower blood pressure.  has occasional dizziness. ER () -hypotension, blood pressure normal in the ER, CTA chest no evidence for pulmonary embolism no acute pulmonary abnormality noted right renal stones, CBC negative, CMP glucose elevated, troponin negative x2 lactic acid negative blood cultures negative at 8 urinalysis ketones hematuria blood and urine culture were negative. Last visit with cardio per reviewed note cardio (3/23) -stop aspirin continue Plavix continue Eliquis, added midodrine.     - Wife was concerned with weight loss.  wife reached out to neurology. Was advised to check with PCP. Per weight records appears stable since 2023    - Was admitted to hospital () - SOB, ddimer inc, labs neg otherwise cta chest evidence of small volume right pulmonary emboli. Placed on Eliquis. Was referred to hematology. Hyper-coag work-up ordered cbc neg, cmp neg, homcystine neg [id number] neg, anticardioli ab neg, donovan neg, factor V neg, mthfr homozygous, Plasminogen Act Inhibitor-1 positive, d-dimer neg, protein c neg, lupus anticoag neg, AT3 neg   US LE (10/22) -No evidence of DVT in either lower extremity. Recommending at least 6 months of eliquis.  Last visit per reviewed report  hem/onc (3/23) D-dimer <200 pulmonary embolism on Eliquis stop Eliquis follow-up 3 months    -  has

## 2023-06-08 ENCOUNTER — CARE COORDINATION (OUTPATIENT)
Dept: CARE COORDINATION | Age: 83
End: 2023-06-08

## 2023-06-08 ENCOUNTER — OFFICE VISIT (OUTPATIENT)
Dept: FAMILY MEDICINE CLINIC | Age: 83
End: 2023-06-08

## 2023-06-08 ENCOUNTER — TELEPHONE (OUTPATIENT)
Dept: PHARMACY | Facility: CLINIC | Age: 83
End: 2023-06-08

## 2023-06-08 VITALS
DIASTOLIC BLOOD PRESSURE: 80 MMHG | TEMPERATURE: 97.7 F | SYSTOLIC BLOOD PRESSURE: 130 MMHG | HEART RATE: 66 BPM | WEIGHT: 186 LBS | OXYGEN SATURATION: 98 % | BODY MASS INDEX: 24.54 KG/M2

## 2023-06-08 DIAGNOSIS — R60.0 LOCALIZED EDEMA: Primary | ICD-10-CM

## 2023-06-08 RX ORDER — FUROSEMIDE 20 MG/1
20 TABLET ORAL DAILY
Qty: 5 TABLET | Refills: 0 | Status: SHIPPED | OUTPATIENT
Start: 2023-06-08

## 2023-06-08 ASSESSMENT — ENCOUNTER SYMPTOMS
RHINORRHEA: 1
VOMITING: 0
SHORTNESS OF BREATH: 0
BLOOD IN STOOL: 0
EYE PAIN: 0
COUGH: 0
CONSTIPATION: 0
CHEST TIGHTNESS: 0
NAUSEA: 0
DIARRHEA: 0
ABDOMINAL PAIN: 0
SORE THROAT: 0

## 2023-06-08 NOTE — TELEPHONE ENCOUNTER
----- Message from Funmi Romero RN sent at 6/8/2023 12:25 PM EDT -----  Regarding: referral  Patient's wife is unsure if she is medicating patient as directed. PCP is Dr. Yoni Abarca and he is also a patient of [facility]. Contact is wife Castillo Gold with no preference of time of call.   Thank you for your help

## 2023-06-08 NOTE — CARE COORDINATION
notify my provider of any barriers to my plan of care. I will notify my provider of any symptoms that indicate a worsening of my condition. Barriers: lack of education  Plan for overcoming my barriers: care coordination  Confidence: 9/10  Anticipated Goal Completion Date: 5/22/23         Self Monitoring   No change     Daily Weights - I will weight myself as directed - Daily and write down weights  Blood Pressure - I will take my blood pressure as directed - Daily    Patient Reported Blood Pressure No flowsheet data found. Patient Reported Weight No flowsheet data found.     Barriers: lack of education  Plan for overcoming my barriers: remote patient monitoring and care coordination  Confidence: 9/10  Anticipated Goal Completion Date: 5/22/2023                Future Appointments   Date Time Provider [facility]   7/7/2023  2:30 PM Dilan Durán MD [address] [place] [address]   7/7/2023  2:45 PM [facility] ONC FAST TRACK 2 [facility] Med Onc [facility]   8/22/2023 10:30 AM Sascha Moscoso  W [address] [place] [address]   4/24/2024  3:20 PM DO MOHSEN Grant SLEEP [facility]

## 2023-06-08 NOTE — TELEPHONE ENCOUNTER
Received a referral:  from Care Coordinator to review patients medications. Called patient to schedule a time to speak with a pharmacist over the telephone. Spoke to patient. Asked that I call his wife at [phone] as she is not home and wanted to set up the appointment. Attempted to reach patients wife, Miriam Pool. Reached wife to schedule.  Patient is scheduled for 6/12/23 at 8:30am    For Pharmacy Admin Tracking Only    Program: [facility] in place:  No  Recommendation Provided To: Patient/Caregiver: 1 via Telephone  Intervention Detail: Scheduled Appointment  Intervention Accepted By: Patient/Caregiver: 1  Gap Closed?: Yes   Time Spent (min): [address] [facility], [facility] [address] [facility] free: [phone]

## 2023-06-08 NOTE — PROGRESS NOTES
ANGIOPLASTY  06    DIAGNOSTIC CARDIAC CATH LAB PROCEDURE  06    ERCP N/A 2021    ERCP STONE REMOVAL performed by Charley Shelton MD at [facility] ENDOSCOPY    ERCP N/A 2021    ERCP STENT REMOVAL performed by Charley Shelton MD at  [facility] [address] Left 2015    ROTATOR CUFF REPAIR      bilaterally    TONSILLECTOMY AND ADENOIDECTOMY      UPPER GASTROINTESTINAL ENDOSCOPY      UPPER GASTROINTESTINAL ENDOSCOPY N/A 2021    EGD ESOPHAGOGASTRODUODENOSCOPY WITH PANCREATIC STENT REMOVAL performed by Charley Shelton MD at [facility] ENDOSCOPY       Family History   Problem Relation Age of Onset    Heart Disease Mother          81 Y/O    Diabetes Mother     Liver Disease Father          66 Y/O    No Known Problems Brother        Social History     Socioeconomic History    Marital status:      Spouse name: Not on file    Number of children: Not on file    Years of education: Not on file    Highest education level: Not on file   Occupational History    Occupation: retired-     Tobacco Use    Smoking status: Former     Packs/day: 1.00     Years: 15.00     Pack years: 15.00     Types: Cigarettes     Start date: 1957     Quit date: 1975     Years since quittin.4    Smokeless tobacco: Former     Types: Snuff     Quit date:    Vaping Use    Vaping Use: Never used   Substance and Sexual Activity    Alcohol use: Not Currently    Drug use: No    Sexual activity: Not on file   Other Topics Concern    Not on file   Social History Narrative    Not on file     Social Determinants of Health     Financial Resource Strain: Unknown    Difficulty of Paying Living Expenses: Patient refused   Food Insecurity: Unknown    Worried About [address] in the Last Year: Patient refused    [address] in the Last Year: Patient refused   Transportation Needs: Unknown    Lack of Transportation (Medical):  Not on file    Lack of Transportation (Non-Medical):

## 2023-06-23 ENCOUNTER — CARE COORDINATION (OUTPATIENT)
Dept: CARE COORDINATION | Age: 83
End: 2023-06-23

## 2023-06-23 NOTE — CARE COORDINATION
Attempted to reach family Trixie Betts spouse) by telephone. Left HIPAA compliant message requesting a return call. Will attempt to reach patient again.

## 2023-06-26 ENCOUNTER — TELEPHONE (OUTPATIENT)
Dept: FAMILY MEDICINE CLINIC | Age: 83
End: 2023-06-26

## 2023-06-29 ENCOUNTER — TELEPHONE (OUTPATIENT)
Dept: ADMINISTRATIVE | Age: 83
End: 2023-06-29

## 2023-06-29 DIAGNOSIS — I50.22 CHF (CONGESTIVE HEART FAILURE), NYHA CLASS I, CHRONIC, SYSTOLIC (HCC): Primary | ICD-10-CM

## 2023-07-06 ENCOUNTER — CARE COORDINATION (OUTPATIENT)
Dept: CARE COORDINATION | Age: 83
End: 2023-07-06

## 2023-07-11 ENCOUNTER — HOSPITAL ENCOUNTER (OUTPATIENT)
Dept: OTHER | Age: 83
Setting detail: THERAPIES SERIES
Discharge: HOME | End: 2023-07-11
Payer: MEDICARE

## 2023-07-11 VITALS
OXYGEN SATURATION: 96 % | WEIGHT: 188 LBS | RESPIRATION RATE: 16 BRPM | HEART RATE: 77 BPM | SYSTOLIC BLOOD PRESSURE: 114 MMHG | BODY MASS INDEX: 24.8 KG/M2 | DIASTOLIC BLOOD PRESSURE: 61 MMHG

## 2023-07-11 LAB
ANION GAP SERPL CALCULATED.3IONS-SCNC: 9 MMOL/L (ref 7–16)
BNP BLD-MCNC: 285 PG/ML (ref 0–450)
BUN SERPL-MCNC: 19 MG/DL (ref 6–23)
CALCIUM SERPL-MCNC: 9.3 MG/DL (ref 8.6–10.2)
CHLORIDE SERPL-SCNC: 105 MMOL/L (ref 98–107)
CO2 SERPL-SCNC: 25 MMOL/L (ref 22–29)
CREAT SERPL-MCNC: 0.8 MG/DL (ref 0.7–1.2)
GLUCOSE SERPL-MCNC: 100 MG/DL (ref 74–99)
POTASSIUM SERPL-SCNC: 3.9 MMOL/L (ref 3.5–5)
SODIUM SERPL-SCNC: 139 MMOL/L (ref 132–146)

## 2023-07-11 PROCEDURE — 36415 COLL VENOUS BLD VENIPUNCTURE: CPT

## 2023-07-11 PROCEDURE — 80048 BASIC METABOLIC PNL TOTAL CA: CPT

## 2023-07-11 PROCEDURE — 83880 ASSAY OF NATRIURETIC PEPTIDE: CPT

## 2023-07-11 PROCEDURE — 99204 OFFICE O/P NEW MOD 45 MIN: CPT

## 2023-07-11 ASSESSMENT — EJECTION FRACTION
EF_VALUE: 55%
EF_SOURCE: 2D ECHO

## 2023-07-11 ASSESSMENT — PATIENT HEALTH QUESTIONNAIRE - PHQ9
1. LITTLE INTEREST OR PLEASURE IN DOING THINGS: NOT AT ALL
SUM OF ALL RESPONSES TO PHQ9 QUESTIONS 1 & 2: 0
2. FEELING DOWN, DEPRESSED OR HOPELESS: NOT AT ALL

## 2023-07-11 NOTE — PROGRESS NOTES
Congestive Heart Failure [address] [place] [address] J [place]   1940          Referring Provider: Dr Dave Rivera  Primary Care Physician: DR Marion Buck  Cardiologist: Dr Karla Walker  Nephrologist: N/A        History of Present Illness:     Emeterio Schwab is a 79 y.o. male with a history of HFpEF, most recent EF 55% on 4-12-23. Patient Story:    He does not  have dyspnea with exertion, shortness of breath, or decline in overall functional capacity. He does not have orthopnea, PND, nocturnal cough or hemoptysis. He does not have abdominal distention or bloating, early satiety, anorexia/change in appetite. He does has a good urinary response to  oral diuretic. He does have  lower extremity edema. He denies lightheadedness, dizziness. He denies palpitations, syncope or near syncope. He does not complain of chest pain, pressure, discomfort. Allergies   Allergen Reactions    Lisinopril Itching           Current Outpatient Medications on File Prior to Encounter   Medication Sig Dispense Refill    carbidopa-levodopa (SINEMET CR)  MG per extended release tablet Take 1 tablet by mouth nightly at bedtime. Prescribed by Neo Olmos CNP [facility] Neurology)      carbidopa-levodopa (SINEMET)  MG per tablet Take 1 tablet by mouth 3 times daily At 9am, 1pm, 5pm  Prescribed by Neo Olmos CNP [facility])      metoprolol succinate (TOPROL XL) 25 MG extended release tablet Take 1 tablet by mouth at bedtime      midodrine (PROAMATINE) 2.5 MG tablet Take 1 tablet by mouth 2 times daily      ketoconazole (NIZORAL) 2 % shampoo Apply topically daily as needed.  120 mL 1    clopidogrel (PLAVIX) 75 MG tablet Take 1 tablet by mouth daily      mirtazapine (REMERON) 15 MG tablet Take 1 tablet by mouth nightly 90 tablet 3    ondansetron (ZOFRAN) 4 MG tablet Take 1 tablet by mouth every 8 hours as needed (as needed for nausea from

## 2023-07-26 ENCOUNTER — OFFICE VISIT (OUTPATIENT)
Dept: CARDIOLOGY CLINIC | Age: 83
End: 2023-07-26

## 2023-07-26 VITALS
RESPIRATION RATE: 16 BRPM | HEART RATE: 77 BPM | BODY MASS INDEX: 24.92 KG/M2 | WEIGHT: 188 LBS | HEIGHT: 73 IN | DIASTOLIC BLOOD PRESSURE: 78 MMHG | SYSTOLIC BLOOD PRESSURE: 112 MMHG

## 2023-07-26 DIAGNOSIS — R60.0 LOWER EXTREMITY EDEMA: ICD-10-CM

## 2023-07-26 DIAGNOSIS — I25.10 CORONARY ARTERY DISEASE INVOLVING NATIVE CORONARY ARTERY OF NATIVE HEART WITHOUT ANGINA PECTORIS: Primary | ICD-10-CM

## 2023-07-26 PROBLEM — I25.119 ATHEROSCLEROTIC HEART DISEASE OF NATIVE CORONARY ARTERY WITH UNSPECIFIED ANGINA PECTORIS (HCC): Status: ACTIVE | Noted: 2023-07-26

## 2023-07-26 RX ORDER — FUROSEMIDE 20 MG/1
20 TABLET ORAL DAILY
Qty: 5 TABLET | Refills: 0 | Status: SHIPPED | OUTPATIENT
Start: 2023-07-26 | End: 2023-07-31

## 2023-07-27 ENCOUNTER — CARE COORDINATION (OUTPATIENT)
Dept: CARE COORDINATION | Age: 83
End: 2023-07-27

## 2023-07-27 NOTE — CARE COORDINATION
Ambulatory Care Coordination Note  2023    Patient Current Location:  Home: [address] [place] [id number]     ACM contacted the family by telephone. Verified name and  with patient and family as identifiers. Provided introduction to self, and explanation of the ACM role. Challenges to be reviewed by the provider   Additional needs identified to be addressed with provider: No  none               Method of communication with provider: none. ACM: Rosario Ireland RN    ACM contacted patient's wife Bethany Baumann). She states Migel Matta is doing well in most regards. He was seen by his cardiologist yesterday. He was prescribed lasix daily. Bethany Baumann states she will  the prescription today. Migel Matta was also advised to wear compression stockings. Bethany Baumann assisted him today to put them on. Migel Matta is active with the CHF clinic and is scheduled weekly. Family's chief concern is patient's tremors. He is being treated by a specialist [facility]. Migel Matta takes his medications as prescribed. Patient/family feels they are able to manage Darell's health care needs without further assistance from V[facility], and feels he can graduate from [facility]. ACM educated patient that if they need additional help in managing his health care in the future, they can call [facility] to re-enroll in Care Coordination. Plan  Graduate from [address] [place], [place] [address]y 2 patient enrollment in the Remote Patient Monitoring (RPM) program for in-home monitoring: Patient declined.     Lab Results       None            Care Coordination Interventions    Referral from Primary Care Provider: Yes  Suggested Interventions and Community Resources  Fall Risk Prevention: Completed  Disease Specific Clinic: Completed  Pharmacist: Completed  Physical Therapy: Completed  Registered Dietician: Not Started  Social Work: Completed (Comment: life alert)  Other Services: Completed (Comment: remote patient monitoring)          Goals

## 2023-07-29 NOTE — PROGRESS NOTES
inducible ischemia. 4. No evidence of scarred myocardium. 5. Left ventricle is mildly dilated. The left ventricle systolic function is mildly decreased. 6. This is a low risk scan. Assessment:   Lower extremity edema: dependent edema with varicose veins. No evidence of acute heart failure  Hypertension with orthostatic hypotension  Hyperlipidemia with LDL of 54 on high intensity statin   Coronary artery disease s/p MARTHA to LAD in 2006, NSTEM in 2015 with 90% ISR of proximal LAD s/p PCI/MARTHA 11/2015   TIA  Right pulmonary emboli in 9/2022: attributed to sedentary lifestyle : treated with Eliquis until 4/2023  LINETTE  Parkinson's Disease     Treatment Plan:   He was given a prescription for compression stockings: his wife will discuss obtaining stockings with zippers with medical supply store   He was encouraged to elevate his legs, perform range of motion exercises while sitting, and follow a no added salt diet  Five day course of Lasix   Continue same cardiac medications   Return office visit as scheduled with Dr. Kalpesh Bales in six weeks. The patient's current medication list, allergies, problem list and results of all previously ordered testing were reviewed at today's visit.     CARMENZA Shi-CNS  [facility]) Cardiology

## 2023-07-31 ENCOUNTER — HOSPITAL ENCOUNTER (OUTPATIENT)
Dept: OTHER | Age: 83
Setting detail: THERAPIES SERIES
Discharge: HOME | End: 2023-07-31
Payer: MEDICARE

## 2023-07-31 VITALS
BODY MASS INDEX: 25.45 KG/M2 | RESPIRATION RATE: 18 BRPM | WEIGHT: 192 LBS | HEART RATE: 90 BPM | DIASTOLIC BLOOD PRESSURE: 61 MMHG | HEIGHT: 73 IN | OXYGEN SATURATION: 92 % | SYSTOLIC BLOOD PRESSURE: 142 MMHG

## 2023-07-31 LAB
ANION GAP SERPL CALCULATED.3IONS-SCNC: 10 MMOL/L (ref 7–16)
BNP SERPL-MCNC: 177 PG/ML (ref 0–450)
BUN SERPL-MCNC: 15 MG/DL (ref 6–23)
CALCIUM SERPL-MCNC: 8.7 MG/DL (ref 8.6–10.2)
CHLORIDE SERPL-SCNC: 106 MMOL/L (ref 98–107)
CO2 SERPL-SCNC: 24 MMOL/L (ref 22–29)
CREAT SERPL-MCNC: 0.9 MG/DL (ref 0.7–1.2)
GFR SERPL CREATININE-BSD FRML MDRD: >60 ML/MIN/1.73M2
GLUCOSE SERPL-MCNC: 116 MG/DL (ref 74–99)
POTASSIUM SERPL-SCNC: 4.1 MMOL/L (ref 3.5–5)
SODIUM SERPL-SCNC: 140 MMOL/L (ref 132–146)

## 2023-07-31 PROCEDURE — 80048 BASIC METABOLIC PNL TOTAL CA: CPT

## 2023-07-31 PROCEDURE — 96374 THER/PROPH/DIAG INJ IV PUSH: CPT

## 2023-07-31 PROCEDURE — 2580000003 HC RX 258

## 2023-07-31 PROCEDURE — 36415 COLL VENOUS BLD VENIPUNCTURE: CPT

## 2023-07-31 PROCEDURE — 83880 ASSAY OF NATRIURETIC PEPTIDE: CPT

## 2023-07-31 PROCEDURE — 99214 OFFICE O/P EST MOD 30 MIN: CPT

## 2023-07-31 PROCEDURE — 6360000002 HC RX W HCPCS

## 2023-07-31 RX ORDER — SODIUM CHLORIDE 0.9 % (FLUSH) 0.9 %
5-40 SYRINGE (ML) INJECTION 2 TIMES DAILY
Status: DISCONTINUED | OUTPATIENT
Start: 2023-07-31 | End: 2023-08-01 | Stop reason: HOSPADM

## 2023-07-31 RX ORDER — SODIUM CHLORIDE 0.9 % (FLUSH) 0.9 %
SYRINGE (ML) INJECTION
Status: COMPLETED
Start: 2023-07-31 | End: 2023-07-31

## 2023-07-31 RX ORDER — FUROSEMIDE 10 MG/ML
INJECTION INTRAMUSCULAR; INTRAVENOUS
Status: COMPLETED
Start: 2023-07-31 | End: 2023-07-31

## 2023-07-31 RX ORDER — FUROSEMIDE 10 MG/ML
40 INJECTION INTRAMUSCULAR; INTRAVENOUS ONCE
Status: COMPLETED | OUTPATIENT
Start: 2023-07-31 | End: 2023-07-31

## 2023-07-31 RX ADMIN — FUROSEMIDE 40 MG: 10 INJECTION, SOLUTION INTRAMUSCULAR; INTRAVENOUS at 12:32

## 2023-07-31 RX ADMIN — Medication 10 ML: at 12:32

## 2023-07-31 RX ADMIN — SODIUM CHLORIDE, PRESERVATIVE FREE 10 ML: 5 INJECTION INTRAVENOUS at 12:32

## 2023-07-31 RX ADMIN — FUROSEMIDE 40 MG: 10 INJECTION INTRAMUSCULAR; INTRAVENOUS at 12:32

## 2023-07-31 NOTE — PROGRESS NOTES
07/31/2023 4.1   07/11/2023 3.9   02/21/2023 4.5     Potassium reflex Magnesium (mmol/L)   Date Value   09/22/2022 3.8   04/22/2021 4.3   11/25/2020 3.7     Chloride (mmol/L)   Date Value   07/31/2023 106   07/11/2023 105   02/21/2023 106     CO2 (mmol/L)   Date Value   07/31/2023 24   07/11/2023 25   02/21/2023 27     BUN (mg/dL)   Date Value   07/31/2023 15   07/11/2023 19   02/21/2023 14     Glucose (mg/dL)   Date Value   07/31/2023 116 (H)   07/11/2023 100 (H)   02/21/2023 101 (H)   01/10/2012 103   01/09/2012 134 (H)     Calcium (mg/dL)   Date Value   07/31/2023 8.7   07/11/2023 9.3   02/21/2023 9.0       Last 3 BNP       Pro-BNP (pg/mL)   Date Value   07/31/2023 177   07/11/2023 285   11/22/2020 170          CBC: No results for input(s): WBC, HGB, PLT in the last 72 hours. BMP:    Recent Labs     07/31/23  1241      K 4.1      CO2 24   BUN 15   CREATININE 0.9   GLUCOSE 116*       Hepatic: No results for input(s): AST, ALT, ALB, BILITOT, ALKPHOS in the last 72 hours. Troponin: No results for input(s): TROPONINI in the last 72 hours. BNP: No results for input(s): BNP in the last 72 hours. Lipids: No results for input(s): CHOL, HDL in the last 72 hours. Invalid input(s): LDLCALCU  INR: No results for input(s): INR in the last 72 hours.         WEIGHTS:    Wt Readings from Last 3 Encounters:   07/31/23 192 lb (87.1 kg)   07/26/23 188 lb (85.3 kg)   07/11/23 188 lb (85.3 kg)         RISK SCORES:    Readmission Risk Score: 7.7       Predictive Model Details          35%  Factor Value    End of Life Care Index Model 37% Age 80 y/o     12% Has Parkinson's Disease Yes     8% Legal Sex Male     7% Has Nonhypertensive Congestive Heart Failure Yes     6% Last RDW Isabel 14.7 fL     5% Last Albumin Isabel 3.8 g/dL     5% Had Nausea or Vomiting Yes     4% Has COPD or Bronchiectasis Yes     4% Prescribed Antiemetic Yes     4% Has Medical Device Yes     4% Has Pulmonary Heart Disease Yes     3% Has Screening or

## 2023-08-07 ENCOUNTER — HOSPITAL ENCOUNTER (OUTPATIENT)
Dept: OTHER | Age: 83
Setting detail: THERAPIES SERIES
Discharge: HOME | End: 2023-08-07
Payer: MEDICARE

## 2023-08-07 ENCOUNTER — APPOINTMENT (OUTPATIENT)
Dept: CT IMAGING | Age: 83
End: 2023-08-07
Payer: MEDICARE

## 2023-08-07 ENCOUNTER — HOSPITAL ENCOUNTER (EMERGENCY)
Age: 83
Discharge: HOME | End: 2023-08-07
Payer: MEDICARE

## 2023-08-07 ENCOUNTER — TELEPHONE (OUTPATIENT)
Dept: FAMILY MEDICINE CLINIC | Age: 83
End: 2023-08-07

## 2023-08-07 VITALS
WEIGHT: 185 LBS | RESPIRATION RATE: 16 BRPM | TEMPERATURE: 98.2 F | OXYGEN SATURATION: 100 % | SYSTOLIC BLOOD PRESSURE: 134 MMHG | BODY MASS INDEX: 24.52 KG/M2 | HEART RATE: 85 BPM | HEIGHT: 73 IN | DIASTOLIC BLOOD PRESSURE: 85 MMHG

## 2023-08-07 DIAGNOSIS — Z23 NEED FOR TETANUS BOOSTER: ICD-10-CM

## 2023-08-07 DIAGNOSIS — S09.90XA MINOR HEAD INJURY WITHOUT LOSS OF CONSCIOUSNESS, INITIAL ENCOUNTER: ICD-10-CM

## 2023-08-07 DIAGNOSIS — S01.01XA LACERATION OF SCALP, INITIAL ENCOUNTER: Primary | ICD-10-CM

## 2023-08-07 PROCEDURE — 99284 EMERGENCY DEPT VISIT MOD MDM: CPT

## 2023-08-07 PROCEDURE — 70450 CT HEAD/BRAIN W/O DYE: CPT

## 2023-08-07 PROCEDURE — 6360000002 HC RX W HCPCS: Performed by: PHYSICIAN ASSISTANT

## 2023-08-07 PROCEDURE — 90714 TD VACC NO PRESV 7 YRS+ IM: CPT | Performed by: PHYSICIAN ASSISTANT

## 2023-08-07 PROCEDURE — 12013 RPR F/E/E/N/L/M 2.6-5.0 CM: CPT

## 2023-08-07 PROCEDURE — 90471 IMMUNIZATION ADMIN: CPT | Performed by: PHYSICIAN ASSISTANT

## 2023-08-07 RX ADMIN — CLOSTRIDIUM TETANI TOXOID ANTIGEN (FORMALDEHYDE INACTIVATED) AND CORYNEBACTERIUM DIPHTHERIAE TOXOID ANTIGEN (FORMALDEHYDE INACTIVATED) 0.5 ML: 5; 2 INJECTION, SUSPENSION INTRAMUSCULAR at 02:51

## 2023-08-07 ASSESSMENT — PAIN DESCRIPTION - LOCATION: LOCATION: HEAD

## 2023-08-07 ASSESSMENT — PAIN - FUNCTIONAL ASSESSMENT: PAIN_FUNCTIONAL_ASSESSMENT: 0-10

## 2023-08-07 ASSESSMENT — PAIN DESCRIPTION - PAIN TYPE: TYPE: ACUTE PAIN

## 2023-08-07 ASSESSMENT — PAIN SCALES - GENERAL: PAINLEVEL_OUTOF10: 2

## 2023-08-07 ASSESSMENT — PAIN DESCRIPTION - DESCRIPTORS: DESCRIPTORS: ACHING;BURNING

## 2023-08-07 ASSESSMENT — PAIN DESCRIPTION - ORIENTATION: ORIENTATION: MID

## 2023-08-07 NOTE — TELEPHONE ENCOUNTER
----- Message from Therese Hunger sent at 8/7/2023  3:50 PM EDT -----  Subject: Message to Provider    QUESTIONS  Information for Provider? PtDmitri Gillis needs an appointment for   removal of stitches on his forehead. They were place on 8/7. Need removed   on 8/14. Thanks  ---------------------------------------------------------------------------  --------------  Casandra Tenorio [facility]  [phone]; OK to leave message on voicemail  ---------------------------------------------------------------------------  --------------  SCRIPT ANSWERS  Relationship to Patient? Spouse/Partner  Representative Name? Louella Gilford  Additional information verified (besides Name and Date of Birth)? Phone   Number  (Is the patient requesting to see the provider for a procedure?)?  Yes

## 2023-08-08 ENCOUNTER — HOSPITAL ENCOUNTER (OUTPATIENT)
Age: 83
Discharge: HOME | End: 2023-08-08
Payer: MEDICARE

## 2023-08-08 ENCOUNTER — HOSPITAL ENCOUNTER (OUTPATIENT)
Dept: OTHER | Age: 83
Setting detail: THERAPIES SERIES
Discharge: HOME | End: 2023-08-08
Payer: MEDICARE

## 2023-08-08 VITALS
OXYGEN SATURATION: 95 % | BODY MASS INDEX: 24.41 KG/M2 | SYSTOLIC BLOOD PRESSURE: 117 MMHG | DIASTOLIC BLOOD PRESSURE: 71 MMHG | WEIGHT: 185 LBS | RESPIRATION RATE: 16 BRPM | HEART RATE: 83 BPM

## 2023-08-08 DIAGNOSIS — R97.20 ELEVATED PSA: ICD-10-CM

## 2023-08-08 DIAGNOSIS — E55.9 VITAMIN D DEFICIENCY: ICD-10-CM

## 2023-08-08 DIAGNOSIS — R53.83 OTHER FATIGUE: ICD-10-CM

## 2023-08-08 DIAGNOSIS — E78.2 MIXED HYPERLIPIDEMIA: ICD-10-CM

## 2023-08-08 DIAGNOSIS — Z79.899 LONG TERM CURRENT USE OF THERAPEUTIC DRUG: ICD-10-CM

## 2023-08-08 DIAGNOSIS — R73.01 IMPAIRED FASTING GLUCOSE: ICD-10-CM

## 2023-08-08 LAB
25(OH)D3 SERPL-MCNC: 28.4 NG/ML (ref 30–100)
ALBUMIN SERPL-MCNC: 4.3 G/DL (ref 3.5–5.2)
ALP SERPL-CCNC: 69 U/L (ref 40–129)
ALT SERPL-CCNC: 12 U/L (ref 0–40)
ANION GAP SERPL CALCULATED.3IONS-SCNC: 8 MMOL/L (ref 7–16)
ANION GAP SERPL CALCULATED.3IONS-SCNC: 9 MMOL/L (ref 7–16)
AST SERPL-CCNC: 15 U/L (ref 0–39)
BASOPHILS # BLD: 0.1 K/UL (ref 0–0.2)
BASOPHILS NFR BLD: 1 % (ref 0–2)
BILIRUB SERPL-MCNC: 0.9 MG/DL (ref 0–1.2)
BILIRUB UR QL STRIP: NEGATIVE
BNP SERPL-MCNC: 212 PG/ML (ref 0–450)
BUN SERPL-MCNC: 20 MG/DL (ref 6–23)
BUN SERPL-MCNC: 22 MG/DL (ref 6–23)
CALCIUM SERPL-MCNC: 9.3 MG/DL (ref 8.6–10.2)
CALCIUM SERPL-MCNC: 9.4 MG/DL (ref 8.6–10.2)
CHLORIDE SERPL-SCNC: 105 MMOL/L (ref 98–107)
CHLORIDE SERPL-SCNC: 106 MMOL/L (ref 98–107)
CHOLEST SERPL-MCNC: 120 MG/DL
CLARITY UR: CLEAR
CO2 SERPL-SCNC: 28 MMOL/L (ref 22–29)
CO2 SERPL-SCNC: 28 MMOL/L (ref 22–29)
COLOR UR: YELLOW
COMMENT: NORMAL
CREAT SERPL-MCNC: 0.9 MG/DL (ref 0.7–1.2)
CREAT SERPL-MCNC: 1 MG/DL (ref 0.7–1.2)
CREAT UR-MCNC: 213.4 MG/DL (ref 40–278)
EOSINOPHIL # BLD: 0.47 K/UL (ref 0.05–0.5)
EOSINOPHILS RELATIVE PERCENT: 6 % (ref 0–6)
ERYTHROCYTE [DISTWIDTH] IN BLOOD BY AUTOMATED COUNT: 14.8 % (ref 11.5–15)
FOLATE SERPL-MCNC: >20 NG/ML (ref 4.8–24.2)
GFR SERPL CREATININE-BSD FRML MDRD: >60 ML/MIN/1.73M2
GFR SERPL CREATININE-BSD FRML MDRD: >60 ML/MIN/1.73M2
GLUCOSE SERPL-MCNC: 103 MG/DL (ref 74–99)
GLUCOSE SERPL-MCNC: 105 MG/DL (ref 74–99)
GLUCOSE UR STRIP-MCNC: NEGATIVE MG/DL
HBA1C MFR BLD: 5.9 % (ref 4–5.6)
HCT VFR BLD AUTO: 43.2 % (ref 37–54)
HDLC SERPL-MCNC: 58 MG/DL
HGB BLD-MCNC: 13.5 G/DL (ref 12.5–16.5)
HGB UR QL STRIP.AUTO: NEGATIVE
IMM GRANULOCYTES # BLD AUTO: <0.03 K/UL (ref 0–0.58)
IMM GRANULOCYTES NFR BLD: 0 % (ref 0–5)
KETONES UR STRIP-MCNC: NEGATIVE MG/DL
LDLC SERPL CALC-MCNC: 45 MG/DL
LEUKOCYTE ESTERASE UR QL STRIP: NEGATIVE
LYMPHOCYTES NFR BLD: 2.43 K/UL (ref 1.5–4)
LYMPHOCYTES RELATIVE PERCENT: 29 % (ref 20–42)
MAGNESIUM SERPL-MCNC: 2.2 MG/DL (ref 1.6–2.6)
MCH RBC QN AUTO: 28 PG (ref 26–35)
MCHC RBC AUTO-ENTMCNC: 31.3 G/DL (ref 32–34.5)
MCV RBC AUTO: 89.4 FL (ref 80–99.9)
MICROALBUMIN UR-MCNC: 28 MG/L (ref 0–19)
MICROALBUMIN/CREAT UR-RTO: 13 MCG/MG CREAT (ref 0–30)
MONOCYTES NFR BLD: 0.74 K/UL (ref 0.1–0.95)
MONOCYTES NFR BLD: 9 % (ref 2–12)
NEUTROPHILS NFR BLD: 55 % (ref 43–80)
NEUTS SEG NFR BLD: 4.65 K/UL (ref 1.8–7.3)
NITRITE UR QL STRIP: NEGATIVE
PH UR STRIP: 6 [PH] (ref 5–9)
PLATELET # BLD AUTO: 222 K/UL (ref 130–450)
PMV BLD AUTO: 11.1 FL (ref 7–12)
POTASSIUM SERPL-SCNC: 4 MMOL/L (ref 3.5–5)
POTASSIUM SERPL-SCNC: 4.1 MMOL/L (ref 3.5–5)
PROT SERPL-MCNC: 7 G/DL (ref 6.4–8.3)
PROT UR STRIP-MCNC: NEGATIVE MG/DL
PSA SERPL-MCNC: 5.22 NG/ML (ref 0–4)
RBC # BLD AUTO: 4.83 M/UL (ref 3.8–5.8)
SODIUM SERPL-SCNC: 142 MMOL/L (ref 132–146)
SODIUM SERPL-SCNC: 142 MMOL/L (ref 132–146)
SP GR UR STRIP: 1.02 (ref 1–1.03)
TRIGL SERPL-MCNC: 84 MG/DL
TSH SERPL DL<=0.05 MIU/L-ACNC: 1.98 UIU/ML (ref 0.27–4.2)
UROBILINOGEN UR STRIP-ACNC: 0.2 EU/DL (ref 0–1)
VIT B12 SERPL-MCNC: 744 PG/ML (ref 211–946)
VLDLC SERPL CALC-MCNC: 17 MG/DL
WBC OTHER # BLD: 8.4 K/UL (ref 4.5–11.5)

## 2023-08-08 PROCEDURE — 83036 HEMOGLOBIN GLYCOSYLATED A1C: CPT

## 2023-08-08 PROCEDURE — 85025 COMPLETE CBC W/AUTO DIFF WBC: CPT

## 2023-08-08 PROCEDURE — 80061 LIPID PANEL: CPT

## 2023-08-08 PROCEDURE — 80048 BASIC METABOLIC PNL TOTAL CA: CPT

## 2023-08-08 PROCEDURE — 83880 ASSAY OF NATRIURETIC PEPTIDE: CPT

## 2023-08-08 PROCEDURE — 81003 URINALYSIS AUTO W/O SCOPE: CPT

## 2023-08-08 PROCEDURE — 84153 ASSAY OF PSA TOTAL: CPT

## 2023-08-08 PROCEDURE — 82306 VITAMIN D 25 HYDROXY: CPT

## 2023-08-08 PROCEDURE — 82043 UR ALBUMIN QUANTITATIVE: CPT

## 2023-08-08 PROCEDURE — 82570 ASSAY OF URINE CREATININE: CPT

## 2023-08-08 PROCEDURE — 83735 ASSAY OF MAGNESIUM: CPT

## 2023-08-08 PROCEDURE — 80053 COMPREHEN METABOLIC PANEL: CPT

## 2023-08-08 PROCEDURE — 84443 ASSAY THYROID STIM HORMONE: CPT

## 2023-08-08 PROCEDURE — 36415 COLL VENOUS BLD VENIPUNCTURE: CPT

## 2023-08-08 PROCEDURE — 82607 VITAMIN B-12: CPT

## 2023-08-08 PROCEDURE — 99214 OFFICE O/P EST MOD 30 MIN: CPT

## 2023-08-08 PROCEDURE — 82746 ASSAY OF FOLIC ACID SERUM: CPT

## 2023-08-08 NOTE — PROGRESS NOTES
Medical Device Yes     4% Has Pulmonary Heart Disease Yes     3% Has Screening or History of Mental Health or Substance Abuse Codes Yes     2% Has Other Injury or Condition due to External Causes Yes     1% Has Coronary Atherosclerosis or Other Heart Disease Yes                 TELEMETRY:  Cardiac Regularity: Regular  Cardiac Rhythm/Interpretation: NSR        ASSESSMENT:  Sharyle Leaven weight is down 7lbs since last CHF Clinic visit and presents for third  visit at the chf clinic with wife. Patient was seen past week with Timothy Jesus CNP and she advised patient to take Lasix for 5 days, patient finished 8-4-23 and states lower legs are \"better   IV diuretics given no  Lab work obtained yes, BMP/BNP   Reviewed currently prescribed medications with patient, educated on importance of compliance and answered any questions regarding their medication  Educated on signs and symptoms of HF  Educated on low sodium diet    PLAN:  Scheduled to follow up in CHF clinic on   Future Appointments   Date Time Provider [address]   8/22/2023 10:30 AM Zofia Prince  [address]   8/28/2023 12:30 PM [facility] ROOM 2 [facility]   9/5/2023 10:10 AM Young Nickerson DO [address] [facility]   4/24/2024  3:20 PM Janie Horvath DO [address]     Given clinic phone number and aware of signs and symptoms to call with any HF change in symptoms.

## 2023-08-14 ENCOUNTER — OFFICE VISIT (OUTPATIENT)
Dept: FAMILY MEDICINE CLINIC | Age: 83
End: 2023-08-14
Payer: MEDICARE

## 2023-08-14 VITALS
TEMPERATURE: 98.9 F | SYSTOLIC BLOOD PRESSURE: 104 MMHG | HEART RATE: 89 BPM | HEIGHT: 73 IN | DIASTOLIC BLOOD PRESSURE: 64 MMHG | WEIGHT: 189 LBS | OXYGEN SATURATION: 96 % | RESPIRATION RATE: 20 BRPM | BODY MASS INDEX: 25.05 KG/M2

## 2023-08-14 DIAGNOSIS — Z48.02 ENCOUNTER FOR REMOVAL OF SUTURES: ICD-10-CM

## 2023-08-14 DIAGNOSIS — S01.81XS FACIAL LACERATION, SEQUELA: Primary | ICD-10-CM

## 2023-08-14 PROCEDURE — 99213 OFFICE O/P EST LOW 20 MIN: CPT | Performed by: INTERNAL MEDICINE

## 2023-08-14 PROCEDURE — 1123F ACP DISCUSS/DSCN MKR DOCD: CPT | Performed by: INTERNAL MEDICINE

## 2023-08-14 PROCEDURE — 3078F DIAST BP <80 MM HG: CPT | Performed by: INTERNAL MEDICINE

## 2023-08-14 PROCEDURE — 3074F SYST BP LT 130 MM HG: CPT | Performed by: INTERNAL MEDICINE

## 2023-08-14 ASSESSMENT — ENCOUNTER SYMPTOMS
DIARRHEA: 0
NAUSEA: 0
SHORTNESS OF BREATH: 0
ABDOMINAL PAIN: 0
SORE THROAT: 0
VOMITING: 0
EYE PAIN: 0
COUGH: 0
RHINORRHEA: 1
BLOOD IN STOOL: 0
CHEST TIGHTNESS: 0
CONSTIPATION: 0

## 2023-08-14 ASSESSMENT — PATIENT HEALTH QUESTIONNAIRE - PHQ9: DEPRESSION UNABLE TO ASSESS: FUNCTIONAL CAPACITY MOTIVATION LIMITS ACCURACY

## 2023-08-14 NOTE — PROGRESS NOTES
[facility]) Physicians   Internal Medicine     2023  Ramon Campuzano : 1940 Sex: male  Age:82 y.o. Chief Complaint   Patient presents with    Follow-Up from [facility] and got stitches in his forehead. HPI:   Patient presents to office for evaluation of the following medical concerns. -  was up in the middle of the night to use bathroom.  does not remember what happened but face was in the wall. Went to ER () -fall coming out of the bathroom hitting head on the door frame causing laceration, no loss of consciousness, CT head no intracranial abnormality, ER treatment tetanus Td, laceration treated with sutures #6. No issues since ER. No lightheaded or dizzy, No headaches. No vision changes. No drainage or erythema, Needs sutures removed, placed 6 days ago (2023)     -  has been having increased lower extremity edema. No pain. No ulcers, wounds, or draining. No erythema. No fever or chills. NO chest pain, discomfort. No SOB.  has been sleeping in chair with legs down. -  has been having issus sleeping in the bed.  feels has if cannot get comfortable in the bed. Stats when gets up to use bathroom cannot get back in the bed.  then will sleeps on the couch.  has not been wearing cpap due to these issues. -  has been having lower blood pressure.  has occasional dizziness. ER () -hypotension, blood pressure normal in the ER, CTA chest no evidence for pulmonary embolism no acute pulmonary abnormality noted right renal stones, CBC negative, CMP glucose elevated, troponin negative x2 lactic acid negative blood cultures negative at 8 urinalysis ketones hematuria blood and urine culture were negative. Last visit with cardio per reviewed note cardio (3/23) -stop aspirin continue Plavix continue Eliquis, added midodrine.     - Wife was concerned with weight loss. States wife reached out to neurology.  Was

## 2023-08-22 ENCOUNTER — OFFICE VISIT (OUTPATIENT)
Dept: FAMILY MEDICINE CLINIC | Age: 83
End: 2023-08-22
Payer: MEDICARE

## 2023-08-22 VITALS
BODY MASS INDEX: 25.31 KG/M2 | RESPIRATION RATE: 20 BRPM | HEART RATE: 76 BPM | WEIGHT: 191 LBS | SYSTOLIC BLOOD PRESSURE: 70 MMHG | HEIGHT: 73 IN | DIASTOLIC BLOOD PRESSURE: 40 MMHG | OXYGEN SATURATION: 98 % | TEMPERATURE: 99.4 F

## 2023-08-22 DIAGNOSIS — I26.99 OTHER PULMONARY EMBOLISM WITHOUT ACUTE COR PULMONALE, UNSPECIFIED CHRONICITY (HCC): ICD-10-CM

## 2023-08-22 DIAGNOSIS — R68.89 CHANGE IN WEIGHT: ICD-10-CM

## 2023-08-22 DIAGNOSIS — J44.9 CHRONIC OBSTRUCTIVE PULMONARY DISEASE, UNSPECIFIED COPD TYPE (HCC): ICD-10-CM

## 2023-08-22 DIAGNOSIS — E78.2 MIXED HYPERLIPIDEMIA: ICD-10-CM

## 2023-08-22 DIAGNOSIS — R97.20 ELEVATED PSA: ICD-10-CM

## 2023-08-22 DIAGNOSIS — I10 ESSENTIAL HYPERTENSION: ICD-10-CM

## 2023-08-22 DIAGNOSIS — G47.33 OBSTRUCTIVE SLEEP APNEA SYNDROME: ICD-10-CM

## 2023-08-22 DIAGNOSIS — F33.1 MODERATE EPISODE OF RECURRENT MAJOR DEPRESSIVE DISORDER (HCC): ICD-10-CM

## 2023-08-22 DIAGNOSIS — I50.22 CHF (CONGESTIVE HEART FAILURE), NYHA CLASS I, CHRONIC, SYSTOLIC (HCC): ICD-10-CM

## 2023-08-22 DIAGNOSIS — G20 PARKINSON DISEASE (HCC): ICD-10-CM

## 2023-08-22 DIAGNOSIS — R60.0 LOCALIZED EDEMA: Primary | ICD-10-CM

## 2023-08-22 DIAGNOSIS — I25.10 CORONARY ARTERY DISEASE INVOLVING NATIVE CORONARY ARTERY OF NATIVE HEART WITHOUT ANGINA PECTORIS: ICD-10-CM

## 2023-08-22 DIAGNOSIS — I95.9 HYPOTENSION, UNSPECIFIED HYPOTENSION TYPE: ICD-10-CM

## 2023-08-22 PROBLEM — I25.119 ATHEROSCLEROTIC HEART DISEASE OF NATIVE CORONARY ARTERY WITH UNSPECIFIED ANGINA PECTORIS (HCC): Status: RESOLVED | Noted: 2023-07-26 | Resolved: 2023-08-22

## 2023-08-22 PROCEDURE — 3074F SYST BP LT 130 MM HG: CPT | Performed by: INTERNAL MEDICINE

## 2023-08-22 PROCEDURE — 99214 OFFICE O/P EST MOD 30 MIN: CPT | Performed by: INTERNAL MEDICINE

## 2023-08-22 PROCEDURE — 3078F DIAST BP <80 MM HG: CPT | Performed by: INTERNAL MEDICINE

## 2023-08-22 PROCEDURE — 1123F ACP DISCUSS/DSCN MKR DOCD: CPT | Performed by: INTERNAL MEDICINE

## 2023-08-22 RX ORDER — CLOPIDOGREL BISULFATE 75 MG/1
75 TABLET ORAL DAILY
Qty: 90 TABLET | Refills: 1 | Status: SHIPPED | OUTPATIENT
Start: 2023-08-22

## 2023-08-22 RX ORDER — RASAGILINE 1 MG/1
TABLET ORAL
Qty: 90 TABLET | Refills: 1 | Status: SHIPPED | OUTPATIENT
Start: 2023-08-22

## 2023-08-22 RX ORDER — ATORVASTATIN CALCIUM 40 MG/1
40 TABLET, FILM COATED ORAL NIGHTLY
Qty: 90 TABLET | Refills: 1 | Status: SHIPPED | OUTPATIENT
Start: 2023-08-22

## 2023-08-22 RX ORDER — CARBIDOPA AND LEVODOPA 25; 100 MG/1; MG/1
1 TABLET, EXTENDED RELEASE ORAL NIGHTLY
Qty: 90 TABLET | Refills: 1 | Status: SHIPPED | OUTPATIENT
Start: 2023-08-22

## 2023-08-22 RX ORDER — METOPROLOL SUCCINATE 25 MG/1
25 TABLET, EXTENDED RELEASE ORAL NIGHTLY
Qty: 90 TABLET | Refills: 1 | Status: SHIPPED | OUTPATIENT
Start: 2023-08-22

## 2023-08-22 RX ORDER — MIDODRINE HYDROCHLORIDE 2.5 MG/1
2.5 TABLET ORAL 3 TIMES DAILY
Qty: 270 TABLET | Refills: 1 | Status: SHIPPED | OUTPATIENT
Start: 2023-08-22

## 2023-08-22 RX ORDER — MIRTAZAPINE 15 MG/1
15 TABLET, FILM COATED ORAL NIGHTLY
Qty: 90 TABLET | Refills: 3 | Status: SHIPPED | OUTPATIENT
Start: 2023-08-22

## 2023-08-22 ASSESSMENT — ENCOUNTER SYMPTOMS
ABDOMINAL PAIN: 0
DIARRHEA: 0
SORE THROAT: 0
SHORTNESS OF BREATH: 0
EYE PAIN: 0
RHINORRHEA: 1
COUGH: 0
BLOOD IN STOOL: 0
NAUSEA: 0
VOMITING: 0
CONSTIPATION: 0
CHEST TIGHTNESS: 0

## 2023-08-22 ASSESSMENT — PATIENT HEALTH QUESTIONNAIRE - PHQ9: DEPRESSION UNABLE TO ASSESS: FUNCTIONAL CAPACITY MOTIVATION LIMITS ACCURACY

## 2023-08-22 NOTE — PROGRESS NOTES
[facility]) Physicians   Internal Medicine     2023  Annemarie Hobbs : 1940 Sex: male  Age:82 y.o. Chief Complaint   Patient presents with    Hypertension     3 month f/u    Cholesterol Problem     3 month f/u         HPI:   Patient presents to office for evaluation of the following medical concerns. -  has been having increased lower extremity edema. No pain. No ulcers, wounds, or draining. No erythema. No fever or chills. NO chest pain, discomfort. No SOB.  has been sleeping in chair with legs down. States improved. -  has been having issus sleeping in the bed.  feels has if cannot get comfortable in the bed. Stats when gets up to use bathroom cannot get back in the bed. States then will sleeps on the couch.  has not been wearing cpap due to these issues. -  has been having lower blood pressure.  has occasional dizziness. ER () -hypotension, blood pressure normal in the ER, CTA chest no evidence for pulmonary embolism no acute pulmonary abnormality noted right renal stones. Last visit with cardio per reviewed note cardio (3/23) -stop aspirin continue Plavix continue Eliquis, added midodrine. States not taking     - Wife was concerned with weight loss. States wife reached out to neurology. Was advised to check with PCP. Per weight records appears stable since 2023    - () - cta chest evidence of small volume right pulmonary emboli. Placed on Eliquis. Was referred to hematology. Hyper-coag work-up ordered cbc neg, cmp neg, homcystine neg u7iplzd neg, anticardioli ab neg, donovan neg, factor V neg, mthfr homozygous, Plasminogen Act Inhibitor-1 positive, d-dimer neg, protein c neg, lupus anticoag neg, AT3 neg   US LE (10/22) -No evidence of DVT in either lower extremity. Recommending at least 6 months of eliquis.  Last visit per reviewed report  hem/onc (3/23) D-dimer <200 pulmonary embolism on Eliquis stop Eliquis follow-up 3

## 2023-08-28 ENCOUNTER — HOSPITAL ENCOUNTER (OUTPATIENT)
Dept: OTHER | Age: 83
Setting detail: THERAPIES SERIES
Discharge: HOME | End: 2023-08-28
Payer: MEDICARE

## 2023-09-05 ENCOUNTER — OFFICE VISIT (OUTPATIENT)
Dept: CARDIOLOGY CLINIC | Age: 83
End: 2023-09-05
Payer: MEDICARE

## 2023-09-05 VITALS
WEIGHT: 191.8 LBS | BODY MASS INDEX: 25.42 KG/M2 | RESPIRATION RATE: 18 BRPM | SYSTOLIC BLOOD PRESSURE: 72 MMHG | HEIGHT: 73 IN | DIASTOLIC BLOOD PRESSURE: 46 MMHG | HEART RATE: 99 BPM

## 2023-09-05 DIAGNOSIS — I95.9 HYPOTENSION, UNSPECIFIED HYPOTENSION TYPE: ICD-10-CM

## 2023-09-05 DIAGNOSIS — I25.10 CORONARY ARTERY DISEASE INVOLVING NATIVE CORONARY ARTERY OF NATIVE HEART WITHOUT ANGINA PECTORIS: Primary | ICD-10-CM

## 2023-09-05 PROCEDURE — 1123F ACP DISCUSS/DSCN MKR DOCD: CPT | Performed by: INTERNAL MEDICINE

## 2023-09-05 PROCEDURE — 93000 ELECTROCARDIOGRAM COMPLETE: CPT | Performed by: INTERNAL MEDICINE

## 2023-09-05 PROCEDURE — 99214 OFFICE O/P EST MOD 30 MIN: CPT | Performed by: INTERNAL MEDICINE

## 2023-09-05 PROCEDURE — 3078F DIAST BP <80 MM HG: CPT | Performed by: INTERNAL MEDICINE

## 2023-09-05 PROCEDURE — 3074F SYST BP LT 130 MM HG: CPT | Performed by: INTERNAL MEDICINE

## 2023-09-05 RX ORDER — MIDODRINE HYDROCHLORIDE 5 MG/1
5 TABLET ORAL 3 TIMES DAILY
Qty: 270 TABLET | Refills: 3 | Status: SHIPPED | OUTPATIENT
Start: 2023-09-05

## 2023-09-05 NOTE — PROGRESS NOTES
communicates well, in no distress. NECK:  No masses, trachea is mid position. Supple, full ROM, no JVD or bruits. No palpable thyromegaly or lymphadenopathy. HEART:  Distant to auscultation. Regular rate and rhythm. Normal S1 and S2. There is an S4 gallop and a I/VI (physiologic) systolic murmur. LUNGS: Poor air movement. Clear to auscultation bilaterally. No use of accessory muscles. symmetrical excursion. ABDOMEN:  Soft, non-tender. Normal bowel sounds. EXTREMITIES:  Full ROM x 4.   bilateral lower extremity edema on exam.  Good distal pulses. EYES:  Extraocular muscles intact. PERRL. Normal lids & conjunctiva. ENT:  Nares are clear & not bleeding. Moist mucosa. Normal lips formation. No external masses   NEURO: right upper extremity tremor is present, full ROM x 4, EOMI. SKIN:  Warm, dry and intact. Normal turgor. EKG: Sinus rhythm, 99 bpm, nl axis, nonspecific ST - T wave changes. q-waves V1-V3. Assessment:   Coronary artery disease as outlined above. No symptoms of recurring ischemia at this time. ischemic cardiomyopathy as outlined above. Clinically euvolemic and no decompensation at this time. Lower extremity edema but not total body volume overloaded. Hypertension, now too low. Trace aortic regurgitation. None seen on echo 4-18 by Dr. Isra Carreon. Hypercholesterolemia  LINETTE  Former smoker & now uses smokeless tobacco.  TIA 4-18. Has since followed with his son whom he states is a Neurologist in [place] and Dr. Funmi Ponce in [place]. COPD with chronic dyspnea on exertion. He denies dyspnea this visit. Parkinson's  He now has hypotension and orthostatic hypotension symptoms. But his wife states that at home it has not been this low and he has not had any lightheadedness until just today. Recommendations:  He is following the cholesterol with Dr. Rimma Moreno. He does drink coffee. I discussed with him the need to stop caffeine.   I discussed with him the
No lesions; no rash

## 2023-09-06 ENCOUNTER — TELEPHONE (OUTPATIENT)
Dept: CARDIOLOGY CLINIC | Age: 83
End: 2023-09-06

## 2023-09-06 NOTE — TELEPHONE ENCOUNTER
Received fax from [address] advising of a possible interaction between Midodrine HCL and Rasagiline re: headache, hyperpyrexia and hypertension.

## 2023-09-11 NOTE — TELEPHONE ENCOUNTER
Spoke with patient's wife. She states patient has been taking Midodrine 2.5 mg with the Rasagiline for more than (6) months with no side effects. She just spent over $300 on the Midodrine between the local pharmacy and the mail order.

## 2023-09-11 NOTE — TELEPHONE ENCOUNTER
Okay with me to continue the midodrine. Chest let us know if he develops any headaches or fevers. Dorota Lino

## 2023-12-05 ENCOUNTER — OFFICE VISIT (OUTPATIENT)
Dept: FAMILY MEDICINE CLINIC | Age: 83
End: 2023-12-05
Payer: MEDICARE

## 2023-12-05 VITALS
HEART RATE: 79 BPM | DIASTOLIC BLOOD PRESSURE: 66 MMHG | TEMPERATURE: 98.2 F | HEIGHT: 73 IN | SYSTOLIC BLOOD PRESSURE: 96 MMHG | RESPIRATION RATE: 18 BRPM | HEIGHT: 73 IN | RESPIRATION RATE: 18 BRPM | WEIGHT: 195 LBS | BODY MASS INDEX: 25.84 KG/M2 | SYSTOLIC BLOOD PRESSURE: 96 MMHG | WEIGHT: 195 LBS | OXYGEN SATURATION: 95 % | OXYGEN SATURATION: 95 % | BODY MASS INDEX: 25.84 KG/M2 | TEMPERATURE: 98.2 F | DIASTOLIC BLOOD PRESSURE: 66 MMHG | HEART RATE: 79 BPM

## 2023-12-05 DIAGNOSIS — E55.9 VITAMIN D DEFICIENCY: ICD-10-CM

## 2023-12-05 DIAGNOSIS — R60.0 LOCALIZED EDEMA: ICD-10-CM

## 2023-12-05 DIAGNOSIS — Z79.899 LONG TERM CURRENT USE OF THERAPEUTIC DRUG: ICD-10-CM

## 2023-12-05 DIAGNOSIS — R68.89 CHANGE IN WEIGHT: ICD-10-CM

## 2023-12-05 DIAGNOSIS — I25.10 CORONARY ARTERY DISEASE INVOLVING NATIVE CORONARY ARTERY OF NATIVE HEART WITHOUT ANGINA PECTORIS: ICD-10-CM

## 2023-12-05 DIAGNOSIS — I95.9 HYPOTENSION, UNSPECIFIED HYPOTENSION TYPE: ICD-10-CM

## 2023-12-05 DIAGNOSIS — G47.33 OBSTRUCTIVE SLEEP APNEA SYNDROME: ICD-10-CM

## 2023-12-05 DIAGNOSIS — Z00.00 MEDICARE ANNUAL WELLNESS VISIT, SUBSEQUENT: Primary | ICD-10-CM

## 2023-12-05 DIAGNOSIS — K59.09 OTHER CONSTIPATION: ICD-10-CM

## 2023-12-05 DIAGNOSIS — J44.9 CHRONIC OBSTRUCTIVE PULMONARY DISEASE, UNSPECIFIED COPD TYPE (HCC): ICD-10-CM

## 2023-12-05 DIAGNOSIS — R73.01 IMPAIRED FASTING GLUCOSE: ICD-10-CM

## 2023-12-05 DIAGNOSIS — F33.1 MODERATE EPISODE OF RECURRENT MAJOR DEPRESSIVE DISORDER (HCC): ICD-10-CM

## 2023-12-05 DIAGNOSIS — M25.562 ACUTE PAIN OF LEFT KNEE: ICD-10-CM

## 2023-12-05 DIAGNOSIS — R97.20 ELEVATED PSA: ICD-10-CM

## 2023-12-05 DIAGNOSIS — I26.99 OTHER PULMONARY EMBOLISM WITHOUT ACUTE COR PULMONALE, UNSPECIFIED CHRONICITY (HCC): ICD-10-CM

## 2023-12-05 DIAGNOSIS — E78.2 MIXED HYPERLIPIDEMIA: ICD-10-CM

## 2023-12-05 DIAGNOSIS — I50.22 CHF (CONGESTIVE HEART FAILURE), NYHA CLASS I, CHRONIC, SYSTOLIC (HCC): ICD-10-CM

## 2023-12-05 DIAGNOSIS — G20.A1 PARKINSON'S DISEASE, UNSPECIFIED WHETHER DYSKINESIA PRESENT, UNSPECIFIED WHETHER MANIFESTATIONS FLUCTUATE: ICD-10-CM

## 2023-12-05 DIAGNOSIS — Z23 NEED FOR INFLUENZA VACCINATION: Primary | ICD-10-CM

## 2023-12-05 DIAGNOSIS — R53.83 OTHER FATIGUE: ICD-10-CM

## 2023-12-05 DIAGNOSIS — I10 ESSENTIAL HYPERTENSION: ICD-10-CM

## 2023-12-05 PROCEDURE — 99214 OFFICE O/P EST MOD 30 MIN: CPT | Performed by: INTERNAL MEDICINE

## 2023-12-05 PROCEDURE — 3078F DIAST BP <80 MM HG: CPT | Performed by: INTERNAL MEDICINE

## 2023-12-05 PROCEDURE — 90694 VACC AIIV4 NO PRSRV 0.5ML IM: CPT | Performed by: INTERNAL MEDICINE

## 2023-12-05 PROCEDURE — 3074F SYST BP LT 130 MM HG: CPT | Performed by: INTERNAL MEDICINE

## 2023-12-05 PROCEDURE — 1123F ACP DISCUSS/DSCN MKR DOCD: CPT | Performed by: INTERNAL MEDICINE

## 2023-12-05 PROCEDURE — G0439 PPPS, SUBSEQ VISIT: HCPCS | Performed by: INTERNAL MEDICINE

## 2023-12-05 ASSESSMENT — PATIENT HEALTH QUESTIONNAIRE - PHQ9
SUM OF ALL RESPONSES TO PHQ QUESTIONS 1-9: 7
5. POOR APPETITE OR OVEREATING: 1
SUM OF ALL RESPONSES TO PHQ QUESTIONS 1-9: 6
SUM OF ALL RESPONSES TO PHQ9 QUESTIONS 1 & 2: 1
2. FEELING DOWN, DEPRESSED OR HOPELESS: 0
7. TROUBLE CONCENTRATING ON THINGS, SUCH AS READING THE NEWSPAPER OR WATCHING TELEVISION: 1
3. TROUBLE FALLING OR STAYING ASLEEP: 0
1. LITTLE INTEREST OR PLEASURE IN DOING THINGS: 1
9. THOUGHTS THAT YOU WOULD BE BETTER OFF DEAD, OR OF HURTING YOURSELF: 1
10. IF YOU CHECKED OFF ANY PROBLEMS, HOW DIFFICULT HAVE THESE PROBLEMS MADE IT FOR YOU TO DO YOUR WORK, TAKE CARE OF THINGS AT HOME, OR GET ALONG WITH OTHER PEOPLE: 2
SUM OF ALL RESPONSES TO PHQ QUESTIONS 1-9: 7
4. FEELING TIRED OR HAVING LITTLE ENERGY: 2
6. FEELING BAD ABOUT YOURSELF - OR THAT YOU ARE A FAILURE OR HAVE LET YOURSELF OR YOUR FAMILY DOWN: 1
SUM OF ALL RESPONSES TO PHQ QUESTIONS 1-9: 7
8. MOVING OR SPEAKING SO SLOWLY THAT OTHER PEOPLE COULD HAVE NOTICED. OR THE OPPOSITE, BEING SO FIGETY OR RESTLESS THAT YOU HAVE BEEN MOVING AROUND A LOT MORE THAN USUAL: 0

## 2023-12-05 ASSESSMENT — ENCOUNTER SYMPTOMS
RHINORRHEA: 1
SHORTNESS OF BREATH: 0
NAUSEA: 0
DIARRHEA: 0
COUGH: 0
EYE PAIN: 0
CONSTIPATION: 0
BLOOD IN STOOL: 0
VOMITING: 0
CHEST TIGHTNESS: 0
ABDOMINAL PAIN: 0
SORE THROAT: 0

## 2023-12-05 ASSESSMENT — COLUMBIA-SUICIDE SEVERITY RATING SCALE - C-SSRS
2. HAVE YOU ACTUALLY HAD ANY THOUGHTS OF KILLING YOURSELF?: NO
4. HAVE YOU HAD THESE THOUGHTS AND HAD SOME INTENTION OF ACTING ON THEM?: NO
1. WITHIN THE PAST MONTH, HAVE YOU WISHED YOU WERE DEAD OR WISHED YOU COULD GO TO SLEEP AND NOT WAKE UP?: NO
6. HAVE YOU EVER DONE ANYTHING, STARTED TO DO ANYTHING, OR PREPARED TO DO ANYTHING TO END YOUR LIFE?: NO
7. DID THIS OCCUR IN THE LAST THREE MONTHS: NO
5. HAVE YOU STARTED TO WORK OUT OR WORKED OUT THE DETAILS OF HOW TO KILL YOURSELF? DO YOU INTEND TO CARRY OUT THIS PLAN?: NO
BASED ON RESPONSES TO C-SSRS QS 1-6, WHAT IS THE PATIENT'S OVERALL RISK RATING FOR SUICIDE: NO RISK
3. HAVE YOU BEEN THINKING ABOUT HOW YOU MIGHT KILL YOURSELF?: NO

## 2023-12-05 ASSESSMENT — LIFESTYLE VARIABLES
HOW OFTEN DO YOU HAVE A DRINK CONTAINING ALCOHOL: NEVER
HOW MANY STANDARD DRINKS CONTAINING ALCOHOL DO YOU HAVE ON A TYPICAL DAY: PATIENT DOES NOT DRINK

## 2023-12-05 NOTE — PATIENT INSTRUCTIONS
from saturated fat and ideally 0% from trans fat. On an 1800-calorie diet, this translates into less than 14 grams of saturated fat per day, leaving 46 grams of fat to come from mono- and polyunsaturated fats.    Food Choices on a Heart Healthy Diet   Food Category   Foods Recommended   Foods to Avoid   Grains   Breads and rolls without salted tops Most dry and cooked cereals Unsalted crackers and breadsticks Low-sodium or homemade breadcrumbs or stuffing All rice and pastas   Breads, rolls, and crackers with salted tops High-fat baked goods (eg, muffins, donuts, pastries) Quick breads, self-rising flour, and biscuit mixes Regular bread crumbs Instant hot cereals Commercially prepared rice, pasta, or stuffing mixes   Vegetables   Most fresh, frozen, and low-sodium canned vegetables Low-sodium and salt-free vegetable juices Canned vegetables if unsalted or rinsed   Regular canned vegetables and juices, including sauerkraut and pickled vegetables Frozen vegetables with sauces Commercially prepared potato and vegetable mixes   Fruits   Most fresh, frozen, and canned fruits All fruit juices   Fruits processed with salt or sodium   Milk   Nonfat or low-fat (1%) milk Nonfat or low-fat yogurt Cottage cheese, low-fat ricotta, cheeses labeled as low-fat and low-sodium   Whole milk Reduced-fat (2%) milk Malted and chocolate milk Full fat yogurt Most cheeses (unless low-fat and low salt) Buttermilk (no more than 1 cup per week)   Meats and Beans   Lean cuts of fresh or frozen beef, veal, lamb, or pork (look for the word loin) Fresh or frozen poultry without the skin Fresh or frozen fish and some shellfish Egg whites and egg substitutes (Limit whole eggs to three per week) Tofu Nuts or seeds (unsalted, dry-roasted), low-sodium peanut butter Dried peas, beans, and lentils   Any smoked, cured, salted, or canned meat, fish, or poultry (including fleming, chipped beef, cold cuts, hot dogs, sausages, sardines, and anchovies) [employer]

## 2023-12-05 NOTE — PROGRESS NOTES
Medicare Annual Wellness Visit    Rachel Bales is here for Medicare AWV    Assessment & Plan   Medicare annual wellness visit, subsequent      Health Maintenance   - immunizations:   Influenza Vaccination - (2021), (2022), (2023)   Pneumonia Vaccination - (9/20219) - prevnar 13, (7/2021) - pneumococcal 23   Zoster/Shingles Vaccine - (2016) - zostavax, (11/2022) - shingrix #1  Tetanus Vaccination  covid (1/2021) #1, (2/12/2021) #2, (11/21/2021) # 3 - pfizer     - Screenings:   Colonoscopy (2013) - normal, (5/21) - diverticulosis, otherwise neg f/u 10yrs  Prostate     Recommendations for Preventive Services Due: see orders and patient instructions/AVS.  Recommended screening schedule for the next 5-10 years is provided to the patient in written form: see Patient Instructions/AVS.     Return for Medicare Annual Wellness Visit in 1 year. Subjective       Patient's complete Health Risk Assessment and screening values have been reviewed and are found in Flowsheets. The following problems were reviewed today and where indicated follow up appointments were made and/or referrals ordered. Positive Risk Factor Screenings with Interventions:    Fall Risk:  Do you feel unsteady or are you worried about falling? : (!) yes  2 or more falls in past year?: (!) yes  Fall with injury in past year?: (!) yes     Interventions: Will refer back to PT   See AVS for additional education material     Depression:  PHQ-2 Score: 1  PHQ-9 Total Score: 7    Interpretation:   1-4 = minimal  5-9 = mild  10-14 = moderate  15-19 = moderately severe  20-27 = severe  Interventions:   On remeron   See AVS for additional education material           Social and Emotional Support:  Do you get the social and emotional support that you need?: (!) No    Interventions:  See AVS for additional education material    Weight and Activity:  Physical Activity: Inactive (12/5/2023)    Exercise Vital Sign     Days of Exercise per Week: 0 days

## 2023-12-05 NOTE — PROGRESS NOTES
[facility]) Physicians   Internal Medicine     2023  Chavez Ornelas : 1940 Sex: male  Age:82 y.o. Chief Complaint   Patient presents with    Hypertension     3 month f/u    Cholesterol Problem     3 month f/u     Other     Left leg feels 50lbs heavier than the right. HPI:   Patient presents to office for evaluation of the following medical concerns. -  has been havign knee pain. States feels left leg feels heavier than right leg.     -  has been having increased lower extremity edema. No pain. No ulcers, wounds, or draining. No erythema. No fever or chills. NO chest pain, discomfort. No SOB.  has been sleeping in chair with legs down. States improved. -  has been having issus sleeping in the bed. States feels has if cannot get comfortable in the bed. Stats when gets up to use bathroom cannot get back in the bed. States then will sleeps on the couch.  has not been wearing cpap due to these issues. -  has been having lower blood pressure.  has occasional dizziness. ER () -hypotension, blood pressure normal in the ER, CTA chest no evidence for pulmonary embolism no acute pulmonary abnormality noted right renal stones. Last visit with cardio per reviewed note cardio (3/23) -stop aspirin continue Plavix continue Eliquis, added midodrine. States not taking     - Wife was concerned with weight loss. \[facility] [place] [facility]\"" wife reached out to neurology. Was advised to check with PCP. Per weight records appears stable since 2023    - () - cta chest evidence of small volume right pulmonary emboli. Placed on Eliquis. Was referred to hematology. Hyper-coag work-up ordered cbc neg, cmp neg, homcystine neg b1yespd neg, anticardioli ab neg, donovan neg, factor V neg, mthfr homozygous, Plasminogen Act Inhibitor-1 positive, d-dimer neg, protein c neg, lupus anticoag neg, AT3 neg   US LE (10/22) -No evidence of DVT in either lower extremity.

## 2023-12-07 ENCOUNTER — CARE COORDINATION (OUTPATIENT)
Dept: CARE COORDINATION | Age: 83
End: 2023-12-07

## 2023-12-07 ENCOUNTER — TELEPHONE (OUTPATIENT)
Dept: PHARMACY | Facility: CLINIC | Age: 83
End: 2023-12-07

## 2023-12-07 NOTE — CARE COORDINATION
ACM contacted Lucy Mistry (wife) for PCP referral to care coordination for Walter Post. Lucy Mistry is agreeable to care coordination and requested to complete enrollment at a later time. ACM will update assigned ACM.

## 2023-12-07 NOTE — TELEPHONE ENCOUNTER
POPULATION HEALTH CLINICAL PHARMACY: ADHERENCE REVIEW  Identified care gap per [employer]: fills at [facility]: Statin adherence    ASSESSMENT   [address] Records claims through 23 (Prior Year [address] = not reported; YTD [address] = FIRST FILL; Potential Fail Date: 23):   ATORVASTATIN TAB 40MG last filled on 23 for 90 day supply. Next refill due: 23    Prescribed si tablet/capsule daily    Per Reconcile Dispense History: last 2 fills 90ds atorvastatin 80mg daily 10/5/22, atorvastatin 40mg daily 23    Per chart, should have refill remaining  *appears rx had been atorvastatin 80mg tablet, marked as \"taking differently\" as 1/2 tablet daily, then PCP changed to atorvastatin 40mg tablet daily with 23 OV  - per 23 review, wife reported having 3 bottles of 80mg tabs on hand    Lab Results   Component Value Date    CHOL 87 2019    TRIG 46 2019    HDL 58 2023    LDLCHOLESTEROL 45 2023    LDLCALC 54 2023     ALT   Date Value Ref Range Status   2023 12 0 - 40 U/L Final     AST   Date Value Ref Range Status   2023 15 0 - 39 U/L Final     The ASCVD Risk score (Valarie DK, et al., 2019) failed to calculate for the following reasons: The 2019 ASCVD risk score is only valid for ages 36 to 78     PLAN  Patient not found in Outcomes MTM    The following are interventions that have been identified:   Patient overdue refilling atorvastatin 40mg daily and active on home medication list.   Per  encounter - may still have 80mg tabs and using them up as 1/2 tab dose? (August rx fill for 40mg tabs likely auto-shipped from provider's order?)    Attempting to reach patient to review. Left message asking for return call.     Last Visit: 23  Next Visit: 3/4/24    Sidra Nicholas, PharmD, [address] [place] [address], toll free: [phone], option

## 2023-12-10 ENCOUNTER — TELEPHONE (OUTPATIENT)
Dept: FAMILY MEDICINE CLINIC | Age: 83
End: 2023-12-10

## 2023-12-10 NOTE — TELEPHONE ENCOUNTER
Please let the patient know that x-ray of the left knee per radiology report was considered negative    X-rays showed knee replacement with hardware in normal alignment with no noted complication    No abnormal joint fluid    Recommend home exercises and consideration of physical therapy    If pain considers would recommend evaluation with orthopedics    Thanks

## 2023-12-11 ENCOUNTER — TELEPHONE (OUTPATIENT)
Dept: FAMILY MEDICINE CLINIC | Age: 83
End: 2023-12-11

## 2023-12-11 NOTE — TELEPHONE ENCOUNTER
Per verbal okay from patient, the results were given to his wife. She verbalized understanding and stated that he is getting better.

## 2023-12-11 NOTE — TELEPHONE ENCOUNTER
Kevon Wilkins is calling in on behalf of Norman Soto, I did not see her on his communication form. She could not get through to the nurses line.     Please call back when able to

## 2023-12-29 ENCOUNTER — OFFICE VISIT (OUTPATIENT)
Dept: FAMILY MEDICINE CLINIC | Age: 83
End: 2023-12-29

## 2023-12-29 VITALS
TEMPERATURE: 98.7 F | BODY MASS INDEX: 24.52 KG/M2 | WEIGHT: 185 LBS | OXYGEN SATURATION: 97 % | HEART RATE: 68 BPM | DIASTOLIC BLOOD PRESSURE: 78 MMHG | HEIGHT: 73 IN | SYSTOLIC BLOOD PRESSURE: 110 MMHG

## 2023-12-29 DIAGNOSIS — S39.012A STRAIN OF LUMBAR REGION, INITIAL ENCOUNTER: ICD-10-CM
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
## 2023-12-29 RX ORDER — BACLOFEN 10 MG/1
10 TABLET ORAL 2 TIMES DAILY
Qty: 30 TABLET | Refills: 0 | Status: SHIPPED | OUTPATIENT
Start: 2023-12-29

## 2023-12-29 NOTE — PROGRESS NOTES
[address] [facility]  [address] [facility] [address]  Dept: [phone]  Dept Fax: [phone]  Loc: [phone]   DATE OF VISIT : 2023      Patient:  Peter Jarrett  Age: 79 y.o.       : 1940      Chief complaint:   Chief Complaint   Patient presents with    Fall     Lower back pain tail bone and lower right side         History of Present Illness     Peter Jarrett is a 79 y.o. male who presented to the clinic today for lower back pain    Patient presents today after a ground-level fall. Was witnessed by spouse. She reports falling backwards onto his tailbone. Denies any head injury or passing out. Reports having lower back pain for the past day. Overall has taken ibuprofen for his pain. Denies any radiating pain. No red flag signs. Imaging shows no gross fractures. Medication List:    Current Outpatient Medications   Medication Sig Dispense Refill    baclofen (LIORESAL) 10 MG tablet Take 1 tablet by mouth 2 times daily 30 tablet 0    midodrine (PROAMATINE) 5 MG tablet Take 1 tablet by mouth 3 times daily 270 tablet 3    rasagiline mesylate 1 MG TABS take 1 tablet by mouth once daily 90 tablet 1    mirtazapine (REMERON) 15 MG tablet Take 1 tablet by mouth nightly 90 tablet 3    atorvastatin (LIPITOR) 40 MG tablet Take 1 tablet by mouth at bedtime 90 tablet 1    metoprolol succinate (TOPROL XL) 25 MG extended release tablet Take 1 tablet by mouth at bedtime 90 tablet 1    carbidopa-levodopa (SINEMET)  MG per tablet Take 1 tablet by mouth 4 times daily At 9am, 1pm, 5pm and at bedtime Prescribed by Chino Hemphill, [facility]) 360 tablet 1    clopidogrel (PLAVIX) 75 MG tablet Take 1 tablet by mouth daily 90 tablet 1    ketoconazole (NIZORAL) 2 % shampoo Apply topically daily as needed.  120 mL 1    nitroGLYCERIN (NITROSTAT) 0.4 MG SL tablet Place 1 tablet under the tongue every 5 minutes

## 2024-01-02 ENCOUNTER — TELEPHONE (OUTPATIENT)
Dept: FAMILY MEDICINE CLINIC | Age: 84
End: 2024-01-02

## 2024-01-02 DIAGNOSIS — S32.010S CLOSED COMPRESSION FRACTURE OF L1 VERTEBRA, SEQUELA: Primary | ICD-10-CM

## 2024-01-02 NOTE — TELEPHONE ENCOUNTER
Please let the patient know that x-ray from urgent care suggested a compression fracture of the lumbar vertebrae level 1    I would recommend evaluation with neurosurgery to see if a procedure to help pain symptoms would be indicated or if just physical therapy and conservative care as needed    Thanks

## 2024-01-02 NOTE — TELEPHONE ENCOUNTER
----- Message from Ramiro Jain MD sent at 1/2/2024  9:13 AM EST -----  Please let patient know that there was a L1 compression fracture and that he should make follow-up appointment with his PCP.

## 2024-01-02 NOTE — TELEPHONE ENCOUNTER
Left message asking patient to call the office back.    No pertinent family history in first degree relatives

## 2024-01-03 ENCOUNTER — CARE COORDINATION (OUTPATIENT)
Dept: CARE COORDINATION | Age: 84
End: 2024-01-03

## 2024-01-03 NOTE — CARE COORDINATION
[facility] contacted Chauncey (spouse) to offer enrollment in care coordination for Darell.  Chauncey states that besides diagnosis of a recent compression fracture he is doing \"fine\".  She has him walking most every day.  Darell is scheduled with Dr. Shanks on Monday to review the result of his back xray.  At this time, Chauncey declines enrollment into care coordination.

## 2024-01-04 ENCOUNTER — TELEPHONE (OUTPATIENT)
Dept: FAMILY MEDICINE CLINIC | Age: 84
End: 2024-01-04

## 2024-01-04 NOTE — TELEPHONE ENCOUNTER
Orders Placed This Encounter   Procedures    Helena Quinn MD, Neurosurgery, [place]     Referral Priority:   Routine     Referral Type:   Eval and Treat     Referral Reason:   Specialty Services Required     Referred to Provider:   Helena Bay MD     Requested Specialty:   Neurosurgery     Number of Visits Requested:   1     Referral placed as above

## 2024-01-04 NOTE — TELEPHONE ENCOUNTER
Nan called back from Dr Metcalf's office. They have patient taking metoprolol succinate 25mg. They did not make a does adjustment and would like patient to stay on 25mg qd. Patient was last seen 9/2/23 - most recent note is in Epic.     I called [facility] back and updated pharmacist     They were filling off Rx that was written 3/3/23.     Dose: 50 mg Route: Oral Frequency: DAILY   Dispense Quantity: 90 tablet Refills: 3          Sig: Take 1 tablet by mouth daily   Patient taking differently: Take 0.5 tablets by mouth daily         Start Date: 03/03/23 End Date: 06/12/23   Discontinued by: Mary Dimas on 6/12/2023 09:44   Reason: DOSE ADJUSTMENT           They cancelled and will fill 25mg going forward.

## 2024-01-04 NOTE — TELEPHONE ENCOUNTER
Spoke w/ wife and she would now like to cancel follow up w/ you on 1/8 and have you place a referral to neurosurgeon. Darell will see whomever you recommend.

## 2024-01-04 NOTE — TELEPHONE ENCOUNTER
Pharmacist at [facility] calling about a duplicate script they received for Darell.      They have been filling your script for Metoprolol 25mg, and have just received a script on 12/28 for Metoprolol 50mg from Dr Metcalf.       I did call the patient and spoke to his wife who has no idea why it was raised, but is willing to give him 50mg if you are agreeable to this.      What is your recommendation?        [facility] call back - [phone] Option 2       Reference number - [id number]

## 2024-01-04 NOTE — TELEPHONE ENCOUNTER
Spoke w/ wife, she had already scheduled an appt for Darell to see you on 1/8 when I called. She would like to keep appt and discuss referral at that time.

## 2024-01-04 NOTE — TELEPHONE ENCOUNTER
Left message for Nan at Dr. Samayoa's office ([phone] ext - [phone]) to clarify the dosage before calling patient's wife.

## 2024-01-04 NOTE — TELEPHONE ENCOUNTER
I would continue with 25 mg.  I would also reach out to cardiology to see what dose they preferred the patient to be on as I could not glean it from their most recent notes

## 2024-02-05 DIAGNOSIS — I25.10 CORONARY ARTERY DISEASE INVOLVING NATIVE CORONARY ARTERY OF NATIVE HEART WITHOUT ANGINA PECTORIS: ICD-10-CM

## 2024-02-05 RX ORDER — CLOPIDOGREL BISULFATE 75 MG/1
75 TABLET ORAL DAILY
Qty: 90 TABLET | Refills: 0 | Status: SHIPPED | OUTPATIENT
Start: 2024-02-05

## 2024-02-05 NOTE — TELEPHONE ENCOUNTER
Last Appointment:  12/5/2023  Future Appointments   Date Time Provider Department Center   3/4/2024  1:00 PM Edin Shanks MD [facility]   4/24/2024  3:40 PM Kelly Vang DO [facility]

## 2024-02-13 NOTE — TELEPHONE ENCOUNTER
Charlene Nash notified and verbalized understanding.
Has been on these medications and appears to be stable    Would recommend also discussing this interaction with neurology and if they would like me to stop the Remeron would recommend a weaning off of this medication    Thanks
Nena Darling from [facility] PT called. He said he was going through the patient's medications. There was a Level1 interaction between the mirtazapine and Rasagiline. Please advise.
No

## 2024-03-18 ENCOUNTER — OFFICE VISIT (OUTPATIENT)
Dept: CARDIOLOGY | Facility: CLINIC | Age: 84
End: 2024-03-18
Payer: MEDICARE

## 2024-03-18 VITALS
WEIGHT: 191 LBS | HEIGHT: 73 IN | BODY MASS INDEX: 25.31 KG/M2 | HEART RATE: 92 BPM | SYSTOLIC BLOOD PRESSURE: 110 MMHG | DIASTOLIC BLOOD PRESSURE: 64 MMHG

## 2024-03-18 DIAGNOSIS — I25.118 ATHEROSCLEROSIS OF CORONARY ARTERY OF NATIVE HEART WITH STABLE ANGINA PECTORIS, UNSPECIFIED VESSEL OR LESION TYPE (CMS-HCC): ICD-10-CM

## 2024-03-18 DIAGNOSIS — I20.89 OTHER FORMS OF ANGINA PECTORIS (CMS-HCC): ICD-10-CM

## 2024-03-18 DIAGNOSIS — E78.2 MIXED HYPERLIPIDEMIA: ICD-10-CM

## 2024-03-18 DIAGNOSIS — R06.02 SHORTNESS OF BREATH: Primary | ICD-10-CM

## 2024-03-18 DIAGNOSIS — Z98.61 HISTORY OF PTCA: ICD-10-CM

## 2024-03-18 PROBLEM — R07.9 CHEST PAIN: Status: ACTIVE | Noted: 2024-03-18

## 2024-03-18 PROCEDURE — 1036F TOBACCO NON-USER: CPT | Performed by: INTERNAL MEDICINE

## 2024-03-18 PROCEDURE — 99204 OFFICE O/P NEW MOD 45 MIN: CPT | Performed by: INTERNAL MEDICINE

## 2024-03-18 PROCEDURE — 1159F MED LIST DOCD IN RCRD: CPT | Performed by: INTERNAL MEDICINE

## 2024-03-18 PROCEDURE — 93000 ELECTROCARDIOGRAM COMPLETE: CPT | Performed by: INTERNAL MEDICINE

## 2024-03-18 RX ORDER — MIRTAZAPINE 15 MG/1
15 TABLET, FILM COATED ORAL NIGHTLY
COMMUNITY
Start: 2015-04-14

## 2024-03-18 RX ORDER — CLOPIDOGREL BISULFATE 75 MG/1
75 TABLET ORAL
COMMUNITY

## 2024-03-18 RX ORDER — CARBIDOPA AND LEVODOPA 25; 100 MG/1; MG/1
6 TABLET, ORALLY DISINTEGRATING ORAL DAILY
COMMUNITY

## 2024-03-18 RX ORDER — ACETAMINOPHEN 500 MG
2000 TABLET ORAL
COMMUNITY

## 2024-03-18 RX ORDER — ATORVASTATIN CALCIUM 40 MG/1
40 TABLET, FILM COATED ORAL
COMMUNITY

## 2024-03-18 RX ORDER — CARBIDOPA AND LEVODOPA 25; 100 MG/1; MG/1
1 TABLET, EXTENDED RELEASE ORAL NIGHTLY
COMMUNITY
Start: 2023-07-05

## 2024-03-18 RX ORDER — METOPROLOL SUCCINATE 25 MG/1
25 TABLET, EXTENDED RELEASE ORAL
COMMUNITY
Start: 2023-06-27

## 2024-03-18 RX ORDER — MULTIVITAMIN
1 TABLET ORAL DAILY
COMMUNITY

## 2024-03-18 ASSESSMENT — ENCOUNTER SYMPTOMS: LIGHT-HEADEDNESS: 1

## 2024-03-18 NOTE — PROGRESS NOTES
Cardiology Consultation- New Consult    Reason for referral: Coronary disease with anginal symptomatology    HPI: Diego Corley is a 83 y.o. male seen in regards to the above.  He is an individual with a longstanding history of coronary disease.  It dates back over 10 years.  Approximately 10 years ago he had classic anginal symptomatology which resulted in transfer from [place] to [facility] for intervention.  It was successful.  5 years after that he had recurrence of symptomatology and his diagnosis was delayed but ultimately he did go back to the [facility] and had yet another intervention.    Recently he has been slowing down due to dyspnea and also some chest discomfort.  It sounds anginal to me.  Very similar to symptoms he had before.  He is hesitant to undergo angiographic evaluation without a diagnostic stress test evaluation first and because of this we recommend noninvasive testing.  He does have advanced Parkinson's disease and his gait is affected.  He will not be able to perform treadmill testing and hence I recommend pharmacologic stress testing with isotope imaging.    It appears he is on appropriate guideline directed therapy for treatment of his blood pressure and lipids.  He is also on Plavix chronically which I believe to be an appropriate intervention.    His son is with him and he offers that historically his Parkinson's disease was treated with midodrine but it was stopped recently and he has no orthostatic hypotension hence we concur that it does not need to be resumed.    I suggested that we reconvene after stress test results become available.  They agree and understand to call if symptoms arise in the interim.      Past Medical History:   He has no past medical history on file.    Surgical History:   He has a past surgical history that includes Carotid stent; Cardiac catheterization; Partial knee arthroplasty; and Multiple tooth extractions.    Family History:   Family  "History   Problem Relation Name Age of Onset    No Known Problems Mother      No Known Problems Father         Social History:   Social History     Tobacco Use    Smoking status: Former     Types: Cigarettes    Smokeless tobacco: Former   Substance Use Topics    Alcohol use: Not Currently     Comment: social        Allergies:  Lisinopril     Current Medications:    Current Outpatient Medications:     atorvastatin (Lipitor) 40 mg tablet, Take 1 tablet (40 mg) by mouth once daily., Disp: , Rfl:     carbidopa-levodopa (Parcopa)  mg disintegrating tablet, Take 6 tablets by mouth once daily. 1 AM, 2 mid morning, 2 lunch, 1 dinner, Disp: , Rfl:     carbidopa-levodopa (Sinemet CR)  mg ER tablet, Take 1 tablet by mouth once daily at bedtime., Disp: , Rfl:     cholecalciferol (Vitamin D-3) 50 mcg (2,000 unit) capsule, Take 1 capsule (50 mcg) by mouth once daily., Disp: , Rfl:     clopidogrel (Plavix) 75 mg tablet, Take 1 tablet (75 mg) by mouth once daily., Disp: , Rfl:     cyanocobalamin (Vitamin B-12) 50 mcg tablet, Take 1 tablet (50 mcg) by mouth once daily., Disp: , Rfl:     metoprolol succinate XL (Toprol-XL) 25 mg 24 hr tablet, Take 1 tablet (25 mg) by mouth once daily., Disp: , Rfl:     mirtazapine (Remeron) 15 mg tablet, Take 1 tablet (15 mg) by mouth once daily at bedtime., Disp: , Rfl:     multivitamin tablet, Take 1 tablet by mouth once daily., Disp: , Rfl:      Vitals:  Vitals:    03/18/24 1047   BP: 120/70   BP Location: Right arm   Pulse: 92   Weight: 86.6 kg (191 lb)   Height: 1.854 m (6' 1\")        EKG done in office today   Review of Systems   Neurological:  Positive for light-headedness.   All other systems reviewed and are negative.      Objective         Physical Exam  Constitutional:       Appearance: Normal appearance.   HENT:      Nose: Nose normal.   Neck:      Vascular: No carotid bruit.   Cardiovascular:      Rate and Rhythm: Normal rate.      Pulses: Normal pulses.      Heart sounds: " Normal heart sounds.   Pulmonary:      Effort: Pulmonary effort is normal.   Abdominal:      General: Bowel sounds are normal.      Palpations: Abdomen is soft.   Musculoskeletal:         General: Normal range of motion.      Cervical back: Normal range of motion.      Right lower leg: No edema.      Left lower leg: No edema.   Skin:     General: Skin is warm and dry.   Neurological:      General: No focal deficit present.      Mental Status: He is alert.   Psychiatric:         Mood and Affect: Mood normal.         Behavior: Behavior normal.         Thought Content: Thought content normal.         Judgment: Judgment normal.       1. Shortness of breath  I believe the patient's shortness of breath is an anginal cloven symptom.  Because of this diagnostic evaluation is recommended  - ECG 12 Lead  - Nuclear Stress Test; Future    2. Other forms of angina pectoris  As above I believe the patient's chest discomfort is anginal in nature and requires diagnostic evaluation.  We have recommended stress testing with isotope imaging  - Nuclear Stress Test; Future  - Follow Up In Cardiology; Future    3. Atherosclerosis of coronary artery of native heart with stable angina pectoris, unspecified vessel or lesion type (CMS/[facility])  Diagnosis of coronary disease made over 10 years ago necessitating several interventions over the last 10 years.  He is not having recurrence of symptoms.    4. History of PTCA  2 separate angioplasties about 5 years apart with successful resolution of symptoms but now they have recurred.    5. Mixed hyperlipidemia  Adequately controlled.           Scribe Attestation  By signing my name below, IGenesis LPN, Scribe   attest that this documentation has been prepared under the direction and in the presence of Douglas Pulido MD.    Provider Attestation - Scribe documentation    All medical record entries made by the Scribe were at my direction and personally dictated by me. I have reviewed the chart  and agree that the record accurately reflects my personal performance of the history, physical exam, discussion and plan.

## 2024-03-18 NOTE — PATIENT INSTRUCTIONS
Please bring all medicines, vitamins, and herbal supplements with you when you come to the office.    Prescriptions will not be filled unless you are compliant with your follow up appointments or have a follow up appointment scheduled as per instruction of your physician. Refills should be requested at the time of your visit.   Fall Prevention Education Given

## 2024-03-18 NOTE — LETTER
March 18, 2024     Sascha Ferrer DO  [address]  [place] [address]    Patient: Diego Corley   YOB: 1940   Date of Visit: 3/18/2024       Dear Dr. Sascha Ferrer, DO:    Thank you for referring Diego Corley to me for evaluation. Below are my notes for this consultation.  If you have questions, please do not hesitate to call me. I look forward to following your patient along with you.       Sincerely,     Douglas Pulido MD      CC: No Recipients  ______________________________________________________________________________________    Cardiology Consultation- New Consult    Reason for referral: Coronary disease with anginal symptomatology    HPI: Diego Corley is a 83 y.o. male seen in regards to the above.  He is an individual with a longstanding history of coronary disease.  It dates back over 10 years.  Approximately 10 years ago he had classic anginal symptomatology which resulted in transfer from [place] to [facility] for intervention.  It was successful.  5 years after that he had recurrence of symptomatology and his diagnosis was delayed but ultimately he did go back to the [facility] and had yet another intervention.    Recently he has been slowing down due to dyspnea and also some chest discomfort.  It sounds anginal to me.  Very similar to symptoms he had before.  He is hesitant to undergo angiographic evaluation without a diagnostic stress test evaluation first and because of this we recommend noninvasive testing.  He does have advanced Parkinson's disease and his gait is affected.  He will not be able to perform treadmill testing and hence I recommend pharmacologic stress testing with isotope imaging.    It appears he is on appropriate guideline directed therapy for treatment of his blood pressure and lipids.  He is also on Plavix chronically which I believe to be an appropriate intervention.    His son is with him and he offers  that historically his Parkinson's disease was treated with midodrine but it was stopped recently and he has no orthostatic hypotension hence we concur that it does not need to be resumed.    I suggested that we reconvene after stress test results become available.  They agree and understand to call if symptoms arise in the interim.      Past Medical History:   He has no past medical history on file.    Surgical History:   He has a past surgical history that includes Carotid stent; Cardiac catheterization; Partial knee arthroplasty; and Multiple tooth extractions.    Family History:   Family History   Problem Relation Name Age of Onset   • No Known Problems Mother     • No Known Problems Father         Social History:   Social History     Tobacco Use   • Smoking status: Former     Types: Cigarettes   • Smokeless tobacco: Former   Substance Use Topics   • Alcohol use: Not Currently     Comment: social        Allergies:  Lisinopril     Current Medications:    Current Outpatient Medications:   •  atorvastatin (Lipitor) 40 mg tablet, Take 1 tablet (40 mg) by mouth once daily., Disp: , Rfl:   •  carbidopa-levodopa (Parcopa)  mg disintegrating tablet, Take 6 tablets by mouth once daily. 1 AM, 2 mid morning, 2 lunch, 1 dinner, Disp: , Rfl:   •  carbidopa-levodopa (Sinemet CR)  mg ER tablet, Take 1 tablet by mouth once daily at bedtime., Disp: , Rfl:   •  cholecalciferol (Vitamin D-3) 50 mcg (2,000 unit) capsule, Take 1 capsule (50 mcg) by mouth once daily., Disp: , Rfl:   •  clopidogrel (Plavix) 75 mg tablet, Take 1 tablet (75 mg) by mouth once daily., Disp: , Rfl:   •  cyanocobalamin (Vitamin B-12) 50 mcg tablet, Take 1 tablet (50 mcg) by mouth once daily., Disp: , Rfl:   •  metoprolol succinate XL (Toprol-XL) 25 mg 24 hr tablet, Take 1 tablet (25 mg) by mouth once daily., Disp: , Rfl:   •  mirtazapine (Remeron) 15 mg tablet, Take 1 tablet (15 mg) by mouth once daily at bedtime., Disp: , Rfl:   •  multivitamin  "tablet, Take 1 tablet by mouth once daily., Disp: , Rfl:      Vitals:  Vitals:    03/18/24 1047   BP: 120/70   BP Location: Right arm   Pulse: 92   Weight: 86.6 kg (191 lb)   Height: 1.854 m (6' 1\")        EKG done in office today   Review of Systems   Neurological:  Positive for light-headedness.   All other systems reviewed and are negative.      Objective        Physical Exam  Constitutional:       Appearance: Normal appearance.   HENT:      Nose: Nose normal.   Neck:      Vascular: No carotid bruit.   Cardiovascular:      Rate and Rhythm: Normal rate.      Pulses: Normal pulses.      Heart sounds: Normal heart sounds.   Pulmonary:      Effort: Pulmonary effort is normal.   Abdominal:      General: Bowel sounds are normal.      Palpations: Abdomen is soft.   Musculoskeletal:         General: Normal range of motion.      Cervical back: Normal range of motion.      Right lower leg: No edema.      Left lower leg: No edema.   Skin:     General: Skin is warm and dry.   Neurological:      General: No focal deficit present.      Mental Status: He is alert.   Psychiatric:         Mood and Affect: Mood normal.         Behavior: Behavior normal.         Thought Content: Thought content normal.         Judgment: Judgment normal.       1. Shortness of breath  I believe the patient's shortness of breath is an anginal cloven symptom.  Because of this diagnostic evaluation is recommended  - ECG 12 Lead  - Nuclear Stress Test; Future    2. Other forms of angina pectoris  As above I believe the patient's chest discomfort is anginal in nature and requires diagnostic evaluation.  We have recommended stress testing with isotope imaging  - Nuclear Stress Test; Future  - Follow Up In Cardiology; Future    3. 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  He is not having recurrence of symptoms.    4. History of PTCA  2 separate angioplasties about 5 years apart with successful resolution of symptoms but now they have recurred.    5. Mixed hyperlipidemia  Adequately controlled.           Scribe Attestation  By signing my name below, I, Genesis CANALES LPN   , [place]   attest that this documentation has been prepared under the direction and in the presence of Douglas Pulido MD.    Provider Attestation - Scribe documentation    All medical record entries made by the Scribe were at my direction and personally dictated by me. I have reviewed the chart and agree that the record accurately reflects my personal performance of the history, physical exam, discussion and plan.

## 2024-03-29 DIAGNOSIS — G20.A1 PARKINSON DISEASE (HCC): ICD-10-CM

## 2024-03-29 DIAGNOSIS — I25.10 CORONARY ARTERY DISEASE INVOLVING NATIVE CORONARY ARTERY OF NATIVE HEART WITHOUT ANGINA PECTORIS: ICD-10-CM

## 2024-03-29 DIAGNOSIS — E78.2 MIXED HYPERLIPIDEMIA: ICD-10-CM

## 2024-03-29 RX ORDER — RASAGILINE 1 MG/1
TABLET ORAL
Qty: 90 TABLET | Refills: 1 | Status: SHIPPED | OUTPATIENT
Start: 2024-03-29

## 2024-03-29 RX ORDER — ATORVASTATIN CALCIUM 40 MG/1
40 TABLET, FILM COATED ORAL NIGHTLY
Qty: 90 TABLET | Refills: 1 | Status: SHIPPED | OUTPATIENT
Start: 2024-03-29

## 2024-03-29 NOTE — TELEPHONE ENCOUNTER
Last Appointment:  12/5/2023  Future Appointments   Date Time Provider Department Center   4/24/2024  3:40 PM Kelly Vang DO [facility]

## 2024-04-11 ENCOUNTER — HOSPITAL ENCOUNTER (OUTPATIENT)
Dept: RADIOLOGY | Facility: CLINIC | Age: 84
Discharge: HOME | End: 2024-04-11
Payer: MEDICARE

## 2024-04-11 ENCOUNTER — HOSPITAL ENCOUNTER (OUTPATIENT)
Dept: CARDIOLOGY | Facility: CLINIC | Age: 84
Discharge: HOME | End: 2024-04-11
Payer: MEDICARE

## 2024-04-11 VITALS — DIASTOLIC BLOOD PRESSURE: 78 MMHG | HEART RATE: 81 BPM | SYSTOLIC BLOOD PRESSURE: 132 MMHG

## 2024-04-11 DIAGNOSIS — I20.89 OTHER FORMS OF ANGINA PECTORIS (CMS-HCC): ICD-10-CM

## 2024-04-11 DIAGNOSIS — R06.02 SHORTNESS OF BREATH: ICD-10-CM

## 2024-04-11 PROCEDURE — 3430000001 HC RX 343 DIAGNOSTIC RADIOPHARMACEUTICALS: Performed by: INTERNAL MEDICINE

## 2024-04-11 PROCEDURE — 2500000004 HC RX 250 GENERAL PHARMACY W/ HCPCS (ALT 636 FOR OP/ED): Performed by: INTERNAL MEDICINE

## 2024-04-11 PROCEDURE — A9502 TC99M TETROFOSMIN: HCPCS | Performed by: INTERNAL MEDICINE

## 2024-04-11 PROCEDURE — 78452 HT MUSCLE IMAGE SPECT MULT: CPT

## 2024-04-11 PROCEDURE — 93017 CV STRESS TEST TRACING ONLY: CPT

## 2024-04-11 RX ORDER — REGADENOSON 0.08 MG/ML
0.4 INJECTION, SOLUTION INTRAVENOUS ONCE
Status: COMPLETED | OUTPATIENT
Start: 2024-04-11 | End: 2024-04-11

## 2024-04-11 RX ADMIN — TETROFOSMIN 32.4 MILLICURIE: 0.23 INJECTION, POWDER, LYOPHILIZED, FOR SOLUTION INTRAVENOUS at 09:36

## 2024-04-11 RX ADMIN — TETROFOSMIN 9.7 MILLICURIE: 0.23 INJECTION, POWDER, LYOPHILIZED, FOR SOLUTION INTRAVENOUS at 08:30

## 2024-04-11 RX ADMIN — REGADENOSON 0.4 MG: 0.08 INJECTION, SOLUTION INTRAVENOUS at 09:36

## 2024-04-12 ENCOUNTER — TELEPHONE (OUTPATIENT)
Dept: CARDIOLOGY | Facility: CLINIC | Age: 84
End: 2024-04-12
Payer: MEDICARE

## 2024-04-12 NOTE — TELEPHONE ENCOUNTER
Result Communication    Resulted Orders   Nuclear Stress Test    Narrative    Interpreted By:  Margaret Ayala and Beal Gina   STUDY:  MYOCARDIAL PERFUSION STRESS TEST WITH LEXISCAN      Performing facility:  [facility],  [address] [place] [address], [address],  [place] [address]  [facility] Provider:  [facility]ill [facility]  PCP:  Dr. JOANNE LEWIS  Supervising provider:  MORIS Urena DO, [facility]      INDICATION:  SOB, CAD      HISTORY:  Gender:  M; Age:  82 y/o ; Height:  .4 cm cm; Weight:   WT  86.637 kg kg.      CAD;  High Cholesterol;  Previous MI;  Chest Pain;  SOB;  carotid  stents      Quit smoking.      Cardiac catheterization 2012.  PTCA 2012: lad.      COMPARISON:  Previous nuclear testing completed 2015 at [facility].          ACCESSION NUMBER(S):  [id number]      ORDERING CLINICIAN:  AYDE ONEILL      TECHNIQUE:  ONE DAY protocol.  Stress injection: Date:4/11/24, 32.4 mCi of Myoview IV 20 seconds  after rapid injection of Lexiscan. Rest injection: Date: 4/11/24, 9.7  mCi of Myoview IV at rest. The patient had a rapid injection of  0.4  mg of Lexiscan IV over 10 seconds. Imaging was performed by  gated  tomographic technique. Reason for Lexiscan:  Parkinson's      STRESS TEST DATA:  Resting heart rate was 81 BPM.  Resting blood pressure was 132/78 mmHg.  Peak blood pressure was 120/76 mmHg.  Peak heart rate was 96 BPM.      TEST TERMINATED DUE TO:  Protocol completed      FINDINGS:  STRESS TEST RESULTS:      Resting electrocardiogram revealed normal sinus rhythm with  occasional PVCs. There were no significant ischemic ECG changes or  dysrhythmias. The patient did not have chest pains/symptoms during  procedure. There was a normal recovery phase.      IMAGING RESULTS:      Image quality was good.  Rest and stress tomographic images were reviewed and revealed normal  perfusion without evidence of ischemia, myocardial infarction, or  left ventricular  dilatation with stress. Overall left ventricular  systolic function appeared hypokinetic globally Ejection fraction was  44%. TID is 1.01 and is normal.  There were no evidence of  attenuation artifact.        Impression    Normal Lexiscan Myoview cardiac perfusion stress test.  No evidence of ischemia or myocardial infarction by perfusion imaging.  Abnormal Left ventricular systolic function, ejection fraction 44%  but please note the patient was noted to have PVCs throughout the  test which might adversely affect the accuracy of the ejection  fraction calculation. No significant changes noted when compared to  previous study except lower ejection fraction.      Signed by: Margaret Ayala 4/11/2024 4:41 PM  Dictation workstation:   [id number]       11:59 AM      Attempted to phone patient. No answer.  Left detailed message on machine with results and Call office with any questions.

## 2024-04-12 NOTE — TELEPHONE ENCOUNTER
----- Message from Douglas Pluido MD sent at 4/12/2024  8:50 AM EDT -----  Advise patient that stress test shows no blockage.  Suggest continue same medicines.  ----- Message -----  From: Interface, Radiology Results In  Sent: 4/11/2024   4:42 PM EDT  To: Douglas Pulido MD

## 2024-05-13 DIAGNOSIS — I25.10 CORONARY ARTERY DISEASE INVOLVING NATIVE CORONARY ARTERY OF NATIVE HEART WITHOUT ANGINA PECTORIS: ICD-10-CM

## 2024-05-13 RX ORDER — CLOPIDOGREL BISULFATE 75 MG/1
75 TABLET ORAL DAILY
Qty: 90 TABLET | Refills: 0 | Status: SHIPPED | OUTPATIENT
Start: 2024-05-13

## 2024-05-13 NOTE — TELEPHONE ENCOUNTER
Requested Prescriptions     Pending Prescriptions Disp Refills    clopidogrel (PLAVIX) 75 MG tablet 90 tablet 0     Sig: Take 1 tablet by mouth daily     Please schedule follow up

## 2024-09-18 DIAGNOSIS — F33.1 MODERATE EPISODE OF RECURRENT MAJOR DEPRESSIVE DISORDER (HCC): ICD-10-CM

## 2024-09-18 RX ORDER — MIRTAZAPINE 15 MG/1
15 TABLET, FILM COATED ORAL NIGHTLY
Qty: 90 TABLET | Refills: 3 | Status: SHIPPED | OUTPATIENT
Start: 2024-09-18

## 2024-09-19 ENCOUNTER — APPOINTMENT (OUTPATIENT)
Dept: CARDIOLOGY | Facility: CLINIC | Age: 84
End: 2024-09-19
Payer: MEDICARE

## 2024-09-19 VITALS
HEART RATE: 84 BPM | BODY MASS INDEX: 25.92 KG/M2 | HEIGHT: 73 IN | WEIGHT: 195.6 LBS | SYSTOLIC BLOOD PRESSURE: 124 MMHG | DIASTOLIC BLOOD PRESSURE: 82 MMHG

## 2024-09-19 DIAGNOSIS — G20.A1 PARKINSON'S DISEASE, UNSPECIFIED WHETHER DYSKINESIA PRESENT, UNSPECIFIED WHETHER MANIFESTATIONS FLUCTUATE: ICD-10-CM

## 2024-09-19 DIAGNOSIS — R06.02 SHORTNESS OF BREATH: ICD-10-CM

## 2024-09-19 DIAGNOSIS — Z87.891 FORMER SMOKER: ICD-10-CM

## 2024-09-19 DIAGNOSIS — Z98.61 HISTORY OF PTCA: ICD-10-CM

## 2024-09-19 DIAGNOSIS — I20.89 OTHER FORMS OF ANGINA PECTORIS: ICD-10-CM

## 2024-09-19 DIAGNOSIS — E78.2 MIXED HYPERLIPIDEMIA: ICD-10-CM

## 2024-09-19 DIAGNOSIS — I25.118 ATHEROSCLEROSIS OF CORONARY ARTERY OF NATIVE HEART WITH STABLE ANGINA PECTORIS, UNSPECIFIED VESSEL OR LESION TYPE: ICD-10-CM

## 2024-09-19 PROCEDURE — 1159F MED LIST DOCD IN RCRD: CPT | Performed by: INTERNAL MEDICINE

## 2024-09-19 PROCEDURE — 1160F RVW MEDS BY RX/DR IN RCRD: CPT | Performed by: INTERNAL MEDICINE

## 2024-09-19 PROCEDURE — 99213 OFFICE O/P EST LOW 20 MIN: CPT | Performed by: INTERNAL MEDICINE

## 2024-09-19 PROCEDURE — 1036F TOBACCO NON-USER: CPT | Performed by: INTERNAL MEDICINE

## 2024-09-19 RX ORDER — ROTIGOTINE 3 MG/24H
3 PATCH, EXTENDED RELEASE TRANSDERMAL DAILY
COMMUNITY
Start: 2024-08-31

## 2024-09-19 RX ORDER — VIBEGRON 75 MG/1
1 TABLET, FILM COATED ORAL DAILY
COMMUNITY

## 2024-09-19 RX ORDER — RASAGILINE 1 MG/1
1 TABLET ORAL
COMMUNITY
Start: 2024-09-05

## 2024-09-19 RX ORDER — SILODOSIN 8 MG/1
8 CAPSULE ORAL NIGHTLY
COMMUNITY

## 2024-09-19 ASSESSMENT — ENCOUNTER SYMPTOMS: SHORTNESS OF BREATH: 1

## 2024-09-19 NOTE — LETTER
"September 19, 2024     Sascha Ferrer,   [address]  [place] [address]    Patient: Diego Corley   YOB: 1940   Date of Visit: 9/19/2024       Dear Dr. Sascha Ferrer, DO:    Thank you for referring Diego Corley to me for evaluation. Below are my notes for this consultation.  If you have questions, please do not hesitate to call me. I look forward to following your patient along with you.       Sincerely,     Douglas Urena,       CC: No Recipients  ______________________________________________________________________________________    Subjective   Diego Corley is a 83 y.o. male       Chief Complaint    Follow-up          83-year-old gentleman here with his wife for routine follow-up following recent testing and initial evaluation with Dr. Pulido.  His consultation note, imaging results are reviewed in detail.  The patient has advanced Parkinson's disease, his very sedentary at home.  He offers no cardiovascular complaints.  He has had no hospitalizations, heart failure, syncope.  He does have some mild ankle edema bilaterally likely due to his sedentary lifestyle.    Cardiolite Lexiscan stress imaging was completely negative with normal left ventricular function    I discussed case with his son, neurologist, will recommend continue current therapies and follow-up in 1 year         Review of Systems   Respiratory:  Positive for shortness of breath.    All other systems reviewed and are negative.           Vitals:    09/19/24 1447   BP: 124/82   BP Location: Right arm   Patient Position: Sitting   Pulse: 84   Weight: 88.7 kg (195 lb 9.6 oz)   Height: 1.854 m (6' 1\")        Objective   Physical Exam  Constitutional:       Appearance: Normal appearance.   HENT:      Nose: Nose normal.   Neck:      Vascular: No carotid bruit.   Cardiovascular:      Rate and Rhythm: Normal rate.      Pulses: Normal pulses.      Heart sounds: Normal heart sounds. "   Pulmonary:      Effort: Pulmonary effort is normal.   Abdominal:      General: Bowel sounds are normal.      Palpations: Abdomen is soft.   Musculoskeletal:         General: Normal range of motion.      Cervical back: Normal range of motion.      Right lower leg: No edema.      Left lower leg: No edema.   Skin:     General: Skin is warm and dry.   Neurological:      General: No focal deficit present.      Mental Status: He is alert.   Psychiatric:         Mood and Affect: Mood normal.         Behavior: Behavior normal.         Thought Content: Thought content normal.         Judgment: Judgment normal.         Allergies  Lisinopril     Current Medications    Current Outpatient Medications:   •  atorvastatin (Lipitor) 40 mg tablet, Take 1 tablet (40 mg) by mouth once daily., Disp: , Rfl:   •  carbidopa-levodopa (Parcopa)  mg disintegrating tablet, Take 6 tablets by mouth once daily. 1 AM, 2 mid morning, 2 lunch, 1 dinner, Disp: , Rfl:   •  carbidopa-levodopa (Sinemet CR)  mg ER tablet, Take 1 tablet by mouth once daily at bedtime., Disp: , Rfl:   •  cholecalciferol (Vitamin D-3) 50 mcg (2,000 unit) capsule, Take 1 capsule (50 mcg) by mouth once daily., Disp: , Rfl:   •  clopidogrel (Plavix) 75 mg tablet, Take 1 tablet (75 mg) by mouth once daily., Disp: , Rfl:   •  cyanocobalamin (Vitamin B-12) 50 mcg tablet, Take 1 tablet (50 mcg) by mouth once daily., Disp: , Rfl:   •  Gemtesa 75 mg tablet, Take 1 tablet (75 mg) by mouth once daily., Disp: , Rfl:   •  metoprolol succinate XL (Toprol-XL) 25 mg 24 hr tablet, Take 1 tablet (25 mg) by mouth once daily., Disp: , Rfl:   •  mirtazapine (Remeron) 15 mg tablet, Take 1 tablet (15 mg) by mouth once daily at bedtime., Disp: , Rfl:   •  multivitamin tablet, Take 1 tablet by mouth once daily., Disp: , Rfl:   •  Neupro 3 mg/24 hour patch 24 hour, 1 patch (3 mg) once daily., Disp: , Rfl:   •  rasagiline (Azilect) 1 mg tablet, Take 1 tablet (1 mg) by mouth once daily.,  Disp: , Rfl:   •  silodosin (Rapaflo) 8 mg capsule, Take 1 capsule (8 mg) by mouth once daily at bedtime., Disp: , Rfl:                      Assessment/Plan   1. Atherosclerosis of coronary artery of native heart with stable angina pectoris, unspecified vessel or lesion type (CMS-HCC)        2. Parkinson's disease, unspecified whether dyskinesia present, unspecified whether manifestations fluctuate (Multi)        3. Other forms of angina pectoris (CMS-HCC)  Follow Up In Cardiology      4. History of PTCA        5. Mixed hyperlipidemia        6. Shortness of breath        7. BMI 25.0-25.9,adult        8. Former smoker                 Scribe Attestation  By signing my name below, I, Ophelia NORMAN RN   , Scribe   attest that this documentation has been prepared under the direction and in the presence of Douglas Urena DO.     Provider Attestation - Scribe documentation    All medical record entries made by the Scribe were at my direction and personally dictated by me. I have reviewed the chart and agree that the record accurately reflects my personal performance of the history, physical exam, discussion and plan.

## 2024-09-19 NOTE — PROGRESS NOTES
"Subjective   Diego Corley is a 83 y.o. male       Chief Complaint    Follow-up          83-year-old gentleman here with his wife for routine follow-up following recent testing and initial evaluation with Dr. Pulido.  His consultation note, imaging results are reviewed in detail.  The patient has advanced Parkinson's disease, his very sedentary at home.  He offers no cardiovascular complaints.  He has had no hospitalizations, heart failure, syncope.  He does have some mild ankle edema bilaterally likely due to his sedentary lifestyle.    Cardiolite Lexiscan stress imaging was completely negative with normal left ventricular function    I discussed case with his son, neurologist, will recommend continue current therapies and follow-up in 1 year         Review of Systems   Respiratory:  Positive for shortness of breath.    All other systems reviewed and are negative.           Vitals:    09/19/24 1447   BP: 124/82   BP Location: Right arm   Patient Position: Sitting   Pulse: 84   Weight: 88.7 kg (195 lb 9.6 oz)   Height: 1.854 m (6' 1\")        Objective   Physical Exam  Constitutional:       Appearance: Normal appearance.   HENT:      Nose: Nose normal.   Neck:      Vascular: No carotid bruit.   Cardiovascular:      Rate and Rhythm: Normal rate.      Pulses: Normal pulses.      Heart sounds: Normal heart sounds.   Pulmonary:      Effort: Pulmonary effort is normal.   Abdominal:      General: Bowel sounds are normal.      Palpations: Abdomen is soft.   Musculoskeletal:         General: Normal range of motion.      Cervical back: Normal range of motion.      Right lower leg: No edema.      Left lower leg: No edema.   Skin:     General: Skin is warm and dry.   Neurological:      General: No focal deficit present.      Mental Status: He is alert.   Psychiatric:         Mood and Affect: Mood normal.         Behavior: Behavior normal.         Thought Content: Thought content normal.         Judgment: Judgment normal. "         Allergies  Lisinopril     Current Medications    Current Outpatient Medications:     atorvastatin (Lipitor) 40 mg tablet, Take 1 tablet (40 mg) by mouth once daily., Disp: , Rfl:     carbidopa-levodopa (Parcopa)  mg disintegrating tablet, Take 6 tablets by mouth once daily. 1 AM, 2 mid morning, 2 lunch, 1 dinner, Disp: , Rfl:     carbidopa-levodopa (Sinemet CR)  mg ER tablet, Take 1 tablet by mouth once daily at bedtime., Disp: , Rfl:     cholecalciferol (Vitamin D-3) 50 mcg (2,000 unit) capsule, Take 1 capsule (50 mcg) by mouth once daily., Disp: , Rfl:     clopidogrel (Plavix) 75 mg tablet, Take 1 tablet (75 mg) by mouth once daily., Disp: , Rfl:     cyanocobalamin (Vitamin B-12) 50 mcg tablet, Take 1 tablet (50 mcg) by mouth once daily., Disp: , Rfl:     Gemtesa 75 mg tablet, Take 1 tablet (75 mg) by mouth once daily., Disp: , Rfl:     metoprolol succinate XL (Toprol-XL) 25 mg 24 hr tablet, Take 1 tablet (25 mg) by mouth once daily., Disp: , Rfl:     mirtazapine (Remeron) 15 mg tablet, Take 1 tablet (15 mg) by mouth once daily at bedtime., Disp: , Rfl:     multivitamin tablet, Take 1 tablet by mouth once daily., Disp: , Rfl:     Neupro 3 mg/24 hour patch 24 hour, 1 patch (3 mg) once daily., Disp: , Rfl:     rasagiline (Azilect) 1 mg tablet, Take 1 tablet (1 mg) by mouth once daily., Disp: , Rfl:     silodosin (Rapaflo) 8 mg capsule, Take 1 capsule (8 mg) by mouth once daily at bedtime., Disp: , Rfl:                      Assessment/Plan   1. Atherosclerosis of coronary artery of native heart with stable angina pectoris, unspecified vessel or lesion type (CMS-HCC)        2. Parkinson's disease, unspecified whether dyskinesia present, unspecified whether manifestations fluctuate (Multi)        3. Other forms of angina pectoris (CMS-HCC)  Follow Up In Cardiology      4. History of PTCA        5. Mixed hyperlipidemia        6. Shortness of breath        7. BMI 25.0-25.9,adult        8. Former  smoker                 Scribe Attestation  By signing my name below, I, Ophelia NORMAN RN   , Scribe   attest that this documentation has been prepared under the direction and in the presence of Douglas Urena DO.     Provider Attestation - Scribe documentation    All medical record entries made by the Scribe were at my direction and personally dictated by me. I have reviewed the chart and agree that the record accurately reflects my personal performance of the history, physical exam, discussion and plan.

## 2024-12-05 NOTE — TELEPHONE ENCOUNTER
Attempted to contact patient and phone states user is not available. Unable to leave voicemail    Will try again tomorrow    Noted atorvastatin refill approved in other encounter, thank you!    =======================================================   For Pharmacy Admin Tracking Only    CPA in place:  No  Recommendation Provided To: Provider: 1 via Note to Provider and Patient/Caregiver: 1 via Telephone  Intervention Detail: Adherence Monitorin and Refill(s) Provided  Gap Closed?: No   Intervention Accepted By: Provider: 1 and Patient/Caregiver: 0  Time Spent (min): 20

## 2025-09-09 ENCOUNTER — APPOINTMENT (OUTPATIENT)
Dept: CARDIOLOGY | Facility: CLINIC | Age: 85
End: 2025-09-09
Payer: MEDICARE

## (undated) DEVICE — SPHINCTEROTOME: Brand: DREAMTOME™ RX 44

## (undated) DEVICE — CANNULA SEAL

## (undated) DEVICE — GOWN,SIRUS,FABRNF,XL,20/CS: Brand: MEDLINE

## (undated) DEVICE — RETRIEVAL BALLOON CATHETER: Brand: EXTRACTOR™ PRO RX

## (undated) DEVICE — PACK PROCEDURE SURG GEN CUST

## (undated) DEVICE — NEEDLE INSUF L120MM DIA2MM DISP FOR PNEUMOPERI ENDOPATH

## (undated) DEVICE — BLOCK BITE 60FR RUBBER ADLT DENTAL

## (undated) DEVICE — ELECTRODE PT RET AD L9FT HI MOIST COND ADH HYDRGEL CORDED

## (undated) DEVICE — TOWEL,OR,DSP,ST,BLUE,STD,6/PK,12PK/CS: Brand: MEDLINE

## (undated) DEVICE — SOLUTION IRRIG 2000ML 0.9% SOD CHL USP UROMATIC PLAS CONT

## (undated) DEVICE — SUCTION IRRIGATOR: Brand: ENDOWRIST

## (undated) DEVICE — GRADUATE TRIANG MEASURE 1000ML BLK PRNT

## (undated) DEVICE — ARM DRAPE

## (undated) DEVICE — SYSTEM BX CAP BILI RAP EXCHG CAP LOK DEV COMPATIBLE W/ OLY

## (undated) DEVICE — BLADELESS OBTURATOR: Brand: WECK VISTA

## (undated) DEVICE — SCISSORS SURG DIA8MM MPLR CRV ENDOWRIST

## (undated) DEVICE — REAGENT TEST UREASE RAPD CLOTEST F/

## (undated) DEVICE — KIT,ANTI FOG,W/SPONGE & FLUID,SOFT PACK: Brand: MEDLINE

## (undated) DEVICE — PROGRASP FORCEPS: Brand: ENDOWRIST

## (undated) DEVICE — BLADE CLIPPER GEN PURP NS

## (undated) DEVICE — PATIENT RETURN ELECTRODE, SINGLE-USE, CONTACT QUALITY MONITORING, ADULT, WITH 9FT CORD, FOR PATIENTS WEIGING OVER 33LBS. (15KG): Brand: MEGADYNE

## (undated) DEVICE — FORCEPS BX OVL CUP FEN DISPOSABLE CAP L 160CM RAD JAW 4

## (undated) DEVICE — APPLICATOR PREP 26ML 0.7% IOD POVACRYLEX 74% ISO ALC ST

## (undated) DEVICE — INTENDED FOR TISSUE SEPARATION, AND OTHER PROCEDURES THAT REQUIRE A SHARP SURGICAL BLADE TO PUNCTURE OR CUT.: Brand: BARD-PARKER ® STAINLESS STEEL BLADES

## (undated) DEVICE — SOLUTION IV IRRIG POUR BRL 0.9% SODIUM CHL 2F7124

## (undated) DEVICE — WARMER SCP LAP

## (undated) DEVICE — MEDIUM-LARGE CLIP APPLIER: Brand: ENDOWRIST

## (undated) DEVICE — SPONGE GZ W4XL4IN RAYON POLY FILL CVR W/ NONWOVEN FAB

## (undated) DEVICE — DOUBLE BASIN SET: Brand: MEDLINE INDUSTRIES, INC.

## (undated) DEVICE — TIP COVER ACCESSORY

## (undated) DEVICE — SYRINGE 20ML LL S/C 50

## (undated) DEVICE — PERMANENT CAUTERY HOOK: Brand: ENDOWRIST

## (undated) DEVICE — COLUMN DRAPE

## (undated) DEVICE — SKIN AFFIX SURG ADHESIVE 72/CS 0.55ML: Brand: MEDLINE

## (undated) DEVICE — [HIGH FLOW INSUFFLATOR,  DO NOT USE IF PACKAGE IS DAMAGED,  KEEP DRY,  KEEP AWAY FROM SUNLIGHT,  PROTECT FROM HEAT AND RADIOACTIVE SOURCES.]: Brand: PNEUMOSURE

## (undated) DEVICE — TISSUE RETRIEVAL SYSTEM: Brand: INZII RETRIEVAL SYSTEM